# Patient Record
Sex: MALE | Race: BLACK OR AFRICAN AMERICAN | NOT HISPANIC OR LATINO | Employment: OTHER | URBAN - METROPOLITAN AREA
[De-identification: names, ages, dates, MRNs, and addresses within clinical notes are randomized per-mention and may not be internally consistent; named-entity substitution may affect disease eponyms.]

---

## 2017-01-11 ENCOUNTER — OFFICE VISIT (OUTPATIENT)
Dept: NEUROSURGERY | Facility: CLINIC | Age: 82
End: 2017-01-11
Payer: MEDICARE

## 2017-01-11 VITALS
HEART RATE: 69 BPM | TEMPERATURE: 98 F | SYSTOLIC BLOOD PRESSURE: 159 MMHG | WEIGHT: 152 LBS | DIASTOLIC BLOOD PRESSURE: 79 MMHG | HEIGHT: 62 IN | BODY MASS INDEX: 27.97 KG/M2

## 2017-01-11 DIAGNOSIS — G91.2 NPH (NORMAL PRESSURE HYDROCEPHALUS): Primary | ICD-10-CM

## 2017-01-11 PROCEDURE — 99213 OFFICE O/P EST LOW 20 MIN: CPT | Mod: S$GLB,,, | Performed by: NEUROLOGICAL SURGERY

## 2017-01-11 PROCEDURE — 1160F RVW MEDS BY RX/DR IN RCRD: CPT | Mod: S$GLB,,, | Performed by: NEUROLOGICAL SURGERY

## 2017-01-11 PROCEDURE — 1159F MED LIST DOCD IN RCRD: CPT | Mod: S$GLB,,, | Performed by: NEUROLOGICAL SURGERY

## 2017-01-11 PROCEDURE — 1126F AMNT PAIN NOTED NONE PRSNT: CPT | Mod: S$GLB,,, | Performed by: NEUROLOGICAL SURGERY

## 2017-01-11 PROCEDURE — 1157F ADVNC CARE PLAN IN RCRD: CPT | Mod: S$GLB,,, | Performed by: NEUROLOGICAL SURGERY

## 2017-01-11 PROCEDURE — 99999 PR PBB SHADOW E&M-EST. PATIENT-LVL III: CPT | Mod: PBBFAC,,, | Performed by: NEUROLOGICAL SURGERY

## 2017-01-11 NOTE — PROGRESS NOTES
"Subjective:   ISamantha, am scribing for, and in the presence of, Dr. Swapnil Sam.     Patient ID: Atif Neves is a 88 y.o. male.    Chief Complaint: Follow-up    HPI This is an 88-year-old man who presents today for follow up after NM Cisternogram. He reports continued difficulty with imbalance and memory loss since last office visit.     He presents today with a walker.     Review of Systems   Constitutional: Positive for activity change. Negative for fatigue and fever.   HENT: Negative for facial swelling.    Eyes: Negative.    Respiratory: Negative.    Cardiovascular: Negative.    Gastrointestinal: Negative for diarrhea, nausea and vomiting.   Genitourinary: Negative.    Musculoskeletal: Positive for back pain. Negative for joint swelling and myalgias.   Neurological: Negative for seizures, weakness, numbness and headaches.        Imbalance  Memory loss   Psychiatric/Behavioral: Negative.        Past Medical History   Diagnosis Date    JONATAN (acute kidney injury) 06/2016    Anxiety     Arthritis     Glaucoma     Hyperlipemia     Hypertension     Irregular heartbeat     Macular degeneration     Normal pressure hydrocephalus     Urinary retention 06/2016    UTI (urinary tract infection)      Objective:       Visit Vitals    BP (!) 159/79    Pulse 69    Temp 97.8 °F (36.6 °C)    Ht 5' 2" (1.575 m)    Wt 68.9 kg (152 lb)    BMI 27.8 kg/m2     Physical Exam   Constitutional: He is oriented to person, place, and time. He appears well-developed and well-nourished.   HENT:   Head: Normocephalic and atraumatic.   Neck: Neck supple.   Neurological: He is alert and oriented to person, place, and time. No cranial nerve deficit. He displays a negative Romberg sign. GCS eye subscore is 4. GCS verbal subscore is 5. GCS motor subscore is 6.       Imaging:  NM Cisternogram, date 12/15/2016, shows evidence of normal pressure hydrocephalus.    I have personally reviewed the images with the pt.      Dr RANDA. " Swapnil Sam, personally performed the services described in this documentation as scribed by Samantha Meyers in my presence, and it is both accurate and complete.  Assessment:       Normal pressure hydrocephalus.    Plan:   I have reviewed the NM Cisternogram with the patient, which shows evidence of normal pressure hydrocephalus. I have explained the indications of this diagnosis, including imbalance, urinary incontinence, and memory loss. I have recommended implantation of a ventriculoperitoneal shunt for treatment of the normal pressure hydrocephalus. I have explained that this is a relatively safe procedure for healthy individuals and would require an overnight stay in the hospital. The patient would like to further consider this surgery and discuss with his family. I will schedule him follow up in 2 weeks for further discussion.

## 2017-01-11 NOTE — PATIENT INSTRUCTIONS
I have reviewed the NM Cisternogram with the patient, which shows evidence of normal pressure hydrocephalus. I have explained the indications of this diagnosis, including imbalance, urinary incontinence, and memory loss. I have recommended implantation of a ventriculoperitoneal shunt for treatment of the normal pressure hydrocephalus. I have explained that this is a relatively safe procedure for healthy individuals and would require an overnight stay in the hospital. The patient would like to further consider this surgery and discuss with his family. I will schedule him follow up in 2 weeks for further discussion.

## 2017-01-11 NOTE — MR AVS SNAPSHOT
[facility] - Neurosurgery 7th Fl  [address]  [facility] [address]  Phone: [phone]                  Atif Neves   2017 2:45 PM   Office Visit    Description:  Male : 1928   Provider:  Swapnil Sam MD   Department:  [facility] - Neurosurgery 7th Fl           Reason for Visit     Follow-up                To Do List           Future Appointments        Provider Department Dept Phone    2017 10:20 AM Jose Lopez MD [facility] [phone]      Goals (5 Years of Data)     None      [facility]     [facility] -  Assistance  Registered nurses in the [facility] Center provide clinical advisement, health education, appointment booking, and other advisory services.  Call for this free service at [phone].             Medications           Message regarding Medications     Verify the changes and/or additions to your medication regime listed below are the same as discussed with your clinician today.  If any of these changes or additions are incorrect, please notify your healthcare provider.             Verify that the below list of medications is an accurate representation of the medications you are currently taking.  If none reported, the list may be blank. If incorrect, please contact your healthcare provider. Carry this list with you in case of emergency.           Current Medications     amlodipine (NORVASC) 5 MG tablet Take 1 tablet (5 mg total) by mouth once daily.    atorvastatin (LIPITOR) 20 MG tablet Take 20 mg by mouth once daily.    citalopram (CELEXA) 10 MG tablet Take 10 mg by mouth once daily.    dorzolamide-timolol 2-0.5% (COSOPT) 22.3-6.8 mg/mL ophthalmic solution Place 1 drop into the left eye once daily.    ergocalciferol (ERGOCALCIFEROL) 50,000 unit Cap TK ONE C     PO ONCE PER MONTH    polyethylene glycol (GLYCOLAX) 17 gram/dose powder Take 17 g by mouth 2 (two) times daily. Adjust frequency for one small bowel  "movement a day    tamsulosin (FLOMAX) 0.4 mg Cp24 TK ONE C     PO 30 MINUTES AFTER THE SAME MEAL QD           Clinical Reference Information           Vital Signs - Last Recorded  Most recent update: 1/11/2017  2:53 PM by Epifanio Henriquez MA    BP Pulse Temp Ht Wt BMI    (!) 159/79 69 97.8 °F (36.6 °C) 5' 2" (1.575 m) 68.9 kg (152 lb) 27.8 kg/m2      Blood Pressure          Most Recent Value    BP  (!)  159/79      Allergies as of 1/11/2017     Aspirin      Immunizations Administered on Date of Encounter - 1/11/2017     None      Instructions    I have reviewed the NM Cisternogram with the patient, which shows evidence of normal pressure hydrocephalus. I have explained the indications of this diagnosis, including imbalance, urinary incontinence, and memory loss. I have recommended implantation of a ventriculoperitoneal shunt for treatment of the normal pressure hydrocephalus. I have explained that this is a relatively safe procedure for healthy individuals and would require an overnight stay in the hospital. The patient would like to further consider this surgery and discuss with his family. I will schedule him follow up in 2 weeks for further discussion.        "

## 2017-01-11 NOTE — LETTER
January 18, 2017      Atif Christian MD  [address]  [place] [address]           [facility] - Neurosurgery 7th Fl  [address]  [place] [address]  Phone: [phone]          Patient: Atif Neves   MR Number: [id number]   YOB: 1928   Date of Visit: 1/11/2017       Dear Dr. Atif Christian:    Thank you for referring Atif Neves to me for evaluation. Attached you will find relevant portions of my assessment and plan of care.    If you have questions, please do not hesitate to call me. I look forward to following Atif Neves along with you.    Sincerely,    Swapnil Sam MD    Enclosure  CC:  No Recipients    If you would like to receive this communication electronically, please contact [email or web address] or [phone] to request more information on [employer] Link access.    For providers and/or their staff who would like to refer a patient to [facility], please contact us through our one-stop-shop provider referral line, [facility] , at [phone].    If you feel you have received this communication in error or would no longer like to receive these types of communications, please e-mail [email or web address]

## 2017-01-23 RX ORDER — CIPROFLOXACIN 250 MG/1
TABLET, FILM COATED ORAL
Qty: 20 TABLET | Refills: 0 | OUTPATIENT
Start: 2017-01-23

## 2017-01-25 ENCOUNTER — OFFICE VISIT (OUTPATIENT)
Dept: NEUROSURGERY | Facility: CLINIC | Age: 82
End: 2017-01-25
Payer: MEDICARE

## 2017-01-25 ENCOUNTER — OFFICE VISIT (OUTPATIENT)
Dept: UROLOGY | Facility: CLINIC | Age: 82
End: 2017-01-25
Attending: UROLOGY
Payer: MEDICARE

## 2017-01-25 ENCOUNTER — TELEPHONE (OUTPATIENT)
Dept: NEUROSURGERY | Facility: CLINIC | Age: 82
End: 2017-01-25

## 2017-01-25 VITALS
HEIGHT: 62 IN | SYSTOLIC BLOOD PRESSURE: 147 MMHG | BODY MASS INDEX: 27.97 KG/M2 | WEIGHT: 152 LBS | HEART RATE: 80 BPM | DIASTOLIC BLOOD PRESSURE: 74 MMHG

## 2017-01-25 VITALS
HEART RATE: 70 BPM | WEIGHT: 156.31 LBS | BODY MASS INDEX: 28.76 KG/M2 | HEIGHT: 62 IN | DIASTOLIC BLOOD PRESSURE: 75 MMHG | SYSTOLIC BLOOD PRESSURE: 152 MMHG

## 2017-01-25 DIAGNOSIS — G91.2 NPH (NORMAL PRESSURE HYDROCEPHALUS): Primary | ICD-10-CM

## 2017-01-25 DIAGNOSIS — N40.1 BENIGN PROSTATIC HYPERPLASIA WITH LOWER URINARY TRACT SYMPTOMS, UNSPECIFIED MORPHOLOGY: Primary | ICD-10-CM

## 2017-01-25 PROCEDURE — 1157F ADVNC CARE PLAN IN RCRD: CPT | Mod: S$GLB,,, | Performed by: NEUROLOGICAL SURGERY

## 2017-01-25 PROCEDURE — 1126F AMNT PAIN NOTED NONE PRSNT: CPT | Mod: S$GLB,,, | Performed by: NEUROLOGICAL SURGERY

## 2017-01-25 PROCEDURE — 1160F RVW MEDS BY RX/DR IN RCRD: CPT | Mod: S$GLB,,, | Performed by: NURSE PRACTITIONER

## 2017-01-25 PROCEDURE — 1157F ADVNC CARE PLAN IN RCRD: CPT | Mod: S$GLB,,, | Performed by: NURSE PRACTITIONER

## 2017-01-25 PROCEDURE — 99999 PR PBB SHADOW E&M-EST. PATIENT-LVL III: CPT | Mod: PBBFAC,,, | Performed by: UROLOGY

## 2017-01-25 PROCEDURE — 1159F MED LIST DOCD IN RCRD: CPT | Mod: S$GLB,,, | Performed by: NEUROLOGICAL SURGERY

## 2017-01-25 PROCEDURE — 1125F AMNT PAIN NOTED PAIN PRSNT: CPT | Mod: S$GLB,,, | Performed by: NURSE PRACTITIONER

## 2017-01-25 PROCEDURE — 99213 OFFICE O/P EST LOW 20 MIN: CPT | Mod: S$GLB,,, | Performed by: NEUROLOGICAL SURGERY

## 2017-01-25 PROCEDURE — 99213 OFFICE O/P EST LOW 20 MIN: CPT | Mod: S$GLB,,, | Performed by: NURSE PRACTITIONER

## 2017-01-25 PROCEDURE — 99999 PR PBB SHADOW E&M-EST. PATIENT-LVL III: CPT | Mod: PBBFAC,,, | Performed by: NEUROLOGICAL SURGERY

## 2017-01-25 PROCEDURE — 1160F RVW MEDS BY RX/DR IN RCRD: CPT | Mod: S$GLB,,, | Performed by: NEUROLOGICAL SURGERY

## 2017-01-25 PROCEDURE — 1159F MED LIST DOCD IN RCRD: CPT | Mod: S$GLB,,, | Performed by: NURSE PRACTITIONER

## 2017-01-25 NOTE — MR AVS SNAPSHOT
[facility] - Neurosurgery 7th Fl  [address]  [facility] [address]  Phone: [phone]                  Atif Neves   2017 1:00 PM   Office Visit    Description:  Male : 1928   Provider:  Swapnil Sam MD   Department:  [facility] - Neurosurgery [facility]                To Do List           Future Appointments        Provider Department Dept Phone    2017 3:45 PM [facility] MRI2 [facility] [phone]    2017 9:45 AM Jose Lopez MD [facility] [place] [phone]      Goals (5 Years of Data)     None      [facility]     [facility] -  Assistance  Registered nurses in the [facility] provide clinical advisement, health education, appointment booking, and other advisory services.  Call for this free service at [phone].             Medications           Message regarding Medications     Verify the changes and/or additions to your medication regime listed below are the same as discussed with your clinician today.  If any of these changes or additions are incorrect, please notify your healthcare provider.             Verify that the below list of medications is an accurate representation of the medications you are currently taking.  If none reported, the list may be blank. If incorrect, please contact your healthcare provider. Carry this list with you in case of emergency.           Current Medications     amlodipine (NORVASC) 5 MG tablet Take 1 tablet (5 mg total) by mouth once daily.    atorvastatin (LIPITOR) 20 MG tablet Take 20 mg by mouth once daily.    dorzolamide-timolol 2-0.5% (COSOPT) 22.3-6.8 mg/mL ophthalmic solution Place 1 drop into the left eye once daily.    ergocalciferol (ERGOCALCIFEROL) 50,000 unit Cap TK ONE C     PO ONCE PER MONTH    polyethylene glycol (GLYCOLAX) 17 gram/dose powder Take 17 g by mouth 2 (two) times daily. Adjust frequency for one small bowel movement a day    tamsulosin (FLOMAX)  "0.4 mg Cp24 TK ONE C     PO 30 MINUTES AFTER THE SAME MEAL QD    citalopram (CELEXA) 10 MG tablet Take 10 mg by mouth once daily.           Clinical Reference Information           Vital Signs - Last Recorded  Most recent update: 1/25/2017  1:06 PM by Anabel Edmonds RN    BP Pulse Ht Wt BMI    (!) 152/75 70 5' 2" (1.575 m) 70.9 kg (156 lb 4.8 oz) 28.59 kg/m2      Blood Pressure          Most Recent Value    BP  (!)  152/75      Allergies as of 1/25/2017     Aspirin      Immunizations Administered on Date of Encounter - 1/25/2017     None      Instructions    At last visit, I had recommended implantation of a ventriculoperitoneal shunt for treatment of the normal pressure hydrocephalus. At this point, the patient would like to proceed with the surgery. I have explained the timeline of the surgery and its mechanism. I also discussed the timeline of recovery and home health with Physical Therapy. I have discussed the risks/benefits, indications, and alternatives for the proposed procedure in detail.  I have answered all of their questions and the pt wishes to proceed with surgery. We will schedule the pt on February 6, 2017.       "

## 2017-01-25 NOTE — PATIENT INSTRUCTIONS
At last visit, I had recommended implantation of a ventriculoperitoneal shunt for treatment of the normal pressure hydrocephalus. At this point, the patient would like to proceed with the surgery. I have explained the timeline of the surgery and its mechanism. I also discussed the timeline of recovery and home health with Physical Therapy. I have discussed the risks/benefits, indications, and alternatives for the proposed procedure in detail.  I have answered all of their questions and the pt wishes to proceed with surgery. We will schedule the pt on February 6, 2017.

## 2017-01-25 NOTE — PROGRESS NOTES
"Subjective:      Atif Neves is a 88 y.o. male who returns today s/p TURP for urinary retention on 8/31/16. Today he reports that his stream is great, and he is emptying his bladder well. PVR today was 17 mL. He does complain of some stress incontinence and leaking; however he does not find this bothersome. He reports nocturia x2, but overall reports that he is doing well. He has discontinued flomax and finasteride.     The following portions of the patient's history were reviewed and updated as appropriate: allergies, current medications, past family history, past medical history, past social history, past surgical history and problem list.    Review of Systems  A comprehensive multipoint review of systems was negative except as otherwise stated in the HPI.     Objective:   Vitals:   Visit Vitals    BP (!) 147/74 (BP Location: Left arm, Patient Position: Sitting, BP Method: Automatic)    Pulse 80    Ht 5' 2" (1.575 m)    Wt 68.9 kg (152 lb)    BMI 27.8 kg/m2       Physical Exam   General: alert and oriented, no acute distress  Respiratory: Symmetric expansion, non-labored breathing  Cardiovascular: regular rate and rhythm, no peripheral edema  Abdomen: soft, non distended  Genitourinary: not done  not indicated  Rectal: not done  Skin: normal coloration and turgor, no rashes, no suspicious skin lesions noted  Neuro: no gross deficits  Psych: normal judgment and insight, normal mood/affect and non-anxious    Lab Review   Urinalysis demonstrates positive for leukocytes (Trace), protein (trace)  Lab Results   Component Value Date    WBC 9.59 08/23/2016    HGB 13.3 (L) 08/23/2016    HCT 40.8 08/23/2016    MCV 82 08/23/2016     08/23/2016     Lab Results   Component Value Date    CREATININE 0.9 08/08/2016    BUN 10 08/08/2016     No results found for: PSA, PSADIAG    Assessment and Plan:   Atif was seen today for follow-up.    Diagnoses and all orders for this visit:    Benign prostatic hyperplasia with " lower urinary tract symptoms, unspecified morphology      Plan: Patient is doing well postoperatively. Follow up in 6 months. If patient becomes bothered by the leakage/incontinence will consider adding myrbetriq.

## 2017-01-25 NOTE — PROGRESS NOTES
"Subjective:    I, Samantha Meyers, am scribing for, and in the presence of, Dr. Swapnil Sam.     Patient ID: Atif Neves is a 88 y.o. male.    Chief Complaint: No chief complaint on file.    HPI This is a 88-year-old man who presents today for follow up to further discuss surgery. At last office visit, I had recommended implantation of a ventriculoperitoneal shunt for treatment of the normal pressure hydrocephalus and he wanted to further consider this. The patient states that he is doing well today. He was seen by Dr. Lopez of Urology this morning.     Review of Systems   Constitutional: Positive for activity change. Negative for fatigue and fever.   HENT: Negative for facial swelling.    Eyes: Negative.    Respiratory: Negative.    Cardiovascular: Negative.    Gastrointestinal: Negative for diarrhea, nausea and vomiting.   Genitourinary: Negative.    Musculoskeletal: Positive for back pain. Negative for joint swelling and myalgias.   Neurological: Negative for seizures, weakness, numbness and headaches.        Imbalance   Memory loss    Psychiatric/Behavioral: Negative.        Past Medical History   Diagnosis Date    JONATAN (acute kidney injury) 06/2016    Anxiety     Arthritis     Glaucoma     Hyperlipemia     Hypertension     Irregular heartbeat     Macular degeneration     Normal pressure hydrocephalus     Urinary retention 06/2016    UTI (urinary tract infection)      Objective:       Visit Vitals    BP (!) 152/75    Pulse 70    Ht 5' 2" (1.575 m)    Wt 70.9 kg (156 lb 4.8 oz)    BMI 28.59 kg/m2     Physical Exam   Constitutional: He is oriented to person, place, and time. He appears well-developed and well-nourished.   HENT:   Head: Normocephalic and atraumatic.   Neck: Neck supple.   Neurological: He is alert and oriented to person, place, and time. No cranial nerve deficit. He displays a negative Romberg sign. GCS eye subscore is 4. GCS verbal subscore is 5. GCS motor subscore is 6.       I, " Dr. Swapnil Sam, personally performed the services described in this documentation as scribed by Samantha Meyers in my presence, and it is both accurate and complete.  Assessment:       Normal pressure hydrocephalus.    Plan:   At last visit, I had recommended implantation of a ventriculoperitoneal shunt for treatment of the normal pressure hydrocephalus. At this point, the patient would like to proceed with the surgery. I have explained the timeline of the surgery and its mechanism. I also discussed the timeline of recovery and home health with Physical Therapy. I have discussed the risks/benefits, indications, and alternatives for the proposed procedure in detail.  I have answered all of their questions and the pt wishes to proceed with surgery. We will schedule the pt on February 6, 2017.

## 2017-01-25 NOTE — LETTER
January 27, 2017      Atif Christian MD  [address]  [place] [address]           [facility] - Neurosurgery 7th Fl  [address]  [place] [address]  Phone: [phone]          Patient: Atif Neves   MR Number: [id number]   YOB: 1928   Date of Visit: 1/25/2017       Dear Dr. Atif Christian:    Thank you for referring Atif Neves to me for evaluation. Attached you will find relevant portions of my assessment and plan of care.    If you have questions, please do not hesitate to call me. I look forward to following Atif Neves along with you.    Sincerely,    Swapnil Sam MD    Enclosure  CC:  No Recipients    If you would like to receive this communication electronically, please contact [email or web address] or [phone] to request more information on [facility] Link access.    For providers and/or their staff who would like to refer a patient to [facility], please contact us through our one-stop-shop provider referral line, [facility] , at [phone].    If you feel you have received this communication in error or would no longer like to receive these types of communications, please e-mail [email or web address]

## 2017-01-26 ENCOUNTER — TELEPHONE (OUTPATIENT)
Dept: NEUROSURGERY | Facility: CLINIC | Age: 82
End: 2017-01-26

## 2017-01-26 DIAGNOSIS — G91.2 NPH (NORMAL PRESSURE HYDROCEPHALUS): Primary | ICD-10-CM

## 2017-02-02 ENCOUNTER — TELEPHONE (OUTPATIENT)
Dept: NEUROSURGERY | Facility: CLINIC | Age: 82
End: 2017-02-02

## 2017-02-02 ENCOUNTER — HOSPITAL ENCOUNTER (OUTPATIENT)
Dept: RADIOLOGY | Facility: HOSPITAL | Age: 82
Discharge: HOME | End: 2017-02-02
Attending: NEUROLOGICAL SURGERY
Payer: MEDICARE

## 2017-02-02 DIAGNOSIS — G91.2 NPH (NORMAL PRESSURE HYDROCEPHALUS): ICD-10-CM

## 2017-02-02 DIAGNOSIS — D49.6 BRAIN TUMOR: ICD-10-CM

## 2017-02-02 LAB
CREAT SERPL-MCNC: 0.8 MG/DL (ref 0.5–1.4)
SAMPLE: NORMAL

## 2017-02-02 PROCEDURE — 70552 MRI BRAIN STEM W/DYE: CPT | Mod: 26,,, | Performed by: RADIOLOGY

## 2017-02-02 PROCEDURE — 25500020 PHARM REV CODE 255: Performed by: NEUROLOGICAL SURGERY

## 2017-02-02 PROCEDURE — A9585 GADOBUTROL INJECTION: HCPCS | Performed by: NEUROLOGICAL SURGERY

## 2017-02-02 PROCEDURE — 70553 MRI BRAIN STEM W/O & W/DYE: CPT | Mod: 26,,, | Performed by: RADIOLOGY

## 2017-02-02 PROCEDURE — 70552 MRI BRAIN STEM W/DYE: CPT | Mod: TC

## 2017-02-02 PROCEDURE — 70553 MRI BRAIN STEM W/O & W/DYE: CPT | Mod: TC

## 2017-02-02 RX ORDER — GADOBUTROL 604.72 MG/ML
7 INJECTION INTRAVENOUS
Status: COMPLETED | OUTPATIENT
Start: 2017-02-02 | End: 2017-02-02

## 2017-02-02 RX ADMIN — GADOBUTROL 7 ML: 604.72 INJECTION INTRAVENOUS at 05:02

## 2017-02-02 NOTE — TELEPHONE ENCOUNTER
----- Message from Jacqueline Garcia sent at 2/2/2017 12:47 PM CST -----  Contact: colleen(daughter) [phone]  Colleen is calling to speak with nurse regarding pt mri appt.pls call

## 2017-02-03 ENCOUNTER — PATIENT MESSAGE (OUTPATIENT)
Dept: NEUROSURGERY | Facility: CLINIC | Age: 82
End: 2017-02-03

## 2017-02-03 ENCOUNTER — TELEPHONE (OUTPATIENT)
Dept: NEUROSURGERY | Facility: CLINIC | Age: 82
End: 2017-02-03

## 2017-02-03 NOTE — TELEPHONE ENCOUNTER
Spoke with patient's sister. Advised her that patient is to arrive for surgery time at 830, DOSC, NPO after MN on Sunday. Understanding verbalized by patient.

## 2017-02-05 NOTE — H&P
"HPI This is a 88-year-old man who presents today for follow up to further discuss surgery. At last office visit, I had recommended implantation of a ventriculoperitoneal shunt for treatment of the normal pressure hydrocephalus and he wanted to further consider this. The patient states that he is doing well today. He was seen by Dr. Lopez of Urology this morning.      Review of Systems   Constitutional: Positive for activity change. Negative for fatigue and fever.   HENT: Negative for facial swelling.   Eyes: Negative.   Respiratory: Negative.   Cardiovascular: Negative.   Gastrointestinal: Negative for diarrhea, nausea and vomiting.   Genitourinary: Negative.   Musculoskeletal: Positive for back pain. Negative for joint swelling and myalgias.   Neurological: Negative for seizures, weakness, numbness and headaches.   Imbalance   Memory loss    Psychiatric/Behavioral: Negative.            Past Medical History   Diagnosis Date    JONATAN (acute kidney injury) 06/2016    Anxiety      Arthritis      Glaucoma      Hyperlipemia      Hypertension      Irregular heartbeat      Macular degeneration      Normal pressure hydrocephalus      Urinary retention 06/2016    UTI (urinary tract infection)        Objective:            Visit Vitals    BP (!) 152/75    Pulse 70    Ht 5' 2" (1.575 m)    Wt 70.9 kg (156 lb 4.8 oz)    BMI 28.59 kg/m2      Physical Exam   Constitutional: He is oriented to person, place, and time. He appears well-developed and well-nourished.   HENT:   Head: Normocephalic and atraumatic.   Neck: Neck supple.   Neurological: He is alert and oriented to person, place, and time. No cranial nerve deficit. He displays a negative Romberg sign. GCS eye subscore is 4. GCS verbal subscore is 5. GCS motor subscore is 6.       I, Dr. Swapnil Sam, personally performed the services described in this documentation as scribed by Samantha Meyers in my presence, and it is both accurate and complete.  Assessment:     "   Normal pressure hydrocephalus.     Plan:   At last visit, I had recommended implantation of a ventriculoperitoneal shunt for treatment of the normal pressure hydrocephalus. At this point, the patient would like to proceed with the surgery. I have explained the timeline of the surgery and its mechanism. I also discussed the timeline of recovery and home health with Physical Therapy. I have discussed the risks/benefits, indications, and alternatives for the proposed procedure in detail. I have answered all of their questions and the pt wishes to proceed with surgery. We will schedule the pt on February 6, 2017.

## 2017-02-06 ENCOUNTER — ANESTHESIA (OUTPATIENT)
Dept: SURGERY | Facility: HOSPITAL | Age: 82
DRG: 033 | End: 2017-02-06
Payer: MEDICARE

## 2017-02-06 ENCOUNTER — ANESTHESIA EVENT (OUTPATIENT)
Dept: SURGERY | Facility: HOSPITAL | Age: 82
DRG: 033 | End: 2017-02-06
Payer: MEDICARE

## 2017-02-06 ENCOUNTER — SURGERY (OUTPATIENT)
Age: 82
End: 2017-02-06

## 2017-02-06 ENCOUNTER — HOSPITAL ENCOUNTER (INPATIENT)
Facility: HOSPITAL | Age: 82
LOS: 1 days | Discharge: HOME HEALTH | DRG: 033 | End: 2017-02-07
Attending: NEUROLOGICAL SURGERY | Admitting: NEUROLOGICAL SURGERY
Payer: MEDICARE

## 2017-02-06 DIAGNOSIS — G91.2 NPH (NORMAL PRESSURE HYDROCEPHALUS): ICD-10-CM

## 2017-02-06 LAB
ABO + RH BLD: NORMAL
ANION GAP SERPL CALC-SCNC: 7 MMOL/L
APTT BLDCRRT: 23.1 SEC
BASOPHILS # BLD AUTO: 0.03 K/UL
BASOPHILS NFR BLD: 0.4 %
BLD GP AB SCN CELLS X3 SERPL QL: NORMAL
BUN SERPL-MCNC: 12 MG/DL
CALCIUM SERPL-MCNC: 8.7 MG/DL
CHLORIDE SERPL-SCNC: 111 MMOL/L
CO2 SERPL-SCNC: 24 MMOL/L
CREAT SERPL-MCNC: 0.8 MG/DL
DIFFERENTIAL METHOD: ABNORMAL
EOSINOPHIL # BLD AUTO: 0.1 K/UL
EOSINOPHIL NFR BLD: 1.3 %
ERYTHROCYTE [DISTWIDTH] IN BLOOD BY AUTOMATED COUNT: 16.1 %
EST. GFR  (AFRICAN AMERICAN): >60 ML/MIN/1.73 M^2
EST. GFR  (NON AFRICAN AMERICAN): >60 ML/MIN/1.73 M^2
GLUCOSE SERPL-MCNC: 94 MG/DL
HCT VFR BLD AUTO: 36.5 %
HGB BLD-MCNC: 12 G/DL
INR PPP: 0.9
LYMPHOCYTES # BLD AUTO: 1.4 K/UL
LYMPHOCYTES NFR BLD: 19 %
MCH RBC QN AUTO: 25.7 PG
MCHC RBC AUTO-ENTMCNC: 32.9 %
MCV RBC AUTO: 78 FL
MONOCYTES # BLD AUTO: 0.6 K/UL
MONOCYTES NFR BLD: 8 %
NEUTROPHILS # BLD AUTO: 5 K/UL
NEUTROPHILS NFR BLD: 71.2 %
PLATELET # BLD AUTO: 217 K/UL
PMV BLD AUTO: 9.7 FL
POTASSIUM SERPL-SCNC: 3.9 MMOL/L
PROTHROMBIN TIME: 10.2 SEC
RBC # BLD AUTO: 4.67 M/UL
SODIUM SERPL-SCNC: 142 MMOL/L
WBC # BLD AUTO: 7.09 K/UL

## 2017-02-06 PROCEDURE — 63600175 PHARM REV CODE 636 W HCPCS: Performed by: STUDENT IN AN ORGANIZED HEALTH CARE EDUCATION/TRAINING PROGRAM

## 2017-02-06 PROCEDURE — 86900 BLOOD TYPING SEROLOGIC ABO: CPT

## 2017-02-06 PROCEDURE — 63600175 PHARM REV CODE 636 W HCPCS: Performed by: ANESTHESIOLOGY

## 2017-02-06 PROCEDURE — 63600175 PHARM REV CODE 636 W HCPCS: Performed by: NURSE ANESTHETIST, CERTIFIED REGISTERED

## 2017-02-06 PROCEDURE — 80048 BASIC METABOLIC PNL TOTAL CA: CPT

## 2017-02-06 PROCEDURE — 27201423 OPTIME MED/SURG SUP & DEVICES STERILE SUPPLY: Performed by: NEUROLOGICAL SURGERY

## 2017-02-06 PROCEDURE — 85730 THROMBOPLASTIN TIME PARTIAL: CPT

## 2017-02-06 PROCEDURE — 62223 ESTABLISH BRAIN CAVITY SHUNT: CPT | Mod: 62,,, | Performed by: SURGERY

## 2017-02-06 PROCEDURE — 71000039 HC RECOVERY, EACH ADD'L HOUR: Performed by: NEUROLOGICAL SURGERY

## 2017-02-06 PROCEDURE — 37000009 HC ANESTHESIA EA ADD 15 MINS: Performed by: NEUROLOGICAL SURGERY

## 2017-02-06 PROCEDURE — 36000711: Performed by: NEUROLOGICAL SURGERY

## 2017-02-06 PROCEDURE — 86850 RBC ANTIBODY SCREEN: CPT

## 2017-02-06 PROCEDURE — 37000008 HC ANESTHESIA 1ST 15 MINUTES: Performed by: NEUROLOGICAL SURGERY

## 2017-02-06 PROCEDURE — 94761 N-INVAS EAR/PLS OXIMETRY MLT: CPT

## 2017-02-06 PROCEDURE — 25000003 PHARM REV CODE 250: Performed by: NURSE ANESTHETIST, CERTIFIED REGISTERED

## 2017-02-06 PROCEDURE — 61781 SCAN PROC CRANIAL INTRA: CPT | Mod: ,,, | Performed by: NEUROLOGICAL SURGERY

## 2017-02-06 PROCEDURE — 25000003 PHARM REV CODE 250: Performed by: NEUROLOGICAL SURGERY

## 2017-02-06 PROCEDURE — C1729 CATH, DRAINAGE: HCPCS | Performed by: NEUROLOGICAL SURGERY

## 2017-02-06 PROCEDURE — 25000003 PHARM REV CODE 250: Performed by: STUDENT IN AN ORGANIZED HEALTH CARE EDUCATION/TRAINING PROGRAM

## 2017-02-06 PROCEDURE — 63600175 PHARM REV CODE 636 W HCPCS: Performed by: NEUROLOGICAL SURGERY

## 2017-02-06 PROCEDURE — 25000003 PHARM REV CODE 250: Performed by: SURGERY

## 2017-02-06 PROCEDURE — 85610 PROTHROMBIN TIME: CPT

## 2017-02-06 PROCEDURE — D9220A PRA ANESTHESIA: Mod: CRNA,,, | Performed by: NURSE ANESTHETIST, CERTIFIED REGISTERED

## 2017-02-06 PROCEDURE — 71000033 HC RECOVERY, INTIAL HOUR: Performed by: NEUROLOGICAL SURGERY

## 2017-02-06 PROCEDURE — 00160J6 BYPASS CEREBRAL VENTRICLE TO PERITONEAL CAVITY WITH SYNTHETIC SUBSTITUTE, OPEN APPROACH: ICD-10-PCS | Performed by: NEUROLOGICAL SURGERY

## 2017-02-06 PROCEDURE — 27000221 HC OXYGEN, UP TO 24 HOURS

## 2017-02-06 PROCEDURE — 27800903 OPTIME MED/SURG SUP & DEVICES OTHER IMPLANTS: Performed by: NEUROLOGICAL SURGERY

## 2017-02-06 PROCEDURE — D9220A PRA ANESTHESIA: Mod: ANES,,, | Performed by: ANESTHESIOLOGY

## 2017-02-06 PROCEDURE — 85025 COMPLETE CBC W/AUTO DIFF WBC: CPT

## 2017-02-06 PROCEDURE — 86920 COMPATIBILITY TEST SPIN: CPT

## 2017-02-06 PROCEDURE — 62223 ESTABLISH BRAIN CAVITY SHUNT: CPT | Mod: 62,,, | Performed by: NEUROLOGICAL SURGERY

## 2017-02-06 PROCEDURE — 20600001 HC STEP DOWN PRIVATE ROOM

## 2017-02-06 PROCEDURE — 36000710: Performed by: NEUROLOGICAL SURGERY

## 2017-02-06 DEVICE — KIT CATH WITH BACTISEAL: Type: IMPLANTABLE DEVICE | Site: ABDOMEN | Status: FUNCTIONAL

## 2017-02-06 DEVICE — CATH VENTRICULAR INNERVISION: Type: IMPLANTABLE DEVICE | Site: CRANIAL | Status: FUNCTIONAL

## 2017-02-06 DEVICE — VALVE CHPU PROGRAMMABLE: Type: IMPLANTABLE DEVICE | Site: CRANIAL | Status: FUNCTIONAL

## 2017-02-06 RX ORDER — SODIUM CHLORIDE 9 MG/ML
INJECTION, SOLUTION INTRAVENOUS CONTINUOUS
Status: DISCONTINUED | OUTPATIENT
Start: 2017-02-06 | End: 2017-02-07 | Stop reason: HOSPADM

## 2017-02-06 RX ORDER — TIMOLOL MALEATE 5 MG/ML
1 SOLUTION/ DROPS OPHTHALMIC DAILY
Status: DISCONTINUED | OUTPATIENT
Start: 2017-02-06 | End: 2017-02-07 | Stop reason: HOSPADM

## 2017-02-06 RX ORDER — ONDANSETRON 2 MG/ML
4 INJECTION INTRAMUSCULAR; INTRAVENOUS EVERY 12 HOURS PRN
Status: DISCONTINUED | OUTPATIENT
Start: 2017-02-06 | End: 2017-02-07 | Stop reason: HOSPADM

## 2017-02-06 RX ORDER — DORZOLAMIDE HYDROCHLORIDE AND TIMOLOL MALEATE 20; 5 MG/ML; MG/ML
1 SOLUTION/ DROPS OPHTHALMIC DAILY
Status: DISCONTINUED | OUTPATIENT
Start: 2017-02-06 | End: 2017-02-06 | Stop reason: CLARIF

## 2017-02-06 RX ORDER — ACETAMINOPHEN 325 MG/1
650 TABLET ORAL EVERY 6 HOURS PRN
Status: DISCONTINUED | OUTPATIENT
Start: 2017-02-06 | End: 2017-02-07 | Stop reason: HOSPADM

## 2017-02-06 RX ORDER — HYDROCODONE BITARTRATE AND ACETAMINOPHEN 5; 325 MG/1; MG/1
1 TABLET ORAL EVERY 4 HOURS PRN
Status: DISCONTINUED | OUTPATIENT
Start: 2017-02-06 | End: 2017-02-07 | Stop reason: HOSPADM

## 2017-02-06 RX ORDER — GLYCOPYRROLATE 0.2 MG/ML
INJECTION INTRAMUSCULAR; INTRAVENOUS
Status: DISCONTINUED | OUTPATIENT
Start: 2017-02-06 | End: 2017-02-06

## 2017-02-06 RX ORDER — FENTANYL CITRATE 50 UG/ML
INJECTION, SOLUTION INTRAMUSCULAR; INTRAVENOUS
Status: DISCONTINUED | OUTPATIENT
Start: 2017-02-06 | End: 2017-02-06 | Stop reason: HOSPADM

## 2017-02-06 RX ORDER — LIDOCAINE HYDROCHLORIDE AND EPINEPHRINE 10; 10 MG/ML; UG/ML
INJECTION, SOLUTION INFILTRATION; PERINEURAL
Status: DISCONTINUED | OUTPATIENT
Start: 2017-02-06 | End: 2017-02-06 | Stop reason: HOSPADM

## 2017-02-06 RX ORDER — ONDANSETRON 2 MG/ML
INJECTION INTRAMUSCULAR; INTRAVENOUS
Status: DISCONTINUED | OUTPATIENT
Start: 2017-02-06 | End: 2017-02-06

## 2017-02-06 RX ORDER — POLYETHYLENE GLYCOL 3350 17 G/17G
17 POWDER, FOR SOLUTION ORAL DAILY
Status: DISCONTINUED | OUTPATIENT
Start: 2017-02-06 | End: 2017-02-07 | Stop reason: HOSPADM

## 2017-02-06 RX ORDER — BACITRACIN ZINC 500 UNIT/G
OINTMENT (GRAM) TOPICAL
Status: DISCONTINUED | OUTPATIENT
Start: 2017-02-06 | End: 2017-02-06 | Stop reason: HOSPADM

## 2017-02-06 RX ORDER — ATORVASTATIN CALCIUM 20 MG/1
20 TABLET, FILM COATED ORAL DAILY
Status: DISCONTINUED | OUTPATIENT
Start: 2017-02-06 | End: 2017-02-07 | Stop reason: HOSPADM

## 2017-02-06 RX ORDER — NEOSTIGMINE METHYLSULFATE 1 MG/ML
INJECTION, SOLUTION INTRAVENOUS
Status: DISCONTINUED | OUTPATIENT
Start: 2017-02-06 | End: 2017-02-06

## 2017-02-06 RX ORDER — TAMSULOSIN HYDROCHLORIDE 0.4 MG/1
0.4 CAPSULE ORAL DAILY
Status: DISCONTINUED | OUTPATIENT
Start: 2017-02-06 | End: 2017-02-07 | Stop reason: HOSPADM

## 2017-02-06 RX ORDER — BUPIVACAINE HYDROCHLORIDE 2.5 MG/ML
INJECTION, SOLUTION EPIDURAL; INFILTRATION; INTRACAUDAL
Status: DISCONTINUED | OUTPATIENT
Start: 2017-02-06 | End: 2017-02-06 | Stop reason: HOSPADM

## 2017-02-06 RX ORDER — AMLODIPINE BESYLATE 5 MG/1
5 TABLET ORAL DAILY
Status: DISCONTINUED | OUTPATIENT
Start: 2017-02-06 | End: 2017-02-07 | Stop reason: HOSPADM

## 2017-02-06 RX ORDER — LIDOCAINE HCL/PF 100 MG/5ML
SYRINGE (ML) INTRAVENOUS
Status: DISCONTINUED | OUTPATIENT
Start: 2017-02-06 | End: 2017-02-06

## 2017-02-06 RX ORDER — ROCURONIUM BROMIDE 10 MG/ML
INJECTION, SOLUTION INTRAVENOUS
Status: DISCONTINUED | OUTPATIENT
Start: 2017-02-06 | End: 2017-02-06

## 2017-02-06 RX ORDER — FENTANYL CITRATE 50 UG/ML
25 INJECTION, SOLUTION INTRAMUSCULAR; INTRAVENOUS EVERY 5 MIN PRN
Status: DISCONTINUED | OUTPATIENT
Start: 2017-02-06 | End: 2017-02-06 | Stop reason: HOSPADM

## 2017-02-06 RX ORDER — ONDANSETRON 2 MG/ML
4 INJECTION INTRAMUSCULAR; INTRAVENOUS ONCE
Status: COMPLETED | OUTPATIENT
Start: 2017-02-06 | End: 2017-02-06

## 2017-02-06 RX ORDER — BACITRACIN 50000 [IU]/1
INJECTION, POWDER, FOR SOLUTION INTRAMUSCULAR
Status: DISCONTINUED | OUTPATIENT
Start: 2017-02-06 | End: 2017-02-06 | Stop reason: HOSPADM

## 2017-02-06 RX ORDER — CITALOPRAM 10 MG/1
10 TABLET ORAL DAILY
Status: DISCONTINUED | OUTPATIENT
Start: 2017-02-06 | End: 2017-02-07 | Stop reason: HOSPADM

## 2017-02-06 RX ORDER — DORZOLAMIDE HCL 20 MG/ML
1 SOLUTION/ DROPS OPHTHALMIC DAILY
Status: DISCONTINUED | OUTPATIENT
Start: 2017-02-06 | End: 2017-02-07 | Stop reason: HOSPADM

## 2017-02-06 RX ORDER — CEFAZOLIN SODIUM 2 G/50ML
2 SOLUTION INTRAVENOUS
Status: COMPLETED | OUTPATIENT
Start: 2017-02-06 | End: 2017-02-07

## 2017-02-06 RX ORDER — LIDOCAINE HYDROCHLORIDE 10 MG/ML
1 INJECTION, SOLUTION EPIDURAL; INFILTRATION; INTRACAUDAL; PERINEURAL ONCE
Status: COMPLETED | OUTPATIENT
Start: 2017-02-06 | End: 2017-02-06

## 2017-02-06 RX ORDER — MORPHINE SULFATE 2 MG/ML
2 INJECTION, SOLUTION INTRAMUSCULAR; INTRAVENOUS EVERY 4 HOURS PRN
Status: DISCONTINUED | OUTPATIENT
Start: 2017-02-06 | End: 2017-02-07 | Stop reason: HOSPADM

## 2017-02-06 RX ORDER — PANTOPRAZOLE SODIUM 40 MG/10ML
40 INJECTION, POWDER, LYOPHILIZED, FOR SOLUTION INTRAVENOUS
Status: DISCONTINUED | OUTPATIENT
Start: 2017-02-07 | End: 2017-02-07 | Stop reason: HOSPADM

## 2017-02-06 RX ORDER — BUPIVACAINE HYDROCHLORIDE AND EPINEPHRINE 5; 5 MG/ML; UG/ML
INJECTION, SOLUTION EPIDURAL; INTRACAUDAL; PERINEURAL
Status: DISCONTINUED | OUTPATIENT
Start: 2017-02-06 | End: 2017-02-06 | Stop reason: HOSPADM

## 2017-02-06 RX ORDER — PROPOFOL 10 MG/ML
VIAL (ML) INTRAVENOUS
Status: DISCONTINUED | OUTPATIENT
Start: 2017-02-06 | End: 2017-02-06

## 2017-02-06 RX ORDER — PHENYLEPHRINE HYDROCHLORIDE 10 MG/ML
INJECTION INTRAVENOUS
Status: DISCONTINUED | OUTPATIENT
Start: 2017-02-06 | End: 2017-02-06

## 2017-02-06 RX ADMIN — SODIUM CHLORIDE: 0.9 INJECTION, SOLUTION INTRAVENOUS at 10:02

## 2017-02-06 RX ADMIN — EPHEDRINE SULFATE 5 MG: 50 INJECTION, SOLUTION INTRAMUSCULAR; INTRAVENOUS; SUBCUTANEOUS at 12:02

## 2017-02-06 RX ADMIN — CEFTRIAXONE 2 G: 2 INJECTION, POWDER, FOR SOLUTION INTRAMUSCULAR; INTRAVENOUS at 12:02

## 2017-02-06 RX ADMIN — SODIUM CHLORIDE, SODIUM GLUCONATE, SODIUM ACETATE, POTASSIUM CHLORIDE, MAGNESIUM CHLORIDE, SODIUM PHOSPHATE, DIBASIC, AND POTASSIUM PHOSPHATE: .53; .5; .37; .037; .03; .012; .00082 INJECTION, SOLUTION INTRAVENOUS at 01:02

## 2017-02-06 RX ADMIN — TIMOLOL MALEATE 1 DROP: 5 SOLUTION OPHTHALMIC at 03:02

## 2017-02-06 RX ADMIN — LIDOCAINE HYDROCHLORIDE 0.2 MG: 10 INJECTION, SOLUTION EPIDURAL; INFILTRATION; INTRACAUDAL; PERINEURAL at 10:02

## 2017-02-06 RX ADMIN — AMLODIPINE BESYLATE 5 MG: 5 TABLET ORAL at 02:02

## 2017-02-06 RX ADMIN — BUPIVACAINE HYDROCHLORIDE AND EPINEPHRINE BITARTRATE 5 ML: 5; .0091 INJECTION, SOLUTION EPIDURAL; INTRACAUDAL; PERINEURAL at 12:02

## 2017-02-06 RX ADMIN — BACITRACIN ZINC 1 TUBE: 500 OINTMENT TOPICAL at 01:02

## 2017-02-06 RX ADMIN — PROPOFOL 60 MG: 10 INJECTION, EMULSION INTRAVENOUS at 11:02

## 2017-02-06 RX ADMIN — PHENYLEPHRINE HYDROCHLORIDE 200 MCG: 10 INJECTION INTRAVENOUS at 12:02

## 2017-02-06 RX ADMIN — DORZOLAMIDE HYDROCHLORIDE 1 DROP: 20 SOLUTION/ DROPS OPHTHALMIC at 03:02

## 2017-02-06 RX ADMIN — ONDANSETRON 4 MG: 2 INJECTION INTRAMUSCULAR; INTRAVENOUS at 02:02

## 2017-02-06 RX ADMIN — LIDOCAINE HYDROCHLORIDE AND EPINEPHRINE 5 ML: 10; 10 INJECTION, SOLUTION INFILTRATION; PERINEURAL at 12:02

## 2017-02-06 RX ADMIN — Medication 1 EACH: at 12:02

## 2017-02-06 RX ADMIN — LIDOCAINE HYDROCHLORIDE 80 MG: 20 INJECTION, SOLUTION INTRAVENOUS at 11:02

## 2017-02-06 RX ADMIN — PHENYLEPHRINE HYDROCHLORIDE 100 MCG: 10 INJECTION INTRAVENOUS at 12:02

## 2017-02-06 RX ADMIN — CEFAZOLIN SODIUM 2 G: 2 SOLUTION INTRAVENOUS at 10:02

## 2017-02-06 RX ADMIN — SODIUM CHLORIDE: 0.9 INJECTION, SOLUTION INTRAVENOUS at 02:02

## 2017-02-06 RX ADMIN — BUPIVACAINE HYDROCHLORIDE 2 ML: 2.5 INJECTION, SOLUTION EPIDURAL; INFILTRATION; INTRACAUDAL; PERINEURAL at 01:02

## 2017-02-06 RX ADMIN — NEOSTIGMINE METHYLSULFATE 4 MG: 1 INJECTION INTRAVENOUS at 01:02

## 2017-02-06 RX ADMIN — FENTANYL CITRATE 100 MCG: 50 INJECTION, SOLUTION INTRAMUSCULAR; INTRAVENOUS at 11:02

## 2017-02-06 RX ADMIN — ATORVASTATIN CALCIUM 20 MG: 20 TABLET, FILM COATED ORAL at 02:02

## 2017-02-06 RX ADMIN — GLYCOPYRROLATE 0.4 MG: 0.2 INJECTION, SOLUTION INTRAMUSCULAR; INTRAVENOUS at 01:02

## 2017-02-06 RX ADMIN — ONDANSETRON 4 MG: 2 INJECTION INTRAMUSCULAR; INTRAVENOUS at 01:02

## 2017-02-06 RX ADMIN — THROMBIN, TOPICAL (BOVINE) 5000 UNITS: KIT at 12:02

## 2017-02-06 RX ADMIN — FENTANYL CITRATE 50 MCG: 50 INJECTION, SOLUTION INTRAMUSCULAR; INTRAVENOUS at 12:02

## 2017-02-06 RX ADMIN — BACITRACIN 50000 UNITS: 50000 INJECTION, POWDER, LYOPHILIZED, FOR SOLUTION INTRAMUSCULAR at 12:02

## 2017-02-06 RX ADMIN — TAMSULOSIN HYDROCHLORIDE 0.4 MG: 0.4 CAPSULE ORAL at 02:02

## 2017-02-06 RX ADMIN — ROCURONIUM BROMIDE 10 MG: 10 INJECTION, SOLUTION INTRAVENOUS at 12:02

## 2017-02-06 RX ADMIN — CITALOPRAM HYDROBROMIDE 10 MG: 10 TABLET ORAL at 03:02

## 2017-02-06 RX ADMIN — ROCURONIUM BROMIDE 40 MG: 10 INJECTION, SOLUTION INTRAVENOUS at 11:02

## 2017-02-06 NOTE — BRIEF OP NOTE
Operative Note       Surgery Date: 2/6/2017     Surgeon(s) and Role:  Panel 1:     * Lizandro Churchill DO - Resident - Assisting     * Swapnil Sam MD - Primary     * Swapnil Sam MD - Primary     * Swapnil Sam MD - Primary    Panel 2:     * Viraj Mckay MD - Primary    Pre-op Diagnosis:  NPH (normal pressure hydrocephalus) [G91.2]    Post-op Diagnosis:  NPH (normal pressure hydrocephalus) [G91.2]    Procedure(s) (LRB):  ENDOSCOPIC  SHUNT (N/A)  INSERTION- SHUNT (N/A)    Anesthesia: General    Procedure in Detail/Findings:   to abdomen without complication    Estimated Blood Loss: Minimal           Specimens     None        Implants:   Implant Name Type Inv. Item Serial No.  Lot No. LRB No. Used   CATH VENTRICULAR INNERVISION - [id number]  CATH VENTRICULAR INNERVISION  [employer] [facility] [id number] N/A 1   VALVE CHPU PROGRAMMABLE - [id number]  VALVE CHPU PROGRAMMABLE  [employer] [id number] N/A 1   KIT CATHETER WITH BACTISEAL - [id number]   KIT CATHETER WITH BACTISEAL   [employer] [id number] N/A 1              Disposition: PACU - hemodynamically stable.           Condition: Good    Attestation:  I was present and scrubbed for the entire procedure.

## 2017-02-06 NOTE — PROGRESS NOTES
Notified PA with Dr. Sam that surgical consents are dated for 2016 and needs to be updated; PA states she will notify the resident. Verbalized understanding.

## 2017-02-06 NOTE — BRIEF OP NOTE
[facility]  Neurosurgery  Operative Note    SUMMARY      Date of Procedure: 2/6/2017     Procedure: Procedure(s) (LRB):  ENDOSCOPIC  SHUNT (N/A)  INSERTION- SHUNT (N/A)     Surgeon(s) and Role:  Panel 1:     * Lizandro Churchill DO - Resident - Assisting     * Swapnil Sam MD - Primary     * Swapnil Sam MD - Primary     * Swapnil Sam MD - Primary    Panel 2:     * Viraj Mckay MD - Primary        Pre-Operative Diagnosis: NPH (normal pressure hydrocephalus) [G91.2]    Post-Operative Diagnosis: Post-Op Diagnosis Codes:     * NPH (normal pressure hydrocephalus) [G91.2]    Anesthesia: General    Technical Procedures Used: na    Description of the Findings of the Procedure: Right parietoccipital  shunt    Significant Surgical Tasks Conducted by the Assistant(s), if Applicable: na    Complications: No    Estimated Blood Loss (EBL): * No values recorded between 2/6/2017 12:36 PM and 2/6/2017  1:41 PM *           Specimens:   Specimen     None           Implants:   Implant Name Type Inv. Item Serial No.  Lot No. LRB No. Used   CATH VENTRICULAR INNERVISION - [id number]  CATH VENTRICULAR INNERVISION  [employer] [facility] [id number] N/A 1   VALVE CHPU PROGRAMMABLE - [id number]  VALVE CHPU PROGRAMMABLE  [employer] [id number] N/A 1   KIT CATHETER WITH BACTISEAL - [id number]   KIT CATHETER WITH BACTISEAL   [employer] [id number] N/A 1              Condition: Stable    Disposition: PACU - hemodynamically stable.    Attestation: I was present and scrubbed for the entire procedure.

## 2017-02-06 NOTE — PROGRESS NOTES
Pt's daughter updated at bedside. She is going home.  Will update and call her when pt gets a room.  Daughter given telephone number to pacu and .

## 2017-02-06 NOTE — PROGRESS NOTES
Notified Dr. Sam that we were only able to collect a type and screen pre-op. MD states its ok to draw the other labs in the OR. Verbalized understanding. No new orders given. MD also updated patient's surgical consent.

## 2017-02-06 NOTE — PLAN OF CARE
Problem: Patient Care Overview  Goal: Plan of Care Review  Outcome: Ongoing (interventions implemented as appropriate)  Pt and daughter updated plan of care by david, rn both verbalize understanding. Vss. sats 100% on room air.  Pt tolerating clear liquids.  Pt oriented, but confused to time/day/year.  Reoriented by pacu rn.  mivf infusing per md order to piv. drsg to right head with old drainage noted, intact with telfa island drsg. 3 abd incisions with steri strips/bandaids noted. Cdi. Well approx. ble teds/scds. Xray series done in pacu.  Pt released from anesthesia, awaiting bed on floor.  Currently in ct, ct scan of head in progress per md order. After ct scan, pt to be transferred back to pacu #20. Vss. sats 100%.  Upon arrival, reconnected to  in pacu.

## 2017-02-06 NOTE — ANESTHESIA POSTPROCEDURE EVALUATION
"Anesthesia Post Evaluation    Patient: Atif Neves    Procedure(s) Performed: Procedure(s) (LRB):  ENDOSCOPIC  SHUNT (N/A)  INSERTION- SHUNT (N/A)    Final Anesthesia Type: general  Patient location during evaluation: PACU  Patient participation: Yes- Able to Participate  Level of consciousness: awake and alert  Post-procedure vital signs: reviewed and stable  Pain management: adequate  Airway patency: patent  PONV status at discharge: No PONV  Anesthetic complications: no      Cardiovascular status: blood pressure returned to baseline  Respiratory status: unassisted  Hydration status: euvolemic  Follow-up not needed.        Visit Vitals    BP (!) 142/63    Pulse 67    Temp 36.4 °C (97.5 °F) (Oral)    Resp 15    Ht 5' 6" (1.676 m)    Wt 70.3 kg (155 lb)    SpO2 100%    BMI 25.02 kg/m2       Pain/Reynaldo Score: Pain Assessment Performed: Yes (2/6/2017 10:08 AM)  Presence of Pain: denies (2/6/2017 10:08 AM)      "

## 2017-02-06 NOTE — PROGRESS NOTES
Pt transported via stretcher and portable cm from pacu #20 to ct scan 2nd floor. Ct of head done per md order. Vss.

## 2017-02-06 NOTE — ANESTHESIA PREPROCEDURE EVALUATION
Past Medical History   Diagnosis Date    JONATAN (acute kidney injury) 06/2016    Anxiety     Arthritis     Glaucoma     Hyperlipemia     Hypertension     Irregular heartbeat     Macular degeneration     Normal pressure hydrocephalus     Urinary retention 06/2016    UTI (urinary tract infection)      Past Surgical History   Procedure Laterality Date    Transurethral resection of prostate  08/31/2016     Patient Active Problem List   Diagnosis    Complicated UTI (urinary tract infection)    BPH (benign prostatic hypertrophy) with urinary obstruction    Hyperlipidemia    Generalized anxiety disorder    Glaucoma    Essential hypertension    Macular degeneration    Cystoid macular edema, left eye    Nonexudative age-related macular degeneration, bilateral, intermediate dry stage    Normal pressure hydrocephalus    BPH (benign prostatic hyperplasia)    NPH (normal pressure hydrocephalus)                                                                                                               02/06/2017  Atif Neves is a 88 y.o., male.    [facility] Anesthesia Evaluation    I have reviewed the Patient Summary Reports.    I have reviewed the Nursing Notes.   I have reviewed the Medications.     Review of Systems  Anesthesia Hx:  No problems with previous Anesthesia    Social:  Former smoker   Hematology/Oncology:  Hematology Normal   Oncology Normal     EENT/Dental:EENT/Dental Normal   Cardiovascular:   Hypertension, well controlled hyperlipidemia ECG has been reviewed.    Pulmonary:  Pulmonary Normal    Renal/:   Chronic Renal Disease BPH    Hepatic/GI:  Hepatic/GI Normal    Musculoskeletal:   Arthritis     Neurological:  Neurology Normal    Endocrine:  Endocrine Normal    Psych:   Psychiatric History          Physical Exam  General:  Well nourished      Dental:  Dental Findings: Upper Dentures, Lower Dentures   Chest/Lungs:  Chest/Lungs Findings: Clear to auscultation, Normal Respiratory Rate      Heart/Vascular:  Heart Findings: Rate: Normal  Rhythm: Regular Rhythm        Mental Status:  Mental Status Findings:  Cooperative, Alert and Oriented         Anesthesia Plan  Type of Anesthesia, risks & benefits discussed:  Anesthesia Type:  general  Patient's Preference: gen  Intra-op Monitoring Plan:   Intra-op Monitoring Plan Comments:   Post Op Pain Control Plan:   Post Op Pain Control Plan Comments: Iv>po  Induction:   IV  Beta Blocker:  Patient is not currently on a Beta-Blocker (No further documentation required).       Informed Consent: Patient understands risks and agrees with Anesthesia plan.  Questions answered. Anesthesia consent signed with patient.  ASA Score: 3     Day of Surgery Review of History & Physical:    H&P update referred to the surgeon.         Ready For Surgery From Anesthesia Perspective.     Present Prior to Hospital Arrival?: No; Placement Date: 08/31/16; Placement Time: 1240; Method of Intubation: Direct laryngoscopy; Inserted by: Anesthesia MD; Airway Device: Endotracheal Tube; Mask Ventilation: Easy - oral; Intubated: Postinduction; Blade: Acevedo #2; Airway Device Size: 7.5; Style: Cuffed; Cuff Inflation: Minimal occlusive pressure; Inflation Amount: 3; Placement Verified By: Auscultation, Capnometry; Grade: Grade I; Complicating Factors: None; Intubation Findings: Positive EtCO2, Bilateral breath sounds, Other (see comments) (edentulous);  Depth of Insertion: 22; Securment: Lips; Complications: None; Breath Sounds: Equal Bilateral; Insertion Attempts:     Vitals - 1 value per visit 7/14/2016 7/19/2016 7/25/2016 7/26/2016 8/4/2016   SYSTOLIC 144  110 132    DIASTOLIC 96  70 70    PULSE 93  94 88      Vitals - 1 value per visit 8/16/2016 8/23/2016 8/29/2016 8/31/2016 9/1/2016   SYSTOLIC 132 134   130   DIASTOLIC 78 70   60   PULSE 83 78   70     Vitals - 1 value per visit 9/7/2016 9/12/2016 9/15/2016 10/25/2016   SYSTOLIC 124 133 143 151   DIASTOLIC 74 73 80 75   PULSE 81 71 100 66      Vitals - 1 value per visit 1/11/2017 1/25/2017 1/25/2017 2/6/2017   SYSTOLIC 159 147 152 134   DIASTOLIC 79 74 75 69   PULSE 69 80 70 65

## 2017-02-06 NOTE — TRANSFER OF CARE
"Anesthesia Transfer of Care Note    Patient: Atif Neves    Procedure(s) Performed: Procedure(s) (LRB):  ENDOSCOPIC  SHUNT (N/A)  INSERTION- SHUNT (N/A)    Patient location: PACU    Anesthesia Type: general    Transport from OR: Transported from OR on 6-10 L/min O2 by face mask with adequate spontaneous ventilation    Post pain: adequate analgesia    Post assessment: no apparent anesthetic complications    Post vital signs: stable    Level of consciousness: sedated    Nausea/Vomiting: no nausea/vomiting    Complications: none          Last vitals:   Visit Vitals    /69 (BP Location: Left arm, Patient Position: Lying, BP Method: Automatic)    Pulse 65    Temp 36.4 °C (97.5 °F) (Axillary)    Resp 18    Ht 5' 6" (1.676 m)    Wt 70.3 kg (155 lb)    SpO2 100%    BMI 25.02 kg/m2     "

## 2017-02-06 NOTE — OP NOTE
Surgery Date: 2/6/2017     Surgeon(s) and Role:  Panel 1:     * Lizandro Churchill DO - Resident - Assisting     * Swapnil Sam MD - Primary     * Swapnil Sam MD - Primary     * Swapnil Sam MD - Primary    Panel 2:     * Viraj Mckay MD - Primary    Pre-op Diagnosis:  NPH (normal pressure hydrocephalus) [G91.2]    Post-op Diagnosis:  NPH (normal pressure hydrocephalus) [G91.2]    Procedure(s) (LRB):  ENDOSCOPIC  SHUNT (N/A)  INSERTION- SHUNT (N/A)    Anesthesia: General    PROCEDURE: The patient was placed under general anesthesia. The patient was   prepped and draped in the usual manner. The access to the peritoneum was gained  through luq abdomen using Optiview trocar under direct vision.   Pneumoperitoneum to 15 mmHg with CO2 gas was attained. The shunt was in the   right upper quadrant. It was brought into the abdominal cavity on a mosquito. The suture passer was placed through the left upper quadrant under direct vision and pulled the shunt into the abdominal cavity to its full extent. The abdomen was inspected for hemostasis. Pneumoperitoneum was released. The trocar was removed. The skin incisions were closed with 4-0 plain catgut, and reinforced   with Mastisol, Steri-Strips, and Band-Aids. The patient tolerated the procedure  well, was brought to Recovery Room in stable condition. Sponge and needle   counts were correct at the end of the case.    PATHOLOGY:  for my part of the procedure is none.  COMPLICATIONS: None.  BLOOD LOSS: Minimal.

## 2017-02-06 NOTE — PROGRESS NOTES
Pt returned to pacu from ct scan 2nd floor. Connected to pacu cm. Vss. sats 100% on room air. Tolerated ct scan of head well. No distress. Pt awaiting room on 7th floor. Remains in pacu. Food tray order.

## 2017-02-06 NOTE — IP AVS SNAPSHOT
[facility]  [address]  [facility] [address]  Phone: [phone]           Patient Discharge Instructions     Our goal is to set you up for success. This packet includes information on your condition, medications, and your home care. It will help you to care for yourself so you don't get sicker and need to go back to the hospital.     Please ask your nurse if you have any questions.        There are many details to remember when preparing to leave the hospital. Here is what you will need to do:    1. Take your medicine. If you are prescribed medications, review your Medication List in the following pages. You may have new medications to  at the pharmacy and others that you'll need to stop taking. Review the instructions for how and when to take your medications. Talk with your doctor or nurses if you are unsure of what to do.     2. Go to your follow-up appointments. Specific follow-up information is listed in the following pages. Your may be contacted by a transition nurse or clinical provider about future appointments. Be sure we have all of the phone numbers to reach you, if needed. Please contact your provider's office if you are unable to make an appointment.     3. Watch for warning signs. Your doctor or nurse will give you detailed warning signs to watch for and when to call for assistance. These instructions may also include educational information about your condition. If you experience any of warning signs to your health, call your doctor.               [facility]  Unless otherwise directed by your provider, please contact [facility], our nurse care line that is available for 24/7 assistance.     [phone] (toll-free)    Registered nurses in the [facility] provide clinical advisement, health education, appointment booking, and other advisory services.                    ** Verify the list of medication(s) below is accurate and up  to date. Carry this with you in case of emergency. If your medications have changed, please notify your healthcare provider.             Medication List      START taking these medications        Additional Info                      hydrocodone-acetaminophen 5-325mg 5-325 mg per tablet   Commonly known as:  NORCO   Quantity:  60 tablet   Refills:  0   Dose:  1 tablet    Instructions:  Take 1 tablet by mouth every 6 (six) hours as needed for Pain.     Begin Date    AM    Noon    PM    Bedtime         CONTINUE taking these medications        Additional Info                      amlodipine 5 MG tablet   Commonly known as:  NORVASC   Quantity:  30 tablet   Refills:  11   Dose:  5 mg    Last time this was given:  5 mg on 2/7/2017  8:55 AM   Instructions:  Take 1 tablet (5 mg total) by mouth once daily.     Begin Date    AM    Noon    PM    Bedtime       atorvastatin 20 MG tablet   Commonly known as:  LIPITOR   Refills:  0   Dose:  20 mg    Last time this was given:  20 mg on 2/7/2017  8:54 AM   Instructions:  Take 20 mg by mouth once daily.     Begin Date    AM    Noon    PM    Bedtime       citalopram 10 MG tablet   Commonly known as:  CELEXA   Refills:  0   Dose:  10 mg    Last time this was given:  10 mg on 2/7/2017  8:54 AM   Instructions:  Take 10 mg by mouth once daily.     Begin Date    AM    Noon    PM    Bedtime       dorzolamide-timolol 2-0.5% 22.3-6.8 mg/mL ophthalmic solution   Commonly known as:  COSOPT   Refills:  0   Dose:  1 drop    Instructions:  Place 1 drop into the left eye once daily.     Begin Date    AM    Noon    PM    Bedtime       ergocalciferol 50,000 unit Cap   Commonly known as:  ERGOCALCIFEROL   Refills:  1    Instructions:  TK ONE C     PO ONCE PER MONTH     Begin Date    AM    Noon    PM    Bedtime       polyethylene glycol 17 gram/dose powder   Commonly known as:  GLYCOLAX   Quantity:  1 Bottle   Refills:  3   Dose:  17 g    Instructions:  Take 17 g by mouth 2 (two) times daily. Adjust  frequency for one small bowel movement a day     Begin Date    AM    Noon    PM    Bedtime       tamsulosin 0.4 mg Cp24   Commonly known as:  FLOMAX   Refills:  1    Last time this was given:  0.4 mg on 2/7/2017  8:54 AM   Instructions:  TK ONE C     PO 30 MINUTES AFTER THE SAME MEAL QD     Begin Date    AM    Noon    PM    Bedtime            Where to Get Your Medications      You can get these medications from any pharmacy     Bring a paper prescription for each of these medications     hydrocodone-acetaminophen 5-325mg 5-325 mg per tablet                  Please bring to all follow up appointments:    1. A copy of your discharge instructions.  2. All medicines you are currently taking in their original bottles.  3. Identification and insurance card.    Please arrive 15 minutes ahead of scheduled appointment time.    Please call 24 hours in advance if you must reschedule your appointment and/or time.        Your Scheduled Appointments     Feb 17, 2017  9:00 AM CST   Post OP with RN, NEUROSURGERY   [facility] Neurosurgery [facility]    [address]  [place] [address]   [phone]            Mar 22, 2017  9:20 AM CDT   Ct Head Non Contrast with [facility] CT6 MOBILE LIMIT 450 LBS   [facility] ([facility] )    [address] [facility] [address]   [phone]            Mar 22, 2017 11:30 AM CDT   Post OP with Swapnil Sam MD   [facility] )    [address]  [place] [address]   [phone]            Jul 25, 2017  9:45 AM CDT   Established Patient Visit with Jose Lopez MD   [facility]    [address], [address]  [facility] [address] [phone]              Referrals     Future Orders    Ambulatory referral to Home Health         Discharge Instructions     Future Orders    Activity as tolerated     Call MD for:  difficulty breathing or increased cough     Call MD for:   increased confusion or weakness     Call MD for:  persistent dizziness, light-headedness, or visual disturbances     Call MD for:  persistent nausea and vomiting or diarrhea     Call MD for:  redness, tenderness, or signs of infection (pain, swelling, redness, odor or green/yellow discharge around incision site)     Call MD for:  severe persistent headache     Call MD for:  severe uncontrolled pain     Call MD for:  temperature >100.4     Call MD for:  worsening rash     Diet general     Questions:    Total calories:      Fat restriction, if any:      Protein restriction, if any:      Na restriction, if any:      Fluid restriction:      Additional restrictions:      No dressing needed         Discharge Instructions       Patient Information  -No driving   -Do not take any OTC products containing acetaminophen at the same time as you take your narcotic pain medication. Medications that may contain acetaminophen include but are not limited to: Excedrin and other headache medications, arthritis medications, cold and sinus medications, etc. Please review the list of active ingredients in any OTC medication prior to taking it.  -Do not take any Aspirin or Aspirin containing products for 2 weeks  -Do not take any Aleeve, Naprosyn, Naproxen, Ibuprofen, Advil or any other NSAID for 2 weeks.   -Do not consume any alcoholic beverages until released by your Neurosurgeon  -Do not perform any excessive bending over or leaning forward as this is a fall hazard.  -Do not perform any heavy lifting or lifting more than 10 lbs from the ground level as this is a fall hazard.    Contact the Neurosurgery Office immediately if:  -If you begin to notice any neurologic changes such as:           -Sudden onset of lethargy or sleepiness           -Sudden confusion, trouble speaking, or understanding            -Sudden trouble seeing in one or both eyes            -Sudden trouble walking, dizziness, loss of coordination            -Sudden  "severe headache with no known cause            -Sudden onset of numbness or weakness     Wound Care:  No bandage necessary. Keep your incision open to air. You may shower on Day 2. Have the stream of water hit you opposite from the incision. Do not scrub the incision. Pat the incision dry after your shower. You cannot take a bath/swim/submerge the incision until 8 weeks after surgery.    Apply Bacitracin ointment (over the counter) to incision twice daily.    Call your doctor or go to the Emergency Room for any signs of infection including: increased redness, drainage, pain or fever (temperature greater than or equal to 101.4).       Miscellaneous:  -Follow up with Neurosurgery RN in 2 weeks for wound check  -Follow up with Dr. Sam in 6 weeks.   -Appointments will be mailed to you      Neurosurgery Office: [phone]        Primary Diagnosis     Your primary diagnosis was:  Water On The Brain      Admission Information     Date & Time Provider Department CSN    2/6/2017  8:43 AM Swapnil Sam MD [facility] [id number]      Care Providers     Provider Role Specialty Primary office phone    Swapnil Sam MD Attending Provider Neurosurgery [phone]    Swapnil Sam MD Surgeon  Neurosurgery [phone]      Your Vitals Were     BP Pulse Temp Resp Height Weight    137/64 81 98.8 °F (37.1 °C) 16 5' 6" (1.676 m) 70.3 kg (155 lb)    SpO2 BMI             95% 25.02 kg/m2         Recent Lab Values     No lab values to display.      Pending Labs     Order Current Status    Prepare RBC 2 Units; perioperative Preliminary result      Allergies as of 2/7/2017        Reactions    Aspirin Nausea And Vomiting    States, "makes me sick." pt does not elaborate.       Advance Directives     An advance directive is a document which, in the event you are no longer able to make decisions for yourself, tells your healthcare team what kind of treatment you do or do not want to receive, or who you would like to " make those decisions for you.  If you do not currently have an advance directive, [facility] encourages you to create one.  For more information call:  [phone]), [phone] ([phone]),  or log on to [email or web address].        Smoking Cessation     If you would like to quit smoking:   You may be eligible for free services if you are a [place] resident and started smoking cigarettes before September 1, 1988.  Call the Smoking Cessation Trust (SCT) toll free at (409) 541-0498 or (755) 966-4402.   Call 5-792-QUIT-NOW if you do not meet the above criteria.            Language Assistance Services     ATTENTION: Language assistance services are available, free of charge. Please call [phone].      ATENCIÓN: Si habla espedgar, tiene a corral disposición servicios gratuitos de asistencia lingüística. Llame al [phone].     CHÚ Ý: N?u b?n nói Ti?ng Vi?t, có các d?ch v? h? tr? ngôn ng? mi?n phí dành cho b?n. G?i s? [phone].         [facility] complies with applicable Federal civil rights laws and does not discriminate on the basis of race, color, national origin, age, disability, or sex.

## 2017-02-06 NOTE — ANESTHESIA RELEASE NOTE
"Anesthesia Release from PACU Note    Patient: Atif Neves    Procedure(s) Performed: Procedure(s) (LRB):  ENDOSCOPIC  SHUNT (N/A)  INSERTION- SHUNT (N/A)    Final Anesthesia Type: general  Patient location during evaluation: PACU  Patient participation: Yes- Able to Participate  Level of consciousness: awake and alert  Post-procedure vital signs: reviewed and stable  Pain management: adequate  Airway patency: patent  PONV status at discharge: No PONV  Anesthetic complications: no      Cardiovascular status: blood pressure returned to baseline  Respiratory status: unassisted  Hydration status: euvolemic  Follow-up not needed.        Visit Vitals    BP (!) 142/63    Pulse 67    Temp 36.4 °C (97.5 °F) (Oral)    Resp 15    Ht 5' 6" (1.676 m)    Wt 70.3 kg (155 lb)    SpO2 100%    BMI 25.02 kg/m2       Pain/Reynaldo Score: Pain Assessment Performed: Yes (2/6/2017 10:08 AM)  Presence of Pain: denies (2/6/2017 10:08 AM)    "

## 2017-02-06 NOTE — PROGRESS NOTES
Pt's daughter updated by pacu rn. Verbalizes understanding. Notified next visiting hours at 4pm. Verbalizes ok.

## 2017-02-07 VITALS
HEIGHT: 66 IN | BODY MASS INDEX: 24.91 KG/M2 | OXYGEN SATURATION: 95 % | SYSTOLIC BLOOD PRESSURE: 137 MMHG | HEART RATE: 81 BPM | DIASTOLIC BLOOD PRESSURE: 64 MMHG | RESPIRATION RATE: 16 BRPM | TEMPERATURE: 99 F | WEIGHT: 155 LBS

## 2017-02-07 DIAGNOSIS — G91.2 NPH (NORMAL PRESSURE HYDROCEPHALUS): Primary | ICD-10-CM

## 2017-02-07 DIAGNOSIS — Z98.2 S/P VENTRICULOPERITONEAL SHUNT: ICD-10-CM

## 2017-02-07 LAB
ANION GAP SERPL CALC-SCNC: 6 MMOL/L
BASOPHILS # BLD AUTO: 0.02 K/UL
BASOPHILS NFR BLD: 0.2 %
BUN SERPL-MCNC: 8 MG/DL
CALCIUM SERPL-MCNC: 8.4 MG/DL
CHLORIDE SERPL-SCNC: 109 MMOL/L
CO2 SERPL-SCNC: 23 MMOL/L
CREAT SERPL-MCNC: 0.8 MG/DL
DIFFERENTIAL METHOD: ABNORMAL
EOSINOPHIL # BLD AUTO: 0.1 K/UL
EOSINOPHIL NFR BLD: 0.4 %
ERYTHROCYTE [DISTWIDTH] IN BLOOD BY AUTOMATED COUNT: 16.3 %
EST. GFR  (AFRICAN AMERICAN): >60 ML/MIN/1.73 M^2
EST. GFR  (NON AFRICAN AMERICAN): >60 ML/MIN/1.73 M^2
GLUCOSE SERPL-MCNC: 100 MG/DL
HCT VFR BLD AUTO: 33.7 %
HGB BLD-MCNC: 10.9 G/DL
LYMPHOCYTES # BLD AUTO: 1.6 K/UL
LYMPHOCYTES NFR BLD: 13.6 %
MAGNESIUM SERPL-MCNC: 1.4 MG/DL
MCH RBC QN AUTO: 25.5 PG
MCHC RBC AUTO-ENTMCNC: 32.3 %
MCV RBC AUTO: 79 FL
MONOCYTES # BLD AUTO: 0.8 K/UL
MONOCYTES NFR BLD: 6.6 %
NEUTROPHILS # BLD AUTO: 9.2 K/UL
NEUTROPHILS NFR BLD: 79 %
PHOSPHATE SERPL-MCNC: 2.1 MG/DL
PLATELET # BLD AUTO: 198 K/UL
PMV BLD AUTO: 9.7 FL
POTASSIUM SERPL-SCNC: 3.5 MMOL/L
RBC # BLD AUTO: 4.27 M/UL
SODIUM SERPL-SCNC: 138 MMOL/L
WBC # BLD AUTO: 11.57 K/UL

## 2017-02-07 PROCEDURE — 83735 ASSAY OF MAGNESIUM: CPT

## 2017-02-07 PROCEDURE — 84100 ASSAY OF PHOSPHORUS: CPT

## 2017-02-07 PROCEDURE — 63600175 PHARM REV CODE 636 W HCPCS: Performed by: STUDENT IN AN ORGANIZED HEALTH CARE EDUCATION/TRAINING PROGRAM

## 2017-02-07 PROCEDURE — 25000003 PHARM REV CODE 250: Performed by: STUDENT IN AN ORGANIZED HEALTH CARE EDUCATION/TRAINING PROGRAM

## 2017-02-07 PROCEDURE — 36415 COLL VENOUS BLD VENIPUNCTURE: CPT

## 2017-02-07 PROCEDURE — 85025 COMPLETE CBC W/AUTO DIFF WBC: CPT

## 2017-02-07 PROCEDURE — C9113 INJ PANTOPRAZOLE SODIUM, VIA: HCPCS | Performed by: STUDENT IN AN ORGANIZED HEALTH CARE EDUCATION/TRAINING PROGRAM

## 2017-02-07 PROCEDURE — 63600175 PHARM REV CODE 636 W HCPCS: Performed by: NEUROLOGICAL SURGERY

## 2017-02-07 PROCEDURE — 80048 BASIC METABOLIC PNL TOTAL CA: CPT

## 2017-02-07 PROCEDURE — 99024 POSTOP FOLLOW-UP VISIT: CPT | Mod: ,,, | Performed by: PHYSICIAN ASSISTANT

## 2017-02-07 RX ORDER — HYDROCODONE BITARTRATE AND ACETAMINOPHEN 5; 325 MG/1; MG/1
1 TABLET ORAL EVERY 6 HOURS PRN
Qty: 60 TABLET | Refills: 0 | Status: SHIPPED | OUTPATIENT
Start: 2017-02-07 | End: 2017-03-22

## 2017-02-07 RX ADMIN — ATORVASTATIN CALCIUM 20 MG: 20 TABLET, FILM COATED ORAL at 08:02

## 2017-02-07 RX ADMIN — CITALOPRAM HYDROBROMIDE 10 MG: 10 TABLET ORAL at 08:02

## 2017-02-07 RX ADMIN — CEFAZOLIN SODIUM 2 G: 2 SOLUTION INTRAVENOUS at 05:02

## 2017-02-07 RX ADMIN — PANTOPRAZOLE SODIUM 40 MG: 40 INJECTION, POWDER, FOR SOLUTION INTRAVENOUS at 06:02

## 2017-02-07 RX ADMIN — DORZOLAMIDE HYDROCHLORIDE 1 DROP: 20 SOLUTION/ DROPS OPHTHALMIC at 08:02

## 2017-02-07 RX ADMIN — AMLODIPINE BESYLATE 5 MG: 5 TABLET ORAL at 08:02

## 2017-02-07 RX ADMIN — POLYETHYLENE GLYCOL 3350 17 G: 17 POWDER, FOR SOLUTION ORAL at 08:02

## 2017-02-07 RX ADMIN — TAMSULOSIN HYDROCHLORIDE 0.4 MG: 0.4 CAPSULE ORAL at 08:02

## 2017-02-07 RX ADMIN — TIMOLOL MALEATE 1 DROP: 5 SOLUTION OPHTHALMIC at 08:02

## 2017-02-07 NOTE — NURSING TRANSFER
Nursing Transfer Note      2/6/2017     Transfer To: 725    Transfer via stretcher    Transported by PCT    Chart send with patient: Yes    Notified: daughter    Patient reassessed at: 02/06/2017 1289

## 2017-02-07 NOTE — PLAN OF CARE
Problem: Patient Care Overview  Goal: Individualization & Mutuality  Outcome: Ongoing (interventions implemented as appropriate)  POC reviewed with patient. Alert but disoriented to place. Pt knows that he is at the hospital, but cannot remember which one. VS remained stable throughout shift. Afebrile. Pt remained free of falls and injuries during the night. Safety precautions remained in place. No new neuro changes. No new skin impairments noted. No c/o pain or nausea/vomiting. Bed locked and low, side rails up x2, and call light in reach. Will continue to monitor.

## 2017-02-07 NOTE — PROGRESS NOTES
Patient seen and examined. S/p  shunt    NAEON    Feeling well, pain well controlled  Abd soft, approp TTP, dressings intact    Please call if any concerns or questions  Follow up per neuro    Linda Plasencia PGY3  [phone]

## 2017-02-07 NOTE — DISCHARGE INSTRUCTIONS
Patient Information  -No driving   -Do not take any OTC products containing acetaminophen at the same time as you take your narcotic pain medication. Medications that may contain acetaminophen include but are not limited to: Excedrin and other headache medications, arthritis medications, cold and sinus medications, etc. Please review the list of active ingredients in any OTC medication prior to taking it.  -Do not take any Aspirin or Aspirin containing products for 2 weeks  -Do not take any Aleeve, Naprosyn, Naproxen, Ibuprofen, Advil or any other NSAID for 2 weeks.   -Do not consume any alcoholic beverages until released by your Neurosurgeon  -Do not perform any excessive bending over or leaning forward as this is a fall hazard.  -Do not perform any heavy lifting or lifting more than 10 lbs from the ground level as this is a fall hazard.    Contact the Neurosurgery Office immediately if:  -If you begin to notice any neurologic changes such as:           -Sudden onset of lethargy or sleepiness           -Sudden confusion, trouble speaking, or understanding            -Sudden trouble seeing in one or both eyes            -Sudden trouble walking, dizziness, loss of coordination            -Sudden severe headache with no known cause            -Sudden onset of numbness or weakness     Wound Care:  No bandage necessary. Keep your incision open to air. You may shower on Day 2. Have the stream of water hit you opposite from the incision. Do not scrub the incision. Pat the incision dry after your shower. You cannot take a bath/swim/submerge the incision until 8 weeks after surgery.    Apply Bacitracin ointment (over the counter) to incision twice daily.    Call your doctor or go to the Emergency Room for any signs of infection including: increased redness, drainage, pain or fever (temperature greater than or equal to 101.4).       Miscellaneous:  -Follow up with Neurosurgery RN in 2 weeks for wound check  -Follow up with  Dr. Sam in 6 weeks.   -Appointments will be mailed to you      Neurosurgery Office: [phone]

## 2017-02-07 NOTE — PLAN OF CARE
Future Appointments  Date Time Provider Department Center   2/17/2017 9:00 AM RN, NEUROSURGERY [facility] NEUROS7 [facility]   3/22/2017 9:20 AM [facility] CT6 MOBILE LIMIT 450 LBS [facility] CT SCAN [facility]   3/22/2017 11:30 AM Swapnil Sam MD [facility] NEUROS7 [facility]   7/25/2017 9:45 AM Jose Lopez MD [facility] UROLOGY [facility]          02/07/17 1425   Final Note   Assessment Type Final Discharge Note   Discharge Disposition Home   Discharge planning education complete? Yes   Hospital Follow Up  Appt(s) scheduled? Yes   Discharge plans and expectations educations in teach back method with documentation complete? Yes   Offered [facility] -- Bedside Delivery? n/a   Discharge/Hospital Encounter Summary to ([facility]) PCP n/a   Referral to Outpatient Case Management complete? n/a   30 day supply of medicines given at discharge, if documented non-compliance / non-adherence? n/a   Any social issues identified prior to discharge? Yes

## 2017-02-07 NOTE — PLAN OF CARE
PCP: Atif Christian MD  ADDRESS: [address] / [facility] [address]    Payor: HU[employer] MANAGED MEDICARE / Plan: [employer] HE[employer] HMO SNP / Product Type: Medicare Advantage /     PHARMACY:    [facility] [address] [place], [place] - [address]  [address]  [facility] [address]  Phone: [phone] Fax: [phone]      Future Appointments  Date Time Provider Department Center   2/17/2017 9:00 AM RN, NEUROSURGERY [facility] NEUROS7 [facility]   3/22/2017 9:20 AM [facility] CT6 MOBILE LIMIT 450 LBS [facility] CT SCAN [facility]   3/22/2017 11:30 AM Swapnil Sam MD [facility] NEUROS7 [facility]   7/25/2017 9:45 AM Jose Lopez MD [facility] UROLOGY [facility]          02/07/17 1040   Discharge Assessment   Assessment Type Discharge Planning Assessment   Confirmed/corrected address and phone number on facesheet? Yes   Assessment information obtained from? Patient;Medical Record  (Pt unable to answer all questions)   Expected Length of Stay (days) 2   Communicated expected length of stay with patient/caregiver yes   Prior to hospitilization cognitive status: Unable to Assess   Prior to hospitalization functional status: Needs Assistance   Current cognitive status: Alert/Oriented  (Unable to provide some information, no family at bedside to confirm)   Current Functional Status: Needs Assistance   Arrived From home or self-care   Lives With child(candi), adult   Able to Return to Prior Arrangements yes   Is patient able to care for self after discharge? No   How many people do you have in your home that can help with your care after discharge? 1   Who are your caregiver(s) and their phone number(s)? Laila, Colleen, [phone]? from pt   Patient's perception of discharge disposition home or selfcare   Readmission Within The Last 30 Days no previous admission in last 30 days   Patient currently being followed by outpatient case management? Unable to determine  (comments)   Patient currently receives private duty nursing? N/A   Equipment Currently Used at Home rollator   Do you have any problems affording any of your prescribed medications? No   Is the patient taking medications as prescribed? yes   Do you have any financial concerns preventing you from receiving the healthcare you need? No   Does the patient have transportation to healthcare appointments? Yes  (Dtr to provide transportation home)   Transportation Available family or friend will provide   On Dialysis? No   Are there any open cases? No   Discharge Plan A Home with family   Discharge Plan B Home   Patient/Family In Agreement With Plan yes

## 2017-02-07 NOTE — PROGRESS NOTES
"Progress Note  Neurosurgery    Admit Date: 2/6/2017  Post-operative Day: 1 Day Post-Op  Hospital Day: 2    SUBJECTIVE:     Atif Neves is a 88 y.o. male with NPH s/p VPS, POD #1. NAEON. Patient resting comfortably in bed. No family at bedside. Denies any headaches, dizziness, N/V. States that he still doesn't feel that his "mind is right". Complains of mild neck soreness. Denies any new symptoms.     Scheduled Meds:   amlodipine  5 mg Oral Daily    atorvastatin  20 mg Oral Daily    citalopram  10 mg Oral Daily    dorzolamide  1 drop Left Eye Daily    And    timolol maleate 0.5%  1 drop Left Eye Daily    pantoprazole  40 mg Intravenous Before breakfast    polyethylene glycol  17 g Oral Daily    tamsulosin  0.4 mg Oral Daily     Continuous Infusions:   sodium chloride 0.9% Stopped (02/06/17 1306)    sodium chloride 0.9% 75 mL/hr at 02/06/17 1436     PRN Meds:acetaminophen, hydrocodone-acetaminophen 5-325mg, morphine, ondansetron    Review of patient's allergies indicates:   Allergen Reactions    Aspirin Nausea And Vomiting     States, "makes me sick." pt does not elaborate.        OBJECTIVE:     Vital Signs (Most Recent)  Temp: 98.8 °F (37.1 °C) (02/07/17 0800)  Pulse: 81 (02/07/17 0800)  Resp: 16 (02/07/17 0800)  BP: 137/64 (02/07/17 0800)  SpO2: 95 % (02/07/17 0800)    Vital Signs Range (Last 24H):  Temp:  [97.4 °F (36.3 °C)-98.9 °F (37.2 °C)]   Pulse:  [65-95]   Resp:  [11-22]   BP: (132-158)/(62-85)   SpO2:  [95 %-100 %]     I & O (Last 24H):  Intake/Output Summary (Last 24 hours) at 02/07/17 0950  Last data filed at 02/06/17 1900   Gross per 24 hour   Intake             2207 ml   Output              275 ml   Net             1932 ml     Physical Exam:  General: well developed, well nourished, no distress.   Head: normocephalic  Neurologic: Alert, mild confusion.   GCS: Motor: 6/Verbal: 4/Eyes: 4 GCS Total: 14  Mental Status: Awake, Alert, Oriented to person, place and situation. Not oriented to year. "   Language: No aphasia  Speech: No dysarthria  Cranial nerves: face symmetric, tongue midline, CN II-XII grossly intact.   Eyes: pupils equal, round, reactive to light with accomodation, EOMI.   Pulmonary: normal respirations, no signs of respiratory distress  Abdomen: soft, non-distended, not tender to palpation  Sensory: intact to light touch throughout  Motor Strength:Moves all extremities spontaneously with good tone.  Full strength upper and lower extremities. No abnormal movements seen.   Pronator Drift: no drift noted  Finger-to-nose: mild dysmetria bilaterally  De Souza: absent  Clonus: absent  No LE edema  Skin: Skin is warm, dry and intact.      Lines/Drains:       Peripheral IV - Single Lumen 08/31/16 1150 Right Hand (Active)   Number of days:159            Peripheral IV - Single Lumen 02/06/17 1020 Left Forearm (Active)   Site Assessment Clean;Dry;Intact 2/6/2017  8:00 PM   Line Status Infusing 2/6/2017  8:00 PM   Dressing Status Clean;Dry;Intact 2/6/2017  8:00 PM   Dressing Intervention New dressing 2/6/2017  7:00 PM   Dressing Change Due 02/10/17 2/6/2017  8:00 PM   Site Change Due 02/10/17 2/6/2017  8:00 PM   Reason Not Rotated Not due 2/6/2017  8:00 PM   Number of days:0            Urethral Catheter 06/16/16 2208 Straight-tip (Active)   Number of days:235            Urethral Catheter 08/31/16 1416 Triple-lumen;Non-latex;Straight-tip 20 Fr. (Active)   Number of days:159       Wound/Incision:  clean, dry, intact, no drainage    Laboratory:  CBC:   Recent Labs  Lab 02/07/17  0436   WBC 11.57   RBC 4.27*   HGB 10.9*   HCT 33.7*      MCV 79*   MCH 25.5*   MCHC 32.3     BMP:   Recent Labs  Lab 02/07/17  0436         K 3.5      CO2 23   BUN 8   CREATININE 0.8   CALCIUM 8.4*   MG 1.4*         Diagnostic Results:  Head CT:  I independently reviewed the imaging.     Interval placement of a ventricular shunt catheter without apparent complication.      Xray shunt series:  I  independently reviewed the imaging.     Views: There is a single intracranial shunt catheter attached to a programmable reservoir at the 7:00 position.  There is continuation of tubing down to the abdomen without complication.      ASSESSMENT/PLAN:     Assessment:   Atif Neves is a 88 y.o. male with NPH s/p VPS, POD #1.    Plan:  -Patient neurologically stable on exam  -Head CT and xray shows good positioning of hardware.   -DC home with home health  -Follow up with NSGY RN in 2 weeks for wound check  -Follow up with Dr. Sam in 6 weeks with head CT  -Discharge instructions given verbally to patient and over the phone to his daughter. All of their questions were answered. They were encouraged to call the clinic with any questions or concerns prior to follow up appt.     Discussed with Dr. Sam  Please call with any questions    Maame Mason PA-C   Neurosurgery   Pager: [phone]

## 2017-02-07 NOTE — DISCHARGE SUMMARY
[facility]  Discharge Summary      Admit Date: 2/6/2017    Discharge Date and Time:  02/07/2017     Attending Physician: Swapnil Sam MD     Reason for Admission:  Mr. Atif Neves is an 88 year old male that presented to Neurosurgery clinic with complaints of imbalance, urinary incontinence, and memory loss. The NM Cisternogram showed evidence of normal pressure hydrocephalus, so placement of a  shunt was recommended. Dr. Sam have explained the timeline of the surgery and its mechanism. He also discussed the timeline of recovery and home health with Physical Therapy. He discussed the risks/benefits, indications, and alternatives for the proposed procedure in detail and have answered all of their questions and the pt wishes to proceed with surgery.     Date of Procedure: 2/6/2017      Surgeon(s) and Role:   Swapnil Sam MD - Primary   Lizandro Churchill DO - Resident - Assisting   Viraj Mckay MD - Primary     Pre-Operative Diagnosis: NPH (normal pressure hydrocephalus) [G91.2]     Post-Operative Diagnosis: NPH (normal pressure hydrocephalus) [G91.2]     Procedure: Right parietoccipital  shunt       Hospital Course   Mr. Neves presented to [facility] on 2/6/17 for the above stated procedure. He tolerated the procedure well and there were no intra-operative complications. He recovered in PACU and was then transferred to the floor. Post-op X-rays and head CT showed good placement of the hardware. No detrimental findings. On POD #1, patient denied any complaints and was at his neurologic baseline. On 2/7/2017, he was discharged home with pain medication and follow up appointments. Regular diet. Activity as tolerated. At the time of discharge, vital signs were stable, patient was afebrile and neuro stable. Discharge instructions were given verbally/written to the patient and their family and all of their questions were answered. Patient and family voiced understanding. They were  encouraged to call the clinic with any questions they might have prior to the follow up appointments.       Consults: None    Significant Diagnostic Studies: Labs:   BMP:   Recent Labs  Lab 02/06/17  0946 02/07/17  0436   GLU 94 100    138   K 3.9 3.5   * 109   CO2 24 23   BUN 12 8   CREATININE 0.8 0.8   CALCIUM 8.7 8.4*   MG  --  1.4*    and CBC   Recent Labs  Lab 02/06/17  0946 02/07/17  0436   WBC 7.09 11.57   HGB 12.0* 10.9*   HCT 36.5* 33.7*    198     Radiology: X-Ray: shunt series  CT scan: head    Final Diagnoses:    Principal Problem: NPH (normal pressure hydrocephalus)   Secondary Diagnoses:   Active Hospital Problems    Diagnosis  POA    *NPH (normal pressure hydrocephalus) [G91.2]  Yes      Resolved Hospital Problems    Diagnosis Date Resolved POA   No resolved problems to display.       Discharged Condition: good    Disposition: Home-Health Care [facility]    Follow Up/Patient Instructions:   See the patient instruction tab for detailed discharge instructions and follow up information.     Medications:  Reconciled Home Medications:   Current Discharge Medication List      START taking these medications    Details   hydrocodone-acetaminophen 5-325mg (NORCO) 5-325 mg per tablet Take 1 tablet by mouth every 6 (six) hours as needed for Pain.  Qty: 60 tablet, Refills: 0         CONTINUE these medications which have NOT CHANGED    Details   amlodipine (NORVASC) 5 MG tablet Take 1 tablet (5 mg total) by mouth once daily.  Qty: 30 tablet, Refills: 11      dorzolamide-timolol 2-0.5% (COSOPT) 22.3-6.8 mg/mL ophthalmic solution Place 1 drop into the left eye once daily.      polyethylene glycol (GLYCOLAX) 17 gram/dose powder Take 17 g by mouth 2 (two) times daily. Adjust frequency for one small bowel movement a day  Qty: 1 Bottle, Refills: 3      tamsulosin (FLOMAX) 0.4 mg Cp24 TK ONE C     PO 30 MINUTES AFTER THE SAME MEAL QD  Refills: 1      atorvastatin (LIPITOR) 20 MG tablet Take 20 mg by mouth  once daily.      citalopram (CELEXA) 10 MG tablet Take 10 mg by mouth once daily.      ergocalciferol (ERGOCALCIFEROL) 50,000 unit Cap TK ONE C     PO ONCE PER MONTH  Refills: 1             Discharge Procedure Orders  Ambulatory referral to Home Health   Referral Priority: Routine Referral Type: Home Health   Referral Reason: Specialty Services Required    Requested Specialty: Home Health Services    Number of Visits Requested: 1      Diet general     Activity as tolerated     Call MD for:  temperature >100.4     Call MD for:  persistent nausea and vomiting or diarrhea     Call MD for:  severe uncontrolled pain     Call MD for:  redness, tenderness, or signs of infection (pain, swelling, redness, odor or green/yellow discharge around incision site)     Call MD for:  difficulty breathing or increased cough     Call MD for:  severe persistent headache     Call MD for:  worsening rash     Call MD for:  persistent dizziness, light-headedness, or visual disturbances     Call MD for:  increased confusion or weakness     No dressing needed

## 2017-02-10 LAB
BLD PROD TYP BPU: NORMAL
BLD PROD TYP BPU: NORMAL
BLOOD UNIT EXPIRATION DATE: NORMAL
BLOOD UNIT EXPIRATION DATE: NORMAL
BLOOD UNIT TYPE CODE: 5100
BLOOD UNIT TYPE CODE: 5100
BLOOD UNIT TYPE: NORMAL
BLOOD UNIT TYPE: NORMAL
CODING SYSTEM: NORMAL
CODING SYSTEM: NORMAL
DISPENSE STATUS: NORMAL
DISPENSE STATUS: NORMAL
TRANS ERYTHROCYTES VOL PATIENT: NORMAL ML
TRANS ERYTHROCYTES VOL PATIENT: NORMAL ML

## 2017-02-17 ENCOUNTER — CLINICAL SUPPORT (OUTPATIENT)
Dept: NEUROSURGERY | Facility: CLINIC | Age: 82
End: 2017-02-17
Payer: MEDICARE

## 2017-02-17 VITALS
HEIGHT: 66 IN | SYSTOLIC BLOOD PRESSURE: 146 MMHG | TEMPERATURE: 98 F | DIASTOLIC BLOOD PRESSURE: 76 MMHG | WEIGHT: 155 LBS | BODY MASS INDEX: 24.91 KG/M2 | HEART RATE: 66 BPM

## 2017-02-17 PROCEDURE — 99999 PR PBB SHADOW E&M-EST. PATIENT-LVL III: CPT | Mod: PBBFAC,,,

## 2017-02-17 NOTE — PROGRESS NOTES
Patient seen 2 weeks post op VPS placement on 2/6/17 with Dr. Sam. Accompanied by daughter. Patient ambulating with rollator. Incision to right head assessed. Dissolvable sutures intact. Edges well approximated. No erythema, swelling, or drainage noted. Puncture site to abdomen assessed. Steri strips intact. Removed without complication. Edges well approximated. No erythema, swelling, or drainage noted. Instructed patient to keep incisions open to air, no scrubbing incisions, no submerging incisions in tub bath/pool for 6 more weeks, and pat to dry. Patient and daughter verbalized understanding of all instructions given. Patient denies any HA's, n/v, or changes in vision. Daughter states she sees some improvement in patient's memory. 6 week post op appointment with CT head reviewed and provided in print. Encouraged to call clinic with any questions or concerns.

## 2017-02-23 ENCOUNTER — OFFICE VISIT (OUTPATIENT)
Dept: INTERNAL MEDICINE | Facility: CLINIC | Age: 82
End: 2017-02-23
Payer: MEDICARE

## 2017-02-23 VITALS
HEIGHT: 67 IN | DIASTOLIC BLOOD PRESSURE: 80 MMHG | BODY MASS INDEX: 23.7 KG/M2 | WEIGHT: 151 LBS | HEART RATE: 66 BPM | SYSTOLIC BLOOD PRESSURE: 132 MMHG

## 2017-02-23 DIAGNOSIS — H90.0 CONDUCTIVE HEARING LOSS, BILATERAL: ICD-10-CM

## 2017-02-23 DIAGNOSIS — G91.2 NPH (NORMAL PRESSURE HYDROCEPHALUS): Primary | ICD-10-CM

## 2017-02-23 DIAGNOSIS — E78.5 HYPERLIPIDEMIA, UNSPECIFIED HYPERLIPIDEMIA TYPE: ICD-10-CM

## 2017-02-23 DIAGNOSIS — N13.8 BPH (BENIGN PROSTATIC HYPERTROPHY) WITH URINARY OBSTRUCTION: ICD-10-CM

## 2017-02-23 DIAGNOSIS — D50.9 IRON DEFICIENCY ANEMIA, UNSPECIFIED IRON DEFICIENCY ANEMIA TYPE: ICD-10-CM

## 2017-02-23 DIAGNOSIS — I10 ESSENTIAL HYPERTENSION: ICD-10-CM

## 2017-02-23 DIAGNOSIS — N40.1 BPH (BENIGN PROSTATIC HYPERTROPHY) WITH URINARY OBSTRUCTION: ICD-10-CM

## 2017-02-23 PROCEDURE — 1159F MED LIST DOCD IN RCRD: CPT | Mod: S$GLB,,, | Performed by: INTERNAL MEDICINE

## 2017-02-23 PROCEDURE — 99499 UNLISTED E&M SERVICE: CPT | Mod: S$GLB,,, | Performed by: INTERNAL MEDICINE

## 2017-02-23 PROCEDURE — 99999 PR PBB SHADOW E&M-EST. PATIENT-LVL III: CPT | Mod: PBBFAC,,, | Performed by: INTERNAL MEDICINE

## 2017-02-23 PROCEDURE — 99215 OFFICE O/P EST HI 40 MIN: CPT | Mod: S$GLB,,, | Performed by: INTERNAL MEDICINE

## 2017-02-23 PROCEDURE — 1126F AMNT PAIN NOTED NONE PRSNT: CPT | Mod: S$GLB,,, | Performed by: INTERNAL MEDICINE

## 2017-02-23 PROCEDURE — 1157F ADVNC CARE PLAN IN RCRD: CPT | Mod: S$GLB,,, | Performed by: INTERNAL MEDICINE

## 2017-02-23 PROCEDURE — 1160F RVW MEDS BY RX/DR IN RCRD: CPT | Mod: S$GLB,,, | Performed by: INTERNAL MEDICINE

## 2017-02-23 NOTE — MR AVS SNAPSHOT
[facility]  [address]  [facility] [address]  Phone: [phone]  Fax: [phone]                  Atif Neves   2017 12:30 PM   Office Visit    Description:  Male : 1928   Provider:  Atif Christian MD   Department:  [facility]           Reason for Visit     Follow-up           Diagnoses this Visit        Comments    NPH (normal pressure hydrocephalus)    -  Primary     Essential hypertension         BPH (benign prostatic hypertrophy) with urinary obstruction         Hyperlipidemia, unspecified hyperlipidemia type         Conductive hearing loss, bilateral         Iron deficiency anemia, unspecified iron deficiency anemia type                To Do List           Future Appointments        Provider Department Dept Phone    3/22/2017 9:20 AM [facility] CT6 MOBILE LIMIT 450 LBS [facility] [phone]    3/22/2017 11:30 AM Swapnil Sam MD [facility] - Neurosurgery 7th Fl [phone]    2017 9:45 AM Jose Lopez MD [facility] Urology [phone]      Goals (5 Years of Data)     None      [facility] On Call     [facility] Care Line -  Assistance  Registered nurses in the [facility] provide clinical advisement, health education, appointment booking, and other advisory services.  Call for this free service at [phone].             Medications           Message regarding Medications     Verify the changes and/or additions to your medication regime listed below are the same as discussed with your clinician today.  If any of these changes or additions are incorrect, please notify your healthcare provider.             Verify that the below list of medications is an accurate representation of the medications you are currently taking.  If none reported, the list may be blank. If incorrect, please contact your healthcare provider. Carry this list with you in case of emergency.           Current  "Medications     amlodipine (NORVASC) 5 MG tablet Take 1 tablet (5 mg total) by mouth once daily.    atorvastatin (LIPITOR) 20 MG tablet Take 20 mg by mouth once daily.    citalopram (CELEXA) 10 MG tablet Take 10 mg by mouth once daily.    dorzolamide-timolol 2-0.5% (COSOPT) 22.3-6.8 mg/mL ophthalmic solution Place 1 drop into the left eye once daily.    ergocalciferol (ERGOCALCIFEROL) 50,000 unit Cap TK ONE C     PO ONCE PER MONTH    hydrocodone-acetaminophen 5-325mg (NORCO) 5-325 mg per tablet Take 1 tablet by mouth every 6 (six) hours as needed for Pain.    polyethylene glycol (GLYCOLAX) 17 gram/dose powder Take 17 g by mouth 2 (two) times daily. Adjust frequency for one small bowel movement a day    tamsulosin (FLOMAX) 0.4 mg Cp24 TK ONE C     PO 30 MINUTES AFTER THE SAME MEAL QD           Clinical Reference Information           Your Vitals Were     BP Pulse Height Weight BMI    132/80 (BP Location: Right arm, Patient Position: Sitting, BP Method: Manual) 66 5' 7" (1.702 m) 68.5 kg (151 lb 0.2 oz) 23.65 kg/m2      Blood Pressure          Most Recent Value    BP  132/80      Allergies as of 2/23/2017     Aspirin      Immunizations Administered on Date of Encounter - 2/23/2017     None      Orders Placed During Today's Visit      Normal Orders This Visit    Ambulatory Referral to ENT     Future Labs/Procedures Expected by Expires    CBC auto differential  2/23/2017 2/23/2018    Iron  2/23/2017 2/23/2018    Lipid panel  2/23/2017 2/23/2018    Comprehensive metabolic panel  8/26/2017 (Approximate) 2/23/2018      Language Assistance Services     ATTENTION: Language assistance services are available, free of charge. Please call [phone].      ATENCIÓN: Si habla bassem, tiene a corral disposición servicios gratuitos de asistencia lingüística. Llame al [phone].     CHÚ Ý: N?u b?n nói Ti?ng Vi?t, có các d?ch v? h? tr? ngôn ng? mi?n phí dành cho b?n. G?i s? [phone].         [facility] " complies with applicable Federal civil rights laws and does not discriminate on the basis of race, color, national origin, age, disability, or sex.

## 2017-02-23 NOTE — PROGRESS NOTES
CHIEF COMPLAINT:  Hospital followup.    HISTORY OF PRESENT ILLNESS:  The patient is an 88-year-old gentleman with   hypertension, macular degeneration, normal pressure hydrocephalus who is status   post  shunt, who comes in today for followup.  He is here today with his   daughter who reports no new complaints.  The patient was having trouble with his   memory.  He would ask the same questions twice.  He was having some other   problems as well such as constipation and urgency, none of these problems   improved after his shunt.    REVIEW OF SYSTEMS:  The patient reports a good appetite.  He has decreased   hearing.  He does have problems with his vision secondary to macular   degeneration.  No trouble swallowing.  No chest pain, no shortness of breath.    No nausea or vomiting.  No abdominal pain, no bowel changes.  He pretty much has   a bowel movement every other day.  He does have to use MiraLax, which helps.    He reports no trouble starting or stopping, but does have to wear Depends at   night because sometimes at night when he wakes up, he will have urinated.  His   memory is still the same.  His gait is about the same.    PHYSICAL EXAMINATION:  GENERAL APPEARANCE:  No acute distress.  HEENT:  Conjunctivae are clear.  Pupils are equal.  TMs are clear.  Nasal septum   is midline without discharge.  Oropharynx is without erythema.  He has upper   and lower dentures in place.  PULMONARY:  Good inspiratory, expiratory breath sounds are heard.  Lungs are   clear to auscultation.  CARDIOVASCULAR:  S1, S2.  EXTREMITIES:  Without edema.  ABDOMEN:  Nontender, nondistended without hepatosplenomegaly.    ASSESSMENT:  1.  Normal pressure hydrocephalus status post  shunt.  2.  Hypertension.  3.  BPH.  4.  Hyperlipidemia.  5.  Hearing loss.  6.  Anemia on review of labs.    PLAN:  We will refer the patient to ENT for hearing evaluation.  We will check a   CBC, CMP, serum iron and lipid panel.  He is to follow up with us  pending   results.      EDITH/ANABEL  dd: 02/23/2017 16:33:48 (CST)  td: 02/24/2017 11:06:24 (CST)  Doc ID   #[id number]  Job ID #[id number]    CC:

## 2017-03-02 ENCOUNTER — LAB VISIT (OUTPATIENT)
Dept: LAB | Facility: HOSPITAL | Age: 82
End: 2017-03-02
Attending: INTERNAL MEDICINE
Payer: MEDICARE

## 2017-03-02 DIAGNOSIS — N13.8 BPH (BENIGN PROSTATIC HYPERTROPHY) WITH URINARY OBSTRUCTION: ICD-10-CM

## 2017-03-02 DIAGNOSIS — N40.1 BPH (BENIGN PROSTATIC HYPERTROPHY) WITH URINARY OBSTRUCTION: ICD-10-CM

## 2017-03-02 DIAGNOSIS — G91.2 NPH (NORMAL PRESSURE HYDROCEPHALUS): ICD-10-CM

## 2017-03-02 DIAGNOSIS — I10 ESSENTIAL HYPERTENSION: ICD-10-CM

## 2017-03-02 DIAGNOSIS — D50.9 IRON DEFICIENCY ANEMIA, UNSPECIFIED IRON DEFICIENCY ANEMIA TYPE: ICD-10-CM

## 2017-03-02 DIAGNOSIS — E78.5 HYPERLIPIDEMIA, UNSPECIFIED HYPERLIPIDEMIA TYPE: ICD-10-CM

## 2017-03-02 LAB
ALBUMIN SERPL BCP-MCNC: 3.6 G/DL
ALP SERPL-CCNC: 144 U/L
ALT SERPL W/O P-5'-P-CCNC: 13 U/L
ANION GAP SERPL CALC-SCNC: 7 MMOL/L
AST SERPL-CCNC: 19 U/L
BASOPHILS # BLD AUTO: 0.02 K/UL
BASOPHILS NFR BLD: 0.3 %
BILIRUB SERPL-MCNC: 0.9 MG/DL
BUN SERPL-MCNC: 13 MG/DL
CALCIUM SERPL-MCNC: 9.6 MG/DL
CHLORIDE SERPL-SCNC: 109 MMOL/L
CHOLEST/HDLC SERPL: 3.8 {RATIO}
CO2 SERPL-SCNC: 25 MMOL/L
CREAT SERPL-MCNC: 0.9 MG/DL
DIFFERENTIAL METHOD: ABNORMAL
EOSINOPHIL # BLD AUTO: 0.2 K/UL
EOSINOPHIL NFR BLD: 3.2 %
ERYTHROCYTE [DISTWIDTH] IN BLOOD BY AUTOMATED COUNT: 16.3 %
EST. GFR  (AFRICAN AMERICAN): >60 ML/MIN/1.73 M^2
EST. GFR  (NON AFRICAN AMERICAN): >60 ML/MIN/1.73 M^2
GLUCOSE SERPL-MCNC: 94 MG/DL
HCT VFR BLD AUTO: 40.8 %
HDL/CHOLESTEROL RATIO: 26.2 %
HDLC SERPL-MCNC: 221 MG/DL
HDLC SERPL-MCNC: 58 MG/DL
HGB BLD-MCNC: 13.3 G/DL
IRON SERPL-MCNC: 47 UG/DL
LDLC SERPL CALC-MCNC: 148.6 MG/DL
LYMPHOCYTES # BLD AUTO: 2.1 K/UL
LYMPHOCYTES NFR BLD: 27.2 %
MCH RBC QN AUTO: 25.8 PG
MCHC RBC AUTO-ENTMCNC: 32.6 %
MCV RBC AUTO: 79 FL
MONOCYTES # BLD AUTO: 0.5 K/UL
MONOCYTES NFR BLD: 6.6 %
NEUTROPHILS # BLD AUTO: 4.7 K/UL
NEUTROPHILS NFR BLD: 62.6 %
NONHDLC SERPL-MCNC: 163 MG/DL
PLATELET # BLD AUTO: 223 K/UL
PMV BLD AUTO: 10.4 FL
POTASSIUM SERPL-SCNC: 4.1 MMOL/L
PROT SERPL-MCNC: 7.6 G/DL
RBC # BLD AUTO: 5.16 M/UL
SODIUM SERPL-SCNC: 141 MMOL/L
TRIGL SERPL-MCNC: 72 MG/DL
WBC # BLD AUTO: 7.53 K/UL

## 2017-03-02 PROCEDURE — 80061 LIPID PANEL: CPT

## 2017-03-02 PROCEDURE — 83540 ASSAY OF IRON: CPT

## 2017-03-02 PROCEDURE — 36415 COLL VENOUS BLD VENIPUNCTURE: CPT

## 2017-03-02 PROCEDURE — 85025 COMPLETE CBC W/AUTO DIFF WBC: CPT

## 2017-03-02 PROCEDURE — 80053 COMPREHEN METABOLIC PANEL: CPT

## 2017-03-03 ENCOUNTER — TELEPHONE (OUTPATIENT)
Dept: INTERNAL MEDICINE | Facility: CLINIC | Age: 82
End: 2017-03-03

## 2017-03-03 RX ORDER — ATORVASTATIN CALCIUM 20 MG/1
20 TABLET, FILM COATED ORAL DAILY
Qty: 30 TABLET | Refills: 6 | Status: SHIPPED | OUTPATIENT
Start: 2017-03-03 | End: 2017-09-28 | Stop reason: SDUPTHER

## 2017-03-03 NOTE — TELEPHONE ENCOUNTER
----- Message from Atif Christian MD sent at 3/2/2017  3:44 PM CST -----  Please contact patient's daughter. His cholesterol was a little elevated. Has he been taking his atorvastatin?

## 2017-03-03 NOTE — TELEPHONE ENCOUNTER
I spoke with patient daughter and pt daughter states she do not have a rx for lipitor/ atorvastatin.

## 2017-03-22 ENCOUNTER — OFFICE VISIT (OUTPATIENT)
Dept: NEUROSURGERY | Facility: CLINIC | Age: 82
DRG: 071 | End: 2017-03-22
Payer: MEDICARE

## 2017-03-22 ENCOUNTER — HOSPITAL ENCOUNTER (OUTPATIENT)
Dept: RADIOLOGY | Facility: HOSPITAL | Age: 82
Discharge: HOME | DRG: 071 | End: 2017-03-22
Attending: NEUROLOGICAL SURGERY
Payer: MEDICARE

## 2017-03-22 VITALS
TEMPERATURE: 97 F | HEIGHT: 67 IN | DIASTOLIC BLOOD PRESSURE: 71 MMHG | HEART RATE: 66 BPM | BODY MASS INDEX: 24.4 KG/M2 | WEIGHT: 155.5 LBS | SYSTOLIC BLOOD PRESSURE: 141 MMHG

## 2017-03-22 DIAGNOSIS — Z98.2 S/P VENTRICULOPERITONEAL SHUNT: ICD-10-CM

## 2017-03-22 DIAGNOSIS — G91.2 NPH (NORMAL PRESSURE HYDROCEPHALUS): Primary | ICD-10-CM

## 2017-03-22 DIAGNOSIS — G91.2 NPH (NORMAL PRESSURE HYDROCEPHALUS): ICD-10-CM

## 2017-03-22 PROCEDURE — 99024 POSTOP FOLLOW-UP VISIT: CPT | Mod: S$GLB,,, | Performed by: NEUROLOGICAL SURGERY

## 2017-03-22 PROCEDURE — 70450 CT HEAD/BRAIN W/O DYE: CPT | Mod: 26,,, | Performed by: RADIOLOGY

## 2017-03-22 PROCEDURE — 99499 UNLISTED E&M SERVICE: CPT | Mod: S$GLB,,, | Performed by: NEUROLOGICAL SURGERY

## 2017-03-22 PROCEDURE — 99999 PR PBB SHADOW E&M-EST. PATIENT-LVL III: CPT | Mod: PBBFAC,,, | Performed by: NEUROLOGICAL SURGERY

## 2017-03-22 NOTE — LETTER
March 22, 2017      Atif Christian MD  [address]  [place] [address]           [facility] - Neurosurgery 7th Fl  [address]  [place] [address]  Phone: [phone]          Patient: Atif Neves   MR Number: [id number]   YOB: 1928   Date of Visit: 3/22/2017       Dear Dr. Atif Christian:    Thank you for referring Atif Neves to me for evaluation. Attached you will find relevant portions of my assessment and plan of care.    If you have questions, please do not hesitate to call me. I look forward to following Atif Neves along with you.    Sincerely,    Swapnil Sam MD    Enclosure  CC:  No Recipients    If you would like to receive this communication electronically, please contact [email or web address] or [phone] to request more information on [facility] access.    For providers and/or their staff who would like to refer a patient to [facility], please contact us through our one-stop-shop provider referral line, [facility] , at [phone].    If you feel you have received this communication in error or would no longer like to receive these types of communications, please e-mail [email or web address]

## 2017-03-22 NOTE — PATIENT INSTRUCTIONS
The patient is doing well 6-weeks post-op endoscopic placement of a ventriculoperitoneal shunt on 2/6/2017. I have reviewed the CT head with the patient, which shows operative change from right parietal ventriculostomy catheter placement. I will refer the patient to Physical Therapy for gait training and strengthening. I will schedule him follow up in 1 year.

## 2017-03-22 NOTE — MR AVS SNAPSHOT
[facility] - Neurosurgery 7th Fl  [address]  [facility] [address]  Phone: [phone]                  Atif Neves   3/22/2017 11:30 AM   Office Visit    Description:  Male : 1928   Provider:  Swapnil Sam MD   Department[facility]  [facility] - Neurosurgery 7th Fl           Reason for Visit     NPH           Diagnoses this Visit        Comments    NPH (normal pressure hydrocephalus)    -  Primary     S/P ventriculoperitoneal shunt                To Do List           Future Appointments        Provider Department Dept Phone    2017 1:00 PM AUDIOGRAM, AUDIO [facility] Audiology [phone]    2017 2:15 PM Elton Butts III, MD [facility] Otorhinolaryngology [phone]    2017 9:45 AM Jose Lopez MD [facility] Urology [phone]      Goals (5 Years of Data)     None      [facility]     [facility] On Call Nurse [facility] -  Assistance  Registered nurses in the [facility] On Call Center provide clinical advisement, health education, appointment booking, and other advisory services.  Call for this free service at [phone].             Medications           Message regarding Medications     Verify the changes and/or additions to your medication regime listed below are the same as discussed with your clinician today.  If any of these changes or additions are incorrect, please notify your healthcare provider.        STOP taking these medications     hydrocodone-acetaminophen 5-325mg (NORCO) 5-325 mg per tablet Take 1 tablet by mouth every 6 (six) hours as needed for Pain.           Verify that the below list of medications is an accurate representation of the medications you are currently taking.  If none reported, the list may be blank. If incorrect, please contact your healthcare provider. Carry this list with you in case of emergency.           Current Medications     amlodipine (NORVASC) 5 MG tablet Take 1 tablet (5 mg total) by mouth once daily.    citalopram  "(CELEXA) 10 MG tablet Take 10 mg by mouth once daily.    dorzolamide-timolol 2-0.5% (COSOPT) 22.3-6.8 mg/mL ophthalmic solution Place 1 drop into the left eye once daily.    ergocalciferol (ERGOCALCIFEROL) 50,000 unit Cap TK ONE C     PO ONCE PER MONTH    polyethylene glycol (GLYCOLAX) 17 gram/dose powder Take 17 g by mouth 2 (two) times daily. Adjust frequency for one small bowel movement a day    tamsulosin (FLOMAX) 0.4 mg Cp24 TK ONE C     PO 30 MINUTES AFTER THE SAME MEAL QD    atorvastatin (LIPITOR) 20 MG tablet Take 1 tablet (20 mg total) by mouth once daily.           Clinical Reference Information           Your Vitals Were     BP Pulse Temp Height Weight BMI    141/71 66 97.4 °F (36.3 °C) (Oral) 5' 7" (1.702 m) 70.5 kg (155 lb 8 oz) 24.35 kg/m2      Blood Pressure          Most Recent Value    BP  (!)  141/71      Allergies as of 3/22/2017     Aspirin      Immunizations Administered on Date of Encounter - 3/22/2017     None      Orders Placed During Today's Visit      Normal Orders This Visit    Ambulatory Referral to Physical/Occupational Therapy       Instructions    The patient is doing well 6-weeks post-op endoscopic placement of a ventriculoperitoneal shunt on 2/6/2017. I have reviewed the CT head with the patient, which shows operative change from right parietal ventriculostomy catheter placement. I will refer the patient to Physical Therapy for gait training and strengthening. I will schedule him follow up in 1 year.       Language Assistance Services     ATTENTION: Language assistance services are available, free of charge. Please call [phone].      ATENCIÓN: Si habla español, tiene a corral disposición servicios gratuitos de asistencia lingüística. Llame al [phone].     JOSÉ MIGUEL Ý: N?u b?n nói Ti?ng Vi?t, có các d?ch v? h? tr? ngôn ng? mi?n phí dành cho b?n. G?i s? [phone].         [facility] - Neurosurgery [facility] complies with applicable Federal civil rights laws and does not " discriminate on the basis of race, color, national origin, age, disability, or sex.

## 2017-03-22 NOTE — PROGRESS NOTES
"Subjective:    I, Samantha Meyers, am scribing for, and in the presence of, Dr. Swapnil Sam.     Patient ID: Atif Neves is a 88 y.o. male.    Chief Complaint: NPH    HPI This is a 88-year-old male who presents 6-weeks PO endoscopic placement of a ventriculoperitoneal shunt on 2/6/2017. The pt states that he is doing well without any complaints. No headaches. He states that his walking has improved.    He presents today with a walker.  Review of Systems   Constitutional: Negative for activity change, fatigue and fever.   HENT: Negative for facial swelling.    Eyes: Negative.    Respiratory: Negative.    Cardiovascular: Negative.    Gastrointestinal: Negative for diarrhea, nausea and vomiting.   Genitourinary: Negative.    Musculoskeletal: Negative for back pain, joint swelling and myalgias.   Neurological: Negative for seizures, weakness, numbness and headaches.   Psychiatric/Behavioral: Negative.        Past Medical History:   Diagnosis Date    JONATAN (acute kidney injury) 06/2016    Anxiety     Arthritis     Glaucoma     Hyperlipemia     Hypertension     Irregular heartbeat     Macular degeneration     Normal pressure hydrocephalus     Urinary retention 06/2016    UTI (urinary tract infection)        Objective:     BP (!) 141/71  Pulse 66  Temp 97.4 °F (36.3 °C) (Oral)   Ht 5' 7" (1.702 m)  Wt 70.5 kg (155 lb 8 oz)  BMI 24.35 kg/m2    Physical Exam   Constitutional: He is oriented to person, place, and time. He appears well-developed and well-nourished.   HENT:   Head: Normocephalic and atraumatic.   Neck: Neck supple.   Neurological: He is alert and oriented to person, place, and time. No cranial nerve deficit. He displays a negative Romberg sign. GCS eye subscore is 4. GCS verbal subscore is 5. GCS motor subscore is 6.       Imaging:  CT head, dated 3/22/2017, shows operative change from right parietal ventriculostomy catheter placement.    I have personally reviewed the images with the pt.    I, " Dr. Swapnil Sam, personally performed the services described in this documentation as scribed by Samantha Meyers in my presence, and it is both accurate and complete.  Assessment:       1. NPH (normal pressure hydrocephalus)    2. S/P ventriculoperitoneal shunt        Plan:   The patient is doing well 6-weeks post-op endoscopic placement of a ventriculoperitoneal shunt on 2/6/2017. I have reviewed the CT head with the patient, which shows operative change from right parietal ventriculostomy catheter placement. I will refer the patient to Physical Therapy for gait training and strengthening. I will schedule him follow up in 1 year.

## 2017-03-23 ENCOUNTER — HOSPITAL ENCOUNTER (INPATIENT)
Facility: HOSPITAL | Age: 82
LOS: 11 days | Discharge: HOME HEALTH | DRG: 071 | End: 2017-04-03
Attending: EMERGENCY MEDICINE | Admitting: HOSPITALIST
Payer: MEDICARE

## 2017-03-23 DIAGNOSIS — Z98.2 VENTRICULO-PERITONEAL SHUNT STATUS: ICD-10-CM

## 2017-03-23 DIAGNOSIS — R41.82 ALTERED MENTAL STATUS, UNSPECIFIED ALTERED MENTAL STATUS TYPE: ICD-10-CM

## 2017-03-23 DIAGNOSIS — N40.1 BPH (BENIGN PROSTATIC HYPERTROPHY) WITH URINARY OBSTRUCTION: ICD-10-CM

## 2017-03-23 DIAGNOSIS — N13.8 BPH (BENIGN PROSTATIC HYPERTROPHY) WITH URINARY OBSTRUCTION: ICD-10-CM

## 2017-03-23 DIAGNOSIS — G91.2 NPH (NORMAL PRESSURE HYDROCEPHALUS): ICD-10-CM

## 2017-03-23 DIAGNOSIS — R41.0 DELIRIUM, ACUTE: ICD-10-CM

## 2017-03-23 DIAGNOSIS — R78.81 BACTEREMIA DUE TO GRAM-POSITIVE BACTERIA: Primary | ICD-10-CM

## 2017-03-23 DIAGNOSIS — R41.82 ALTERED MENTAL STATE: ICD-10-CM

## 2017-03-23 LAB
ALBUMIN SERPL BCP-MCNC: 3.6 G/DL
ALP SERPL-CCNC: 142 U/L
ALT SERPL W/O P-5'-P-CCNC: 11 U/L
AMMONIA PLAS-SCNC: 23 UMOL/L
ANION GAP SERPL CALC-SCNC: 12 MMOL/L
APAP SERPL-MCNC: <3 UG/ML
AST SERPL-CCNC: 44 U/L
BACTERIA #/AREA URNS AUTO: ABNORMAL /HPF
BASOPHILS # BLD AUTO: 0.02 K/UL
BASOPHILS NFR BLD: 0.1 %
BILIRUB SERPL-MCNC: 0.9 MG/DL
BILIRUB UR QL STRIP: NEGATIVE
BILIRUB UR QL STRIP: NEGATIVE
BUN SERPL-MCNC: 12 MG/DL
CALCIUM SERPL-MCNC: 9.2 MG/DL
CHLORIDE SERPL-SCNC: 111 MMOL/L
CLARITY UR REFRACT.AUTO: ABNORMAL
CLARITY UR REFRACT.AUTO: ABNORMAL
CO2 SERPL-SCNC: 22 MMOL/L
COLOR UR AUTO: YELLOW
COLOR UR AUTO: YELLOW
CREAT SERPL-MCNC: 0.9 MG/DL
DIFFERENTIAL METHOD: ABNORMAL
EOSINOPHIL # BLD AUTO: 0 K/UL
EOSINOPHIL NFR BLD: 0.1 %
ERYTHROCYTE [DISTWIDTH] IN BLOOD BY AUTOMATED COUNT: 15.5 %
EST. GFR  (AFRICAN AMERICAN): >60 ML/MIN/1.73 M^2
EST. GFR  (NON AFRICAN AMERICAN): >60 ML/MIN/1.73 M^2
ETHANOL SERPL-MCNC: <10 MG/DL
GLUCOSE SERPL-MCNC: 86 MG/DL
GLUCOSE UR QL STRIP: NEGATIVE
GLUCOSE UR QL STRIP: NEGATIVE
HCT VFR BLD AUTO: 35.4 %
HGB BLD-MCNC: 11.7 G/DL
HGB UR QL STRIP: ABNORMAL
HGB UR QL STRIP: ABNORMAL
KETONES UR QL STRIP: NEGATIVE
KETONES UR QL STRIP: NEGATIVE
LACTATE SERPL-SCNC: 1.9 MMOL/L
LEUKOCYTE ESTERASE UR QL STRIP: NEGATIVE
LEUKOCYTE ESTERASE UR QL STRIP: NEGATIVE
LIPASE SERPL-CCNC: 23 U/L
LYMPHOCYTES # BLD AUTO: 2 K/UL
LYMPHOCYTES NFR BLD: 12.2 %
MCH RBC QN AUTO: 25.7 PG
MCHC RBC AUTO-ENTMCNC: 33.1 %
MCV RBC AUTO: 78 FL
MICROSCOPIC COMMENT: ABNORMAL
MONOCYTES # BLD AUTO: 0.9 K/UL
MONOCYTES NFR BLD: 5.6 %
NEUTROPHILS # BLD AUTO: 13.2 K/UL
NEUTROPHILS NFR BLD: 81.8 %
NITRITE UR QL STRIP: NEGATIVE
NITRITE UR QL STRIP: NEGATIVE
PH UR STRIP: 5 [PH] (ref 5–8)
PH UR STRIP: 5 [PH] (ref 5–8)
PLATELET # BLD AUTO: 236 K/UL
PMV BLD AUTO: 9.9 FL
POCT GLUCOSE: 97 MG/DL (ref 70–110)
POTASSIUM SERPL-SCNC: 3.4 MMOL/L
PROT SERPL-MCNC: 7.2 G/DL
PROT UR QL STRIP: NEGATIVE
PROT UR QL STRIP: NEGATIVE
RBC # BLD AUTO: 4.56 M/UL
RBC #/AREA URNS AUTO: 6 /HPF (ref 0–4)
SALICYLATES SERPL-MCNC: <5 MG/DL
SODIUM SERPL-SCNC: 145 MMOL/L
SP GR UR STRIP: 1.01 (ref 1–1.03)
SP GR UR STRIP: 1.01 (ref 1–1.03)
URATE CRY UR QL COMP ASSIST: ABNORMAL
URN SPEC COLLECT METH UR: ABNORMAL
URN SPEC COLLECT METH UR: ABNORMAL
UROBILINOGEN UR STRIP-ACNC: NEGATIVE EU/DL
UROBILINOGEN UR STRIP-ACNC: NEGATIVE EU/DL
WBC # BLD AUTO: 16.11 K/UL
WBC #/AREA URNS AUTO: 3 /HPF (ref 0–5)

## 2017-03-23 PROCEDURE — 80053 COMPREHEN METABOLIC PANEL: CPT

## 2017-03-23 PROCEDURE — 99285 EMERGENCY DEPT VISIT HI MDM: CPT | Mod: ,,, | Performed by: EMERGENCY MEDICINE

## 2017-03-23 PROCEDURE — 96367 TX/PROPH/DG ADDL SEQ IV INF: CPT

## 2017-03-23 PROCEDURE — 80320 DRUG SCREEN QUANTALCOHOLS: CPT

## 2017-03-23 PROCEDURE — 82140 ASSAY OF AMMONIA: CPT

## 2017-03-23 PROCEDURE — 87077 CULTURE AEROBIC IDENTIFY: CPT

## 2017-03-23 PROCEDURE — 93005 ELECTROCARDIOGRAM TRACING: CPT

## 2017-03-23 PROCEDURE — 63600175 PHARM REV CODE 636 W HCPCS: Performed by: EMERGENCY MEDICINE

## 2017-03-23 PROCEDURE — 99285 EMERGENCY DEPT VISIT HI MDM: CPT

## 2017-03-23 PROCEDURE — 96366 THER/PROPH/DIAG IV INF ADDON: CPT

## 2017-03-23 PROCEDURE — 99024 POSTOP FOLLOW-UP VISIT: CPT | Mod: ,,, | Performed by: PHYSICIAN ASSISTANT

## 2017-03-23 PROCEDURE — 87186 SC STD MICRODIL/AGAR DIL: CPT | Mod: 59

## 2017-03-23 PROCEDURE — 96365 THER/PROPH/DIAG IV INF INIT: CPT

## 2017-03-23 PROCEDURE — 87040 BLOOD CULTURE FOR BACTERIA: CPT | Mod: 59

## 2017-03-23 PROCEDURE — 85025 COMPLETE CBC W/AUTO DIFF WBC: CPT

## 2017-03-23 PROCEDURE — 81001 URINALYSIS AUTO W/SCOPE: CPT

## 2017-03-23 PROCEDURE — 80307 DRUG TEST PRSMV CHEM ANLYZR: CPT

## 2017-03-23 PROCEDURE — 83690 ASSAY OF LIPASE: CPT

## 2017-03-23 PROCEDURE — 83605 ASSAY OF LACTIC ACID: CPT

## 2017-03-23 PROCEDURE — 96375 TX/PRO/DX INJ NEW DRUG ADDON: CPT

## 2017-03-23 PROCEDURE — 80329 ANALGESICS NON-OPIOID 1 OR 2: CPT

## 2017-03-23 PROCEDURE — 82962 GLUCOSE BLOOD TEST: CPT

## 2017-03-23 PROCEDURE — 11000001 HC ACUTE MED/SURG PRIVATE ROOM

## 2017-03-23 RX ORDER — LORAZEPAM 2 MG/ML
1 INJECTION INTRAMUSCULAR
Status: COMPLETED | OUTPATIENT
Start: 2017-03-23 | End: 2017-03-23

## 2017-03-23 RX ORDER — CEFEPIME HYDROCHLORIDE 2 G/50ML
2 INJECTION, SOLUTION INTRAVENOUS
Status: COMPLETED | OUTPATIENT
Start: 2017-03-23 | End: 2017-03-23

## 2017-03-23 RX ADMIN — VANCOMYCIN HYDROCHLORIDE 1000 MG: 1 INJECTION, POWDER, LYOPHILIZED, FOR SOLUTION INTRAVENOUS at 04:03

## 2017-03-23 RX ADMIN — LORAZEPAM 1 MG: 2 INJECTION, SOLUTION INTRAMUSCULAR; INTRAVENOUS at 01:03

## 2017-03-23 RX ADMIN — CEFEPIME HYDROCHLORIDE 2 G: 2 INJECTION, SOLUTION INTRAVENOUS at 02:03

## 2017-03-23 NOTE — IP AVS SNAPSHOT
[facility]  [address]  [facility] [address]  Phone: [phone]           Patient Discharge Instructions   Our goal is to set you up for success. This packet includes information on your condition, medications, and your home care.  It will help you care for yourself to prevent having to return to the hospital.     Please ask your nurse if you have any questions.      There are many details to remember when preparing to leave the hospital. Here is what you will need to do:    1. Take your medicine. If you are prescribed medications, review your Medication List on the following pages. You may have new medications to  at the pharmacy and others that you'll need to stop taking. Review the instructions for how and when to take your medications. Talk with your doctor or nurses if you are unsure of what to do.     2. Go to your follow-up appointments. Specific follow-up information is listed in the following pages. Your may be contacted by a nurse or clinical provider about future appointments. Be sure we have all of the phone numbers to reach you. Please contact your provider's office if you are unable to make an appointment.     3. Watch for warning signs. Your doctor or nurse will give you detailed warning signs to watch for and when to call for assistance. These instructions may also include educational information about your condition. If you experience any of warning signs to your health, call your doctor.           [facility]  Unless otherwise directed by your provider, please   contact [facility], our nurse care line   that is available for 24/7 assistance.     [phone] (toll-free)     Registered nurses in the [facility] Center   provide: appointment scheduling, clinical advisement, health education, and other advisory services.                  ** Verify the list of medication(s) below is accurate and up to date. Carry this with you in case of  emergency. If your medications have changed, please notify your healthcare provider.             Medication List      START taking these medications        Additional Info                      levetiracetam 1000 MG tablet   Commonly known as:  KEPPRA   Quantity:  60 tablet   Refills:  11   Dose:  1000 mg    Instructions:  Take 1 tablet (1,000 mg total) by mouth 2 (two) times daily.     Begin Date    AM    Noon    PM    Bedtime       VANCOMYCIN 1 G/250 ML D5W (READY TO MIX SYSTEM)   Refills:  0   Dose:  1000 mg   Indications:  Bacteremia    Last time this was given:  1,000 mg on 4/3/2017  4:52 PM   Instructions:  Inject 250 mLs (1,000 mg total) into the vein every 12 (twelve) hours.     Begin Date    AM    Noon    PM    Bedtime         CHANGE how you take these medications        Additional Info                      polyethylene glycol 17 gram/dose powder   Commonly known as:  GLYCOLAX   Quantity:  1 Bottle   Refills:  3   Dose:  17 g   What changed:    - when to take this  - reasons to take this    Instructions:  Take 17 g by mouth 2 (two) times daily as needed (constipation). Adjust frequency for one small bowel movement a day     Begin Date    AM    Noon    PM    Bedtime         CONTINUE taking these medications        Additional Info                      amlodipine 5 MG tablet   Commonly known as:  NORVASC   Quantity:  30 tablet   Refills:  11   Dose:  5 mg    Last time this was given:  10 mg on 4/3/2017  9:28 AM   Instructions:  Take 1 tablet (5 mg total) by mouth once daily.     Begin Date    AM    Noon    PM    Bedtime       atorvastatin 20 MG tablet   Commonly known as:  LIPITOR   Quantity:  30 tablet   Refills:  6   Dose:  20 mg    Last time this was given:  20 mg on 4/3/2017  9:28 AM   Instructions:  Take 1 tablet (20 mg total) by mouth once daily.     Begin Date    AM    Noon    PM    Bedtime       citalopram 10 MG tablet   Commonly known as:  CELEXA   Refills:  0   Dose:  10 mg    Last time this was  given:  10 mg on 4/3/2017  9:28 AM   Instructions:  Take 10 mg by mouth once daily.     Begin Date    AM    Noon    PM    Bedtime       dorzolamide-timolol 2-0.5% 22.3-6.8 mg/mL ophthalmic solution   Commonly known as:  COSOPT   Refills:  0   Dose:  1 drop    Instructions:  Place 1 drop into the left eye once daily.     Begin Date    AM    Noon    PM    Bedtime       ergocalciferol 50,000 unit Cap   Commonly known as:  ERGOCALCIFEROL   Refills:  1    Instructions:  TK ONE C     PO ONCE PER MONTH     Begin Date    AM    Noon    PM    Bedtime       tamsulosin 0.4 mg Cp24   Commonly known as:  FLOMAX   Refills:  1    Last time this was given:  0.4 mg on 4/3/2017  9:28 AM   Instructions:  TK ONE C     PO 30 MINUTES AFTER THE SAME MEAL QD     Begin Date    AM    Noon    PM    Bedtime            Where to Get Your Medications      These medications were sent to [facility] [place] [address] [place] [address] [address]  [address] [facility] [address]     Phone:  [phone]     polyethylene glycol 17 gram/dose powder         Information about where to get these medications is not yet available     ! Ask your nurse or doctor about these medications     levetiracetam 1000 MG tablet    VANCOMYCIN 1 G/250 ML D5W (READY TO MIX SYSTEM)                  Please bring to all follow up appointments:    1. A copy of your discharge instructions.  2. All medicines you are currently taking in their original bottles.  3. Identification and insurance card.    Please arrive 15 minutes ahead of scheduled appointment time.    Please call 24 hours in advance if you must reschedule your appointment and/or time.        Your Scheduled Appointments     Apr 07, 2017  8:15 AM CDT   New Physical Therapy Patient with Zoe Kee, PT   [facility] ([facility])    [address]  [place] [address] [phone]            Apr 19, 2017  1:00 PM  CDT   Audiogram with AUDIOGRAM, AUDIO   [facility] ([facility] )    [address]  [facility] [address] [phone]            Apr 19, 2017  2:15 PM CDT   Consult with MD Marlon Merrill III - Otorhinolaryngology ([facility] )    [address]  [facility] [address] [phone]            Jul 25, 2017  9:45 AM CDT   Established Patient Visit with Jose Lopez MD   [facility] - Urology ([facility])    [address]  [facility] [address] [phone]              Follow-up Information     Follow up with [facility].    Specialties:  Home Health Services, Physical Therapy, Occupational Therapy    Contact information:    [address]  [address]  [facility] [id number] [phone]          Follow up with [facility].    Specialties:  Pharmacist, DME Provider, IV Infusion    Contact information:    [address]  [facility] [address]  [phone]        Referrals     Future Orders    Ambulatory consult to Neurology Epilepsy     Ambulatory referral to Outpatient Case Management     Questions:    Does the patient have a chronic or uncontrolled disease process?:      Does the patient have a new diagnosis of a catastrophic or life altering illness/treatment?:      Does the patient have any psycho-social issues that may affect their ability to adhere to treatment plan?:      Does patient have any behaviors or circumstances that may impede ability to adhere to treatment plan?:      Is patient at risk for admission/readmission?:          Discharge Instructions     Future Orders    Diet general     Questions:    Total calories:      Fat restriction, if any:      Protein restriction, if any:      Na restriction, if any:      Fluid restriction:      Additional restrictions:      Diet general     Questions:    Total calories:      Fat restriction, if any:      Protein restriction,  "if any:      Na restriction, if any:      Fluid restriction:      Additional restrictions:          Primary Diagnosis     Your primary diagnosis was:  Mental Health Problem      Admission Information     Date & Time Provider Department CSN    3/23/2017 10:02 AM Claudio Phipps MD [facility] [id number]      Care Providers     Provider Role Specialty Primary office phone    Claudio Phipps MD Attending Provider Hospitalist [phone]    Claudio Phipps MD Team Attending  Hospitalist [phone]      Your Vitals Were     BP Pulse Temp Resp Height Weight    131/77 72 99.2 °F (37.3 °C) (Oral) 18 5' 6.9" (1.699 m) 68.9 kg (151 lb 14.4 oz)    SpO2 BMI             93% 23.86 kg/m2         Recent Lab Values     No lab values to display.      Pending Labs     Order Current Status    Drug screen panel, emergency In process      Allergies as of 4/3/2017        Reactions    Aspirin Swelling    States, "makes me sick." pt does not elaborate.       Advance Directives     An advance directive is a document which, in the event you are no longer able to make decisions for yourself, tells your healthcare team what kind of treatment you do or do not want to receive, or who you would like to make those decisions for you.  If you do not currently have an advance directive, [facility] encourages you to create one.  For more information call:  [phone]), [phone] ([phone]),  or log on to [email or web address].        Language Assistance Services     ATTENTION: Language assistance services are available, free of charge. Please call [phone].      ATENCIÓN: Si habla español, tiene a corral disposición servicios gratuitos de asistencia lingüística. Llame al [phone].     CHÚ Ý: N?u b?n nói Ti?ng Vi?t, có các d?ch v? h? tr? ngôn ng? mi?n phí dành cho b?n. G?i s? [phone].         [facility] complies with applicable Federal civil rights laws and does not " discriminate on the basis of race, color, national origin, age, disability, or sex.

## 2017-03-23 NOTE — ED TRIAGE NOTES
Per pt's daughter found pt in his room incoherent, couldn't state his name, or where he was. Last normal 9pm yesterday. Pt is normally AAOx4, per daughter. Pt's daughter denies pt had fever, n/v/d. Hx of  shunt placed last month. Followed up yesterday for a CT of shunt. Per pt's daughter, the physician stated that the CT looked fine.

## 2017-03-23 NOTE — CONSULTS
"[facility]  Consult Note  Neurosurgery    Consult Requested By: ED  Reason for Consult: AMS, recent shunt placement 2/6/17    SUBJECTIVE:     History of Present Illness:  Patient is a 88 y.o. male with PMHx HTN, HLD, BPH, macular degeneration, & NPH s/p VPS placement on 2/6/17 by Dr. Sam who presents to the ED for acute altered mental status.  Patient has no family at bedside & information is obtained via EMR.  He was actually seen in clinic yesterday by Dr. Sam with stable CTH.  His gait had improved & he was given an Rx for physical therapy & follow up made for 1 year.  The patient's daughter reports acute MS change at 7:30am upon waking.  He was not following commands, & had tangential, disoriented speech.  She denies any fevers, seizure, N/V, or weakness.      Scheduled Meds:   ceFEPime (MAXIPIME) IVPB  2 g Intravenous ED 1 Time    vancomycin (VANCOCIN) IVPB  15 mg/kg Intravenous ED 1 Time     Continuous Infusions:   PRN Meds:    Review of patient's allergies indicates:   Allergen Reactions    Aspirin Swelling     States, "makes me sick." pt does not elaborate.        Past Medical History:   Diagnosis Date    JONATAN (acute kidney injury) 06/2016    Anxiety     Arthritis     Glaucoma     Hyperlipemia     Hypertension     Irregular heartbeat     Macular degeneration     Normal pressure hydrocephalus     Urinary retention 06/2016    UTI (urinary tract infection)      Past Surgical History:   Procedure Laterality Date    TRANSURETHRAL RESECTION OF PROSTATE  08/31/2016     Family History   Problem Relation Age of Onset    Heart disease Brother     Cancer Daughter 55     Breast    Cancer Daughter 54     Breast     Social History   Substance Use Topics    Smoking status: Former Smoker     Packs/day: 1.00     Years: 18.00     Quit date: 1950    Smokeless tobacco: Never Used    Alcohol use No        Review of Systems:  Unable to obtain secondary to mental status    OBJECTIVE:     Vital " Signs (Most Recent)  Temp: 99.5 °F (37.5 °C) (03/23/17 1255)  Pulse: 77 (03/23/17 0858)  Resp: 16 (03/23/17 0858)  BP: (!) 158/81 (03/23/17 0858)  SpO2: 99 % (03/23/17 0858)    Vital Signs Range (Last 24H):  Temp:  [98.3 °F (36.8 °C)-99.5 °F (37.5 °C)]   Pulse:  [77]   Resp:  [16]   BP: (158)/(81)   SpO2:  [99 %]     Physical Exam:  General: well developed, well nourished, no distress  Head: normocephalic, atraumatic  Shunt incision healing well, shunt reservoir pumps & refills appropriately  Neurologic: Alert, keeps repeating phrases  GCS: Motor: 5/Verbal: 3/Eyes: 4 GCS Total: 12  Mental Status: Awake, Alert, disoriented  Language: No aphasia  Speech: No dysarthria  Cranial nerves: face symmetric, tongue midline, CN II-XII grossly intact.   Eyes: pupils equal, round, reactive to light with accommodation, EOMI. Unable to appreciate optic disc. Red reflex intact bilaterally.   Pulmonary: normal respirations, not labored, no accessory muscles used  Abdomen: soft, non-distended, not tender to palpation  Sensory: intact to light touch throughout  Motor Strength: Moves all extremities spontaneously with good tone. He is not following commands.  No focal motor deficits appreciated.  No abnormal movements seen.   DTR's - 2 + and symmetric triceps, biceps, brachioradialis, patellar, & achilles  Pronator Drift: Unable to assess  Finger-to-nose: Unable to assess  De Souza: absent  Clonus: absent  Babinski: absent  Pulses: 2+ and symmetric radial and dorsalis pedis  Skin: warm, dry and intact, no rashes  Negative for Nuchal rigidity, Kernig's sign, Brudzinski's signs    Laboratory:  CBC:   Recent Labs  Lab 03/23/17  1247   WBC 16.11*   RBC 4.56*   HGB 11.7*   HCT 35.4*      MCV 78*   MCH 25.7*   MCHC 33.1     CMP:   Recent Labs  Lab 03/23/17  1247   GLU 86   CALCIUM 9.2   ALBUMIN 3.6   PROT 7.2      K 3.4*   CO2 22*   *   BUN 12   CREATININE 0.9   ALKPHOS 142*   ALT 11   AST 44*   BILITOT 0.9     Coagulation:  No results for input(s): INR, APTT in the last 168 hours.    Invalid input(s): PT    Diagnostic Results:  I personally reviewed CT Head & agree with findings: Severely limited exam due to motion artifact. Given the limitation, no evidence of acute intracranial pathology.  Stable ventricular dilation with ventriculoperitoneal shunt catheter in stable and appropriate position.    I personally reviewed Shunt series xrays & agree with the findings: Findings: There is a single intracranial shunt catheter attached to a reservoir with continuous shunt tubing in the abdomen.    ASSESSMENT/PLAN:     Patient is a 88 y.o. male with PMHx HTN, HLD, BPH, macular degeneration, & NPH s/p VPS placement on 2/6/17 by Dr. Sam who presents to the ED for acute altered mental status & leukocytosis  -Recommend admit to medicine for work up of altered mental status  -CTH stable from yesterday, Xrays without evidence of disruption of catheter  -UA has not resulted, blood cultures recently drawn - the risks of shunt tap at this early stage of  workup far outweighs the benefits given risk for introducing infection to hardware  -Low suspicion for meningitis - no fevers & no meningeal signs  -If workup remains negative, can consider LP for CSF analysis   -Will sign off at this time  -Please reconsult if neurosurgery can be of any further assistance  -Discussed with Dr. Sam    Please feel free to call with any further questions    Awilda Jaffe PA-C  Neurosurgery  [phone]

## 2017-03-23 NOTE — ED NOTES
Multiple attempt from 2 different nurses to start IV.  Pt is unable to follow commands.  He is unable to remain still for IV placement.  He is pulling off EKG leads and his diaper and his SPO2.  Order for soft restraints. Even with soft restraints he is pulling at things and grabbing at nurse.  He is alert but cannot answer questions appropriately or follow verbal commands

## 2017-03-23 NOTE — ED PROVIDER NOTES
"Encounter Date: 3/23/2017    SCRIBE #1 NOTE: I, Zoe Leonard, am scribing for, and in the presence of,  Dr. Shah. I have scribed the following portions of the note - the Resident attestation.   SCRIBE #2 NOTE: I, Yanni Sandoval, am scribing for, and in the presence of,  Dr. Jules. I have scribed the following portions of the note - Other sections scribed: Attending Notes .     History     Chief Complaint   Patient presents with    Altered Mental Status     Altered mental status per family that was noticed at 0730 when patient woke up. Patient had  shunt placed 1 month ago.     Review of patient's allergies indicates:   Allergen Reactions    Aspirin Nausea And Vomiting     States, "makes me sick." pt does not elaborate.      HPI Comments: 87 y/o AA male with a hx of NPH with recent  shunt placed on 2/6/2017, HTN, UTI, and HLD who presents to the ED with family with reports of acute AMS since about 7:30 am upon waking up this morning. At baseline, dtr says patient is normal (A&O x 3). Dtr says patient was well and at baseline without f/c, n/v/d or any other complaints.     The history is provided by the patient.     Past Medical History:   Diagnosis Date    JONATAN (acute kidney injury) 06/2016    Anxiety     Arthritis     Glaucoma     Hyperlipemia     Hypertension     Irregular heartbeat     Macular degeneration     Normal pressure hydrocephalus     Urinary retention 06/2016    UTI (urinary tract infection)      Past Surgical History:   Procedure Laterality Date    TRANSURETHRAL RESECTION OF PROSTATE  08/31/2016     Family History   Problem Relation Age of Onset    Heart disease Brother     Cancer Daughter 55     Breast    Cancer Daughter 54     Breast     Social History   Substance Use Topics    Smoking status: Former Smoker     Packs/day: 1.00     Years: 18.00     Quit date: 1950    Smokeless tobacco: Never Used    Alcohol use No     Review of Systems   Unable to perform ROS: " Mental status change       Physical Exam   Initial Vitals   BP Pulse Resp Temp SpO2   03/23/17 0858 03/23/17 0858 03/23/17 0858 03/23/17 0858 03/23/17 0858   158/81 77 16 98.8 °F (37.1 °C) 99 %     Physical Exam    Nursing note and vitals reviewed.  Constitutional: He appears well-developed and well-nourished. No distress.   + urine stained clothes   HENT:   Head: Normocephalic and atraumatic.   Unable to palpation shunt for assessment   Eyes: EOM are normal. Pupils are equal, round, and reactive to light.   Pupils pin point B   Neck: Normal range of motion. Neck supple.   Cardiovascular: Normal rate, regular rhythm, normal heart sounds and intact distal pulses. Exam reveals no gallop and no friction rub.    No murmur heard.  Pulmonary/Chest: Breath sounds normal. No stridor. No respiratory distress. He has no wheezes. He has no rhonchi. He has no rales. He exhibits no tenderness.   Abdominal: Soft. Bowel sounds are normal. He exhibits no distension and no mass. There is no tenderness. There is no rebound and no guarding.   Musculoskeletal: Normal range of motion.   Neurological: He is alert.   GCS 12 (E4, V3, M5).  Moving all extremities   Skin: Skin is warm and dry.   Psychiatric: He has a normal mood and affect. His behavior is normal. Judgment and thought content normal.         ED Course   Procedures  Labs Reviewed   CBC W/ AUTO DIFFERENTIAL - Abnormal; Notable for the following:        Result Value    WBC 16.11 (*)     RBC 4.56 (*)     Hemoglobin 11.7 (*)     Hematocrit 35.4 (*)     MCV 78 (*)     MCH 25.7 (*)     RDW 15.5 (*)     Gran # 13.2 (*)     Gran% 81.8 (*)     Lymph% 12.2 (*)     All other components within normal limits   COMPREHENSIVE METABOLIC PANEL - Abnormal; Notable for the following:     Potassium 3.4 (*)     Chloride 111 (*)     CO2 22 (*)     Alkaline Phosphatase 142 (*)     AST 44 (*)     All other components within normal limits   URINALYSIS - Abnormal; Notable for the following:      Appearance, UA Hazy (*)     Occult Blood UA 2+ (*)     All other components within normal limits   ACETAMINOPHEN LEVEL - Abnormal; Notable for the following:     Acetaminophen (Tylenol), Serum <3.0 (*)     All other components within normal limits   SALICYLATE LEVEL - Abnormal; Notable for the following:     Salicylate Lvl <5.0 (*)     All other components within normal limits   URINALYSIS - Abnormal; Notable for the following:     Appearance, UA Hazy (*)     Occult Blood UA 2+ (*)     All other components within normal limits   URINALYSIS MICROSCOPIC - Abnormal; Notable for the following:     RBC, UA 6 (*)     All other components within normal limits   CULTURE, BLOOD   CULTURE, BLOOD   LIPASE   LACTIC ACID, PLASMA   ALCOHOL,MEDICAL (ETHANOL)   AMMONIA   DRUG SCREEN PANEL, URINE EMERGENCY   POCT GLUCOSE   POCT GLUCOSE MONITORING CONTINUOUS             Medical Decision Making:   History:   Old Medical Records: I decided to obtain old medical records.  Clinical Tests:   Lab Tests: Reviewed and Ordered  Radiological Study: Reviewed and Ordered  Medical Tests: Reviewed and Ordered  Other:   I have discussed this case with another health care provider.       APC / Resident Notes:    89 y/o AA male with a hx of NPH with recent  shunt placed on 2/6/2017, HTN, UTI, and HLD who presents to the ED with family with reports of acute AMS since about 7:30 am upon waking up this morning. Diff includes shunt malfunction vs infectious process vs metabolic derangement vs ACS vs UTI. Exam concerning for shunt malfunction. Will get AMS w/u including xray shunt series with CT head. Will cover with vanc and cefepime for indwelling device infection. BG 90s. Dispo admission with NSGY consult.      10:37 AM 3/23/2017  Tao FREIRE       HOIV update note: Difficulty getting line. Fem stick for blood. CT head completed. Will need to establics line access for IV abx.    12:39 PM 3/23/2017  Tao FREIRE    HOIV update  note: Spoke with NSGY regarding patient and acute AMS. Nsgy will come and evaluate patient. Suspected need for tap of shunt.     12:49 PM 3/23/2017  Tao PHAMIV      HOIV update note: Deep brachial IV access obtained US guided. Able to flush but unable to draw back. Patient tolerated well. UA pending. NSGY does not want to tap the  shunt despite ED concern for possible infection/malfuction. NSGY recs include admit to medicine. Will admit to medicine.    2:28 PM 3/23/2017  Tao FREIRE    HOIV update note: Spoke with medicine (Dr. Garay) who feel patient should be admitted to NSGY if UA is negative. UA negative. Spoke with NSGY who will contact medicine to come up with a plan for further management of the patient. Awaiting final dispo.     3:58 PM 3/23/2017  Tao FREIRE    HOIV update note: Final disposition pending. Staff to staff discussing occurring with Dr. Garay and Dr. Sam. Family updated. Patient sign out to Dr. Edouard.    4:58 PM 3/23/2017  Tao PHAMIV            Scribe Attestation:   Scribe #1: I performed the above scribed service and the documentation accurately describes the services I performed. I attest to the accuracy of the note.  Scribe #2: I performed the above scribed service and the documentation accurately describes the services I performed. I attest to the accuracy of the note.    Attending Attestation:   Physician Attestation Statement for Resident:  As the supervising MD   Physician Attestation Statement: I have personally seen and examined this patient.   I agree with the above history. -: This is an emergent evaluation of a 88 y.o. Male w/ recent shunt placement for NPH presenting from nursing facility w/ AMS. Initial concerns for shunt malfunction, infectious etiology, electrolyte abnormality. Will get labs and imaging to evaluate for shunt malfunction or other abnormalities.   As the supervising MD I agree with the above PE.    As the  supervising MD I agree with the above treatment, course, plan, and disposition.          Physician Attestation for Scribe:  Physician Attestation Statement for Scribe #1: I, Dr. Shah, reviewed documentation, as scribed by Zoe Leonard in my presence, and it is both accurate and complete.   Physician Attestation Statement for Scribe #2: I, Dr. Jules, reviewed documentation, as scribed by Yanni Sandoval in my presence, and it is both accurate and complete. I also acknowledge and confirm the content of the note done by Karieibbrionna #1.      Attending ED Notes:   Received this patient in sign out from Dr. Shah. I have spoken to both Dr. Sam and Dr. Varela. Dr. Sam states that there is no reason to tap the shunt because this was done 6 weeks ago, and patient was seen yesterday. He doubts that this was a sudden evolution of a CNS problem. The patient will be admitted to internal medicine.           ED Course     Clinical Impression:   Diagnoses of Altered mental state and Altered mental status, unspecified altered mental status type were pertinent to this visit.    Disposition:   Disposition: Admitted       Napoleon Shah MD  03/23/17 2028

## 2017-03-24 PROBLEM — R78.81 BACTEREMIA DUE TO GRAM-POSITIVE BACTERIA: Status: ACTIVE | Noted: 2017-03-24

## 2017-03-24 PROBLEM — D72.829 LEUKOCYTOSIS: Status: ACTIVE | Noted: 2017-03-24

## 2017-03-24 LAB
ALBUMIN SERPL BCP-MCNC: 3.4 G/DL
ALBUMIN SERPL BCP-MCNC: 3.6 G/DL
ALP SERPL-CCNC: 138 U/L
ALP SERPL-CCNC: 148 U/L
ALT SERPL W/O P-5'-P-CCNC: 15 U/L
ALT SERPL W/O P-5'-P-CCNC: 21 U/L
ANION GAP SERPL CALC-SCNC: 16 MMOL/L
AST SERPL-CCNC: 140 U/L
AST SERPL-CCNC: 92 U/L
BASOPHILS # BLD AUTO: 0.01 K/UL
BASOPHILS NFR BLD: 0.1 %
BILIRUB DIRECT SERPL-MCNC: 0.4 MG/DL
BILIRUB SERPL-MCNC: 1.1 MG/DL
BILIRUB SERPL-MCNC: 1.5 MG/DL
BUN SERPL-MCNC: 9 MG/DL
CALCIUM SERPL-MCNC: 9.4 MG/DL
CHLORIDE SERPL-SCNC: 109 MMOL/L
CO2 SERPL-SCNC: 16 MMOL/L
CREAT SERPL-MCNC: 0.8 MG/DL
DIFFERENTIAL METHOD: ABNORMAL
EOSINOPHIL # BLD AUTO: 0.1 K/UL
EOSINOPHIL NFR BLD: 0.3 %
ERYTHROCYTE [DISTWIDTH] IN BLOOD BY AUTOMATED COUNT: 15.6 %
EST. GFR  (AFRICAN AMERICAN): >60 ML/MIN/1.73 M^2
EST. GFR  (NON AFRICAN AMERICAN): >60 ML/MIN/1.73 M^2
GLUCOSE SERPL-MCNC: 82 MG/DL
HCT VFR BLD AUTO: 38.2 %
HGB BLD-MCNC: 12.9 G/DL
LYMPHOCYTES # BLD AUTO: 1.4 K/UL
LYMPHOCYTES NFR BLD: 9.7 %
MAGNESIUM SERPL-MCNC: 2.1 MG/DL
MCH RBC QN AUTO: 26.7 PG
MCHC RBC AUTO-ENTMCNC: 33.8 %
MCV RBC AUTO: 79 FL
MONOCYTES # BLD AUTO: 0.7 K/UL
MONOCYTES NFR BLD: 4.9 %
NEUTROPHILS # BLD AUTO: 12.4 K/UL
NEUTROPHILS NFR BLD: 84.7 %
PLATELET # BLD AUTO: 185 K/UL
PMV BLD AUTO: 11 FL
POTASSIUM SERPL-SCNC: 3.8 MMOL/L
PROCALCITONIN SERPL IA-MCNC: 0.24 NG/ML
PROT SERPL-MCNC: 7 G/DL
PROT SERPL-MCNC: 7.6 G/DL
RBC # BLD AUTO: 4.84 M/UL
SODIUM SERPL-SCNC: 141 MMOL/L
T4 FREE SERPL-MCNC: 0.96 NG/DL
TSH SERPL DL<=0.005 MIU/L-ACNC: 0.23 UIU/ML
WBC # BLD AUTO: 14.7 K/UL

## 2017-03-24 PROCEDURE — 99233 SBSQ HOSP IP/OBS HIGH 50: CPT | Mod: ,,, | Performed by: INTERNAL MEDICINE

## 2017-03-24 PROCEDURE — 87040 BLOOD CULTURE FOR BACTERIA: CPT

## 2017-03-24 PROCEDURE — 80053 COMPREHEN METABOLIC PANEL: CPT

## 2017-03-24 PROCEDURE — 84145 PROCALCITONIN (PCT): CPT

## 2017-03-24 PROCEDURE — 25000003 PHARM REV CODE 250: Performed by: NURSE PRACTITIONER

## 2017-03-24 PROCEDURE — 83735 ASSAY OF MAGNESIUM: CPT

## 2017-03-24 PROCEDURE — 84439 ASSAY OF FREE THYROXINE: CPT

## 2017-03-24 PROCEDURE — 99223 1ST HOSP IP/OBS HIGH 75: CPT | Mod: ,,, | Performed by: NURSE PRACTITIONER

## 2017-03-24 PROCEDURE — 85025 COMPLETE CBC W/AUTO DIFF WBC: CPT

## 2017-03-24 PROCEDURE — 11000001 HC ACUTE MED/SURG PRIVATE ROOM

## 2017-03-24 PROCEDURE — 99223 1ST HOSP IP/OBS HIGH 75: CPT | Mod: 25,GC,, | Performed by: PSYCHIATRY & NEUROLOGY

## 2017-03-24 PROCEDURE — 63600175 PHARM REV CODE 636 W HCPCS: Performed by: NURSE PRACTITIONER

## 2017-03-24 PROCEDURE — 80076 HEPATIC FUNCTION PANEL: CPT

## 2017-03-24 PROCEDURE — 84443 ASSAY THYROID STIM HORMONE: CPT

## 2017-03-24 PROCEDURE — 36415 COLL VENOUS BLD VENIPUNCTURE: CPT

## 2017-03-24 PROCEDURE — 63600175 PHARM REV CODE 636 W HCPCS: Performed by: PSYCHIATRY & NEUROLOGY

## 2017-03-24 PROCEDURE — 62270 DX LMBR SPI PNXR: CPT | Mod: ,,, | Performed by: PSYCHIATRY & NEUROLOGY

## 2017-03-24 RX ORDER — HEPARIN SODIUM 5000 [USP'U]/ML
5000 INJECTION, SOLUTION INTRAVENOUS; SUBCUTANEOUS EVERY 12 HOURS
Status: DISCONTINUED | OUTPATIENT
Start: 2017-03-24 | End: 2017-04-03 | Stop reason: HOSPADM

## 2017-03-24 RX ORDER — DEXTROSE MONOHYDRATE, SODIUM CHLORIDE, AND POTASSIUM CHLORIDE 50; 1.49; 4.5 G/1000ML; G/1000ML; G/1000ML
INJECTION, SOLUTION INTRAVENOUS CONTINUOUS
Status: DISCONTINUED | OUTPATIENT
Start: 2017-03-24 | End: 2017-03-29

## 2017-03-24 RX ORDER — RAMELTEON 8 MG/1
8 TABLET ORAL NIGHTLY PRN
Status: DISCONTINUED | OUTPATIENT
Start: 2017-03-24 | End: 2017-04-03 | Stop reason: HOSPADM

## 2017-03-24 RX ORDER — AMLODIPINE BESYLATE 5 MG/1
5 TABLET ORAL DAILY
Status: DISCONTINUED | OUTPATIENT
Start: 2017-03-24 | End: 2017-03-28

## 2017-03-24 RX ORDER — ATORVASTATIN CALCIUM 20 MG/1
20 TABLET, FILM COATED ORAL DAILY
Status: DISCONTINUED | OUTPATIENT
Start: 2017-03-24 | End: 2017-04-03 | Stop reason: HOSPADM

## 2017-03-24 RX ORDER — CITALOPRAM 10 MG/1
10 TABLET ORAL DAILY
Status: DISCONTINUED | OUTPATIENT
Start: 2017-03-24 | End: 2017-04-03 | Stop reason: HOSPADM

## 2017-03-24 RX ORDER — POLYETHYLENE GLYCOL 3350 17 G/17G
17 POWDER, FOR SOLUTION ORAL 2 TIMES DAILY PRN
Status: DISCONTINUED | OUTPATIENT
Start: 2017-03-24 | End: 2017-04-03 | Stop reason: HOSPADM

## 2017-03-24 RX ORDER — ONDANSETRON 2 MG/ML
4 INJECTION INTRAMUSCULAR; INTRAVENOUS EVERY 12 HOURS PRN
Status: DISCONTINUED | OUTPATIENT
Start: 2017-03-24 | End: 2017-04-03 | Stop reason: HOSPADM

## 2017-03-24 RX ORDER — GLUCAGON 1 MG
1 KIT INJECTION
Status: DISCONTINUED | OUTPATIENT
Start: 2017-03-24 | End: 2017-04-03 | Stop reason: HOSPADM

## 2017-03-24 RX ORDER — IBUPROFEN 200 MG
16 TABLET ORAL
Status: DISCONTINUED | OUTPATIENT
Start: 2017-03-24 | End: 2017-04-03 | Stop reason: HOSPADM

## 2017-03-24 RX ORDER — TAMSULOSIN HYDROCHLORIDE 0.4 MG/1
0.4 CAPSULE ORAL DAILY
Status: DISCONTINUED | OUTPATIENT
Start: 2017-03-24 | End: 2017-04-03 | Stop reason: HOSPADM

## 2017-03-24 RX ORDER — DIAZEPAM 10 MG/2ML
2 INJECTION INTRAMUSCULAR ONCE
Status: COMPLETED | OUTPATIENT
Start: 2017-03-24 | End: 2017-03-24

## 2017-03-24 RX ORDER — BISACODYL 10 MG
10 SUPPOSITORY, RECTAL RECTAL DAILY PRN
Status: DISCONTINUED | OUTPATIENT
Start: 2017-03-24 | End: 2017-04-03 | Stop reason: HOSPADM

## 2017-03-24 RX ORDER — ONDANSETRON 4 MG/1
8 TABLET, ORALLY DISINTEGRATING ORAL EVERY 8 HOURS PRN
Status: DISCONTINUED | OUTPATIENT
Start: 2017-03-24 | End: 2017-04-03 | Stop reason: HOSPADM

## 2017-03-24 RX ORDER — OLANZAPINE 2.5 MG/1
2.5 TABLET ORAL 3 TIMES DAILY PRN
Status: DISCONTINUED | OUTPATIENT
Start: 2017-03-24 | End: 2017-04-03 | Stop reason: HOSPADM

## 2017-03-24 RX ORDER — DORZOLAMIDE HCL 20 MG/ML
1 SOLUTION/ DROPS OPHTHALMIC DAILY
Status: DISCONTINUED | OUTPATIENT
Start: 2017-03-24 | End: 2017-04-03 | Stop reason: HOSPADM

## 2017-03-24 RX ORDER — TIMOLOL MALEATE 5 MG/ML
1 SOLUTION/ DROPS OPHTHALMIC DAILY
Status: DISCONTINUED | OUTPATIENT
Start: 2017-03-24 | End: 2017-04-03 | Stop reason: HOSPADM

## 2017-03-24 RX ORDER — IBUPROFEN 200 MG
24 TABLET ORAL
Status: DISCONTINUED | OUTPATIENT
Start: 2017-03-24 | End: 2017-04-03 | Stop reason: HOSPADM

## 2017-03-24 RX ORDER — ACETAMINOPHEN 325 MG/1
650 TABLET ORAL EVERY 4 HOURS PRN
Status: DISCONTINUED | OUTPATIENT
Start: 2017-03-24 | End: 2017-04-03 | Stop reason: HOSPADM

## 2017-03-24 RX ADMIN — DIAZEPAM 2 MG: 5 INJECTION, SOLUTION INTRAMUSCULAR; INTRAVENOUS at 02:03

## 2017-03-24 RX ADMIN — HEPARIN SODIUM 5000 UNITS: 5000 INJECTION, SOLUTION INTRAVENOUS; SUBCUTANEOUS at 08:03

## 2017-03-24 RX ADMIN — CEFTRIAXONE 2 G: 2 INJECTION, SOLUTION INTRAVENOUS at 11:03

## 2017-03-24 RX ADMIN — RAMELTEON 8 MG: 8 TABLET, FILM COATED ORAL at 08:03

## 2017-03-24 RX ADMIN — VANCOMYCIN HYDROCHLORIDE 1000 MG: 1 INJECTION, POWDER, LYOPHILIZED, FOR SOLUTION INTRAVENOUS at 06:03

## 2017-03-24 RX ADMIN — HEPARIN SODIUM 5000 UNITS: 5000 INJECTION, SOLUTION INTRAVENOUS; SUBCUTANEOUS at 12:03

## 2017-03-24 NOTE — ASSESSMENT & PLAN NOTE
- BP fairly well controlled  - continue home antihypertensive regimen   - consider adding PRN agent if SBP climbs greater than 180

## 2017-03-24 NOTE — PROGRESS NOTES
Pt transported to 958A per RN from Ed. Unable to assess LOC. Pt is confused. Restraints noted; bilateral wrist. Will continue to monitor.

## 2017-03-24 NOTE — ASSESSMENT & PLAN NOTE
- patient of Dr. Sam - who recently underwent  shunt placement 2/6/2017  - evaluated by NSX while in the ED  - neurology consulted

## 2017-03-24 NOTE — ASSESSMENT & PLAN NOTE
Mr. 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 At baseline he is alert and oriented **. Labs show a leukocytosis and a temperature of 100.1 today, suggesting an infectious process with a metabolic encephalopathy. Infectious workup is still pending, and he has been started on broad spectrum antibiotics.     Recommendations:   -- Continue infectious workup per primary team - including urine culture, chest xray, f/u blood cultures, shunt tap by Neurosurgery  -- Basic dementia labs: Vitamin B12, B1  -- Routine EEG to rule out seizures

## 2017-03-24 NOTE — PLAN OF CARE
Problem: Restraint, Nonbehavioral (nonviolent)  Goal: Clinical Justification  Outcome: Ongoing (interventions implemented as appropriate)  Pt continues to attempt climbing out of bed and pulling at medical devices such as koenig and PIV. Attempts made to reorient pt.     Problem: Patient Care Overview  Goal: Plan of Care Review  Outcome: Ongoing (interventions implemented as appropriate)  Pt is disoriented X 4. Pt is attempting to communicate but is not appropriate to situation. Bilateral wrist restraints continued. Koenig intact; delmy urine noted. No falls or injuries. Bed alarm noted. Regular rounding done on this pt.

## 2017-03-24 NOTE — H&P
[facility] Hospital Medicine  History & Physical    Patient Name: Atif Neves  MRN: [id number]  Admission Date: 3/23/2017  Attending Physician: Fer Jerez MD   Primary Care Provider: Atif Christian MD    [facility] Medicine Team: [facility] MED B Go Torres NP     Patient information was obtained from ER records and limited patient input.     Subjective:     Principal Problem:Altered mental state    Chief Complaint:   Chief Complaint   Patient presents with    Altered Mental Status     Altered mental status per family that was noticed at 0730 when patient woke up. Patient had  shunt placed 1 month ago.        HPI: 89 y/o gentleman who is new to me presents to the ED today with acute onset of AMS.  At this time, he is confused, alone, and unable to provide any information related to events leading up to ED visit.  According to the records it appears that he has a hx of normal pressure hydrocephalus and recently underwent  shunt placement in February of this year.  He appears to have went to his 6 week post-op follow up with Dr. Sam on 3/22/17 and was at baseline and A/Ox3.   He reportedly was at baseline yesterday, but was found to be acutely alerted upon awaking this AM around 7:30. At this time he is screaming, calling for people that are not present in the room and conversing with the television. He is witnessed moving all 4 extremities.  He does follow simple commands but not consistently and provides yes / no responses to some questioning.  Additional PMH consists of HTN, BPH, macular degeneration, and HLD.  CT of the head was performed while in the ED and there appears to be no acute abnormality, but image was less than optimal related to motion artifact. He was found to have a WBC count of 16.1 and was evaluated by NSX and considered low probability for meningitis. U/A was not suggestive of UTI. Blood cultures were obtained and he was started empirically on vancomycin and  "cefepime.       Past Medical History:   Diagnosis Date    JONATAN (acute kidney injury) 06/2016    Anxiety     Arthritis     Glaucoma     Hyperlipemia     Hypertension     Irregular heartbeat     Macular degeneration     Normal pressure hydrocephalus     Urinary retention 06/2016    UTI (urinary tract infection)        Past Surgical History:   Procedure Laterality Date    TRANSURETHRAL RESECTION OF PROSTATE  08/31/2016       Review of patient's allergies indicates:   Allergen Reactions    Aspirin Swelling     States, "makes me sick." pt does not elaborate.        No current facility-administered medications on file prior to encounter.      Current Outpatient Prescriptions on File Prior to Encounter   Medication Sig    amlodipine (NORVASC) 5 MG tablet Take 1 tablet (5 mg total) by mouth once daily.    atorvastatin (LIPITOR) 20 MG tablet Take 1 tablet (20 mg total) by mouth once daily.    citalopram (CELEXA) 10 MG tablet Take 10 mg by mouth once daily.    dorzolamide-timolol 2-0.5% (COSOPT) 22.3-6.8 mg/mL ophthalmic solution Place 1 drop into the left eye once daily.    ergocalciferol (ERGOCALCIFEROL) 50,000 unit Cap TK ONE C     PO ONCE PER MONTH    polyethylene glycol (GLYCOLAX) 17 gram/dose powder Take 17 g by mouth 2 (two) times daily. Adjust frequency for one small bowel movement a day    tamsulosin (FLOMAX) 0.4 mg Cp24 TK ONE C     PO 30 MINUTES AFTER THE SAME MEAL QD     Family History     Problem Relation (Age of Onset)    Cancer Daughter (55), Daughter (54)    Heart disease Brother        Social History Main Topics    Smoking status: Former Smoker     Packs/day: 1.00     Years: 18.00     Quit date: 1950    Smokeless tobacco: Never Used    Alcohol use No    Drug use: No    Sexual activity: Not on file     Review of Systems   Constitutional: Negative for chills and fever.   HENT: Negative for rhinorrhea.    Eyes: Negative for visual disturbance.   Respiratory: Negative for cough and " shortness of breath.    Cardiovascular: Negative for chest pain and leg swelling.   Gastrointestinal: Negative for abdominal distention and abdominal pain.   Endocrine: Negative for cold intolerance and heat intolerance.   Genitourinary: Negative for dysuria.   Musculoskeletal: Negative for back pain.   Skin: Negative for rash.   Neurological: Negative for seizures and syncope.   Hematological: Does not bruise/bleed easily.     Objective:     Vital Signs (Most Recent):  Temp: 98.5 °F (36.9 °C) (03/23/17 2200)  Pulse: 75 (03/23/17 2200)  Resp: 18 (03/23/17 2200)  BP: (!) 143/67 (03/23/17 2200)  SpO2: 100 % (03/23/17 2200) Vital Signs (24h Range):  Temp:  [98.3 °F (36.8 °C)-99.5 °F (37.5 °C)] 98.5 °F (36.9 °C)  Pulse:  [72-78] 75  Resp:  [16-18] 18  SpO2:  [96 %-100 %] 100 %  BP: (132-158)/(67-81) 143/67     Weight: 70.5 kg (155 lb 6.8 oz)  Body mass index is 24.42 kg/(m^2).    Physical Exam   Constitutional: He appears well-developed and well-nourished. No distress.   HENT:   Head: Normocephalic and atraumatic.   Hx of right sided  shunt   Eyes: EOM are normal. Pupils are equal, round, and reactive to light.   Neck: Normal range of motion. Neck supple.   Cardiovascular: Normal rate, regular rhythm, normal heart sounds and intact distal pulses.    No murmur heard.  Pulmonary/Chest: Effort normal and breath sounds normal. No respiratory distress.   Abdominal: Soft. Bowel sounds are normal. He exhibits no distension. There is no tenderness.   Musculoskeletal: Normal range of motion. He exhibits no edema.   Neurological: He is alert. He has normal strength. He displays a negative Romberg sign. GCS eye subscore is 4. GCS verbal subscore is 4. GCS motor subscore is 6.   Skin: Skin is warm and dry. He is not diaphoretic.   Psychiatric: He is actively hallucinating.   Nursing note and vitals reviewed.       Significant Labs:   Blood Culture:   Recent Labs  Lab 03/23/17  1247 03/23/17  1249   LABSELINA No Growth to date No  Growth to date     BMP:   Recent Labs  Lab 03/23/17  1247 03/24/17  0109   GLU 86  --      --    K 3.4*  --    *  --    CO2 22*  --    BUN 12  --    CREATININE 0.9  --    CALCIUM 9.2  --    MG  --  2.1     CBC:   Recent Labs  Lab 03/23/17  1247   WBC 16.11*   HGB 11.7*   HCT 35.4*        Urine Studies:   Recent Labs  Lab 03/23/17  1350   COLORU Yellow  Yellow   APPEARANCEUA Hazy*  Hazy*   PHUR 5.0  5.0   SPECGRAV 1.015  1.015   PROTEINUA Negative  Negative   GLUCUA Negative  Negative   KETONESU Negative  Negative   BILIRUBINUA Negative  Negative   OCCULTUA 2+*  2+*   NITRITE Negative  Negative   UROBILINOGEN Negative  Negative   LEUKOCYTESUR Negative  Negative   RBCUA 6*   WBCUA 3   BACTERIA Occasional       Significant Imaging: I have reviewed and interpreted all pertinent imaging results/findings within the past 24 hours.    Assessment/Plan:     * Altered mental state  - remains altered  - continue all safety measures - high risk of injury / falls  - ammonia 23  - CT head unremarkable but less than optimal imaging related to motion artifact  - may want to consider sedation and repeat imaging  - neurology consulted      Leukocytosis  - WBC's 16.1 on arrival   - Repeat CBC in AM  - Procalcitonin pending    - U/A negative for nitrites or leuks   - Blood cultures pending  - Vanco and cefepime given empirically  in the ED  - continue Vanco and transition to ceftriaxone and deescalate as indicated by culture results  - seen by NSX while in ED - thought to be low probability for meningitis - if workup is negative consider reconsulting NSX and possible LP      Essential hypertension  - BP fairly well controlled  - continue home antihypertensive regimen   - consider adding PRN agent if SBP climbs greater than 180      NPH (normal pressure hydrocephalus)  - patient of Dr. Sam - who recently underwent  shunt placement 2/6/2017  - evaluated by NSX while in the ED  - neurology  consulted        Glaucoma  Hx of glaucoma  - continue home dorzolamide / timolol as directed         BPH (benign prostatic hypertrophy) with urinary obstruction  - continue home tamsulosin       VTE Risk Mitigation         Ordered     heparin (porcine) injection 5,000 Units  Every 12 hours     Route:  Subcutaneous        03/24/17 0018     Medium Risk of VTE  Once      03/24/17 0018     Place sequential compression device  Until discontinued      03/24/17 0018     Place LIAM hose  Until discontinued      03/24/17 0018        Go Torres NP  Department of Hospital Medicine   [facility]

## 2017-03-24 NOTE — PLAN OF CARE
03/24/17 1325   Discharge Assessment   Assessment Type Discharge Planning Assessment   Confirmed/corrected address and phone number on facesheet? Yes   Assessment information obtained from? Caregiver;Medical Record  (patient continues with AMS)   Prior to hospitilization cognitive status: Alert/Oriented   Prior to hospitalization functional status: Assistive Equipment   Current cognitive status: Alert/Oriented   Current Functional Status: Assistive Equipment   Arrived From home or self-care   Lives With child(candi), adult  (daughter, Colleen)   Able to Return to Prior Arrangements yes   Is patient able to care for self after discharge? Unable to determine at this time (comments)   How many people do you have in your home that can help with your care after discharge? 1   Patient's perception of discharge disposition home or selfcare   Readmission Within The Last 30 Days current reason for admission unrelated to previous admission   Patient currently being followed by outpatient case management? No   Patient currently receives home health services? No  (has previously had [facility] and would like them again if home health needed)   Does the patient currently use HME? Yes   Patient currently receives private duty nursing? No   Patient currently receives any other outside agency services? No   Equipment Currently Used at Home rollator   Do you have any problems affording any of your prescribed medications? No   Is the patient taking medications as prescribed? yes   Do you have any financial concerns preventing you from receiving the healthcare you need? No   Does the patient have transportation to healthcare appointments? Yes   Transportation Available family or friend will provide   On Dialysis? No   Does the patient receive services at the Coumadin Clinic? No   Are there any open cases? No   Discharge Plan A Home with family   Discharge Plan B Home Health   Patient/Family In Agreement With Plan yes   Patient has 5 sons and  2 daughters, no POA.

## 2017-03-24 NOTE — SUBJECTIVE & OBJECTIVE
"Past Medical History:   Diagnosis Date    JONATAN (acute kidney injury) 06/2016    Anxiety     Arthritis     Glaucoma     Hyperlipemia     Hypertension     Irregular heartbeat     Macular degeneration     Normal pressure hydrocephalus     Urinary retention 06/2016    UTI (urinary tract infection)        Past Surgical History:   Procedure Laterality Date    TRANSURETHRAL RESECTION OF PROSTATE  08/31/2016       Review of patient's allergies indicates:   Allergen Reactions    Aspirin Swelling     States, "makes me sick." pt does not elaborate.          No current facility-administered medications on file prior to encounter.      Current Outpatient Prescriptions on File Prior to Encounter   Medication Sig    amlodipine (NORVASC) 5 MG tablet Take 1 tablet (5 mg total) by mouth once daily.    atorvastatin (LIPITOR) 20 MG tablet Take 1 tablet (20 mg total) by mouth once daily.    citalopram (CELEXA) 10 MG tablet Take 10 mg by mouth once daily.    dorzolamide-timolol 2-0.5% (COSOPT) 22.3-6.8 mg/mL ophthalmic solution Place 1 drop into the left eye once daily.    ergocalciferol (ERGOCALCIFEROL) 50,000 unit Cap TK ONE C     PO ONCE PER MONTH    polyethylene glycol (GLYCOLAX) 17 gram/dose powder Take 17 g by mouth 2 (two) times daily. Adjust frequency for one small bowel movement a day    tamsulosin (FLOMAX) 0.4 mg Cp24 TK ONE C     PO 30 MINUTES AFTER THE SAME MEAL QD     Family History     Problem Relation (Age of Onset)    Cancer Daughter (55), Daughter (54)    Heart disease Brother        Social History Main Topics    Smoking status: Former Smoker     Packs/day: 1.00     Years: 18.00     Quit date: 1950    Smokeless tobacco: Never Used    Alcohol use No    Drug use: No    Sexual activity: Not on file     Review of Systems   Unable to perform ROS: Mental status change     Objective:     Vital Signs (Most Recent):  Temp: 100.1 °F (37.8 °C) (03/24/17 0758)  Pulse: 92 (03/24/17 0800)  Resp: 18 (03/24/17 " 0758)  BP: (!) 173/76 (03/24/17 0758)  SpO2: 100 % (03/23/17 2200) Vital Signs (24h Range):  Temp:  [97.4 °F (36.3 °C)-100.1 °F (37.8 °C)] 100.1 °F (37.8 °C)  Pulse:  [68-92] 92  Resp:  [18] 18  SpO2:  [96 %-100 %] 100 %  BP: (132-173)/(56-76) 173/76     Weight: 70.5 kg (155 lb 6.8 oz)  Body mass index is 24.42 kg/(m^2).    Physical Exam   Constitutional: He appears well-developed and well-nourished.   Eyes: EOM are normal.   Neck: Normal range of motion. Neck supple.   Pulmonary/Chest: Effort normal.   Abdominal: Soft.   Neurological:   Reflex Scores:       Bicep reflexes are 2+ on the right side and 2+ on the left side.       Brachioradialis reflexes are 2+ on the right side and 2+ on the left side.       Patellar reflexes are 2+ on the right side and 2+ on the left side.       Achilles reflexes are 2+ on the right side and 2+ on the left side.  Skin: Skin is warm and dry.   Nursing note and vitals reviewed.      NEUROLOGICAL EXAMINATION:     MENTAL STATUS        Disoriented, speaking nonsensically. Does not follow commands or answer questions appropriately.      CRANIAL NERVES     CN III, IV, VI   Extraocular motions are normal.   Pupils: pinpoint       Unable to fully assess cranial nerves due to mental status. No obvious facial asymmetry.      MOTOR EXAM        In two point restraints. Moving all 4 extremities spontaneously. Unable to fully assess strength.     REFLEXES     Reflexes   Right brachioradialis: 2+  Left brachioradialis: 2+  Right biceps: 2+  Left biceps: 2+  Right patellar: 2+  Left patellar: 2+  Right achilles: 2+  Left achilles: 2+  Right plantar: equivocal  Left plantar: equivocal    SENSORY EXAM        Unable to assess due to mental status.       Significant Labs:   Blood Culture:   Recent Labs  Lab 03/23/17  1247 03/23/17  1249   LABBLOO No Growth to date No Growth to date     CBC:   Recent Labs  Lab 03/23/17  1247   WBC 16.11*   HGB 11.7*   HCT 35.4*        CMP:   Recent Labs  Lab  03/23/17  1247 03/24/17  0109   GLU 86  --      --    K 3.4*  --    *  --    CO2 22*  --    BUN 12  --    CREATININE 0.9  --    CALCIUM 9.2  --    MG  --  2.1   PROT 7.2 7.0   ALBUMIN 3.6 3.4*   BILITOT 0.9 1.1*   ALKPHOS 142* 138*   AST 44* 92*   ALT 11 15   ANIONGAP 12  --    EGFRNONAA >60.0  --      Urine Culture: No results for input(s): LABURIN in the last 48 hours.  Urine Studies:   Recent Labs  Lab 03/23/17  1350   COLORU Yellow  Yellow   APPEARANCEUA Hazy*  Hazy*   PHUR 5.0  5.0   SPECGRAV 1.015  1.015   PROTEINUA Negative  Negative   GLUCUA Negative  Negative   KETONESU Negative  Negative   BILIRUBINUA Negative  Negative   OCCULTUA 2+*  2+*   NITRITE Negative  Negative   UROBILINOGEN Negative  Negative   LEUKOCYTESUR Negative  Negative   RBCUA 6*   WBCUA 3   BACTERIA Occasional       Significant Imaging: CT: I have reviewed all pertinent results/findings within the past 24 hours and my personal findings are:  Limited due to motion artifact. No large mass or hemorrhage.

## 2017-03-24 NOTE — ED NOTES
Pt restless.  Fusing with me.  Remains in restraints to protect himself and protect his IV and such

## 2017-03-24 NOTE — ASSESSMENT & PLAN NOTE
- CT head limited by motion, but appears stable  - No evidence of shunt malfunction per NSY  - Management per NSY

## 2017-03-24 NOTE — ASSESSMENT & PLAN NOTE
- WBC's 16.1 on arrival   - Repeat CBC in AM  - Procalcitonin pending    - U/A negative for nitrites or leuks   - Blood cultures pending  - Vanco and cefepime given empirically  in the ED  - continue Vanco and transition to ceftriaxone and deescalate as indicated by culture results  - seen by NSX while in ED - thought to be low probability for meningitis - if workup is negative consider reconsulting NSX and possible LP

## 2017-03-24 NOTE — CONSULTS
"[facility]  Neurology  Consult Note    Patient Name: Atif Neves  MRN: [id number]  Admission Date: 3/23/2017  Hospital Length of Stay: 1 days  Code Status: Full Code   Attending Provider: Leyla Bettencourt MD   Consulting Provider: Diann Zayas MD  Primary Care Physician: Atif Christian MD  Principal Problem:Delirium, acute    Inpatient consult to Neurology  Consult performed by: DIANN ZAYAS  Consult ordered by: ROBERTO URBANO         Subjective:     Chief Complaint:  Altered mental status     HPI:   Mr. Neves is an 88 year old man with a history of NPH s/p  shunt placement 2/6/17, HTN, HLD who presents with altered mental status. No family is at bedside so history is obtained from chart review. He saw Dr. Sam in clinic on 3/22 for post-op follow up, and was noted to be at baseline mental status (alert and oriented). His daughter reported that he was normal when he went to sleep on 3/22, but upon waking at 7:30am on 3/23 he was confused - unable to follow commands, speaking inappropriately. His daughter denied any recent nausea, vomiting, diarrhea, fevers or chills. There is no known trauma or seizure activity.    Neurosurgery was consulted in the ED, and imaging confirmed that his shunt was functioning appropriately. They deferred tapping his shunt, as infectious workup was still pending.       Past Medical History:   Diagnosis Date    JONATAN (acute kidney injury) 06/2016    Anxiety     Arthritis     Glaucoma     Hyperlipemia     Hypertension     Irregular heartbeat     Macular degeneration     Normal pressure hydrocephalus     Urinary retention 06/2016    UTI (urinary tract infection)        Past Surgical History:   Procedure Laterality Date    TRANSURETHRAL RESECTION OF PROSTATE  08/31/2016       Review of patient's allergies indicates:   Allergen Reactions    Aspirin Swelling     States, "makes me sick." pt does not elaborate.          No current facility-administered " medications on file prior to encounter.      Current Outpatient Prescriptions on File Prior to Encounter   Medication Sig    amlodipine (NORVASC) 5 MG tablet Take 1 tablet (5 mg total) by mouth once daily.    atorvastatin (LIPITOR) 20 MG tablet Take 1 tablet (20 mg total) by mouth once daily.    citalopram (CELEXA) 10 MG tablet Take 10 mg by mouth once daily.    dorzolamide-timolol 2-0.5% (COSOPT) 22.3-6.8 mg/mL ophthalmic solution Place 1 drop into the left eye once daily.    ergocalciferol (ERGOCALCIFEROL) 50,000 unit Cap TK ONE C     PO ONCE PER MONTH    polyethylene glycol (GLYCOLAX) 17 gram/dose powder Take 17 g by mouth 2 (two) times daily. Adjust frequency for one small bowel movement a day    tamsulosin (FLOMAX) 0.4 mg Cp24 TK ONE C     PO 30 MINUTES AFTER THE SAME MEAL QD     Family History     Problem Relation (Age of Onset)    Cancer Daughter (55), Daughter (54)    Heart disease Brother        Social History Main Topics    Smoking status: Former Smoker     Packs/day: 1.00     Years: 18.00     Quit date: 1950    Smokeless tobacco: Never Used    Alcohol use No    Drug use: No    Sexual activity: Not on file     Review of Systems   Unable to perform ROS: Mental status change     Objective:     Vital Signs (Most Recent):  Temp: 100.1 °F (37.8 °C) (03/24/17 0758)  Pulse: 92 (03/24/17 0800)  Resp: 18 (03/24/17 0758)  BP: (!) 173/76 (03/24/17 0758)  SpO2: 100 % (03/23/17 2200) Vital Signs (24h Range):  Temp:  [97.4 °F (36.3 °C)-100.1 °F (37.8 °C)] 100.1 °F (37.8 °C)  Pulse:  [68-92] 92  Resp:  [18] 18  SpO2:  [96 %-100 %] 100 %  BP: (132-173)/(56-76) 173/76     Weight: 70.5 kg (155 lb 6.8 oz)  Body mass index is 24.42 kg/(m^2).    Physical Exam   Constitutional: He appears well-developed and well-nourished.   Eyes: EOM are normal.   Neck: Normal range of motion. Neck supple.   Pulmonary/Chest: Effort normal.   Abdominal: Soft.   Neurological:   Reflex Scores:       Bicep reflexes are 2+ on the right  side and 2+ on the left side.       Brachioradialis reflexes are 2+ on the right side and 2+ on the left side.       Patellar reflexes are 2+ on the right side and 2+ on the left side.       Achilles reflexes are 2+ on the right side and 2+ on the left side.  Skin: Skin is warm and dry.   Nursing note and vitals reviewed.      NEUROLOGICAL EXAMINATION:     MENTAL STATUS        Disoriented, speaking nonsensically. Does not follow commands or answer questions appropriately.      CRANIAL NERVES     CN III, IV, VI   Extraocular motions are normal.   Pupils: pinpoint       Unable to fully assess cranial nerves due to mental status. No obvious facial asymmetry.      MOTOR EXAM        In two point restraints. Moving all 4 extremities spontaneously. Unable to fully assess strength.     REFLEXES     Reflexes   Right brachioradialis: 2+  Left brachioradialis: 2+  Right biceps: 2+  Left biceps: 2+  Right patellar: 2+  Left patellar: 2+  Right achilles: 2+  Left achilles: 2+  Right plantar: equivocal  Left plantar: equivocal    SENSORY EXAM        Unable to assess due to mental status.       Significant Labs:   Blood Culture:   Recent Labs  Lab 03/23/17  1247 03/23/17  1249   LABBLOO No Growth to date No Growth to date     CBC:   Recent Labs  Lab 03/23/17  1247   WBC 16.11*   HGB 11.7*   HCT 35.4*        CMP:   Recent Labs  Lab 03/23/17  1247 03/24/17  0109   GLU 86  --      --    K 3.4*  --    *  --    CO2 22*  --    BUN 12  --    CREATININE 0.9  --    CALCIUM 9.2  --    MG  --  2.1   PROT 7.2 7.0   ALBUMIN 3.6 3.4*   BILITOT 0.9 1.1*   ALKPHOS 142* 138*   AST 44* 92*   ALT 11 15   ANIONGAP 12  --    EGFRNONAA >60.0  --      Urine Culture: No results for input(s): LABURIN in the last 48 hours.  Urine Studies:   Recent Labs  Lab 03/23/17  1350   COLORU Yellow  Yellow   APPEARANCEUA Hazy*  Hazy*   PHUR 5.0  5.0   SPECGRAV 1.015  1.015   PROTEINUA Negative  Negative   GLUCUA Negative  Negative   KETONESU  Negative  Negative   BILIRUBINUA Negative  Negative   OCCULTUA 2+*  2+*   NITRITE Negative  Negative   UROBILINOGEN Negative  Negative   LEUKOCYTESUR Negative  Negative   RBCUA 6*   WBCUA 3   BACTERIA Occasional       Significant Imaging: CT: I have reviewed all pertinent results/findings within the past 24 hours and my personal findings are:  Limited due to motion artifact. No large mass or hemorrhage.    Assessment and Plan:     * Delirium, acute  Mr. 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 At baseline he is alert and oriented **. Labs show a leukocytosis and a temperature of 100.1 today, suggesting an infectious process with a metabolic encephalopathy. Infectious workup is still pending, and he has been started on broad spectrum antibiotics.     Recommendations:   -- Continue infectious workup per primary team - including urine culture, chest xray, f/u blood cultures, shunt tap by Neurosurgery  -- Basic dementia labs: Vitamin B12, B1  -- Routine EEG to rule out seizures      NPH (normal pressure hydrocephalus)  - CT head limited by motion, but appears stable  - No evidence of shunt malfunction per NSY  - Management per NSY      VTE Risk Mitigation         Ordered     heparin (porcine) injection 5,000 Units  Every 12 hours     Route:  Subcutaneous        03/24/17 0018     Medium Risk of VTE  Once      03/24/17 0018     Place sequential compression device  Until discontinued      03/24/17 0018     Place LIAM hose  Until discontinued      03/24/17 0018          Thank you for your consult. I will follow-up with patient. Please contact us if you have any additional questions.       Rosi Zayas MD  Neurology  [facility]

## 2017-03-24 NOTE — SUBJECTIVE & OBJECTIVE
"Past Medical History:   Diagnosis Date    JONATAN (acute kidney injury) 06/2016    Anxiety     Arthritis     Glaucoma     Hyperlipemia     Hypertension     Irregular heartbeat     Macular degeneration     Normal pressure hydrocephalus     Urinary retention 06/2016    UTI (urinary tract infection)        Past Surgical History:   Procedure Laterality Date    TRANSURETHRAL RESECTION OF PROSTATE  08/31/2016       Review of patient's allergies indicates:   Allergen Reactions    Aspirin Swelling     States, "makes me sick." pt does not elaborate.        No current facility-administered medications on file prior to encounter.      Current Outpatient Prescriptions on File Prior to Encounter   Medication Sig    amlodipine (NORVASC) 5 MG tablet Take 1 tablet (5 mg total) by mouth once daily.    atorvastatin (LIPITOR) 20 MG tablet Take 1 tablet (20 mg total) by mouth once daily.    citalopram (CELEXA) 10 MG tablet Take 10 mg by mouth once daily.    dorzolamide-timolol 2-0.5% (COSOPT) 22.3-6.8 mg/mL ophthalmic solution Place 1 drop into the left eye once daily.    ergocalciferol (ERGOCALCIFEROL) 50,000 unit Cap TK ONE C     PO ONCE PER MONTH    polyethylene glycol (GLYCOLAX) 17 gram/dose powder Take 17 g by mouth 2 (two) times daily. Adjust frequency for one small bowel movement a day    tamsulosin (FLOMAX) 0.4 mg Cp24 TK ONE C     PO 30 MINUTES AFTER THE SAME MEAL QD     Family History     Problem Relation (Age of Onset)    Cancer Daughter (55), Daughter (54)    Heart disease Brother        Social History Main Topics    Smoking status: Former Smoker     Packs/day: 1.00     Years: 18.00     Quit date: 1950    Smokeless tobacco: Never Used    Alcohol use No    Drug use: No    Sexual activity: Not on file     Review of Systems   Constitutional: Negative for chills and fever.   HENT: Negative for rhinorrhea.    Eyes: Negative for visual disturbance.   Respiratory: Negative for cough and shortness of breath.  "   Cardiovascular: Negative for chest pain and leg swelling.   Gastrointestinal: Negative for abdominal distention and abdominal pain.   Endocrine: Negative for cold intolerance and heat intolerance.   Genitourinary: Negative for dysuria.   Musculoskeletal: Negative for back pain.   Skin: Negative for rash.   Neurological: Negative for seizures and syncope.   Hematological: Does not bruise/bleed easily.     Objective:     Vital Signs (Most Recent):  Temp: 98.5 °F (36.9 °C) (03/23/17 2200)  Pulse: 75 (03/23/17 2200)  Resp: 18 (03/23/17 2200)  BP: (!) 143/67 (03/23/17 2200)  SpO2: 100 % (03/23/17 2200) Vital Signs (24h Range):  Temp:  [98.3 °F (36.8 °C)-99.5 °F (37.5 °C)] 98.5 °F (36.9 °C)  Pulse:  [72-78] 75  Resp:  [16-18] 18  SpO2:  [96 %-100 %] 100 %  BP: (132-158)/(67-81) 143/67     Weight: 70.5 kg (155 lb 6.8 oz)  Body mass index is 24.42 kg/(m^2).    Physical Exam   Constitutional: He appears well-developed and well-nourished. No distress.   HENT:   Head: Normocephalic and atraumatic.   Hx of right sided  shunt   Eyes: EOM are normal. Pupils are equal, round, and reactive to light.   Neck: Normal range of motion. Neck supple.   Cardiovascular: Normal rate, regular rhythm, normal heart sounds and intact distal pulses.    No murmur heard.  Pulmonary/Chest: Effort normal and breath sounds normal. No respiratory distress.   Abdominal: Soft. Bowel sounds are normal. He exhibits no distension. There is no tenderness.   Musculoskeletal: Normal range of motion. He exhibits no edema.   Neurological: He is alert. He has normal strength. He displays a negative Romberg sign. GCS eye subscore is 4. GCS verbal subscore is 4. GCS motor subscore is 6.   Skin: Skin is warm and dry. He is not diaphoretic.   Psychiatric: He is actively hallucinating.   Nursing note and vitals reviewed.       Significant Labs:   Blood Culture:   Recent Labs  Lab 03/23/17  1247 03/23/17  1249   LABBLOO No Growth to date No Growth to date     BMP:    Recent Labs  Lab 03/23/17  1247 03/24/17  0109   GLU 86  --      --    K 3.4*  --    *  --    CO2 22*  --    BUN 12  --    CREATININE 0.9  --    CALCIUM 9.2  --    MG  --  2.1     CBC:   Recent Labs  Lab 03/23/17  1247   WBC 16.11*   HGB 11.7*   HCT 35.4*        Urine Studies:   Recent Labs  Lab 03/23/17  1350   COLORU Yellow  Yellow   APPEARANCEUA Hazy*  Hazy*   PHUR 5.0  5.0   SPECGRAV 1.015  1.015   PROTEINUA Negative  Negative   GLUCUA Negative  Negative   KETONESU Negative  Negative   BILIRUBINUA Negative  Negative   OCCULTUA 2+*  2+*   NITRITE Negative  Negative   UROBILINOGEN Negative  Negative   LEUKOCYTESUR Negative  Negative   RBCUA 6*   WBCUA 3   BACTERIA Occasional       Significant Imaging: I have reviewed and interpreted all pertinent imaging results/findings within the past 24 hours.

## 2017-03-24 NOTE — PROCEDURES
"Atif Neves is a 88 y.o. male patient.    Temp: 99.6 °F (37.6 °C) (03/24/17 1208)  Pulse: 84 (03/24/17 1500)  Resp: 18 (03/24/17 1208)  BP: (!) 173/76 (03/24/17 0758)  SpO2: 96 % (03/24/17 1208)  Weight: 70.5 kg (155 lb 6.8 oz) (03/23/17 1037)  Height: 5' 6.9" (169.9 cm) (03/23/17 1038)       Lumbar Puncture  Date/Time: 3/24/2017 3:55 PM  Performed by: EVELYN ANDREWS  Authorized by: EVELYN ANDREWS   Consent Done: Yes  Indications: evaluation for infection  Anesthesia: local infiltration    Anesthesia:  Anesthesia: local infiltration  Local Anesthetic: lidocaine 2% without epinephrine   Patient sedated: no  Preparation: Patient was prepped and draped in the usual sterile fashion.  Lumbar space: L4-L5 interspace  Patient's position: right lateral decubitus  Needle gauge: 22  Needle type: moris point  Needle length: 2.5 in  Number of attempts: 5 or more  Total volume: 0 ml  Post-procedure: site cleaned  Complications: No  Specimens: No  Patient tolerance: Patient tolerated the procedure well with no immediate complications  Comments: We first attempted to position patient without sedation and he became combative.  Given 2 mg of valium and returned 20 minutes later.  Patient more calm, but positioning was still difficult (back would not flex fully, he was in crease of bed and slanted back.  Three people (2 students and an intern) holding him.  Multiple attempts made, but unable to get to dural sac.  Procedure aborted.          Evelyn Andrews  3/24/2017  "

## 2017-03-24 NOTE — ASSESSMENT & PLAN NOTE
- remains altered  - continue all safety measures - high risk of injury / falls  - ammonia 23  - CT head unremarkable but less than optimal imaging related to motion artifact  - may want to consider sedation and repeat imaging  - neurology consulted

## 2017-03-25 LAB
ALBUMIN SERPL BCP-MCNC: 3.4 G/DL
ALP SERPL-CCNC: 139 U/L
ALT SERPL W/O P-5'-P-CCNC: 25 U/L
ANION GAP SERPL CALC-SCNC: 15 MMOL/L
AST SERPL-CCNC: 184 U/L
BASOPHILS # BLD AUTO: 0.03 K/UL
BASOPHILS NFR BLD: 0.2 %
BILIRUB SERPL-MCNC: 1.4 MG/DL
BUN SERPL-MCNC: 11 MG/DL
CALCIUM SERPL-MCNC: 9.3 MG/DL
CHLORIDE SERPL-SCNC: 107 MMOL/L
CO2 SERPL-SCNC: 20 MMOL/L
CREAT SERPL-MCNC: 0.8 MG/DL
DIFFERENTIAL METHOD: ABNORMAL
EOSINOPHIL # BLD AUTO: 0.1 K/UL
EOSINOPHIL NFR BLD: 0.8 %
ERYTHROCYTE [DISTWIDTH] IN BLOOD BY AUTOMATED COUNT: 15.2 %
EST. GFR  (AFRICAN AMERICAN): >60 ML/MIN/1.73 M^2
EST. GFR  (NON AFRICAN AMERICAN): >60 ML/MIN/1.73 M^2
GLUCOSE SERPL-MCNC: 106 MG/DL
HCT VFR BLD AUTO: 38.6 %
HGB BLD-MCNC: 13.1 G/DL
LYMPHOCYTES # BLD AUTO: 1.3 K/UL
LYMPHOCYTES NFR BLD: 9.1 %
MAGNESIUM SERPL-MCNC: 1.8 MG/DL
MCH RBC QN AUTO: 25.8 PG
MCHC RBC AUTO-ENTMCNC: 33.9 %
MCV RBC AUTO: 76 FL
MONOCYTES # BLD AUTO: 0.9 K/UL
MONOCYTES NFR BLD: 6.8 %
NEUTROPHILS # BLD AUTO: 11.4 K/UL
NEUTROPHILS NFR BLD: 82.9 %
PLATELET # BLD AUTO: 244 K/UL
PMV BLD AUTO: 10.2 FL
POTASSIUM SERPL-SCNC: 3.2 MMOL/L
PROT SERPL-MCNC: 7.3 G/DL
RBC # BLD AUTO: 5.08 M/UL
SODIUM SERPL-SCNC: 142 MMOL/L
WBC # BLD AUTO: 13.76 K/UL

## 2017-03-25 PROCEDURE — 80053 COMPREHEN METABOLIC PANEL: CPT

## 2017-03-25 PROCEDURE — 99222 1ST HOSP IP/OBS MODERATE 55: CPT | Mod: GC,,, | Performed by: INTERNAL MEDICINE

## 2017-03-25 PROCEDURE — 99232 SBSQ HOSP IP/OBS MODERATE 35: CPT | Mod: ,,, | Performed by: INTERNAL MEDICINE

## 2017-03-25 PROCEDURE — 25000003 PHARM REV CODE 250: Performed by: PSYCHIATRY & NEUROLOGY

## 2017-03-25 PROCEDURE — 85025 COMPLETE CBC W/AUTO DIFF WBC: CPT

## 2017-03-25 PROCEDURE — 36415 COLL VENOUS BLD VENIPUNCTURE: CPT

## 2017-03-25 PROCEDURE — 25000003 PHARM REV CODE 250: Performed by: NURSE PRACTITIONER

## 2017-03-25 PROCEDURE — 95816 EEG AWAKE AND DROWSY: CPT | Mod: 26,,, | Performed by: PSYCHIATRY & NEUROLOGY

## 2017-03-25 PROCEDURE — 99233 SBSQ HOSP IP/OBS HIGH 50: CPT | Mod: ,,, | Performed by: PSYCHIATRY & NEUROLOGY

## 2017-03-25 PROCEDURE — 63600175 PHARM REV CODE 636 W HCPCS: Performed by: NURSE PRACTITIONER

## 2017-03-25 PROCEDURE — 11000001 HC ACUTE MED/SURG PRIVATE ROOM

## 2017-03-25 PROCEDURE — 83735 ASSAY OF MAGNESIUM: CPT

## 2017-03-25 PROCEDURE — 63600175 PHARM REV CODE 636 W HCPCS: Performed by: INTERNAL MEDICINE

## 2017-03-25 PROCEDURE — 63600175 PHARM REV CODE 636 W HCPCS: Performed by: PSYCHIATRY & NEUROLOGY

## 2017-03-25 PROCEDURE — 25000003 PHARM REV CODE 250: Performed by: INTERNAL MEDICINE

## 2017-03-25 RX ORDER — LEVETIRACETAM 10 MG/ML
1000 INJECTION INTRAVASCULAR EVERY 12 HOURS
Status: DISCONTINUED | OUTPATIENT
Start: 2017-03-25 | End: 2017-04-03 | Stop reason: HOSPADM

## 2017-03-25 RX ADMIN — TAMSULOSIN HYDROCHLORIDE 0.4 MG: 0.4 CAPSULE ORAL at 09:03

## 2017-03-25 RX ADMIN — ATORVASTATIN CALCIUM 20 MG: 20 TABLET, FILM COATED ORAL at 09:03

## 2017-03-25 RX ADMIN — CITALOPRAM HYDROBROMIDE 10 MG: 10 TABLET ORAL at 09:03

## 2017-03-25 RX ADMIN — HEPARIN SODIUM 5000 UNITS: 5000 INJECTION, SOLUTION INTRAVENOUS; SUBCUTANEOUS at 09:03

## 2017-03-25 RX ADMIN — DEXTROSE MONOHYDRATE, SODIUM CHLORIDE, AND POTASSIUM CHLORIDE: 50; 4.5; 1.49 INJECTION, SOLUTION INTRAVENOUS at 07:03

## 2017-03-25 RX ADMIN — DORZOLAMIDE HYDROCHLORIDE 1 DROP: 20 SOLUTION/ DROPS OPHTHALMIC at 09:03

## 2017-03-25 RX ADMIN — TIMOLOL MALEATE 1 DROP: 5 SOLUTION OPHTHALMIC at 09:03

## 2017-03-25 RX ADMIN — THIAMINE HYDROCHLORIDE 200 MG: 100 INJECTION, SOLUTION INTRAMUSCULAR; INTRAVENOUS at 01:03

## 2017-03-25 RX ADMIN — VANCOMYCIN HYDROCHLORIDE 1000 MG: 1 INJECTION, POWDER, LYOPHILIZED, FOR SOLUTION INTRAVENOUS at 04:03

## 2017-03-25 RX ADMIN — LEVETIRACETAM 1000 MG: 10 INJECTION INTRAVENOUS at 05:03

## 2017-03-25 RX ADMIN — DEXTROSE MONOHYDRATE, SODIUM CHLORIDE, AND POTASSIUM CHLORIDE: 50; 4.5; 1.49 INJECTION, SOLUTION INTRAVENOUS at 01:03

## 2017-03-25 RX ADMIN — DEXTROSE 2000 MG: 5 SOLUTION INTRAVENOUS at 12:03

## 2017-03-25 RX ADMIN — AMLODIPINE BESYLATE 5 MG: 5 TABLET ORAL at 09:03

## 2017-03-25 RX ADMIN — CEFTRIAXONE 2 G: 2 INJECTION, SOLUTION INTRAVENOUS at 09:03

## 2017-03-25 NOTE — ASSESSMENT & PLAN NOTE
Mr. 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 At baseline he is alert and oriented. Blood cultures have grown GPC and GNR (staph and enterococcus), with speciation pending - source remains unclear. LP was attempted at bedside yesterday, however patient was combative and non-compliant despite valium so the procedure was aborted. On rounds today there was concern that the patient may be actively seizing, so keppra 2g IV was ordered STAT with maintenance of 1g BID. EEG was ordered yesterday but hasn't occurred, so we will discuss with techs regarding placement ASAP.    Recommendations:   -- Continue infectious workup per primary team - positive blood cultures with unclear source, LP unable to be performed at bedside though CSF is likely source at this time  -- Keppra 2g IV once ordered  -- Start keppra 1g BID  -- EEG pending    Plan discussed with Dr. Bettencourt.

## 2017-03-25 NOTE — ASSESSMENT & PLAN NOTE
- remains altered  - continue all safety measures - high risk of injury / falls  - ammonia 23 on admission  - CT head unremarkable but less than optimal imaging related to motion artifact  - Neurology consulted; LP attempted. Appears that infectious cause is likely given positive blood cultures.

## 2017-03-25 NOTE — PROGRESS NOTES
[facility]  Neurology  Progress Note    Patient Name: Atif Neves  MRN: [id number]  Admission Date: 3/23/2017  Hospital Length of Stay: 2 days  Code Status: Full Code   Attending Provider: Leyla Bettencourt MD  Primary Care Physician: Atif Christian MD   Principal Problem:Delirium, acute      Subjective:     Interval History: On rounds, patient found in bed alert but confused and mute. He was not able to respond to questions or follow commands, and appeared to have a left sided facial droop (eye and mouth). He was unable to fully open his left eye and kept blinking it. He also had increased tone throughout, but was moving extremities. We were concerned that he could be either seizing or post-ictal, so a keppra load was ordered (2g) with maintenance (1g BID). An EEG will be placed ASAP.     Current Neurological Medications: Keppra    Current Facility-Administered Medications   Medication Dose Route Frequency Provider Last Rate Last Dose    acetaminophen tablet 650 mg  650 mg Oral Q4H PRN Go Torres NP        amlodipine tablet 5 mg  5 mg Oral Daily Go Torres NP   5 mg at 03/25/17 0957    atorvastatin tablet 20 mg  20 mg Oral Daily Go Torres NP   20 mg at 03/25/17 0957    bisacodyl suppository 10 mg  10 mg Rectal Daily PRN Go Torres NP        cefTRIAXone (ROCEPHIN) 2 g in dextrose 5 % 50 mL IVPB  2 g Intravenous Q24H Go Torres NP   2 g at 03/25/17 0956    citalopram tablet 10 mg  10 mg Oral Daily Go Torres NP   10 mg at 03/25/17 0957    dextrose 5 % and 0.45 % NaCl with KCl 20 mEq infusion   Intravenous Continuous Leyla Bettencourt MD 75 mL/hr at 03/25/17 0101      dextrose 50% injection 12.5 g  12.5 g Intravenous PRN Go Torres NP        dextrose 50% injection 25 g  25 g Intravenous PRN Go Torres NP        dorzolamide 2 % ophthalmic solution 1 drop  1 drop Left Eye Daily Go Torres NP   1 drop at 03/25/17 0959    glucagon (human recombinant) injection 1 mg  1 mg  Intramuscular PRN Go Torres NP        glucose chewable tablet 16 g  16 g Oral PRN Go Torres NP        glucose chewable tablet 24 g  24 g Oral PRN Go Torres NP        heparin (porcine) injection 5,000 Units  5,000 Units Subcutaneous Q12H Go Torres NP   5,000 Units at 03/25/17 0956    levetiracetam (KEPPRA) 2,000 mg in dextrose 5 % 250 mL IVPB  2,000 mg Intravenous Once Evelyn Jorge MD        levetiracetam in NaCl (iso-os) IVPB 1,000 mg  1,000 mg Intravenous Q12H Evelyn Jorge MD        olanzapine tablet 2.5 mg  2.5 mg Oral TID PRN Leyla Bettencourt MD        ondansetron disintegrating tablet 8 mg  8 mg Oral Q8H PRN Go Torres NP        ondansetron injection 4 mg  4 mg Intravenous Q12H PRN Go Torres NP        polyethylene glycol packet 17 g  17 g Oral BID PRN Go Torres NP        ramelteon tablet 8 mg  8 mg Oral Nightly PRN Go Torres NP   8 mg at 03/24/17 2046    tamsulosin 24 hr capsule 0.4 mg  0.4 mg Oral Daily Go Torres NP   0.4 mg at 03/25/17 0957    thiamine (B-1) 200 mg in dextrose 5 % 50 mL IVPB  200 mg Intravenous Q24H Leyla Bettencourt MD 25 mL/hr at 03/25/17 0113 200 mg at 03/25/17 0113    timolol maleate 0.5% ophthalmic solution 1 drop  1 drop Left Eye Daily Go Torres NP   1 drop at 03/25/17 0957    vancomycin 1 g in dextrose 5 % 250 mL IVPB (ready to mix system)  15 mg/kg Intravenous Q24H Go Torres .7 mL/hr at 03/24/17 1843 1,000 mg at 03/24/17 1843       Review of Systems   Unable to perform ROS: Mental status change     Objective:     Vital Signs (Most Recent):  Temp: 97.4 °F (36.3 °C) (03/25/17 0857)  Pulse: 66 (03/25/17 0857)  Resp: 16 (03/25/17 0857)  BP: (!) 143/68 (03/25/17 0857)  SpO2: 96 % (03/25/17 0857) Vital Signs (24h Range):  Temp:  [97.4 °F (36.3 °C)-99.6 °F (37.6 °C)] 97.4 °F (36.3 °C)  Pulse:  [60-93] 66  Resp:  [15-18] 16  SpO2:  [96 %-99 %] 96 %  BP: (140-168)/(67-82) 143/68     Weight: 65.7 kg (144 lb 13.5 oz)  Body mass index is  "22.75 kg/(m^2).    Physical Exam   Constitutional: He appears well-developed and well-nourished.   HENT:   Head: Normocephalic and atraumatic.   Pulmonary/Chest: Effort normal.   Skin: Skin is warm and dry.   Nursing note and vitals reviewed.      NEUROLOGICAL EXAMINATION:     MENTAL STATUS   Level of consciousness: Awake, alert       Makes eye contact, but unable to answer questions or follow commands. Says "mmm-hmm" to all questions.     CRANIAL NERVES        Left ptosis/swelling. Constant non-rhythmic blinking of left eye. ?left nasolabial fold flattening. Unable to fully assess cranial nerves due to mental status     MOTOR EXAM   Muscle bulk: normal  Overall muscle tone: increased       Moves all extremities spontaneously, but decreased from yesterday.     REFLEXES  Right plantar: equivocal  Left plantar: equivocal      Significant Labs:   Blood Culture:   Recent Labs  Lab 03/23/17  1247 03/23/17  1249 03/24/17  1133   LABBLOO Gram stain laura bottle: Gram positive cocci  Results called to and read back by:Lavonne Garcia RN 03/24/2017  11:24  Gram stain aer bottle: Gram positive rods   Results called to and read back by:Kristin Zimmerman RN 03/25/2017  06:21  Gram stain laura bottle: Gram positive cocci in chains resembling Strep  Results called to and read back by:Anitha Teague RN 03/25/2017  07:48  ENTEROCOCCUS SPECIESIdentification and susceptibility pending Gram stain aer bottle: Gram positive cocci in clusters resembling Staph  Gram stain laura bottle: Gram positive cocci in clusters resembling Staph  Results called to and read back by:Lavonne Garcia RN 03/24/2017  09:40  STAPHYLOCOCCUS SPECIESIdentification and susceptibility pending No Growth to date     CBC:   Recent Labs  Lab 03/23/17  1247 03/24/17  1133 03/25/17  0457   WBC 16.11* 14.70* 13.76*   HGB 11.7* 12.9* 13.1*   HCT 35.4* 38.2* 38.6*    185 244     CMP:   Recent Labs  Lab 03/23/17  1247 03/24/17  0109 03/24/17  1133 03/25/17  0457   GLU " 86  --  82 106     --  141 142   K 3.4*  --  3.8 3.2*   *  --  109 107   CO2 22*  --  16* 20*   BUN 12  --  9 11   CREATININE 0.9  --  0.8 0.8   CALCIUM 9.2  --  9.4 9.3   MG  --  2.1  --  1.8   PROT 7.2 7.0 7.6 7.3   ALBUMIN 3.6 3.4* 3.6 3.4*   BILITOT 0.9 1.1* 1.5* 1.4*   ALKPHOS 142* 138* 148* 139*   AST 44* 92* 140* 184*   ALT 11 15 21 25   ANIONGAP 12  --  16 15   EGFRNONAA >60.0  --  >60.0 >60.0     Urine Culture: No results for input(s): LABURIN in the last 48 hours.  Urine Studies:   Recent Labs  Lab 03/23/17  1350   COLORU Yellow  Yellow   APPEARANCEUA Hazy*  Hazy*   PHUR 5.0  5.0   SPECGRAV 1.015  1.015   PROTEINUA Negative  Negative   GLUCUA Negative  Negative   KETONESU Negative  Negative   BILIRUBINUA Negative  Negative   OCCULTUA 2+*  2+*   NITRITE Negative  Negative   UROBILINOGEN Negative  Negative   LEUKOCYTESUR Negative  Negative   RBCUA 6*   WBCUA 3   BACTERIA Occasional       Significant Imaging: I have reviewed all pertinent imaging results/findings within the past 24 hours.    Assessment and Plan:     * Delirium, acute  Mr. 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 At baseline he is alert and oriented. Blood cultures have grown GPC and GNR (staph and enterococcus), with speciation pending - source remains unclear. LP was attempted at bedside yesterday, however patient was combative and non-compliant despite valium so the procedure was aborted. On rounds today there was concern that the patient may be actively seizing, so keppra 2g IV was ordered STAT with maintenance of 1g BID. EEG was ordered yesterday but hasn't occurred, so we will discuss with techs regarding placement ASAP.    Recommendations:   -- Continue infectious workup per primary team - positive blood cultures with unclear source, LP unable to be performed at bedside though CSF is likely source at this time  -- Keppra 2g IV once ordered  -- Start  keppra 1g BID  -- EEG pending    Plan discussed with Dr. Btetencourt.       NPH (normal pressure hydrocephalus)  - CT head limited by motion, but appears stable  - No evidence of shunt malfunction per NSY  - Management per NSY      VTE Risk Mitigation         Ordered     heparin (porcine) injection 5,000 Units  Every 12 hours     Route:  Subcutaneous        03/24/17 0018     Medium Risk of VTE  Once      03/24/17 0018     Place sequential compression device  Until discontinued      03/24/17 0018     Place LIAM hose  Until discontinued      03/24/17 0018          Rosi Zayas MD  Neurology  [facility]

## 2017-03-25 NOTE — NURSING
Notified MD Arnold of  Blood cultures gram + cocci & gram + rods. MD stated pt covered with vanc and rocephin.

## 2017-03-25 NOTE — PROGRESS NOTES
[facility] Hospital Medicine  Progress Note    Patient Name: Atif Neves  MRN: [id number]  Patient Class: IP- Inpatient   Admission Date: 3/23/2017  Length of Stay: 1 days  Attending Physician: Leyla Bettencourt MD  Primary Care Provider: Atif Christian MD    [facility] MED  Leyla Bettencourt MD    Subjective:     Principal Problem:Delirium, acute    HPI:  87 y/o gentleman who is new to me presents to the ED today with acute onset of AMS.  At this time, he is confused, alone, and unable to provide any information related to events leading up to ED visit.  According to the records it appears that he has a hx of normal pressure hydrocephalus and recently underwent  shunt placement in February of this year.  He appears to have went to his 6 week post-op follow up with Dr. Sam on 3/22/17 and was at baseline and A/Ox3.   He reportedly was at baseline yesterday, but was found to be acutely alerted upon awaking this AM around 7:30. At this time he is screaming, calling for people that are not present in the room and conversing with the television. He is witnessed moving all 4 extremities.  He does follow simple commands but not consistently and provides yes / no responses to some questioning.  Additional PMH consists of HTN, BPH, macular degeneration, and HLD.  CT of the head was performed while in the ED and there appears to be no acute abnormality, but image was less than optimal related to motion artifact. He was found to have a WBC count of 16.1 and was evaluated by NSX and considered low probability for meningitis. U/A was not suggestive of UTI. Blood cultures were obtained and he was started empirically on vancomycin and cefepime.       Hospital Course:       Interval History: Patient with no events overnight, no new complaints. Remains confused; requires restraints. Blood cultures positive for GPC.  Data Review: Results recorded below were reviewed 3/24/2017.    Review  of Systems   Unable to perform ROS: Mental status change   Constitutional: Positive for fever.   Psychiatric/Behavioral: Positive for confusion, hallucinations and sleep disturbance.     Objective:     Vital Signs (Most Recent):  Temp: 98.2 °F (36.8 °C) (03/24/17 1951)  Pulse: 64 (03/24/17 2300)  Resp: 18 (03/24/17 1951)  BP: (!) 168/78 (03/24/17 1951)  SpO2: 99 % (03/24/17 1951) Vital Signs (24h Range):  Temp:  [97.4 °F (36.3 °C)-100.1 °F (37.8 °C)] 98.2 °F (36.8 °C)  Pulse:  [64-92] 64  Resp:  [18] 18  SpO2:  [96 %-99 %] 99 %  BP: (158-173)/(56-78) 168/78     Weight: 70.5 kg (155 lb 6.8 oz)  Body mass index is 24.42 kg/(m^2).    Intake/Output Summary (Last 24 hours) at 03/24/17 2338  Last data filed at 03/24/17 0500   Gross per 24 hour   Intake                0 ml   Output              250 ml   Net             -250 ml      Physical Exam   Constitutional: He appears well-developed.   HENT:   Nose: Nose normal.   Mouth/Throat: Mucous membranes are not pale and not cyanotic.   Eyes: Conjunctivae and lids are normal. Pupils are equal.   Neck: Neck supple.   Cardiovascular: Normal rate, regular rhythm, S1 normal and S2 normal.    Pulmonary/Chest: Effort normal and breath sounds normal.   Abdominal: Soft. Bowel sounds are normal. There is no tenderness.   Musculoskeletal: He exhibits no edema.   Neurological: He is alert. He is disoriented.   Skin: Skin is warm and dry. He is not diaphoretic. No cyanosis. Nails show no clubbing.   Psychiatric: His affect is inappropriate. He is agitated.       Significant Labs:     Blood Culture:   Recent Labs  Lab 03/23/17  1247 03/23/17  1249 03/24/17  1133   LABBLOO Gram stain laura bottle: Gram positive cocci  Results called to and read back by:Lavonne Garcia RN 03/24/2017  11:24 Gram stain aer bottle: Gram positive cocci in clusters resembling Staph  Gram stain laura bottle: Gram positive cocci in clusters resembling Staph  03/24/2017  09:40 No Growth to date     CBC:   Recent  Labs  Lab 03/23/17  1247 03/24/17  1133   WBC 16.11* 14.70*   HGB 11.7* 12.9*   HCT 35.4* 38.2*    185     CMP:   Recent Labs  Lab 03/23/17  1247 03/24/17  0109 03/24/17  1133     --  141   K 3.4*  --  3.8   *  --  109   CO2 22*  --  16*   GLU 86  --  82   BUN 12  --  9   CREATININE 0.9  --  0.8   CALCIUM 9.2  --  9.4   PROT 7.2 7.0 7.6   ALBUMIN 3.6 3.4* 3.6   BILITOT 0.9 1.1* 1.5*   ALKPHOS 142* 138* 148*   AST 44* 92* 140*   ALT 11 15 21   ANIONGAP 12  --  16   EGFRNONAA >60.0  --  >60.0     Lactic Acid:   Recent Labs  Lab 03/23/17  1248   LACTATE 1.9     Lipase:   Recent Labs  Lab 03/23/17  1247   LIPASE 23     POCT Glucose:   Recent Labs  Lab 03/23/17  1032   POCTGLUCOSE 97     TSH:   Recent Labs  Lab 03/24/17  0109   TSH 0.228*     Urine Studies:   Recent Labs  Lab 03/23/17  1350   COLORU Yellow  Yellow   APPEARANCEUA Hazy*  Hazy*   PHUR 5.0  5.0   SPECGRAV 1.015  1.015   PROTEINUA Negative  Negative   GLUCUA Negative  Negative   KETONESU Negative  Negative   BILIRUBINUA Negative  Negative   OCCULTUA 2+*  2+*   NITRITE Negative  Negative   UROBILINOGEN Negative  Negative   LEUKOCYTESUR Negative  Negative   RBCUA 6*   WBCUA 3   BACTERIA Occasional     Assessment/Plan:      Current Hospital Problem List:    Active Hospital Problems    Diagnosis  POA    *Delirium, acute [R41.0]  Yes     Priority: 1 - High     Last known normal 9pm 3/22. Found at 7:30am on 3/23 confused. No known trauma or seizure activity.      Bacteremia due to Gram-positive bacteria [A49.9]  Yes     Priority: 2     NPH (normal pressure hydrocephalus) [G91.2]  Yes     Priority: 3      Shunt placed 2/6/17      Essential hypertension [I10]  Yes     Priority: 4     BPH (benign prostatic hypertrophy) with urinary obstruction [N40.1, N13.8]  Yes     Priority: 5     Glaucoma [H40.9]  Yes      Resolved Hospital Problems    Diagnosis Date Resolved POA   No resolved problems to display.       Ordered Medications for  management of current problems:    amlodipine  5 mg Oral Daily    atorvastatin  20 mg Oral Daily    cefTRIAXone (ROCEPHIN) IVPB  2 g Intravenous Q24H    citalopram  10 mg Oral Daily    dorzolamide  1 drop Left Eye Daily    heparin (porcine)  5,000 Units Subcutaneous Q12H    tamsulosin  0.4 mg Oral Daily    [START ON 3/25/2017] thiamine (VITAMIN B1) IVPB  200 mg Intravenous Q24H    timolol maleate 0.5%  1 drop Left Eye Daily    vancomycin (VANCOCIN) IVPB  15 mg/kg Intravenous Q24H       IV Fluids: D5-0.45NS or with 20 meq potassium/liter    Risk  Patient has an abrupt change in neurologic status: Delirium  Patient is currently on drug therapy requiring intensive monitoring for toxicity: Vancomycin    Anticipated Disposition: Home-Health Care [facility]    Assessment and Plan by Problem:    * Delirium, acute  - remains altered  - continue all safety measures - high risk of injury / falls  - ammonia 23 on admission  - CT head unremarkable but less than optimal imaging related to motion artifact  - Neurology consulted; LP attempted. Appears that infectious cause is likely given positive blood cultures.    Bacteremia due to Gram-positive bacteria  - WBC's 16.1 on arrival   - Procalcitonin 0.24  - U/A negative for nitrites or leuks   - Blood cultures positive for GPC in 2 bottles  - Vanco and cefepime started empirically in the ED  - continue Vanco and transition to ceftriaxone and deescalate as indicated by culture results  - seen by NSX while in ED - thought to be low probability for meningitis - if workup is negative consider reconsulting NSX and possible LP  - Consulting ID      NPH (normal pressure hydrocephalus)  - patient of Dr. Sam - who recently underwent  shunt placement 2/6/2017  - evaluated by NSX while in the ED  - neurology consulted        Essential hypertension  - BP fairly well controlled  - continue home antihypertensive regimen   - consider adding PRN agent if SBP climbs greater than 180      BPH  (benign prostatic hypertrophy) with urinary obstruction  - continue home tamsulosin   - bladder scan / straight cath PRN      VTE Risk Mitigation         Ordered     heparin (porcine) injection 5,000 Units  Every 12 hours     Route:  Subcutaneous        03/24/17 0018     Medium Risk of VTE  Once      03/24/17 0018     Place sequential compression device  Until discontinued      03/24/17 0018     Place LIAM hose  Until discontinued      03/24/17 0018          Leyla Bettencourt MD  Department of Hospital Medicine   [facility]

## 2017-03-25 NOTE — ASSESSMENT & PLAN NOTE
- WBC's 16.1 on arrival   - Procalcitonin 0.24  - U/A negative for nitrites or leuks   - Blood cultures positive for GPC in 2 bottles  - Vanco and cefepime started empirically in the ED  - continue Vanco and transition to ceftriaxone and deescalate as indicated by culture results  - seen by NSX while in ED - thought to be low probability for meningitis - if workup is negative consider reconsulting NSX and possible LP  - Consulting ID

## 2017-03-25 NOTE — SUBJECTIVE & OBJECTIVE
Subjective:     Interval History: On rounds, patient found in bed alert but confused and mute. He was not able to respond to questions or follow commands, and appeared to have a left sided facial droop (eye and mouth). He was unable to fully open his left eye and kept blinking it. He also had increased tone throughout, but was moving extremities. We were concerned that he could be either seizing or post-ictal, so a keppra load was ordered (2g) with maintenance (1g BID). An EEG will be placed ASAP.     Current Neurological Medications: Keppra    Current Facility-Administered Medications   Medication Dose Route Frequency Provider Last Rate Last Dose    acetaminophen tablet 650 mg  650 mg Oral Q4H PRN oG Torres NP        amlodipine tablet 5 mg  5 mg Oral Daily Go Torres NP   5 mg at 03/25/17 0957    atorvastatin tablet 20 mg  20 mg Oral Daily Go Torres NP   20 mg at 03/25/17 0957    bisacodyl suppository 10 mg  10 mg Rectal Daily PRN Go Torres NP        cefTRIAXone (ROCEPHIN) 2 g in dextrose 5 % 50 mL IVPB  2 g Intravenous Q24H Go Torres NP   2 g at 03/25/17 0956    citalopram tablet 10 mg  10 mg Oral Daily Go Torres NP   10 mg at 03/25/17 0957    dextrose 5 % and 0.45 % NaCl with KCl 20 mEq infusion   Intravenous Continuous Leyla Bettencourt MD 75 mL/hr at 03/25/17 0101      dextrose 50% injection 12.5 g  12.5 g Intravenous PRN Go Torres NP        dextrose 50% injection 25 g  25 g Intravenous PRN Go Torres NP        dorzolamide 2 % ophthalmic solution 1 drop  1 drop Left Eye Daily Go Torres NP   1 drop at 03/25/17 0959    glucagon (human recombinant) injection 1 mg  1 mg Intramuscular PRN Go Torres NP        glucose chewable tablet 16 g  16 g Oral PRN Go Torres NP        glucose chewable tablet 24 g  24 g Oral PRN Go Torres NP        heparin (porcine) injection 5,000 Units  5,000 Units Subcutaneous Q12H Go Torres NP   5,000 Units at 03/25/17 0956    levetiracetam  (KEPPRA) 2,000 mg in dextrose 5 % 250 mL IVPB  2,000 mg Intravenous Once Evelyn Jorge MD        levetiracetam in NaCl (iso-os) IVPB 1,000 mg  1,000 mg Intravenous Q12H Evelyn Jorge MD        olanzapine tablet 2.5 mg  2.5 mg Oral TID PRN Leyla Bettencourt MD        ondansetron disintegrating tablet 8 mg  8 mg Oral Q8H PRN Go Torres NP        ondansetron injection 4 mg  4 mg Intravenous Q12H PRN Go Torres NP        polyethylene glycol packet 17 g  17 g Oral BID PRN Go Torres NP        ramelteon tablet 8 mg  8 mg Oral Nightly PRN Go Torres NP   8 mg at 03/24/17 2046    tamsulosin 24 hr capsule 0.4 mg  0.4 mg Oral Daily Go Torres NP   0.4 mg at 03/25/17 0957    thiamine (B-1) 200 mg in dextrose 5 % 50 mL IVPB  200 mg Intravenous Q24H Leyla Bettencourt MD 25 mL/hr at 03/25/17 0113 200 mg at 03/25/17 0113    timolol maleate 0.5% ophthalmic solution 1 drop  1 drop Left Eye Daily Go Torres NP   1 drop at 03/25/17 0957    vancomycin 1 g in dextrose 5 % 250 mL IVPB (ready to mix system)  15 mg/kg Intravenous Q24H Go Torres .7 mL/hr at 03/24/17 1843 1,000 mg at 03/24/17 1843       Review of Systems   Unable to perform ROS: Mental status change     Objective:     Vital Signs (Most Recent):  Temp: 97.4 °F (36.3 °C) (03/25/17 0857)  Pulse: 66 (03/25/17 0857)  Resp: 16 (03/25/17 0857)  BP: (!) 143/68 (03/25/17 0857)  SpO2: 96 % (03/25/17 0857) Vital Signs (24h Range):  Temp:  [97.4 °F (36.3 °C)-99.6 °F (37.6 °C)] 97.4 °F (36.3 °C)  Pulse:  [60-93] 66  Resp:  [15-18] 16  SpO2:  [96 %-99 %] 96 %  BP: (140-168)/(67-82) 143/68     Weight: 65.7 kg (144 lb 13.5 oz)  Body mass index is 22.75 kg/(m^2).    Physical Exam   Constitutional: He appears well-developed and well-nourished.   HENT:   Head: Normocephalic and atraumatic.   Pulmonary/Chest: Effort normal.   Skin: Skin is warm and dry.   Nursing note and vitals reviewed.      NEUROLOGICAL EXAMINATION:     MENTAL STATUS   Level of  consciousness: alert       Makes eye contact, but unable to answer questions or follow commands.      CRANIAL NERVES        Left ptosis/swelling. Constant non-rhythmic blinking of left eye. ?left nasolabial fold flattening. Unable to fully assess cranial nerves due to mental status     MOTOR EXAM   Muscle bulk: normal  Overall muscle tone: increased       Moves all extremities spontaneously, but decreased from yesterday.       Significant Labs:   Blood Culture:   Recent Labs  Lab 03/23/17  1247 03/23/17  1249 03/24/17  1133   LABBLOO Gram stain laura bottle: Gram positive cocci  Results called to and read back by:Lavonne Garcia RN 03/24/2017  11:24  Gram stain aer bottle: Gram positive rods   Results called to and read back by:Kristin Zimmerman RN 03/25/2017  06:21  Gram stain laura bottle: Gram positive cocci in chains resembling Strep  Results called to and read back by:Anitha Teague RN 03/25/2017  07:48  ENTEROCOCCUS SPECIESIdentification and susceptibility pending Gram stain aer bottle: Gram positive cocci in clusters resembling Staph  Gram stain laura bottle: Gram positive cocci in clusters resembling Staph  Results called to and read back by:Lavonne Garcia RN 03/24/2017  09:40  STAPHYLOCOCCUS SPECIESIdentification and susceptibility pending No Growth to date     CBC:   Recent Labs  Lab 03/23/17  1247 03/24/17  1133 03/25/17  0457   WBC 16.11* 14.70* 13.76*   HGB 11.7* 12.9* 13.1*   HCT 35.4* 38.2* 38.6*    185 244     CMP:   Recent Labs  Lab 03/23/17  1247 03/24/17  0109 03/24/17  1133 03/25/17  0457   GLU 86  --  82 106     --  141 142   K 3.4*  --  3.8 3.2*   *  --  109 107   CO2 22*  --  16* 20*   BUN 12  --  9 11   CREATININE 0.9  --  0.8 0.8   CALCIUM 9.2  --  9.4 9.3   MG  --  2.1  --  1.8   PROT 7.2 7.0 7.6 7.3   ALBUMIN 3.6 3.4* 3.6 3.4*   BILITOT 0.9 1.1* 1.5* 1.4*   ALKPHOS 142* 138* 148* 139*   AST 44* 92* 140* 184*   ALT 11 15 21 25   ANIONGAP 12  --  16 15   EGFRNONAA >60.0  --   >60.0 >60.0     Urine Culture: No results for input(s): LABURIN in the last 48 hours.  Urine Studies:   Recent Labs  Lab 03/23/17  1350   COLORU Yellow  Yellow   APPEARANCEUA Hazy*  Hazy*   PHUR 5.0  5.0   SPECGRAV 1.015  1.015   PROTEINUA Negative  Negative   GLUCUA Negative  Negative   KETONESU Negative  Negative   BILIRUBINUA Negative  Negative   OCCULTUA 2+*  2+*   NITRITE Negative  Negative   UROBILINOGEN Negative  Negative   LEUKOCYTESUR Negative  Negative   RBCUA 6*   WBCUA 3   BACTERIA Occasional       Significant Imaging: I have reviewed all pertinent imaging results/findings within the past 24 hours.

## 2017-03-25 NOTE — CONSULTS
[facility]  Infectious Disease  Consult Note    Patient Name: Atif Neves  MRN: [id number]  Admission Date: 3/23/2017  Hospital Length of Stay: 2 days  Attending Physician: Raysa Bettencourt MD  Primary Care Provider: Atif Christian MD     Isolation Status: No active isolations       Inpatient consult to Infectious Diseases  Consult performed by: SHIRLEY GARCIA  Consult ordered by: RAYSA BETTENCOURT        Assessment/Plan:     Active Diagnoses:    Diagnosis Date Noted POA    PRINCIPAL PROBLEM:  Delirium, acute [R41.0]  Yes    Bacteremia due to Gram-positive bacteria [A49.9] 03/24/2017 Yes    NPH (normal pressure hydrocephalus) [G91.2] 02/06/2017 Yes    BPH (benign prostatic hypertrophy) with urinary obstruction [N40.1, N13.8] 06/16/2016 Yes    Essential hypertension [I10] 06/16/2016 Yes    Glaucoma [H40.9] 06/16/2016 Yes      Problems Resolved During this Admission:    Diagnosis Date Noted Date Resolved POA     Assessment: Pt is an 88 year old male with recent 2/6/17 placement of  shunt for NPH who presents with altered mental status, and has since grown enterococcus (I & S pending) and staph (I & S pending) in 3/23 blood cultures. UA and CXR unremarkable. Repeat blood cultures NGTD. LP was attempted yesterday but aborted secondary to combative behavior and inability to get patient in proper position.     Plan:   - Recommend LP given bacteremic patient with recent shunt placement and AMS, may need to be done by anesthesia or by IR   - Recommend continuing vancomycin and ceftriaxone for now, though will change antibiotics based on upon further culture results    Thank you for your consult. Patient examined and discussed with staff, Dr. Sargent. ID will continue to follow patient.     JORDYN Epstein  Infectious Disease  [facility]    Subjective:     Principal Problem: Delirium, acute    From HPI: 89 y/o gentleman who presented to the ED with acute onset of AMS. At  "this time, he is confused, alone, and unable to provide any information related to events leading up to ED visit. According to the records it appears that he has a hx of normal pressure hydrocephalus and recently underwent  shunt placement in February of this year. He appears to have went to his 6 week post-op follow up with Dr. Sam on 3/22/17 and was at baseline and A/Ox3. He reportedly was at baseline yesterday, but was found to be acutely alerted upon awaking this AM around 7:30. At this time he is screaming, calling for people that are not present in the room and conversing with the television. He is witnessed moving all 4 extremities. He does follow simple commands but not consistently and provides yes / no responses to some questioning. Additional PMH consists of HTN, BPH, macular degeneration, and HLD. CT of the head was performed while in the ED and there appears to be no acute abnormality, but image was less than optimal related to motion artifact. He was found to have a WBC count of 16.1 and was evaluated by NSX and considered low probability for meningitis. U/A was not suggestive of UTI. Blood cultures were obtained and he was started empirically on vancomycin and cefepime.     Past Medical History:   Diagnosis Date    JONATAN (acute kidney injury) 06/2016    Anxiety     Arthritis     Glaucoma     Hyperlipemia     Hypertension     Irregular heartbeat     Macular degeneration     Normal pressure hydrocephalus     Urinary retention 06/2016    UTI (urinary tract infection)        Past Surgical History:   Procedure Laterality Date    TRANSURETHRAL RESECTION OF PROSTATE  08/31/2016       Review of patient's allergies indicates:   Allergen Reactions    Aspirin Swelling     States, "makes me sick." pt does not elaborate.        Medications:  Prescriptions Prior to Admission   Medication Sig    amlodipine (NORVASC) 5 MG tablet Take 1 tablet (5 mg total) by mouth once daily.    atorvastatin (LIPITOR) " 20 MG tablet Take 1 tablet (20 mg total) by mouth once daily.    citalopram (CELEXA) 10 MG tablet Take 10 mg by mouth once daily.    dorzolamide-timolol 2-0.5% (COSOPT) 22.3-6.8 mg/mL ophthalmic solution Place 1 drop into the left eye once daily.    ergocalciferol (ERGOCALCIFEROL) 50,000 unit Cap TK ONE C     PO ONCE PER MONTH    polyethylene glycol (GLYCOLAX) 17 gram/dose powder Take 17 g by mouth 2 (two) times daily. Adjust frequency for one small bowel movement a day    tamsulosin (FLOMAX) 0.4 mg Cp24 TK ONE C     PO 30 MINUTES AFTER THE SAME MEAL QD     Antibiotics     Start     Stop Route Frequency Ordered    03/24/17 0900  cefTRIAXone (ROCEPHIN) 2 g in dextrose 5 % 50 mL IVPB      03/31 0859 IV Every 24 hours (non-standard times) 03/24/17 0026    03/24/17 1600  vancomycin 1 g in dextrose 5 % 250 mL IVPB (ready to mix system)      -- IV Every 24 hours (non-standard times) 03/24/17 0026        Antifungals     None        Antivirals     None             There is no immunization history on file for this patient.    Family History     Problem Relation (Age of Onset)    Cancer Daughter (55), Daughter (54)    Heart disease Brother        Social History     Social History    Marital status:      Spouse name: N/A    Number of children: N/A    Years of education: N/A     Occupational History          Social History Main Topics    Smoking status: Former Smoker     Packs/day: 1.00     Years: 18.00     Quit date: 1950    Smokeless tobacco: Never Used    Alcohol use No    Drug use: No    Sexual activity: Not Asked     Other Topics Concern    None     Social History Narrative       Review of Systems   Unable to perform ROS: Mental status change     Objective:     Vital Signs (Most Recent):  Temp: 99 °F (37.2 °C) (03/25/17 0420)  Pulse: 71 (03/25/17 0700)  Resp: 15 (03/25/17 0420)  BP: (!) 140/67 (03/25/17 0420)  SpO2: 97 % (03/25/17 0420) Vital Signs (24h Range):  Temp:  [98.2 °F (36.8 °C)-99.6  "°F (37.6 °C)] 99 °F (37.2 °C)  Pulse:  [60-93] 71  Resp:  [15-18] 15  SpO2:  [96 %-99 %] 97 %  BP: (140-168)/(67-82) 140/67     Weight: 65.7 kg (144 lb 13.5 oz)  Body mass index is 22.75 kg/(m^2).    Estimated Creatinine Clearance: 59.3 mL/min (based on Cr of 0.8).    Physical Exam   Constitutional: No distress.   HENT:   Head: Normocephalic and atraumatic.   Eyes: EOM are normal. Pupils are equal, round, and reactive to light.   Neck: Normal range of motion.   Moves his neck with assistance, does not appear to be in pain    Cardiovascular: Normal rate, regular rhythm, normal heart sounds and intact distal pulses.    Pulmonary/Chest: Effort normal and breath sounds normal.   Abdominal: Soft.   Musculoskeletal: Normal range of motion.   Neurological: He is alert.   Was only stating "yeah" to all questions asked to him regardless of whether it was a yes or no question. Moving all extremities, intermittently following commands.     Skin: Skin is warm and dry. No rash noted. He is not diaphoretic. No erythema. No pallor.       Significant Labs:   Blood Culture:   Recent Labs  Lab 03/23/17  1247 03/23/17  1249 03/24/17  1133   LABBLOO Gram stain laura bottle: Gram positive cocci  Results called to and read back by:Lavonne Garcia RN 03/24/2017  11:24  Gram stain aer bottle: Gram positive rods   Results called to and read back by:Kristin Zimmerman RN 03/25/2017  06:21  Gram stain laura bottle: Gram positive cocci in chains resembling Strep  Results called to and read back by:Anitha Teague RN 03/25/2017  07:48  ENTEROCOCCUS SPECIESIdentification and susceptibility pending Gram stain aer bottle: Gram positive cocci in clusters resembling Staph  Gram stain laura bottle: Gram positive cocci in clusters resembling Staph  Results called to and read back by:Lavonne Garcia RN 03/24/2017  09:40  STAPHYLOCOCCUS SPECIESIdentification and susceptibility pending No Growth to date     CBC:   Recent Labs  Lab 03/24/17  1133 03/25/17  0457 "   WBC 14.70* 13.76*   HGB 12.9* 13.1*   HCT 38.2* 38.6*    244     CMP:   Recent Labs  Lab 03/24/17  0109 03/24/17  1133 03/25/17  0457   NA  --  141 142   K  --  3.8 3.2*   CL  --  109 107   CO2  --  16* 20*   GLU  --  82 106   BUN  --  9 11   CREATININE  --  0.8 0.8   CALCIUM  --  9.4 9.3   PROT 7.0 7.6 7.3   ALBUMIN 3.4* 3.6 3.4*   BILITOT 1.1* 1.5* 1.4*   ALKPHOS 138* 148* 139*   AST 92* 140* 184*   ALT 15 21 25   ANIONGAP  --  16 15   EGFRNONAA  --  >60.0 >60.0     Lactic Acid: No results for input(s): LACTATE in the last 48 hours.  Urine Culture: No results for input(s): LABURIN in the last 4320 hours.  Urine Studies:   Recent Labs  Lab 03/23/17  1350   COLORU Yellow  Yellow   APPEARANCEUA Hazy*  Hazy*   PHUR 5.0  5.0   SPECGRAV 1.015  1.015   PROTEINUA Negative  Negative   GLUCUA Negative  Negative   KETONESU Negative  Negative   BILIRUBINUA Negative  Negative   OCCULTUA 2+*  2+*   NITRITE Negative  Negative   UROBILINOGEN Negative  Negative   LEUKOCYTESUR Negative  Negative   RBCUA 6*   WBCUA 3   BACTERIA Occasional       Significant Imaging: I have reviewed all pertinent imaging results/findings within the past 24 hours.

## 2017-03-25 NOTE — SUBJECTIVE & OBJECTIVE
Interval History: Patient with no events overnight, no new complaints. Remains confused; requires restraints. Blood cultures positive for GPC.  Data Review: Results recorded below were reviewed 3/24/2017.    Review of Systems   Unable to perform ROS: Mental status change   Constitutional: Positive for fever.   Psychiatric/Behavioral: Positive for confusion, hallucinations and sleep disturbance.     Objective:     Vital Signs (Most Recent):  Temp: 98.2 °F (36.8 °C) (03/24/17 1951)  Pulse: 64 (03/24/17 2300)  Resp: 18 (03/24/17 1951)  BP: (!) 168/78 (03/24/17 1951)  SpO2: 99 % (03/24/17 1951) Vital Signs (24h Range):  Temp:  [97.4 °F (36.3 °C)-100.1 °F (37.8 °C)] 98.2 °F (36.8 °C)  Pulse:  [64-92] 64  Resp:  [18] 18  SpO2:  [96 %-99 %] 99 %  BP: (158-173)/(56-78) 168/78     Weight: 70.5 kg (155 lb 6.8 oz)  Body mass index is 24.42 kg/(m^2).    Intake/Output Summary (Last 24 hours) at 03/24/17 2338  Last data filed at 03/24/17 0500   Gross per 24 hour   Intake                0 ml   Output              250 ml   Net             -250 ml      Physical Exam   Constitutional: He appears well-developed.   HENT:   Nose: Nose normal.   Mouth/Throat: Mucous membranes are not pale and not cyanotic.   Eyes: Conjunctivae and lids are normal. Pupils are equal.   Neck: Neck supple.   Cardiovascular: Normal rate, regular rhythm, S1 normal and S2 normal.    Pulmonary/Chest: Effort normal and breath sounds normal.   Abdominal: Soft. Bowel sounds are normal. There is no tenderness.   Musculoskeletal: He exhibits no edema.   Neurological: He is alert. He is disoriented.   Skin: Skin is warm and dry. He is not diaphoretic. No cyanosis. Nails show no clubbing.   Psychiatric: His affect is inappropriate. He is agitated.       Significant Labs:     Blood Culture:   Recent Labs  Lab 03/23/17  1247 03/23/17  1249 03/24/17  1133   LABBLOO Gram stain laura bottle: Gram positive cocci  Results called to and read back by:Lavonne Garcia RN 03/24/2017   11:24 Gram stain aer bottle: Gram positive cocci in clusters resembling Staph  Gram stain laura bottle: Gram positive cocci in clusters resembling Staph  Results called to and read back by:Lavonne Garcia RN 03/24/2017  09:40 No Growth to date     CBC:   Recent Labs  Lab 03/23/17  1247 03/24/17  1133   WBC 16.11* 14.70*   HGB 11.7* 12.9*   HCT 35.4* 38.2*    185     CMP:   Recent Labs  Lab 03/23/17  1247 03/24/17  0109 03/24/17  1133     --  141   K 3.4*  --  3.8   *  --  109   CO2 22*  --  16*   GLU 86  --  82   BUN 12  --  9   CREATININE 0.9  --  0.8   CALCIUM 9.2  --  9.4   PROT 7.2 7.0 7.6   ALBUMIN 3.6 3.4* 3.6   BILITOT 0.9 1.1* 1.5*   ALKPHOS 142* 138* 148*   AST 44* 92* 140*   ALT 11 15 21   ANIONGAP 12  --  16   EGFRNONAA >60.0  --  >60.0     Lactic Acid:   Recent Labs  Lab 03/23/17  1248   LACTATE 1.9     Lipase:   Recent Labs  Lab 03/23/17  1247   LIPASE 23     POCT Glucose:   Recent Labs  Lab 03/23/17  1032   POCTGLUCOSE 97     TSH:   Recent Labs  Lab 03/24/17  0109   TSH 0.228*     Urine Studies:   Recent Labs  Lab 03/23/17  1350   COLORU Yellow  Yellow   APPEARANCEUA Hazy*  Hazy*   PHUR 5.0  5.0   SPECGRAV 1.015  1.015   PROTEINUA Negative  Negative   GLUCUA Negative  Negative   KETONESU Negative  Negative   BILIRUBINUA Negative  Negative   OCCULTUA 2+*  2+*   NITRITE Negative  Negative   UROBILINOGEN Negative  Negative   LEUKOCYTESUR Negative  Negative   RBCUA 6*   WBCUA 3   BACTERIA Occasional

## 2017-03-26 LAB
ALBUMIN SERPL BCP-MCNC: 3.3 G/DL
ALP SERPL-CCNC: 134 U/L
ALT SERPL W/O P-5'-P-CCNC: 27 U/L
ANION GAP SERPL CALC-SCNC: 12 MMOL/L
AST SERPL-CCNC: 168 U/L
BACTERIA BLD CULT: NORMAL
BASOPHILS # BLD AUTO: 0.02 K/UL
BASOPHILS NFR BLD: 0.2 %
BILIRUB SERPL-MCNC: 1.2 MG/DL
BUN SERPL-MCNC: 13 MG/DL
CALCIUM SERPL-MCNC: 9.3 MG/DL
CHLORIDE SERPL-SCNC: 108 MMOL/L
CO2 SERPL-SCNC: 20 MMOL/L
CREAT SERPL-MCNC: 0.8 MG/DL
DIFFERENTIAL METHOD: ABNORMAL
EOSINOPHIL # BLD AUTO: 0.2 K/UL
EOSINOPHIL NFR BLD: 1.9 %
ERYTHROCYTE [DISTWIDTH] IN BLOOD BY AUTOMATED COUNT: 15.2 %
EST. GFR  (AFRICAN AMERICAN): >60 ML/MIN/1.73 M^2
EST. GFR  (NON AFRICAN AMERICAN): >60 ML/MIN/1.73 M^2
GLUCOSE SERPL-MCNC: 110 MG/DL
HCT VFR BLD AUTO: 40 %
HGB BLD-MCNC: 13.6 G/DL
LYMPHOCYTES # BLD AUTO: 1.4 K/UL
LYMPHOCYTES NFR BLD: 12 %
MAGNESIUM SERPL-MCNC: 2 MG/DL
MCH RBC QN AUTO: 26.3 PG
MCHC RBC AUTO-ENTMCNC: 34 %
MCV RBC AUTO: 77 FL
MONOCYTES # BLD AUTO: 0.9 K/UL
MONOCYTES NFR BLD: 7.6 %
NEUTROPHILS # BLD AUTO: 9.1 K/UL
NEUTROPHILS NFR BLD: 78 %
PLATELET # BLD AUTO: 244 K/UL
PMV BLD AUTO: 10.1 FL
POTASSIUM SERPL-SCNC: 3.4 MMOL/L
PROT SERPL-MCNC: 7.2 G/DL
RBC # BLD AUTO: 5.18 M/UL
SODIUM SERPL-SCNC: 140 MMOL/L
WBC # BLD AUTO: 11.63 K/UL

## 2017-03-26 PROCEDURE — 99233 SBSQ HOSP IP/OBS HIGH 50: CPT | Mod: ,,, | Performed by: PSYCHIATRY & NEUROLOGY

## 2017-03-26 PROCEDURE — 80053 COMPREHEN METABOLIC PANEL: CPT

## 2017-03-26 PROCEDURE — 25000003 PHARM REV CODE 250: Performed by: NURSE PRACTITIONER

## 2017-03-26 PROCEDURE — 63600175 PHARM REV CODE 636 W HCPCS: Performed by: NURSE PRACTITIONER

## 2017-03-26 PROCEDURE — 36415 COLL VENOUS BLD VENIPUNCTURE: CPT

## 2017-03-26 PROCEDURE — 11000001 HC ACUTE MED/SURG PRIVATE ROOM

## 2017-03-26 PROCEDURE — 87040 BLOOD CULTURE FOR BACTERIA: CPT

## 2017-03-26 PROCEDURE — 83735 ASSAY OF MAGNESIUM: CPT

## 2017-03-26 PROCEDURE — 85025 COMPLETE CBC W/AUTO DIFF WBC: CPT

## 2017-03-26 PROCEDURE — 25000003 PHARM REV CODE 250: Performed by: INTERNAL MEDICINE

## 2017-03-26 PROCEDURE — 63600175 PHARM REV CODE 636 W HCPCS: Performed by: PSYCHIATRY & NEUROLOGY

## 2017-03-26 PROCEDURE — 63600175 PHARM REV CODE 636 W HCPCS: Performed by: INTERNAL MEDICINE

## 2017-03-26 PROCEDURE — 99232 SBSQ HOSP IP/OBS MODERATE 35: CPT | Mod: ,,, | Performed by: INTERNAL MEDICINE

## 2017-03-26 PROCEDURE — 99232 SBSQ HOSP IP/OBS MODERATE 35: CPT | Mod: GC,,, | Performed by: INTERNAL MEDICINE

## 2017-03-26 RX ORDER — POTASSIUM CHLORIDE 750 MG/1
50 CAPSULE, EXTENDED RELEASE ORAL ONCE
Status: COMPLETED | OUTPATIENT
Start: 2017-03-26 | End: 2017-03-26

## 2017-03-26 RX ADMIN — TIMOLOL MALEATE 1 DROP: 5 SOLUTION OPHTHALMIC at 09:03

## 2017-03-26 RX ADMIN — LEVETIRACETAM 1000 MG: 10 INJECTION INTRAVENOUS at 08:03

## 2017-03-26 RX ADMIN — DORZOLAMIDE HYDROCHLORIDE 1 DROP: 20 SOLUTION/ DROPS OPHTHALMIC at 09:03

## 2017-03-26 RX ADMIN — HEPARIN SODIUM 5000 UNITS: 5000 INJECTION, SOLUTION INTRAVENOUS; SUBCUTANEOUS at 08:03

## 2017-03-26 RX ADMIN — AMLODIPINE BESYLATE 5 MG: 5 TABLET ORAL at 09:03

## 2017-03-26 RX ADMIN — CEFTRIAXONE 2 G: 2 INJECTION, SOLUTION INTRAVENOUS at 10:03

## 2017-03-26 RX ADMIN — POTASSIUM CHLORIDE 50 MEQ: 750 CAPSULE, EXTENDED RELEASE ORAL at 05:03

## 2017-03-26 RX ADMIN — CITALOPRAM HYDROBROMIDE 10 MG: 10 TABLET ORAL at 09:03

## 2017-03-26 RX ADMIN — TAMSULOSIN HYDROCHLORIDE 0.4 MG: 0.4 CAPSULE ORAL at 09:03

## 2017-03-26 RX ADMIN — THIAMINE HYDROCHLORIDE 200 MG: 100 INJECTION, SOLUTION INTRAMUSCULAR; INTRAVENOUS at 12:03

## 2017-03-26 RX ADMIN — VANCOMYCIN HYDROCHLORIDE 1000 MG: 1 INJECTION, POWDER, LYOPHILIZED, FOR SOLUTION INTRAVENOUS at 05:03

## 2017-03-26 RX ADMIN — LEVETIRACETAM 1000 MG: 10 INJECTION INTRAVENOUS at 09:03

## 2017-03-26 RX ADMIN — HEPARIN SODIUM 5000 UNITS: 5000 INJECTION, SOLUTION INTRAVENOUS; SUBCUTANEOUS at 09:03

## 2017-03-26 RX ADMIN — ATORVASTATIN CALCIUM 20 MG: 20 TABLET, FILM COATED ORAL at 09:03

## 2017-03-26 NOTE — ASSESSMENT & PLAN NOTE
- remains altered  - continue all safety measures - high risk of injury / falls  - ammonia 23 on admission  - CT head unremarkable but less than optimal imaging related to motion artifact  - Neurology consulted; LP attempted. Appears that infectious cause is possible given positive blood cultures but cultures are polymicrobial.

## 2017-03-26 NOTE — SUBJECTIVE & OBJECTIVE
Interval History: Patient with no events overnight, no new complaints. Patient found to be excessively confused and lethargic this AM; appearing post-ictal. Later, talking about going to work and attempting to get out of bed.  Data Review: Results recorded below were reviewed 3/25/2017.    Review of Systems   Unable to perform ROS: Mental status change   Constitutional: Positive for fever.   Psychiatric/Behavioral: Positive for confusion, hallucinations and sleep disturbance.     Objective:     Vital Signs (Most Recent):  Temp: 97.5 °F (36.4 °C) (03/25/17 1923)  Pulse: 85 (03/25/17 1923)  Resp: 18 (03/25/17 1923)  BP: (!) 162/81 (03/25/17 1923)  SpO2: 98 % (03/25/17 1923) Vital Signs (24h Range):  Temp:  [97.4 °F (36.3 °C)-99 °F (37.2 °C)] 97.5 °F (36.4 °C)  Pulse:  [9-93] 85  Resp:  [15-18] 18  SpO2:  [96 %-99 %] 98 %  BP: (125-163)/(67-84) 162/81     Weight: 65.7 kg (144 lb 13.5 oz)  Body mass index is 22.75 kg/(m^2).    Intake/Output Summary (Last 24 hours) at 03/25/17 2108  Last data filed at 03/25/17 0545   Gross per 24 hour   Intake              465 ml   Output                0 ml   Net              465 ml      Physical Exam   Constitutional: He appears well-developed.   HENT:   Nose: Nose normal.   Mouth/Throat: Mucous membranes are not pale and not cyanotic.   Eyes: Conjunctivae and lids are normal. Pupils are equal.   Neck: Neck supple.   Cardiovascular: Normal rate, regular rhythm, S1 normal and S2 normal.    Pulmonary/Chest: Effort normal and breath sounds normal.   Abdominal: Soft. Bowel sounds are normal. There is no tenderness.   Musculoskeletal: He exhibits no edema.   Neurological: He is alert. He is disoriented.   Skin: Skin is warm and dry. He is not diaphoretic. No cyanosis. Nails show no clubbing.   Psychiatric: His affect is inappropriate. He is agitated.       Significant Labs:     CBC:     Recent Labs  Lab 03/24/17  1133 03/25/17  0457   WBC 14.70* 13.76*   HGB 12.9* 13.1*   HCT 38.2* 38.6*     244     CMP:     Recent Labs  Lab 03/24/17  0109 03/24/17  1133 03/25/17  0457   NA  --  141 142   K  --  3.8 3.2*   CL  --  109 107   CO2  --  16* 20*   GLU  --  82 106   BUN  --  9 11   CREATININE  --  0.8 0.8   CALCIUM  --  9.4 9.3   PROT 7.0 7.6 7.3   ALBUMIN 3.4* 3.6 3.4*   BILITOT 1.1* 1.5* 1.4*   ALKPHOS 138* 148* 139*   AST 92* 140* 184*   ALT 15 21 25   ANIONGAP  --  16 15   EGFRNONAA  --  >60.0 >60.0     TSH:     Recent Labs  Lab 03/24/17 0109   TSH 0.228*

## 2017-03-26 NOTE — SUBJECTIVE & OBJECTIVE
Interval History: Patient with no events overnight, no new complaints. Patient intermittently at baseline today per his wife.  Data Review: Results recorded below were reviewed 3/26/2017.    Review of Systems   Unable to perform ROS: Mental status change   Constitutional: Negative for fever.   Psychiatric/Behavioral: Positive for confusion.     Objective:     Vital Signs (Most Recent):  Temp: 98.5 °F (36.9 °C) (03/26/17 1235)  Pulse: 86 (03/26/17 1500)  Resp: 18 (03/26/17 1235)  BP: 120/62 (03/26/17 1235)  SpO2: 96 % (03/26/17 1235) Vital Signs (24h Range):  Temp:  [97.5 °F (36.4 °C)-98.5 °F (36.9 °C)] 98.5 °F (36.9 °C)  Pulse:  [56-86] 86  Resp:  [16-18] 18  SpO2:  [96 %-98 %] 96 %  BP: (120-162)/(62-82) 120/62     Weight: 66.5 kg (146 lb 9.7 oz)  Body mass index is 23.03 kg/(m^2).    Intake/Output Summary (Last 24 hours) at 03/26/17 1722  Last data filed at 03/26/17 0013   Gross per 24 hour   Intake               50 ml   Output                0 ml   Net               50 ml      Physical Exam   Constitutional: He appears well-developed.   HENT:   Nose: Nose normal.   Mouth/Throat: Mucous membranes are not pale and not cyanotic.   Eyes: Conjunctivae and lids are normal. Pupils are equal.   Neck: Neck supple.   Cardiovascular: Normal rate, regular rhythm, S1 normal and S2 normal.    Pulmonary/Chest: Effort normal and breath sounds normal.   Abdominal: Soft. Bowel sounds are normal. There is no tenderness.   Musculoskeletal: He exhibits no edema.   Neurological: He is alert. He is disoriented.   Skin: Skin is warm and dry. He is not diaphoretic. No cyanosis. Nails show no clubbing.   Psychiatric: His affect is inappropriate. Cognition and memory are impaired.       Significant Labs:     CBC:     Recent Labs  Lab 03/25/17  0457 03/26/17  0350   WBC 13.76* 11.63   HGB 13.1* 13.6*   HCT 38.6* 40.0    244     CMP:     Recent Labs  Lab 03/25/17  0457 03/26/17  0350    140   K 3.2* 3.4*    108   CO2 20*  20*    110   BUN 11 13   CREATININE 0.8 0.8   CALCIUM 9.3 9.3   PROT 7.3 7.2   ALBUMIN 3.4* 3.3*   BILITOT 1.4* 1.2*   ALKPHOS 139* 134   * 168*   ALT 25 27   ANIONGAP 15 12   EGFRNONAA >60.0 >60.0     TSH:     Recent Labs  Lab 03/24/17  0109   TSH 0.228*

## 2017-03-26 NOTE — ASSESSMENT & PLAN NOTE
Mr. 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 At baseline he is alert and oriented. Blood cultures have grown GPC and GNR (staph and enterococcus), with speciation pending - source remains unclear. LP was attempted at bedside yesterday, however patient was combative and non-compliant despite valium so the procedure was aborted. On rounds 3/25 there was concern that the patient may be actively seizing, so keppra 2g IV was ordered STAT with maintenance of 1g BID.     3/26, doing much better (no focal neuro deficits), but still delirious and not to baseline.  I suspect CNS infection as well as his bacteremia with possibly secondary seizures.  EEG was slow, but without seizure by yesterday afternoon (load of keppra).  Unable to get LP at bedside safely, so discussed empirically treating for encephalitis with ID team.  ID would prefer to get LP (will have to be under general anesthesia) to determine length of treatment.    Recommendations:    -> LP under general anesthesia   -> continue keppra 1g BID indefinitely

## 2017-03-26 NOTE — SUBJECTIVE & OBJECTIVE
Subjective:     Interval history 3/26:  Daughter at bedside.  Patient much more alert, making eye contact.  Still confused, but not aphasic.      Interval History 3/25: On rounds, patient found in bed alert but confused and mute. He was not able to respond to questions or follow commands, and appeared to have a left sided facial droop (eye and mouth). He was unable to fully open his left eye and kept blinking it. He also had increased tone throughout, but was moving extremities. We were concerned that he could be either seizing or post-ictal, so a keppra load was ordered (2g) with maintenance (1g BID). An EEG will be placed ASAP.     Current Neurological Medications: Keppra    Current Facility-Administered Medications   Medication Dose Route Frequency Provider Last Rate Last Dose    acetaminophen tablet 650 mg  650 mg Oral Q4H PRN Go Torres NP        amlodipine tablet 5 mg  5 mg Oral Daily Go Torres NP   5 mg at 03/26/17 0929    atorvastatin tablet 20 mg  20 mg Oral Daily Go Torres NP   20 mg at 03/26/17 0929    bisacodyl suppository 10 mg  10 mg Rectal Daily PRN Go Torres NP        cefTRIAXone (ROCEPHIN) 2 g in dextrose 5 % 50 mL IVPB  2 g Intravenous Q24H Go Torres NP   2 g at 03/26/17 1046    citalopram tablet 10 mg  10 mg Oral Daily Go Torres NP   10 mg at 03/26/17 0929    dextrose 5 % and 0.45 % NaCl with KCl 20 mEq infusion   Intravenous Continuous Leyla Bettencourt MD 75 mL/hr at 03/25/17 1950      dextrose 50% injection 12.5 g  12.5 g Intravenous PRN Go Torres NP        dextrose 50% injection 25 g  25 g Intravenous PRN Go Torres NP        dorzolamide 2 % ophthalmic solution 1 drop  1 drop Left Eye Daily Go Torres NP   1 drop at 03/26/17 0929    glucagon (human recombinant) injection 1 mg  1 mg Intramuscular PRN Go Torres NP        glucose chewable tablet 16 g  16 g Oral PRN Go Torres NP        glucose chewable tablet 24 g  24 g Oral PRN Go Torres NP         heparin (porcine) injection 5,000 Units  5,000 Units Subcutaneous Q12H Go Torres NP   5,000 Units at 03/26/17 0929    levetiracetam in NaCl (iso-os) IVPB 1,000 mg  1,000 mg Intravenous Q12H Evelyn Jorge  mL/hr at 03/26/17 0929 1,000 mg at 03/26/17 0929    olanzapine tablet 2.5 mg  2.5 mg Oral TID PRN Leyla Bettencourt MD        ondansetron disintegrating tablet 8 mg  8 mg Oral Q8H PRN Go Torres NP        ondansetron injection 4 mg  4 mg Intravenous Q12H PRN Go Torres NP        polyethylene glycol packet 17 g  17 g Oral BID PRN Go Torres NP        ramelteon tablet 8 mg  8 mg Oral Nightly PRN Go Torres NP   8 mg at 03/24/17 2046    tamsulosin 24 hr capsule 0.4 mg  0.4 mg Oral Daily Go Torres NP   0.4 mg at 03/26/17 0929    thiamine (B-1) 200 mg in dextrose 5 % 50 mL IVPB  200 mg Intravenous Q24H Leyla Bettencourt MD 25 mL/hr at 03/26/17 0001 200 mg at 03/26/17 0001    timolol maleate 0.5% ophthalmic solution 1 drop  1 drop Left Eye Daily Go Torres NP   1 drop at 03/26/17 0929    vancomycin 1 g in dextrose 5 % 250 mL IVPB (ready to mix system)  15 mg/kg Intravenous Q24H Go Torres .7 mL/hr at 03/25/17 1623 1,000 mg at 03/25/17 1623       Review of Systems   Unable to perform ROS: Mental status change     Objective:     Vital Signs (Most Recent):  Temp: 98.5 °F (36.9 °C) (03/26/17 1235)  Pulse: 86 (03/26/17 1235)  Resp: 18 (03/26/17 1235)  BP: 120/62 (03/26/17 1235)  SpO2: 96 % (03/26/17 1235) Vital Signs (24h Range):  Temp:  [97.5 °F (36.4 °C)-98.5 °F (36.9 °C)] 98.5 °F (36.9 °C)  Pulse:  [9-90] 86  Resp:  [16-18] 18  SpO2:  [96 %-98 %] 96 %  BP: (120-162)/(62-82) 120/62     Weight: 66.5 kg (146 lb 9.7 oz)  Body mass index is 23.03 kg/(m^2).    Physical Exam   Constitutional: He appears well-developed and well-nourished.   HENT:   Head: Normocephalic and atraumatic.   Pulmonary/Chest: Effort normal.   Neurological: He has normal strength.   Skin: Skin is warm and dry.    Nursing note and vitals reviewed.      NEUROLOGICAL EXAMINATION:     MENTAL STATUS   Level of consciousness: alert       Makes eye contact, says hello, but starts telling us about his cat.    Oriented to self only.  Speech is understandable.     CRANIAL NERVES        Left ptosis/swelling is resolved from yesterday     MOTOR EXAM   Muscle bulk: normal  Overall muscle tone: increased    Strength   Strength 5/5 throughout.       Significant Labs:   Blood Culture:     Recent Labs  Lab 03/26/17  0349   LABBLOO No Growth to date     CBC:     Recent Labs  Lab 03/25/17 0457 03/26/17  0350   WBC 13.76* 11.63   HGB 13.1* 13.6*   HCT 38.6* 40.0    244     CMP:     Recent Labs  Lab 03/25/17 0457 03/26/17  0350    110    140   K 3.2* 3.4*    108   CO2 20* 20*   BUN 11 13   CREATININE 0.8 0.8   CALCIUM 9.3 9.3   MG 1.8 2.0   PROT 7.3 7.2   ALBUMIN 3.4* 3.3*   BILITOT 1.4* 1.2*   ALKPHOS 139* 134   * 168*   ALT 25 27   ANIONGAP 15 12   EGFRNONAA >60.0 >60.0     Urine Culture: No results for input(s): LABURIN in the last 48 hours.  Urine Studies: No results for input(s): COLORU, APPEARANCEUA, PHUR, SPECGRAV, PROTEINUA, GLUCUA, KETONESU, BILIRUBINUA, OCCULTUA, NITRITE, UROBILINOGEN, LEUKOCYTESUR, RBCUA, WBCUA, BACTERIA, SQUAMEPITHEL, HYALINECASTS in the last 48 hours.    Invalid input(s): GALNE    Significant Imaging: I have reviewed all pertinent imaging results/findings within the past 24 hours.

## 2017-03-26 NOTE — ASSESSMENT & PLAN NOTE
- patient of Dr. Sam - who recently underwent  shunt placement 2/6/2017  - evaluated by Neurosurgery while in the ED  - Neurology consulted

## 2017-03-26 NOTE — ASSESSMENT & PLAN NOTE
- WBC's 16.1 on arrival   - Procalcitonin 0.24  - U/A negative for nitrites or leuks   - Blood cultures positive for GPC in 2 bottles  - Vanco and cefepime started empirically in the ED  - continue Vanco and transition to ceftriaxone and deescalate as indicated by culture results  - seen by Neurosurgery while in ED - thought to be low probability for meningitis - if workup is negative consider reconsulting and possible LP. LP attempted by Neurology but will need imaging due to DJD if reattempted.  - Consulted ID. BCs are polymicrobial.

## 2017-03-26 NOTE — PLAN OF CARE
Problem: Patient Care Overview  Goal: Plan of Care Review  Outcome: Ongoing (interventions implemented as appropriate)  D/Ox4. VSS. Pt able to verbalize more comprehensible speech tonight compared to last night. Remained free from falls,trauma,injuries throughout shift. Will continue to monitor and reassess.

## 2017-03-26 NOTE — PROGRESS NOTES
[facility] Hospital Medicine  Progress Note    Patient Name: Atif Neves  MRN: [id number]  Patient Class: IP- Inpatient   Admission Date: 3/23/2017  Length of Stay: 2 days  Attending Physician: Leyla Bettencourt MD  Primary Care Provider: Atif Christian MD    [facility] Medicine Team: [facility]  Leyla Bettencourt MD    Subjective:     Principal Problem:Delirium, acute    HPI:  87 y/o gentleman who is new to me presents to the ED today with acute onset of AMS.  At this time, he is confused, alone, and unable to provide any information related to events leading up to ED visit.  According to the records it appears that he has a hx of normal pressure hydrocephalus and recently underwent  shunt placement in February of this year.  He appears to have went to his 6 week post-op follow up with Dr. Sam on 3/22/17 and was at baseline and A/Ox3.   He reportedly was at baseline yesterday, but was found to be acutely alerted upon awaking this AM around 7:30. At this time he is screaming, calling for people that are not present in the room and conversing with the television. He is witnessed moving all 4 extremities.  He does follow simple commands but not consistently and provides yes / no responses to some questioning.  Additional PMH consists of HTN, BPH, macular degeneration, and HLD.  CT of the head was performed while in the ED and there appears to be no acute abnormality, but image was less than optimal related to motion artifact. He was found to have a WBC count of 16.1 and was evaluated by NSX and considered low probability for meningitis. U/A was not suggestive of UTI. Blood cultures were obtained and he was started empirically on vancomycin and cefepime.       Hospital Course:       Interval History: Patient with no events overnight, no new complaints. Patient found to be excessively confused and lethargic this AM; appearing post-ictal. Later, talking about going to work and attempting to get  out of bed.  Data Review: Results recorded below were reviewed 3/25/2017.    Review of Systems   Unable to perform ROS: Mental status change   Constitutional: Positive for fever.   Psychiatric/Behavioral: Positive for confusion, hallucinations and sleep disturbance.     Objective:     Vital Signs (Most Recent):  Temp: 97.5 °F (36.4 °C) (03/25/17 1923)  Pulse: 85 (03/25/17 1923)  Resp: 18 (03/25/17 1923)  BP: (!) 162/81 (03/25/17 1923)  SpO2: 98 % (03/25/17 1923) Vital Signs (24h Range):  Temp:  [97.4 °F (36.3 °C)-99 °F (37.2 °C)] 97.5 °F (36.4 °C)  Pulse:  [9-93] 85  Resp:  [15-18] 18  SpO2:  [96 %-99 %] 98 %  BP: (125-163)/(67-84) 162/81     Weight: 65.7 kg (144 lb 13.5 oz)  Body mass index is 22.75 kg/(m^2).    Intake/Output Summary (Last 24 hours) at 03/25/17 2108  Last data filed at 03/25/17 0545   Gross per 24 hour   Intake              465 ml   Output                0 ml   Net              465 ml      Physical Exam   Constitutional: He appears well-developed.   HENT:   Nose: Nose normal.   Mouth/Throat: Mucous membranes are not pale and not cyanotic.   Eyes: Conjunctivae and lids are normal. Pupils are equal.   Neck: Neck supple.   Cardiovascular: Normal rate, regular rhythm, S1 normal and S2 normal.    Pulmonary/Chest: Effort normal and breath sounds normal.   Abdominal: Soft. Bowel sounds are normal. There is no tenderness.   Musculoskeletal: He exhibits no edema.   Neurological: He is alert. He is disoriented.   Skin: Skin is warm and dry. He is not diaphoretic. No cyanosis. Nails show no clubbing.   Psychiatric: His affect is inappropriate. He is agitated.       Significant Labs:     CBC:     Recent Labs  Lab 03/24/17  1133 03/25/17  0457   WBC 14.70* 13.76*   HGB 12.9* 13.1*   HCT 38.2* 38.6*    244     CMP:     Recent Labs  Lab 03/24/17  0109 03/24/17  1133 03/25/17  0457   NA  --  141 142   K  --  3.8 3.2*   CL  --  109 107   CO2  --  16* 20*   GLU  --  82 106   BUN  --  9 11   CREATININE  --   0.8 0.8   CALCIUM  --  9.4 9.3   PROT 7.0 7.6 7.3   ALBUMIN 3.4* 3.6 3.4*   BILITOT 1.1* 1.5* 1.4*   ALKPHOS 138* 148* 139*   AST 92* 140* 184*   ALT 15 21 25   ANIONGAP  --  16 15   EGFRNONAA  --  >60.0 >60.0     TSH:     Recent Labs  Lab 03/24/17  0109   TSH 0.228*     Assessment/Plan:      Current Hospital Problem List:    Active Hospital Problems    Diagnosis  POA    *Delirium, acute [R41.0]  Yes     Priority: 1 - High     Last known normal 9pm 3/22. Found at 7:30am on 3/23 confused. No known trauma or seizure activity.      Bacteremia due to Gram-positive bacteria [A49.9]  Yes     Priority: 2     NPH (normal pressure hydrocephalus) [G91.2]  Yes     Priority: 3      Shunt placed 2/6/17      Essential hypertension [I10]  Yes     Priority: 4     BPH (benign prostatic hypertrophy) with urinary obstruction [N40.1, N13.8]  Yes     Priority: 5     Glaucoma [H40.9]  Yes      Resolved Hospital Problems    Diagnosis Date Resolved POA   No resolved problems to display.       Ordered Medications for management of current problems:    amlodipine  5 mg Oral Daily    atorvastatin  20 mg Oral Daily    cefTRIAXone (ROCEPHIN) IVPB  2 g Intravenous Q24H    citalopram  10 mg Oral Daily    dorzolamide  1 drop Left Eye Daily    heparin (porcine)  5,000 Units Subcutaneous Q12H    levetiracetam IVPB  1,000 mg Intravenous Q12H    tamsulosin  0.4 mg Oral Daily    thiamine (VITAMIN B1) IVPB  200 mg Intravenous Q24H    timolol maleate 0.5%  1 drop Left Eye Daily    vancomycin (VANCOCIN) IVPB  15 mg/kg Intravenous Q24H       IV Fluids: D5-0.45 NS or with 20 meq potassium/liter    Risk  Patient has an abrupt change in neurologic status: Possible Seizure and Delirium    Anticipated Disposition: [place]Health Care [facility] [place]    Assessment and Plan by Problem:    * Delirium, acute  - remains altered  - continue all safety measures - high risk of injury / falls  - ammonia 23 on admission  - CT head unremarkable but less than optimal  imaging related to motion artifact  - Neurology consulted; LP attempted. Appears that infectious cause is possible given positive blood cultures but cultures are polymicrobial.    Bacteremia due to Gram-positive bacteria  - WBC's 16.1 on arrival   - Procalcitonin 0.24  - U/A negative for nitrites or leuks   - Blood cultures positive for GPC in 2 bottles  - Vanco and cefepime started empirically in the ED  - continue Vanco and transition to ceftriaxone and deescalate as indicated by culture results  - seen by Neurosurgery while in ED - thought to be low probability for meningitis - if workup is negative consider reconsulting and possible LP. LP attempted by Neurology but will need imaging due to DJD if reattempted.  - Consulted ID. BCs are polymicrobial.    NPH (normal pressure hydrocephalus)  - patient of Dr. Sam - who recently underwent  shunt placement 2/6/2017  - evaluated by Neurosurgery while in the ED  - Neurology consulted    Essential hypertension  - BP fairly well controlled  - continue home antihypertensive regimen     BPH (benign prostatic hypertrophy) with urinary obstruction  - continue home tamsulosin   - bladder scan / straight cath PRN    Glaucoma  Hx of glaucoma  - continue home dorzolamide / timolol as directed     VTE Risk Mitigation         Ordered     heparin (porcine) injection 5,000 Units  Every 12 hours     Route:  Subcutaneous        03/24/17 0018     Medium Risk of VTE  Once      03/24/17 0018     Place sequential compression device  Until discontinued      03/24/17 0018     Place LIAM hose  Until discontinued      03/24/17 0018          Leyla Bettencourt MD  Department of Hospital Medicine   [facility]

## 2017-03-26 NOTE — PROGRESS NOTES
[facility]  Infectious Disease  Progress Note    Patient Name: Atif Neves  MRN: [id number]  Admission Date: 3/23/2017  Length of Stay: 3 days  Attending Physician: Leyla Bettencourt MD  Primary Care Provider: Atif Christian MD    Isolation Status: No active isolations  Assessment/Plan:      Active Diagnoses:    Diagnosis Date Noted POA    PRINCIPAL PROBLEM:  Delirium, acute [R41.0]  Yes    Bacteremia due to Gram-positive bacteria [A49.9] 03/24/2017 Yes    NPH (normal pressure hydrocephalus) [G91.2] 02/06/2017 Yes    BPH (benign prostatic hypertrophy) with urinary obstruction [N40.1, N13.8] 06/16/2016 Yes    Essential hypertension [I10] 06/16/2016 Yes    Glaucoma [H40.9] 06/16/2016 Yes      Problems Resolved During this Admission:    Diagnosis Date Noted Date Resolved POA     Assessment: Pt is an 88 year old male with recent 2/6/17 placement of  shunt for NPH who presents with altered mental status, and has since grown enterococcus faecalis (sensitive to ampicillin, vancomycin)  and staph epidermidis (sensitive to clinda, tetra, bactrim, and vanc) in 3/23 blood cultures. Less likely staph epidermidis is contaminant as it grew in more than one bottle. UA and CXR unremarkable. Repeat blood cultures 3/24 NGTD. LP was attempted previously but aborted secondary to combative behavior and inability to get patient in proper position.      Plan:   - Recommend LP given bacteremic patient with recent shunt placement and AMS, may need to be done by anesthesia or by IR   - Recommend continuing vancomycin and ceftriaxone for now, though likely will stop ceftriaxone soon given blood culture results  - Duration of abx dependent on LP results, though may need to treat empirically for CNS infection if not able to be done  - Will continue to follow cultures     Patient examined and discussed with staff, Dr. Sargent. ID will continue to follow patient.      JORDYN Epstein  Infectious Disease  [facility]    Subjective:     Principal Problem:Delirium, acute    Interval History: NAEON, afebrile. Waxing and waning mental status at times per pt's family.      From HPI: 87 y/o gentleman who presented to the ED with acute onset of AMS. At this time, he is confused, alone, and unable to provide any information related to events leading up to ED visit. According to the records it appears that he has a hx of normal pressure hydrocephalus and recently underwent  shunt placement in February of this year. He appears to have went to his 6 week post-op follow up with Dr. Sam on 3/22/17 and was at baseline and A/Ox3. He reportedly was at baseline yesterday, but was found to be acutely alerted upon awaking this AM around 7:30. At this time he is screaming, calling for people that are not present in the room and conversing with the television. He is witnessed moving all 4 extremities. He does follow simple commands but not consistently and provides yes / no responses to some questioning. Additional PMH consists of HTN, BPH, macular degeneration, and HLD. CT of the head was performed while in the ED and there appears to be no acute abnormality, but image was less than optimal related to motion artifact. He was found to have a WBC count of 16.1 and was evaluated by NSX and considered low probability for meningitis. U/A was not suggestive of UTI. Blood cultures were obtained and he was started empirically on vancomycin and cefepime.      Review of Systems   Unable to perform ROS: Mental status change     Objective:     Vital Signs (Most Recent):  Temp: 97.6 °F (36.4 °C) (03/26/17 0342)  Pulse: 61 (03/26/17 0700)  Resp: 16 (03/26/17 0342)  BP: (!) 144/82 (03/26/17 0342)  SpO2: 97 % (03/26/17 0342) Vital Signs (24h Range):  Temp:  [97.4 °F (36.3 °C)-98 °F (36.7 °C)] 97.6 °F (36.4 °C)  Pulse:  [9-90] 61  Resp:  [16-18] 16  SpO2:  [96 %-98 %] 97 %  BP: (125-163)/(62-84) 144/82     Weight: 66.5 kg (146 lb 9.7  oz)  Body mass index is 23.03 kg/(m^2).    Estimated Creatinine Clearance: 59.5 mL/min (based on Cr of 0.8).    Physical Exam   Constitutional: No distress.   HENT:   Head: Normocephalic and atraumatic.   Eyes: EOM are normal. Pupils are equal, round, and reactive to light.   Neck: Normal range of motion.   Pulmonary/Chest: Effort normal and breath sounds normal.   Abdominal: Soft. Bowel sounds are normal.   Musculoskeletal: Normal range of motion.   Neurological: He is alert.   Not oriented to time, person, or place but seems more appropriate than yesterday in terms of lying in bed comfortably, trying to converse (though nonsensical at times, difficult to understand)    Skin: Skin is warm and dry. He is not diaphoretic.       Significant Labs:   Blood Culture:   Recent Labs  Lab 03/23/17  1247 03/23/17  1249 03/24/17  1133 03/26/17  0349   LABBLOO Gram stain laura bottle: Gram positive cocci  Results called to and read back by:Lavonne Garcia RN 03/24/2017  11:24  Gram stain aer bottle: Gram positive rods   Results called to and read back by:Kristin Zimmerman RN 03/25/2017  06:21  Gram stain laura bottle: Gram positive cocci in chains resembling Strep  Results called to and read back by:Antiha Teague RN 03/25/2017  07:48  ENTEROCOCCUS FAECALIS  STAPHYLOCOCCUS EPIDERMIDISSusceptibility pending  CORYNEBACTERIUM JEIKEIUM (JK) Gram stain aer bottle: Gram positive cocci in clusters resembling Staph  Gram stain laura bottle: Gram positive cocci in clusters resembling Staph  Results called to and read back by:Lavonne Garcia RN 03/24/2017  09:40  STAPHYLOCOCCUS EPIDERMIDIS No Growth to date  No Growth to date No Growth to date     CBC:   Recent Labs  Lab 03/25/17  0457 03/26/17  0350   WBC 13.76* 11.63   HGB 13.1* 13.6*   HCT 38.6* 40.0    244     CMP:   Recent Labs  Lab 03/25/17  0457 03/26/17  0350    140   K 3.2* 3.4*    108   CO2 20* 20*    110   BUN 11 13   CREATININE 0.8 0.8   CALCIUM 9.3  9.3   PROT 7.3 7.2   ALBUMIN 3.4* 3.3*   BILITOT 1.4* 1.2*   ALKPHOS 139* 134   * 168*   ALT 25 27   ANIONGAP 15 12   EGFRNONAA >60.0 >60.0       Significant Imaging: I have reviewed all pertinent imaging results/findings within the past 24 hours.

## 2017-03-26 NOTE — PROGRESS NOTES
[facility]  Neurology  Progress Note    Patient Name: Atif Neves  MRN: [id number]  Admission Date: 3/23/2017  Hospital Length of Stay: 3 days  Code Status: Full Code   Attending Provider: Leyla Bettencourt MD  Primary Care Physician: Atif Christian MD   Principal Problem:Delirium, acute      Subjective:     Interval history 3/26:  Daughter at bedside.  Patient much more alert, making eye contact.  Still confused, but not aphasic.      Interval History 3/25: On rounds, patient found in bed alert but confused and mute. He was not able to respond to questions or follow commands, and appeared to have a left sided facial droop (eye and mouth). He was unable to fully open his left eye and kept blinking it. He also had increased tone throughout, but was moving extremities. We were concerned that he could be either seizing or post-ictal, so a keppra load was ordered (2g) with maintenance (1g BID). An EEG will be placed ASAP.     Current Neurological Medications: Keppra    Current Facility-Administered Medications   Medication Dose Route Frequency Provider Last Rate Last Dose    acetaminophen tablet 650 mg  650 mg Oral Q4H PRN Go Torres NP        amlodipine tablet 5 mg  5 mg Oral Daily Go Torres NP   5 mg at 03/26/17 0929    atorvastatin tablet 20 mg  20 mg Oral Daily Go Torres NP   20 mg at 03/26/17 0929    bisacodyl suppository 10 mg  10 mg Rectal Daily PRN Go Torres NP        cefTRIAXone (ROCEPHIN) 2 g in dextrose 5 % 50 mL IVPB  2 g Intravenous Q24H Go Torres NP   2 g at 03/26/17 1046    citalopram tablet 10 mg  10 mg Oral Daily Go Torres NP   10 mg at 03/26/17 0929    dextrose 5 % and 0.45 % NaCl with KCl 20 mEq infusion   Intravenous Continuous Leyla Bettencourt MD 75 mL/hr at 03/25/17 1950      dextrose 50% injection 12.5 g  12.5 g Intravenous PRN Go Torres NP        dextrose 50% injection 25 g  25 g Intravenous PRN Go Torres NP        dorzolamide 2 % ophthalmic  solution 1 drop  1 drop Left Eye Daily Go Torres NP   1 drop at 03/26/17 0929    glucagon (human recombinant) injection 1 mg  1 mg Intramuscular PRN Go Torres NP        glucose chewable tablet 16 g  16 g Oral PRN Go Torres NP        glucose chewable tablet 24 g  24 g Oral PRN Go Torres NP        heparin (porcine) injection 5,000 Units  5,000 Units Subcutaneous Q12H Go Torres NP   5,000 Units at 03/26/17 0929    levetiracetam in NaCl (iso-os) IVPB 1,000 mg  1,000 mg Intravenous Q12H Evelyn Jorge  mL/hr at 03/26/17 0929 1,000 mg at 03/26/17 0929    olanzapine tablet 2.5 mg  2.5 mg Oral TID PRN Leyla Bettencourt MD        ondansetron disintegrating tablet 8 mg  8 mg Oral Q8H PRN Go Torres NP        ondansetron injection 4 mg  4 mg Intravenous Q12H PRN Go Torres NP        polyethylene glycol packet 17 g  17 g Oral BID PRN Go Torres NP        ramelteon tablet 8 mg  8 mg Oral Nightly PRN Go Torres NP   8 mg at 03/24/17 2046    tamsulosin 24 hr capsule 0.4 mg  0.4 mg Oral Daily Go Torres NP   0.4 mg at 03/26/17 0929    thiamine (B-1) 200 mg in dextrose 5 % 50 mL IVPB  200 mg Intravenous Q24H Leyla Bettencourt MD 25 mL/hr at 03/26/17 0001 200 mg at 03/26/17 0001    timolol maleate 0.5% ophthalmic solution 1 drop  1 drop Left Eye Daily Go Torres NP   1 drop at 03/26/17 0929    vancomycin 1 g in dextrose 5 % 250 mL IVPB (ready to mix system)  15 mg/kg Intravenous Q24H Go Torres .7 mL/hr at 03/25/17 1623 1,000 mg at 03/25/17 1623       Review of Systems   Unable to perform ROS: Mental status change     Objective:     Vital Signs (Most Recent):  Temp: 98.5 °F (36.9 °C) (03/26/17 1235)  Pulse: 86 (03/26/17 1235)  Resp: 18 (03/26/17 1235)  BP: 120/62 (03/26/17 1235)  SpO2: 96 % (03/26/17 1235) Vital Signs (24h Range):  Temp:  [97.5 °F (36.4 °C)-98.5 °F (36.9 °C)] 98.5 °F (36.9 °C)  Pulse:  [9-90] 86  Resp:  [16-18] 18  SpO2:  [96 %-98 %] 96 %  BP: (120-162)/(62-82)  120/62     Weight: 66.5 kg (146 lb 9.7 oz)  Body mass index is 23.03 kg/(m^2).    Physical Exam   Constitutional: He appears well-developed and well-nourished.   HENT:   Head: Normocephalic and atraumatic.   Pulmonary/Chest: Effort normal.   Neurological: He has normal strength.   Skin: Skin is warm and dry.   Nursing note and vitals reviewed.      NEUROLOGICAL EXAMINATION:     MENTAL STATUS   Level of consciousness: alert       Makes eye contact, says hello, but starts telling us about his cat.    Oriented to self only.  Speech is understandable.     CRANIAL NERVES        Left ptosis/swelling is resolved from yesterday     MOTOR EXAM   Muscle bulk: normal  Overall muscle tone: increased    Strength   Strength 5/5 throughout.       Significant Labs:   Blood Culture:     Recent Labs  Lab 03/26/17  0349   LABBLOO No Growth to date     CBC:     Recent Labs  Lab 03/25/17 0457 03/26/17  0350   WBC 13.76* 11.63   HGB 13.1* 13.6*   HCT 38.6* 40.0    244     CMP:     Recent Labs  Lab 03/25/17 0457 03/26/17  0350    110    140   K 3.2* 3.4*    108   CO2 20* 20*   BUN 11 13   CREATININE 0.8 0.8   CALCIUM 9.3 9.3   MG 1.8 2.0   PROT 7.3 7.2   ALBUMIN 3.4* 3.3*   BILITOT 1.4* 1.2*   ALKPHOS 139* 134   * 168*   ALT 25 27   ANIONGAP 15 12   EGFRNONAA >60.0 >60.0     Urine Culture: No results for input(s): LABURIN in the last 48 hours.  Urine Studies: No results for input(s): COLORU, APPEARANCEUA, PHUR, SPECGRAV, PROTEINUA, GLUCUA, KETONESU, BILIRUBINUA, OCCULTUA, NITRITE, UROBILINOGEN, LEUKOCYTESUR, RBCUA, WBCUA, BACTERIA, SQUAMEPITHEL, HYALINECASTS in the last 48 hours.    Invalid input(s): WRIGHTSUR    Significant Imaging: I have reviewed all pertinent imaging results/findings within the past 24 hours.    Assessment and Plan:     * Delirium, acute  Mr. 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 At baseline he is alert and oriented.  Blood cultures have grown GPC and GNR (staph and enterococcus), with speciation pending - source remains unclear. LP was attempted at bedside yesterday, however patient was combative and non-compliant despite valium so the procedure was aborted. On rounds 3/25 there was concern that the patient may be actively seizing, so keppra 2g IV was ordered STAT with maintenance of 1g BID.     3/26, doing much better (no focal neuro deficits), but still delirious and not to baseline.  I suspect CNS infection as well as his bacteremia with possibly secondary seizures.  EEG was slow, but without seizure by yesterday afternoon (load of keppra).  Unable to get LP at bedside safely, so discussed empirically treating for encephalitis with ID team.  ID would prefer to get LP (will have to be under general anesthesia) to determine length of treatment.    Recommendations:    -> LP under general anesthesia   -> continue keppra 1g BID indefinitely        VTE Risk Mitigation         Ordered     heparin (porcine) injection 5,000 Units  Every 12 hours     Route:  Subcutaneous        03/24/17 0018     Medium Risk of VTE  Once      03/24/17 0018     Place sequential compression device  Until discontinued      03/24/17 0018     Place LIAM hose  Until discontinued      03/24/17 0018          Evelyn Jorge MD  Neurology  [facility]

## 2017-03-27 ENCOUNTER — OUTPATIENT CASE MANAGEMENT (OUTPATIENT)
Dept: ADMINISTRATIVE | Facility: OTHER | Age: 82
End: 2017-03-27

## 2017-03-27 PROBLEM — Z98.2 VENTRICULO-PERITONEAL SHUNT STATUS: Status: ACTIVE | Noted: 2017-02-06

## 2017-03-27 LAB
ALBUMIN SERPL BCP-MCNC: 2.8 G/DL
ALP SERPL-CCNC: 111 U/L
ALT SERPL W/O P-5'-P-CCNC: 21 U/L
ANION GAP SERPL CALC-SCNC: 9 MMOL/L
AST SERPL-CCNC: 79 U/L
BACTERIA BLD CULT: NORMAL
BASOPHILS # BLD AUTO: 0.01 K/UL
BASOPHILS NFR BLD: 0.1 %
BILIRUB SERPL-MCNC: 0.7 MG/DL
BUN SERPL-MCNC: 16 MG/DL
CALCIUM SERPL-MCNC: 9 MG/DL
CHLORIDE SERPL-SCNC: 107 MMOL/L
CO2 SERPL-SCNC: 23 MMOL/L
CREAT SERPL-MCNC: 0.9 MG/DL
DIFFERENTIAL METHOD: ABNORMAL
EOSINOPHIL # BLD AUTO: 0.1 K/UL
EOSINOPHIL NFR BLD: 1.2 %
ERYTHROCYTE [DISTWIDTH] IN BLOOD BY AUTOMATED COUNT: 15.3 %
EST. GFR  (AFRICAN AMERICAN): >60 ML/MIN/1.73 M^2
EST. GFR  (NON AFRICAN AMERICAN): >60 ML/MIN/1.73 M^2
GLUCOSE SERPL-MCNC: 98 MG/DL
HCT VFR BLD AUTO: 40.2 %
HGB BLD-MCNC: 13.1 G/DL
LYMPHOCYTES # BLD AUTO: 1.3 K/UL
LYMPHOCYTES NFR BLD: 10.6 %
MAGNESIUM SERPL-MCNC: 1.7 MG/DL
MCH RBC QN AUTO: 25.7 PG
MCHC RBC AUTO-ENTMCNC: 32.6 %
MCV RBC AUTO: 79 FL
MONOCYTES # BLD AUTO: 1.1 K/UL
MONOCYTES NFR BLD: 9.5 %
NEUTROPHILS # BLD AUTO: 9.2 K/UL
NEUTROPHILS NFR BLD: 78.2 %
PLATELET # BLD AUTO: 216 K/UL
PMV BLD AUTO: 9.9 FL
POTASSIUM SERPL-SCNC: 4 MMOL/L
PROT SERPL-MCNC: 6.8 G/DL
RBC # BLD AUTO: 5.1 M/UL
SODIUM SERPL-SCNC: 139 MMOL/L
VANCOMYCIN TROUGH SERPL-MCNC: 8.6 UG/ML
WBC # BLD AUTO: 11.75 K/UL

## 2017-03-27 PROCEDURE — 97535 SELF CARE MNGMENT TRAINING: CPT

## 2017-03-27 PROCEDURE — 25000003 PHARM REV CODE 250: Performed by: INTERNAL MEDICINE

## 2017-03-27 PROCEDURE — 99232 SBSQ HOSP IP/OBS MODERATE 35: CPT | Mod: ,,, | Performed by: INTERNAL MEDICINE

## 2017-03-27 PROCEDURE — 83735 ASSAY OF MAGNESIUM: CPT

## 2017-03-27 PROCEDURE — 36415 COLL VENOUS BLD VENIPUNCTURE: CPT

## 2017-03-27 PROCEDURE — 97116 GAIT TRAINING THERAPY: CPT

## 2017-03-27 PROCEDURE — 99233 SBSQ HOSP IP/OBS HIGH 50: CPT | Mod: GC,,, | Performed by: PSYCHIATRY & NEUROLOGY

## 2017-03-27 PROCEDURE — 85025 COMPLETE CBC W/AUTO DIFF WBC: CPT

## 2017-03-27 PROCEDURE — 80202 ASSAY OF VANCOMYCIN: CPT

## 2017-03-27 PROCEDURE — 63600175 PHARM REV CODE 636 W HCPCS: Performed by: INTERNAL MEDICINE

## 2017-03-27 PROCEDURE — 25000003 PHARM REV CODE 250: Performed by: NURSE PRACTITIONER

## 2017-03-27 PROCEDURE — 99232 SBSQ HOSP IP/OBS MODERATE 35: CPT | Mod: GC,,, | Performed by: INTERNAL MEDICINE

## 2017-03-27 PROCEDURE — 97166 OT EVAL MOD COMPLEX 45 MIN: CPT

## 2017-03-27 PROCEDURE — 63600175 PHARM REV CODE 636 W HCPCS: Performed by: NURSE PRACTITIONER

## 2017-03-27 PROCEDURE — 11000001 HC ACUTE MED/SURG PRIVATE ROOM

## 2017-03-27 PROCEDURE — 97530 THERAPEUTIC ACTIVITIES: CPT

## 2017-03-27 PROCEDURE — 80053 COMPREHEN METABOLIC PANEL: CPT

## 2017-03-27 PROCEDURE — 97162 PT EVAL MOD COMPLEX 30 MIN: CPT

## 2017-03-27 PROCEDURE — 63600175 PHARM REV CODE 636 W HCPCS: Performed by: PSYCHIATRY & NEUROLOGY

## 2017-03-27 RX ADMIN — AMLODIPINE BESYLATE 5 MG: 5 TABLET ORAL at 09:03

## 2017-03-27 RX ADMIN — LEVETIRACETAM 1000 MG: 10 INJECTION INTRAVENOUS at 12:03

## 2017-03-27 RX ADMIN — HEPARIN SODIUM 5000 UNITS: 5000 INJECTION, SOLUTION INTRAVENOUS; SUBCUTANEOUS at 08:03

## 2017-03-27 RX ADMIN — DORZOLAMIDE HYDROCHLORIDE 1 DROP: 20 SOLUTION/ DROPS OPHTHALMIC at 09:03

## 2017-03-27 RX ADMIN — VANCOMYCIN HYDROCHLORIDE 1000 MG: 1 INJECTION, POWDER, LYOPHILIZED, FOR SOLUTION INTRAVENOUS at 04:03

## 2017-03-27 RX ADMIN — TAMSULOSIN HYDROCHLORIDE 0.4 MG: 0.4 CAPSULE ORAL at 09:03

## 2017-03-27 RX ADMIN — ATORVASTATIN CALCIUM 20 MG: 20 TABLET, FILM COATED ORAL at 09:03

## 2017-03-27 RX ADMIN — HEPARIN SODIUM 5000 UNITS: 5000 INJECTION, SOLUTION INTRAVENOUS; SUBCUTANEOUS at 09:03

## 2017-03-27 RX ADMIN — THIAMINE HYDROCHLORIDE 200 MG: 100 INJECTION, SOLUTION INTRAMUSCULAR; INTRAVENOUS at 01:03

## 2017-03-27 RX ADMIN — DEXTROSE MONOHYDRATE, SODIUM CHLORIDE, AND POTASSIUM CHLORIDE: 50; 4.5; 1.49 INJECTION, SOLUTION INTRAVENOUS at 06:03

## 2017-03-27 RX ADMIN — CITALOPRAM HYDROBROMIDE 10 MG: 10 TABLET ORAL at 09:03

## 2017-03-27 RX ADMIN — TIMOLOL MALEATE 1 DROP: 5 SOLUTION OPHTHALMIC at 09:03

## 2017-03-27 RX ADMIN — CEFTRIAXONE 2 G: 2 INJECTION, SOLUTION INTRAVENOUS at 12:03

## 2017-03-27 RX ADMIN — LEVETIRACETAM 1000 MG: 10 INJECTION INTRAVENOUS at 08:03

## 2017-03-27 NOTE — PLAN OF CARE
Problem: Patient Care Overview  Goal: Plan of Care Review  Outcome: Ongoing (interventions implemented as appropriate)  VSS. DOx4. Removed restraints, pt has been calm, cooperative and remaining in bed. No acute events throughout shift. Will continue to monitor and reassess.

## 2017-03-27 NOTE — PROGRESS NOTES
[facility]  Infectious Disease  Progress Note    Patient Name: Atif Neves  MRN: [id number]  Admission Date: 3/23/2017  Length of Stay: 4 days  Attending Physician: Leyla Bettencourt MD  Primary Care Provider: Atif Christian MD    Isolation Status: No active isolations  Assessment/Plan:      Active Diagnoses:    Diagnosis Date Noted POA    PRINCIPAL PROBLEM:  Delirium, acute [R41.0]  Yes    Bacteremia due to Gram-positive bacteria [A49.9] 03/24/2017 Yes    NPH (normal pressure hydrocephalus) [G91.2] 02/06/2017 Yes    BPH (benign prostatic hypertrophy) with urinary obstruction [N40.1, N13.8] 06/16/2016 Yes    Essential hypertension [I10] 06/16/2016 Yes    Glaucoma [H40.9] 06/16/2016 Yes      Problems Resolved During this Admission:    Diagnosis Date Noted Date Resolved POA     Assessment: Pt is an 88 year old male with recent 2/6/17 placement of  shunt for NPH who presents with altered mental status, and has since grown enterococcus faecalis (sensitive to ampicillin, vancomycin)  and staph epidermidis (sensitive to clinda, tetra, bactrim, and vanc), and CORYNEBACTERIUM JEIKEIUM in 3/23 blood cultures. Less likely staph epidermidis is contaminant as it grew in more than one bottle. UA and CXR unremarkable. Repeat blood cultures 3/24 NGTD. LP was attempted previously but aborted secondary to combative behavior and inability to get patient in proper position.      Plan:   - Can stop ceftriaxone  - Recommend continuing vancomycin x 14 days following date of negative cultures, as this would cover for both uncomplicated bacteremia and possible meningitis (LP not able to be done)   - End date April 7th if 3/24 blood cultures NGTD once finalized result     Patient examined and discussed with staff, Dr. Sargent. ID will sign off at this time. Please call if questions.      Janeth Shultz MD  IM PGY-2  [facility]    Subjective:     Principal Problem:Delirium, acute    Interval  History: NAEON, afebrile.      From HPI: 89 y/o gentleman who presented to the ED with acute onset of AMS. At this time, he is confused, alone, and unable to provide any information related to events leading up to ED visit. According to the records it appears that he has a hx of normal pressure hydrocephalus and recently underwent  shunt placement in February of this year. He appears to have went to his 6 week post-op follow up with Dr. Sam on 3/22/17 and was at baseline and A/Ox3. He reportedly was at baseline yesterday, but was found to be acutely alerted upon awaking this AM around 7:30. At this time he is screaming, calling for people that are not present in the room and conversing with the television. He is witnessed moving all 4 extremities. He does follow simple commands but not consistently and provides yes / no responses to some questioning. Additional PMH consists of HTN, BPH, macular degeneration, and HLD. CT of the head was performed while in the ED and there appears to be no acute abnormality, but image was less than optimal related to motion artifact. He was found to have a WBC count of 16.1 and was evaluated by NSX and considered low probability for meningitis. U/A was not suggestive of UTI. Blood cultures were obtained and he was started empirically on vancomycin and cefepime.      Review of Systems   Unable to perform ROS: Mental status change     Objective:     Vital Signs (Most Recent):  Temp: 98.1 °F (36.7 °C) (03/27/17 1155)  Pulse: 78 (03/27/17 1155)  Resp: 18 (03/27/17 1155)  BP: (!) 158/77 (03/27/17 1155)  SpO2: (!) 93 % (03/27/17 1155) Vital Signs (24h Range):  Temp:  [97.7 °F (36.5 °C)-99 °F (37.2 °C)] 98.1 °F (36.7 °C)  Pulse:  [69-94] 78  Resp:  [16-18] 18  SpO2:  [93 %-97 %] 93 %  BP: (141-158)/(73-77) 158/77     Weight: 66.4 kg (146 lb 6.2 oz)  Body mass index is 23 kg/(m^2).    Estimated Creatinine Clearance: 52.9 mL/min (based on Cr of 0.9).    Physical Exam   Constitutional: No  distress.   HENT:   Head: Normocephalic and atraumatic.   Eyes: EOM are normal. Pupils are equal, round, and reactive to light.   Neck: Normal range of motion.   Pulmonary/Chest: Effort normal and breath sounds normal.   Abdominal: Soft. Bowel sounds are normal.   Musculoskeletal: Normal range of motion.   Neurological: He is alert.   Not oriented to time, person, or place but seems more appropriate than yesterday in terms of lying in bed comfortably, trying to converse (though nonsensical at times, difficult to understand)    Skin: Skin is warm and dry. He is not diaphoretic.       Significant Labs:   Blood Culture:     Recent Labs  Lab 03/23/17  1247 03/23/17  1249 03/24/17  1133 03/26/17  0349   LABBLOO Gram stain laura bottle: Gram positive cocci  Results called to and read back by:Lavonne Garcia RN 03/24/2017  11:24  Gram stain aer bottle: Gram positive rods   Results called to and read back by:Kristin Zimmerman RN 03/25/2017  06:21  Gram stain laura bottle: Gram positive cocci in chains resembling Strep  Results called to and read back by:Anitha Teague RN 03/25/2017  07:48  ENTEROCOCCUS FAECALIS  STAPHYLOCOCCUS EPIDERMIDIS  CORYNEBACTERIUM CHRISIKEIUM (JK) Gram stain aer bottle: Gram positive cocci in clusters resembling Staph  Gram stain laura bottle: Gram positive cocci in clusters resembling Staph  Results called to and read back by:Lavonne Garcia RN 03/24/2017  09:40  STAPHYLOCOCCUS EPIDERMIDIS No Growth to date  No Growth to date  No Growth to date  No Growth to date No Growth to date  No Growth to date     CBC:     Recent Labs  Lab 03/26/17  0350 03/27/17  0548   WBC 11.63 11.75   HGB 13.6* 13.1*   HCT 40.0 40.2    216     CMP:     Recent Labs  Lab 03/26/17  0350 03/27/17  0548    139   K 3.4* 4.0    107   CO2 20* 23    98   BUN 13 16   CREATININE 0.8 0.9   CALCIUM 9.3 9.0   PROT 7.2 6.8   ALBUMIN 3.3* 2.8*   BILITOT 1.2* 0.7   ALKPHOS 134 111   * 79*   ALT 27 21    ANIONGAP 12 9   EGFRNONAA >60.0 >60.0       Significant Imaging: I have reviewed all pertinent imaging results/findings within the past 24 hours.

## 2017-03-27 NOTE — PROGRESS NOTES
[facility] Hospital Medicine  Progress Note    Patient Name: Atif Neves  MRN: [id number]  Patient Class: IP- Inpatient   Admission Date: 3/23/2017  Length of Stay: 3 days  Attending Physician: Leyla Bettencourt MD  Primary Care Provider: Atif Christian MD    [facility] Medicine Team: [facility] HOSP MED  Leyla Bettencourt MD    Subjective:     Principal Problem:Delirium, acute    HPI:  89 y/o gentleman who is new to me presents to the ED today with acute onset of AMS.  At this time, he is confused, alone, and unable to provide any information related to events leading up to ED visit.  According to the records it appears that he has a hx of normal pressure hydrocephalus and recently underwent  shunt placement in February of this year.  He appears to have went to his 6 week post-op follow up with Dr. Sam on 3/22/17 and was at baseline and A/Ox3.   He reportedly was at baseline yesterday, but was found to be acutely alerted upon awaking this AM around 7:30. At this time he is screaming, calling for people that are not present in the room and conversing with the television. He is witnessed moving all 4 extremities.  He does follow simple commands but not consistently and provides yes / no responses to some questioning.  Additional PMH consists of HTN, BPH, macular degeneration, and HLD.  CT of the head was performed while in the ED and there appears to be no acute abnormality, but image was less than optimal related to motion artifact. He was found to have a WBC count of 16.1 and was evaluated by NSX and considered low probability for meningitis. U/A was not suggestive of UTI. Blood cultures were obtained and he was started empirically on vancomycin and cefepime.       Hospital Course:       Interval History: Patient with no events overnight, no new complaints. Patient intermittently at baseline today per his wife.  Data Review: Results recorded below were reviewed 3/26/2017.    Review of Systems    Unable to perform ROS: Mental status change   Constitutional: Negative for fever.   Psychiatric/Behavioral: Positive for confusion.     Objective:     Vital Signs (Most Recent):  Temp: 98.5 °F (36.9 °C) (03/26/17 1235)  Pulse: 86 (03/26/17 1500)  Resp: 18 (03/26/17 1235)  BP: 120/62 (03/26/17 1235)  SpO2: 96 % (03/26/17 1235) Vital Signs (24h Range):  Temp:  [97.5 °F (36.4 °C)-98.5 °F (36.9 °C)] 98.5 °F (36.9 °C)  Pulse:  [56-86] 86  Resp:  [16-18] 18  SpO2:  [96 %-98 %] 96 %  BP: (120-162)/(62-82) 120/62     Weight: 66.5 kg (146 lb 9.7 oz)  Body mass index is 23.03 kg/(m^2).    Intake/Output Summary (Last 24 hours) at 03/26/17 1722  Last data filed at 03/26/17 0013   Gross per 24 hour   Intake               50 ml   Output                0 ml   Net               50 ml      Physical Exam   Constitutional: He appears well-developed.   HENT:   Nose: Nose normal.   Mouth/Throat: Mucous membranes are not pale and not cyanotic.   Eyes: Conjunctivae and lids are normal. Pupils are equal.   Neck: Neck supple.   Cardiovascular: Normal rate, regular rhythm, S1 normal and S2 normal.    Pulmonary/Chest: Effort normal and breath sounds normal.   Abdominal: Soft. Bowel sounds are normal. There is no tenderness.   Musculoskeletal: He exhibits no edema.   Neurological: He is alert. He is disoriented.   Skin: Skin is warm and dry. He is not diaphoretic. No cyanosis. Nails show no clubbing.   Psychiatric: His affect is inappropriate. Cognition and memory are impaired.       Significant Labs:     CBC:     Recent Labs  Lab 03/25/17  0457 03/26/17  0350   WBC 13.76* 11.63   HGB 13.1* 13.6*   HCT 38.6* 40.0    244     CMP:     Recent Labs  Lab 03/25/17  0457 03/26/17  0350    140   K 3.2* 3.4*    108   CO2 20* 20*    110   BUN 11 13   CREATININE 0.8 0.8   CALCIUM 9.3 9.3   PROT 7.3 7.2   ALBUMIN 3.4* 3.3*   BILITOT 1.4* 1.2*   ALKPHOS 139* 134   * 168*   ALT 25 27   ANIONGAP 15 12   EGFRNONAA >60.0  >60.0     TSH:     Recent Labs  Lab 03/24/17  0109   TSH 0.228*     Assessment/Plan:      Current Hospital Problem List:    Active Hospital Problems    Diagnosis  POA    *Delirium, acute [R41.0]  Yes     Priority: 1 - High     Last known normal 9pm 3/22. Found at 7:30am on 3/23 confused. No known trauma or seizure activity.    Acutely worsened 3/25 with aphasia, left ptosis (concern for ictal event).  Neg eeg and CT, but loaded and maintained on keppra.      Bacteremia due to Gram-positive bacteria [A49.9]  Yes     Priority: 2     NPH (normal pressure hydrocephalus) [G91.2]  Yes     Priority: 3      Shunt placed 2/6/17      Essential hypertension [I10]  Yes     Priority: 4     BPH (benign prostatic hypertrophy) with urinary obstruction [N40.1, N13.8]  Yes     Priority: 5     Glaucoma [H40.9]  Yes      Resolved Hospital Problems    Diagnosis Date Resolved POA   No resolved problems to display.       Ordered Medications for management of current problems:    amlodipine  5 mg Oral Daily    atorvastatin  20 mg Oral Daily    cefTRIAXone (ROCEPHIN) IVPB  2 g Intravenous Q24H    citalopram  10 mg Oral Daily    dorzolamide  1 drop Left Eye Daily    heparin (porcine)  5,000 Units Subcutaneous Q12H    levetiracetam IVPB  1,000 mg Intravenous Q12H    tamsulosin  0.4 mg Oral Daily    thiamine (VITAMIN B1) IVPB  200 mg Intravenous Q24H    timolol maleate 0.5%  1 drop Left Eye Daily    vancomycin (VANCOCIN) IVPB  15 mg/kg Intravenous Q24H       IV Fluids: D5-0.45 NS with 20 meq potassium/liter    Risk  Patient has an abrupt change in neurologic status: Possible Seizure and Delirium  Patient is currently on drug therapy requiring intensive monitoring for toxicity: Vancomycin    Anticipated Disposition: [facility]    Assessment and Plan by Problem:    * Delirium, acute  - remains altered  - continue all safety measures - high risk of injury / falls  - ammonia 23 on admission  - CT head unremarkable but  less than optimal imaging related to motion artifact  - Neurology consulted; LP attempted. Appears that infectious cause is possible given positive blood cultures but cultures are polymicrobial. ID consulted; repeat BCs are negative so far.  - Slightly improved.    Bacteremia due to Gram-positive bacteria  - WBC's 16.1 on arrival   - Procalcitonin 0.24  - U/A negative for nitrites or leuks   - Blood cultures positive for GPC in 2 bottles  - Vanco and cefepime started empirically in the ED  - continue Vanco and transition to ceftriaxone and deescalate as indicated by culture results  - seen by Neurosurgery while in ED - thought to be low probability for meningitis - if workup is negative consider reconsulting and possible LP. LP attempted by Neurology but will need imaging due to DJD if reattempted.  - Consulted ID. BCs are polymicrobial.    NPH (normal pressure hydrocephalus)  - patient of Dr. Sam - who recently underwent  shunt placement 2/6/2017  - evaluated by Neurosurgery while in the ED  - Neurology consulted    Essential hypertension  - BP fairly well controlled  - continue home antihypertensive regimen     BPH (benign prostatic hypertrophy) with urinary obstruction  - continue home tamsulosin   - bladder scan / straight cath PRN    Glaucoma  Hx of glaucoma  - continue home dorzolamide / timolol as directed     VTE Risk Mitigation         Ordered     heparin (porcine) injection 5,000 Units  Every 12 hours     Route:  Subcutaneous        03/24/17 0018     Medium Risk of VTE  Once      03/24/17 0018     Place sequential compression device  Until discontinued      03/24/17 0018     Place LIAM hose  Until discontinued      03/24/17 0018          Leyla Bettencourt MD  Department of Hospital Medicine   [facility]

## 2017-03-27 NOTE — PT/OT/SLP EVAL
Physical Therapy  Evaluation    Atif Neves   MRN: [id number]   Admitting Diagnosis: Delirium, acute    PT Received On: 03/27/17  PT Start Time: 1037     PT Stop Time: 1107    PT Total Time (min): 30 min       Billable Minutes:  Evaluation 10, Gait [id number] and Therapeutic Activity 10    Diagnosis: Delirium, acute      Past Medical History:   Diagnosis Date    JONATAN (acute kidney injury) 06/2016    Anxiety     Arthritis     Glaucoma     Hyperlipemia     Hypertension     Irregular heartbeat     Macular degeneration     Normal pressure hydrocephalus     Urinary retention 06/2016    UTI (urinary tract infection)       Past Surgical History:   Procedure Laterality Date    TRANSURETHRAL RESECTION OF PROSTATE  08/31/2016       Referring physician: Rut  Date referred to PT: 3/27/2017      General Precautions: Standard, fall  Orthopedic Precautions: N/A   Braces: N/A       Do you have any cultural, spiritual, Bahai conflicts, given your current situation?: none stated    Patient History:  Lives With: child(candi), adult  Living Arrangements: house  Home Accessibility: stairs to enter home  Home Layout: Able to live on 1st floor  Number of Stairs to Enter Home: 1  Stair Railings at Home: none  Transportation Available: family or friend will provide  Living Environment Comment: Pt info provided by dtr 2/2 AMS of pt. Dtr states pt lives with her and was mostly (I) using a rollator for ambulation. Pt has a walk-in shower with a shower chair.   Equipment Currently Used at Home: rollator  DME owned (not currently used):     Previous Level of Function:       Subjective:  Communicated with nsg prior to session.  -Pt agreeable to PT session     Chief Complaint: impaired functional mobility and AMS  Patient goals: return to PLOF    Pain Rating: other (see comments) (no indications of pain)               Pain Rating Post-Intervention: other (see comments)    Objective:         Cognitive Exam:  Oriented to: Person  only; pt unable to determine location, situation, and time.    Follows Commands/attention: Follows multistep  commands  Communication: clear/fluent  Safety awareness/insight to disability: impaired    Physical Exam:  Postural examination/scapula alignment: Head forward    Skin integrity: Visible skin intact  Edema: None noted     Sensation:   Intact  light/touch (B) LE    Lower Extremity Range of Motion:  Right Lower Extremity: WNL  Left Lower Extremity: WNL    Lower Extremity Strength:  Right Lower Extremity: Deficits: 4/5 grossly  Left Lower Extremity: Deficits: 4/5 grossly    Functional Mobility:  Bed Mobility:  Scooting/Bridging: Contact Guard Assistance  Supine to Sit: Minimum Assistance  Sit to Supine: Contact Guard Assistance    Transfers:  Sit <> Stand Assistance: Contact Guard Assistance x2 trials from EOB  Sit <> Stand Assistive Device: Rolling Walker    -Pt given cueing for hand placement and sequencing 2/2 difficulty following commands.    Gait:   Gait Distance: 50' x1 trial  Assistance 1: Minimum assistance  Gait Assistive Device: Rolling walker  Gait Pattern: 3-point gait  Gait Deviation(s): decreased alisia, increased time in double stance, decreased velocity of limb motion, decreased step length, decreased stride length, decreased toe-to-floor clearance, decreased weight-shifting ability, foot flat     -Pt given frequent cueing to stay within RW during ambulation. Pt also given cueing for direction changes during ambulation as well as turning.     Balance:   Static Sit: FAIR+: Able to take MINIMAL challenges from all directions  Dynamic Sit: FAIR+: Maintains balance through MINIMAL excursions of active trunk motion  Static Stand: FAIR+: Takes MINIMAL challenges from all directions  Dynamic stand: FAIR: Needs CONTACT GUARD during gait    Therapeutic Activities and Exercises:  -Pt sat EOB for ~15 minutes today while OT changed linens in bed and performed manju-care/brief change after pt had soiled  original brief. Pt performed standing balance for ~3 minutes while OT/PT assisted pt with donning new brief and performing manju-care.    -Pt/family educated on:  A. PT POC and role of PT  B. Importance of OOB activity to improve functional outcomes  C. DME mgmt  D. Gait sequencing    AM-PAC 6 CLICK MOBILITY  How much help from another person does this patient currently need?   1 = Unable, Total/Dependent Assistance  2 = A lot, Maximum/Moderate Assistance  3 = A little, Minimum/Contact Guard/Supervision  4 = None, Modified [place]/Independent    Turning over in bed (including adjusting bedclothes, sheets and blankets)?: 3  Sitting down on and standing up from a chair with arms (e.g., wheelchair, bedside commode, etc.): 3  Moving from lying on back to sitting on the side of the bed?: 3  Moving to and from a bed to a chair (including a wheelchair)?: 3  Need to walk in hospital room?: 3  Climbing 3-5 steps with a railing?: 2  Total Score: 17     AM-PAC Raw Score CMS G-Code Modifier Level of Impairment Assistance   6 % Total / Unable   7 - 9 CM 80 - 100% Maximal Assist   10 - 14 CL 60 - 80% Moderate Assist   15 - 19 CK 40 - 60% Moderate Assist   20 - 22 CJ 20 - 40% Minimal Assist   23 CI 1-20% SBA / CGA   24 CH 0% Independent/ Mod I     Patient left supine with all lines intact, call button in reach and nsg notified.    Assessment:   Atif Neves is a 88 y.o. male with a medical diagnosis of Delirium, acute and presents with impaired functional mobility. Pt tolerated session well today despite being pleasantly confused. Pt tolerated t/f's and ambulation well with no LOB using RW. Pt required frequent cueing for attention to task and re-direction when ambulating with RW. Pt will cont to benefit from skilled therapy services and is appropriate for SNF placement upon d/c. Pt may be HHPT appropriate pending further progress/safety with mobility.    Rehab identified problem list/impairments: Rehab identified  problem list/impairments: weakness, impaired endurance, impaired self care skills, impaired functional mobilty, gait instability, impaired balance, impaired cognition, decreased safety awareness, decreased lower extremity function    Rehab potential is good.    Activity tolerance: Good    Discharge recommendations: Discharge Facility/Level Of Care Needs: nursing facility, skilled (possible HHPT pending resolution of AMS)     Barriers to discharge: Barriers to Discharge: Decreased caregiver support    Equipment recommendations: Equipment Needed After Discharge: none     GOALS:   Physical Therapy Goals        Problem: Physical Therapy Goal    Goal Priority Disciplines Outcome Goal Variances Interventions   Physical Therapy Goal     PT/OT, PT Ongoing (interventions implemented as appropriate)     Description:  Goals to be met by: 4/3/17     Patient will increase functional independence with mobility by performin. Supine to sit with SBA  2. Sit to supine with SBA  3. Sit to stand transfer with SBA  4. Bed to chair transfer with Stand-by Assistance using Rolling Walker  5. Gait  x 150 feet with Contact Guard Assistance using Rolling Walker.   6. Ascend/Descend 7 inch curb step with Minimal Assistance using Rolling Walker.  7. Lower extremity exercise program x20-30 reps per handout, with independence                PLAN:    Patient to be seen 4 x/week to address the above listed problems via gait training, therapeutic activities, therapeutic exercises, neuromuscular re-education  Plan of Care expires:  17  Plan of Care reviewed with: patient          Dejon Márquez, PT  2017

## 2017-03-27 NOTE — PROCEDURES
DATE OF SERVICE:  03/25/2017    EEG #:  [id number]    REQUESTING PHYSICIAN:  Dr. Bettencourt.    LOCATION OF SERVICE:  [id number]    ELECTROENCEPHALOGRAM REPORT    METHODOLOGY:  Electroencephalographic (EEG) recording is recorded with   electrodes placed according to the International 10-20 placement system.  Thirty   two (32) channels of digital signal (sampling rate of 512/sec), including T1   and T2, were simultaneously recorded from the scalp and may include EKG, EMG,   and/or eye monitors.  Recording band pass was 0.1 to 512 Hz.  Digital video   recording of the patient is simultaneously recorded with the EEG.  The patient   is instructed to report clinical symptoms which may occur during the recording   session.  EEG and video recording are stored and archived in digital format.    Activation procedures, which include photic stimulation, hyperventilation and   instructing patients to perform simple tasks, are done in selected patients.    The EEG is displayed on a monitor screen and can be reviewed using different   montages.  Computer assisted-analysis is employed to detect spike and   electrographic seizure activity.  The entire record is submitted for computer   analysis.  The entire recording is visually reviewed, and the times identified   by computer analysis as being spikes or seizures are reviewed again.    Compressed spectral analysis (CSA) is also performed on the activity recorded   from each individual channel.  This is displayed as a power display of   frequencies from 0 to 30 Hz over time.  The CSA is reviewed looking for   asymmetries in power between homologous areas of the scalp, then compared with   the original EEG recording.    [employer]rightleaf software was also utilized in the review of this study.  This software   suite analyzes the EEG recording in multiple domains.  Coherence and rhythmicity   are computed to identify EEG sections which may contain organized seizures.    Each channel undergoes analysis to  detect the presence of spike and sharp waves   which have special and morphological characteristics of epileptic activity.  The   routine EEG recording is converted from special into frequency domain.  This is   then displayed comparing homologous areas to identify areas of significant   asymmetry.  Algorithm to identify non-cortically generated artifact is used to   separate artifact from the EEG.    EEG FINDINGS:  Recording was obtained in the patient's hospital room.  The   patient was resting quietly, and both waking and sleeping states were recorded.    Waking background was somewhat irregular 7 to 8 Hz posterior dominant rhythm   with some beta activity present diffusely.  There was intermixed midrange theta noted at   times.  Sleep was characterized by a mixture of delta, theta and some beta   frequencies, which was seen diffusely.  Only light sleep was recorded.  Waking   and sleeping background was symmetrical without lateralized or focal changes,   and no spike or sharp wave activity was seen.  Activation procedures were not   conducted.    IMPRESSION:  Mildly abnormal EEG with background disorganized in slow and waking   state, indicating the presence of a mild and relatively static encephalopathy   without evidence of focal changes nor an epileptic process.    CLINICAL CORRELATION:  The patient is an 88-year-old gentleman with a history of   delirium, normal pressure hydrocephalus, acute kidney injury, and a urinary   tract infection.  EEG was requested to rule out seizures.  This tracing shows no   evidence of an epileptic process or focal changes, but does show mild, probably   relatively static encephalopathy.          /amado [id number] blank(s)       RR/HN  dd: 03/26/2017 12:33:34 (CDT)  td: 03/26/2017 13:31:05 (CDT)  Doc ID   #[id number]  Job ID #[id number]    CC:

## 2017-03-27 NOTE — PLAN OF CARE
Problem: Physical Therapy Goal  Goal: Physical Therapy Goal  Goals to be met by: 4/3/17     Patient will increase functional independence with mobility by performin. Supine to sit with SBA  2. Sit to supine with SBA  3. Sit to stand transfer with SBA  4. Bed to chair transfer with Stand-by Assistance using Rolling Walker  5. Gait x 150 feet with Contact Guard Assistance using Rolling Walker.   6. Ascend/Descend 7 inch curb step with Minimal Assistance using Rolling Walker.  7. Lower extremity exercise program x20-30 reps per handout, with independence  Outcome: Ongoing (interventions implemented as appropriate)  Pt tolerated session well today despite being pleasantly confused. Pt tolerated t/f's and ambulation well with no LOB using RW. Pt required frequent cueing for attention to task and re-direction when ambulating with RW. Pt will cont to benefit from skilled therapy services and is appropriate for SNF placement upon d/c. Pt may be HHPT appropriate pending further progress/safety with mobility.

## 2017-03-27 NOTE — ASSESSMENT & PLAN NOTE
- remains altered  - continue all safety measures - high risk of injury / falls  - ammonia 23 on admission  - CT head unremarkable but less than optimal imaging related to motion artifact  - Neurology consulted; LP attempted. Appears that infectious cause is possible given positive blood cultures but cultures are polymicrobial. ID consulted; repeat BCs are negative so far.  - Slightly improved.

## 2017-03-27 NOTE — ASSESSMENT & PLAN NOTE
- WBC's 16.1 on arrival   - Procalcitonin 0.24  - U/A negative for nitrites or leukocytes   - Blood cultures positive for GPC in 2 bottles  - Vanco and cefepime started empirically in the ED  - continued Vanco and transitioned to ceftriaxone; plan to deescalate as indicated by culture results  - seen by Neurosurgery while in ED - thought to be low probability for meningitis - if workup is negative consider reconsulting and possible LP. 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  - Consulted ID. BCs are polymicrobial:  ENTEROCOCCUS FAECALIS   STAPHYLOCOCCUS EPIDERMIDIS     CORYNEBACTERIUM JEIKEIUM (JK)    Antibiotics per ID recommendations; vancomycin x 14 days. LP if possible.

## 2017-03-27 NOTE — PLAN OF CARE
Problem: Occupational Therapy Goal  Goal: Occupational Therapy Goal  Goals to be met by: 04/10     Patient will increase functional independence with ADLs by performing:    UE Dressing with Stand-by Assistance.  LE Dressing with Moderate Assistance.  Grooming while seated with Supervision.  Toileting from bedside commode with Moderate Assistance for hygiene and clothing management.   Stand pivot transfers with Stand-by Assistance.  Toilet transfer to bedside commode with Stand-by Assistance.     BEN Vasquez  3/27/2017

## 2017-03-27 NOTE — ASSESSMENT & PLAN NOTE
"Mr. 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 At baseline he is alert and oriented. Blood cultures have grown GPC and GNR (staph and enterococcus), with speciation pending - source remains unclear. LP was attempted at bedside 3/24, however patient was combative and non-compliant despite valium so the procedure was aborted. On rounds 3/25 there was concern that the patient may be actively seizing, so keppra 2g IV was ordered STAT with maintenance of 1g BID.     3/26, doing much better (no focal neuro deficits), but still delirious and not to baseline.  I suspect CNS infection as well as his bacteremia with possibly secondary seizures.  EEG was slow, but without seizure by yesterday afternoon (load of keppra).  Unable to get LP at bedside safely, so discussed empirically treating for encephalitis with ID team.  ID would prefer to get LP (will have to be under general anesthesia) to determine length of treatment.    3/27 Patient calm, still delirious. Only says variations of "I'm doing ok" in response to all questions. I'm wondering if this is an aphasia, and no MRI has been done to rule out a focal lesion to account for this. It would also be worth doing baseline dementia labs. Blood cultures are polymicrobial, still with no clear source (?CSF, ?endocarditis)    Recommendations:   -- MRI brain with and without contrast - will require sedation for adequate exam  -- Dementia labs: Vitamin B12, B1, folate  -- Will ideally have an LP to determine source and length of treatment - will likely require general anesthesia  -- Consider cardiac imaging (TTE vs JANAY) to evaluate for any vegetations as a source of infection    "

## 2017-03-27 NOTE — SUBJECTIVE & OBJECTIVE
Subjective:     Interval history: No acute events overnight. No family is at bedside, but the patient is resting comfortably. He is out of restraints and alert but unable to follow commands or answer questions. He perseverates when answering questions, which he does so inappropriately.     Current Neurological Medications: Keppra    Current Facility-Administered Medications   Medication Dose Route Frequency Provider Last Rate Last Dose    acetaminophen tablet 650 mg  650 mg Oral Q4H PRN Go Torres NP        amlodipine tablet 5 mg  5 mg Oral Daily Go Torres NP   5 mg at 03/27/17 0909    atorvastatin tablet 20 mg  20 mg Oral Daily Go Torres NP   20 mg at 03/27/17 0908    bisacodyl suppository 10 mg  10 mg Rectal Daily PRN Go Torres NP        cefTRIAXone (ROCEPHIN) 2 g in dextrose 5 % 50 mL IVPB  2 g Intravenous Q24H Go Torres NP   2 g at 03/26/17 1046    citalopram tablet 10 mg  10 mg Oral Daily Go Torres NP   10 mg at 03/27/17 0908    dextrose 5 % and 0.45 % NaCl with KCl 20 mEq infusion   Intravenous Continuous Leyla Bettencourt MD 75 mL/hr at 03/27/17 0619      dextrose 50% injection 12.5 g  12.5 g Intravenous PRN Go Torres NP        dextrose 50% injection 25 g  25 g Intravenous PRN Go Torres NP        dorzolamide 2 % ophthalmic solution 1 drop  1 drop Left Eye Daily Go Torres NP   1 drop at 03/27/17 0909    glucagon (human recombinant) injection 1 mg  1 mg Intramuscular PRN Go Torres NP        glucose chewable tablet 16 g  16 g Oral PRN Go Torres NP        glucose chewable tablet 24 g  24 g Oral PRN Go Torres NP        heparin (porcine) injection 5,000 Units  5,000 Units Subcutaneous Q12H Go Torres NP   5,000 Units at 03/27/17 0909    levetiracetam in NaCl (iso-os) IVPB 1,000 mg  1,000 mg Intravenous Q12H Evelyn Jorge  mL/hr at 03/26/17 2050 1,000 mg at 03/26/17 2050    olanzapine tablet 2.5 mg  2.5 mg Oral TID PRN Leyla Bettencourt MD         "ondansetron disintegrating tablet 8 mg  8 mg Oral Q8H PRN Go Torres NP        ondansetron injection 4 mg  4 mg Intravenous Q12H PRN Go Torres NP        polyethylene glycol packet 17 g  17 g Oral BID PRN Go Torres NP        ramelteon tablet 8 mg  8 mg Oral Nightly PRN Go Torres NP   8 mg at 03/24/17 2046    tamsulosin 24 hr capsule 0.4 mg  0.4 mg Oral Daily Go Torres NP   0.4 mg at 03/27/17 0909    thiamine (B-1) 200 mg in dextrose 5 % 50 mL IVPB  200 mg Intravenous Q24H Leyla Bettencourt MD 25 mL/hr at 03/27/17 0115 200 mg at 03/27/17 0115    timolol maleate 0.5% ophthalmic solution 1 drop  1 drop Left Eye Daily Go Torres NP   1 drop at 03/27/17 0909    vancomycin 1 g in dextrose 5 % 250 mL IVPB (ready to mix system)  15 mg/kg Intravenous Q24H Go Torres .7 mL/hr at 03/26/17 1700 1,000 mg at 03/26/17 1700       Review of Systems   Unable to perform ROS: Mental status change     Objective:     Vital Signs (Most Recent):  Temp: 97.7 °F (36.5 °C) (03/27/17 0807)  Pulse: 78 (03/27/17 0807)  Resp: 16 (03/27/17 0807)  BP: (!) 155/77 (03/27/17 0807)  SpO2: (!) 94 % (03/27/17 0807) Vital Signs (24h Range):  Temp:  [97.7 °F (36.5 °C)-99 °F (37.2 °C)] 97.7 °F (36.5 °C)  Pulse:  [69-94] 78  Resp:  [16-18] 16  SpO2:  [94 %-97 %] 94 %  BP: (120-155)/(62-77) 155/77     Weight: 66.4 kg (146 lb 6.2 oz)  Body mass index is 23 kg/(m^2).    Physical Exam   Constitutional: He appears well-developed and well-nourished.   HENT:   Head: Normocephalic and atraumatic.   Pulmonary/Chest: Effort normal.   Neurological: He has normal strength.   Skin: Skin is warm and dry.   Nursing note and vitals reviewed.      NEUROLOGICAL EXAMINATION:     MENTAL STATUS   Level of consciousness: alert       Awake, alert. Makes eye contact. Only says variations of "I'm doing well" or "I have bad hearing".      CRANIAL NERVES        Left ptosis/swelling is resolved.     MOTOR EXAM   Muscle bulk: normal  Overall muscle tone: " normal    Strength   Strength 5/5 throughout.       Significant Labs:   Blood Culture:     Recent Labs  Lab 03/26/17  0349   LABBLOO No Growth to date  No Growth to date     CBC:     Recent Labs  Lab 03/26/17  0350 03/27/17  0548   WBC 11.63 11.75   HGB 13.6* 13.1*   HCT 40.0 40.2    216     CMP:     Recent Labs  Lab 03/26/17  0350 03/27/17  0548    98    139   K 3.4* 4.0    107   CO2 20* 23   BUN 13 16   CREATININE 0.8 0.9   CALCIUM 9.3 9.0   MG 2.0 1.7   PROT 7.2 6.8   ALBUMIN 3.3* 2.8*   BILITOT 1.2* 0.7   ALKPHOS 134 111   * 79*   ALT 27 21   ANIONGAP 12 9   EGFRNONAA >60.0 >60.0     Urine Culture: No results for input(s): LABURIN in the last 48 hours.  Urine Studies: No results for input(s): COLORU, APPEARANCEUA, PHUR, SPECGRAV, PROTEINUA, GLUCUA, KETONESU, BILIRUBINUA, OCCULTUA, NITRITE, UROBILINOGEN, LEUKOCYTESUR, RBCUA, WBCUA, BACTERIA, SQUAMEPITHEL, HYALINECASTS in the last 48 hours.    Invalid input(s): GALEN    Significant Imaging: I have reviewed all pertinent imaging results/findings within the past 24 hours.

## 2017-03-27 NOTE — PROGRESS NOTES
[facility]  Neurology  Progress Note    Patient Name: Atif Neves  MRN: [id number]  Admission Date: 3/23/2017  Hospital Length of Stay: 4 days  Code Status: Full Code   Attending Provider: Leyla Bettencourt MD  Primary Care Physician: Atif Christian MD   Principal Problem:Delirium, acute      Subjective:     Interval history: No acute events overnight. No family is at bedside, but the patient is resting comfortably. He is out of restraints and alert but unable to follow commands or answer questions. He perseverates when answering questions, which he does so inappropriately.     Current Neurological Medications: Keppra    Current Facility-Administered Medications   Medication Dose Route Frequency Provider Last Rate Last Dose    acetaminophen tablet 650 mg  650 mg Oral Q4H PRN Go Torres NP        amlodipine tablet 5 mg  5 mg Oral Daily Go Torres NP   5 mg at 03/27/17 0909    atorvastatin tablet 20 mg  20 mg Oral Daily Go Torres NP   20 mg at 03/27/17 0908    bisacodyl suppository 10 mg  10 mg Rectal Daily PRN Go Torres NP        cefTRIAXone (ROCEPHIN) 2 g in dextrose 5 % 50 mL IVPB  2 g Intravenous Q24H Go Torres NP   2 g at 03/26/17 1046    citalopram tablet 10 mg  10 mg Oral Daily Go Torres NP   10 mg at 03/27/17 0908    dextrose 5 % and 0.45 % NaCl with KCl 20 mEq infusion   Intravenous Continuous Leyla Bettencourt MD 75 mL/hr at 03/27/17 0619      dextrose 50% injection 12.5 g  12.5 g Intravenous PRN Go Torres NP        dextrose 50% injection 25 g  25 g Intravenous PRN Go Torres NP        dorzolamide 2 % ophthalmic solution 1 drop  1 drop Left Eye Daily Go Torres NP   1 drop at 03/27/17 0909    glucagon (human recombinant) injection 1 mg  1 mg Intramuscular PRN Go Torres NP        glucose chewable tablet 16 g  16 g Oral PRN Go Torres NP        glucose chewable tablet 24 g  24 g Oral PRN Go Torres NP        heparin (porcine) injection 5,000 Units   5,000 Units Subcutaneous Q12H Go Torres NP   5,000 Units at 03/27/17 0909    levetiracetam in NaCl (iso-os) IVPB 1,000 mg  1,000 mg Intravenous Q12H Evelyn Jorge  mL/hr at 03/26/17 2050 1,000 mg at 03/26/17 2050    olanzapine tablet 2.5 mg  2.5 mg Oral TID PRN Leyla Bettencourt MD        ondansetron disintegrating tablet 8 mg  8 mg Oral Q8H PRN Go Torres NP        ondansetron injection 4 mg  4 mg Intravenous Q12H PRN Go Torres NP        polyethylene glycol packet 17 g  17 g Oral BID PRN Go Torres NP        ramelteon tablet 8 mg  8 mg Oral Nightly PRN Go Torres NP   8 mg at 03/24/17 2046    tamsulosin 24 hr capsule 0.4 mg  0.4 mg Oral Daily Go Torres NP   0.4 mg at 03/27/17 0909    thiamine (B-1) 200 mg in dextrose 5 % 50 mL IVPB  200 mg Intravenous Q24H Leyla Bettencourt MD 25 mL/hr at 03/27/17 0115 200 mg at 03/27/17 0115    timolol maleate 0.5% ophthalmic solution 1 drop  1 drop Left Eye Daily Go Torres NP   1 drop at 03/27/17 0909    vancomycin 1 g in dextrose 5 % 250 mL IVPB (ready to mix system)  15 mg/kg Intravenous Q24H Go Torres .7 mL/hr at 03/26/17 1700 1,000 mg at 03/26/17 1700       Review of Systems   Unable to perform ROS: Mental status change     Objective:     Vital Signs (Most Recent):  Temp: 97.7 °F (36.5 °C) (03/27/17 0807)  Pulse: 78 (03/27/17 0807)  Resp: 16 (03/27/17 0807)  BP: (!) 155/77 (03/27/17 0807)  SpO2: (!) 94 % (03/27/17 0807) Vital Signs (24h Range):  Temp:  [97.7 °F (36.5 °C)-99 °F (37.2 °C)] 97.7 °F (36.5 °C)  Pulse:  [69-94] 78  Resp:  [16-18] 16  SpO2:  [94 %-97 %] 94 %  BP: (120-155)/(62-77) 155/77     Weight: 66.4 kg (146 lb 6.2 oz)  Body mass index is 23 kg/(m^2).    Physical Exam   Constitutional: He appears well-developed and well-nourished.   HENT:   Head: Normocephalic and atraumatic.   Pulmonary/Chest: Effort normal.   Neurological: He has normal strength.   Skin: Skin is warm and dry.   Nursing note and vitals  "reviewed.      NEUROLOGICAL EXAMINATION:     MENTAL STATUS   Level of consciousness: alert       Awake, alert. Makes eye contact. Only says variations of "I'm doing well" or "I have bad hearing".      CRANIAL NERVES        Left ptosis/swelling is resolved.     MOTOR EXAM   Muscle bulk: normal  Overall muscle tone: normal    Strength   Strength 5/5 throughout.       Significant Labs:   Blood Culture:     Recent Labs  Lab 03/26/17  0349   LABBLOO No Growth to date  No Growth to date     CBC:     Recent Labs  Lab 03/26/17  0350 03/27/17  0548   WBC 11.63 11.75   HGB 13.6* 13.1*   HCT 40.0 40.2    216     CMP:     Recent Labs  Lab 03/26/17  0350 03/27/17  0548    98    139   K 3.4* 4.0    107   CO2 20* 23   BUN 13 16   CREATININE 0.8 0.9   CALCIUM 9.3 9.0   MG 2.0 1.7   PROT 7.2 6.8   ALBUMIN 3.3* 2.8*   BILITOT 1.2* 0.7   ALKPHOS 134 111   * 79*   ALT 27 21   ANIONGAP 12 9   EGFRNONAA >60.0 >60.0     Urine Culture: No results for input(s): LABURIN in the last 48 hours.  Urine Studies: No results for input(s): COLORU, APPEARANCEUA, PHUR, SPECGRAV, PROTEINUA, GLUCUA, KETONESU, BILIRUBINUA, OCCULTUA, NITRITE, UROBILINOGEN, LEUKOCYTESUR, RBCUA, WBCUA, BACTERIA, SQUAMEPITHEL, HYALINECASTS in the last 48 hours.    Invalid input(s): WRIGHTSUR    Significant Imaging: I have reviewed all pertinent imaging results/findings within the past 24 hours.        Assessment and Plan:     * Delirium, acute  Mr. 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 At baseline he is alert and oriented. Blood cultures have grown GPC and GNR (staph and enterococcus), with speciation pending - source remains unclear. LP was attempted at bedside 3/24, however patient was combative and non-compliant despite valium so the procedure was aborted. On rounds 3/25 there was concern that the patient may be actively seizing, so keppra 2g IV was ordered STAT with " "maintenance of 1g BID.     3/26, doing much better (no focal neuro deficits), but still delirious and not to baseline.  I suspect CNS infection as well as his bacteremia with possibly secondary seizures.  EEG was slow, but without seizure by yesterday afternoon (load of keppra).  Unable to get LP at bedside safely, so discussed empirically treating for encephalitis with ID team.  ID would prefer to get LP (will have to be under general anesthesia) to determine length of treatment.    3/27 Patient calm, still delirious. Only says variations of "I'm doing ok" in response to all questions. I'm wondering if this is an aphasia, and no MRI has been done to rule out a focal lesion to account for this. It would also be worth doing baseline dementia labs. Blood cultures are polymicrobial, still with no clear source (?CSF, ?endocarditis)    Recommendations:   -- MRI brain with and without contrast - will require sedation for adequate exam  -- Dementia labs: Vitamin B12, B1, folate  -- Will ideally have an LP to determine source and length of treatment - will likely require general anesthesia  -- Consider cardiac imaging (TTE vs JANAY) to evaluate for any vegetations as a source of infection        VTE Risk Mitigation         Ordered     heparin (porcine) injection 5,000 Units  Every 12 hours     Route:  Subcutaneous        03/24/17 0018     Medium Risk of VTE  Once      03/24/17 0018     Place sequential compression device  Until discontinued      03/24/17 0018     Place LIAM hose  Until discontinued      03/24/17 0018          Rosi Zayas MD  Neurology  [facility]  "

## 2017-03-27 NOTE — PROGRESS NOTES
Please note the following patient has been assigned to Neli Frazier RN with Outpatient Complex Care Mgmt for screening.    Please contact [facility] at ext [phone] with questions.    Thank you    Chasidy Mitchell, SSC

## 2017-03-27 NOTE — PROGRESS NOTES
3/27/2017  1410  Received a referral for OPCM, review of EMR.  Pt. Is currently admitted to [facility], dx Delirium.  Call to [facility], Sally WILKERSON following, x [phone].  Per Sally current d/c plan is home with daughter Colleen, with HH, when medically stable for discharge.  Requested [facility] RN to notify patient/caregiver that I will be contacting them once discharged to complete OP assessment.  Partially completed initial assessment for OPCM via EMR review.  Will follow up with patient/caregiver once discharged from the hospital.      Follow - up Plan:  Collaborate with [facility] regarding discharge plan  Complete assessment with patient/care giver    CARLYLE Guillen

## 2017-03-27 NOTE — PT/OT/SLP EVAL
Occupational Therapy  Evaluation    Atif Neves   MRN: [id number]   Admitting Diagnosis: Delirium, acute    OT Date of Treatment: 17   OT Start Time: 1035  OT Stop Time: 1107  OT Total Time (min): 32 min    Billable Minutes:  Evaluation 9  Self Care/Home Management 23    Diagnosis: Delirium, acute     Past Medical History:   Diagnosis Date    JONATAN (acute kidney injury) 2016    Anxiety     Arthritis     Glaucoma     Hyperlipemia     Hypertension     Irregular heartbeat     Macular degeneration     Normal pressure hydrocephalus     Urinary retention 2016    UTI (urinary tract infection)       Past Surgical History:   Procedure Laterality Date    TRANSURETHRAL RESECTION OF PROSTATE  2016       General Precautions: Standard, fall    Patient History:  Living Environment  Lives With: child(candi), adult  Living Arrangements: house  Home Accessibility: stairs to enter home  Home Layout: Able to live on 1st floor  Number of Stairs to Enter Home: 1  Stair Railings at Home: none  Transportation Available: family or friend will provide  Living Environment Comment: Pt history provided by daughter 2* AMS of pt. Pt lives was Mod(I) with ADLs using rollator and has 24/7 Spv/(A) upon discharge  Equipment Currently Used at Home: rollator, shower chair    Prior level of function:   Bed Mobility/Transfers: needs device  Grooming: independent  Bathing: needs device  Upper Body Dressing: independent  Lower Body Dressing: independent  Toileting: independent     Subjective:  Communicated with RN prior to session.    Pt not oriented, able to follow 1 step commands 50% of the time, and agreeable to OOB activity    Pain Ratin/10    Objective:  Patient found with: telemetry    Upper Extremity Range of Motion:  Right Upper Extremity: WFL  Left Upper Extremity: WFL    Upper Extremity Strength:  Right Upper Extremity: grossly 4/5  Left Upper Extremity: grossly 4/5    Functional Mobility:  Bed  "Mobility:  Scooting/Bridging: Minimum Assistance  Supine to Sit: Minimum Assistance  Sit to Supine: Minimum Assistance    Transfers:  Sit <> Stand Assistance: Contact Guard Assistance  Sit <> Stand Assistive Device: Rolling Walker    Functional Ambulation: Theo with RW 50 feet    Activities of Daily Living:  UE Dressing Level of Assistance: Maximum assistance    LE Dressing Level of Assistance: Total assistance    Grooming Position: Seated, EOB  Grooming Level of Assistance: Moderate assistance     Toileting Where Assessed: Bed level  Toileting Level of Assistance: Total assistance    Balance:   Static Sit: GOOD: Takes MODERATE challenges from all directions  Dynamic Sit: GOOD: Maintains balance through MODERATE excursions of active trunk movement  Static Stand: FAIR: Maintains without assist but unable to take challenges  Dynamic stand: FAIR: Needs CONTACT GUARD during gait    AM-PAC 6 CLICK ADL  How much help from another person does this patient currently need?  1 = Unable, Total/Dependent Assistance  2 = A lot, Maximum/Moderate Assistance  3 = A little, Minimum/Contact Guard/Supervision  4 = None, Modified Stanley/Independent    Putting on and taking off regular lower body clothing? : 2  Bathing (including washing, rinsing, drying)?: 2  Toileting, which includes using toilet, bedpan, or urinal? : 1  Putting on and taking off regular upper body clothing?: 2  Taking care of personal grooming such as brushing teeth?: 2  Eating meals?: 3  Total Score: 12    AM-PAC Raw Score CMS "G-Code Modifier Level of Impairment Assistance   6 % Total / Unable   7 - 9 CM 80 - 100% Maximal Assist   10 - 14 CL 60 - 80% Moderate Assist   15 - 19 CK 40 - 60% Moderate Assist   20 - 22 CJ 20 - 40% Minimal Assist   23 CI 1-20% SBA / CGA   24 CH 0% Independent/ Mod I       Patient left supine with all lines intact, call button in reach, bed alarm on and RN notified    Assessment:  Atif Neves is a 88 y.o. male with a medical " diagnosis of Delirium, acute and presents with overall strength deficits and decreased cognition impeding his ability to safely perform ADLs and functional mobility and would benefit from OT services to maximize functional (I) and safety.    Rehab identified problem list/impairments: Rehab identified problem list/impairments: weakness, impaired endurance, impaired self care skills, impaired functional mobilty, gait instability, impaired balance, decreased safety awareness, impaired cognition    Rehab potential is good.    Activity tolerance: Good    Discharge recommendations: Discharge Facility/Level Of Care Needs: nursing facility, skilled     Barriers to discharge: Barriers to Discharge: Decreased caregiver support    Equipment recommendations: walker, rolling, bedside commode     GOALS:   Occupational Therapy Goals        Problem: Occupational Therapy Goal    Goal Priority Disciplines Outcome Interventions   Occupational Therapy Goal     OT, PT/OT     Description:  Goals to be met by: 04/10     Patient will increase functional independence with ADLs by performing:    UE Dressing with Stand-by Assistance.  LE Dressing with Moderate Assistance.  Grooming while seated with Supervision.  Toileting from bedside commode with Moderate Assistance for hygiene and clothing management.   Stand pivot transfers with Stand-by Assistance.  Toilet transfer to bedside commode with Stand-by Assistance.                PLAN:  Patient to be seen 4 x/week to address the above listed problems via self-care/home management, therapeutic activities, therapeutic exercises  Plan of Care reviewed with: patient, daughter      Lorenza M BEN Montgomery  03/27/2017

## 2017-03-28 LAB
ALBUMIN SERPL BCP-MCNC: 2.6 G/DL
ALP SERPL-CCNC: 95 U/L
ALT SERPL W/O P-5'-P-CCNC: 17 U/L
ANION GAP SERPL CALC-SCNC: 9 MMOL/L
AST SERPL-CCNC: 56 U/L
BASOPHILS # BLD AUTO: 0.03 K/UL
BASOPHILS NFR BLD: 0.4 %
BILIRUB SERPL-MCNC: 0.7 MG/DL
BUN SERPL-MCNC: 10 MG/DL
CALCIUM SERPL-MCNC: 8.5 MG/DL
CHLORIDE SERPL-SCNC: 105 MMOL/L
CO2 SERPL-SCNC: 22 MMOL/L
CREAT SERPL-MCNC: 0.8 MG/DL
DIFFERENTIAL METHOD: ABNORMAL
EOSINOPHIL # BLD AUTO: 0.1 K/UL
EOSINOPHIL NFR BLD: 1.3 %
ERYTHROCYTE [DISTWIDTH] IN BLOOD BY AUTOMATED COUNT: 15.2 %
EST. GFR  (AFRICAN AMERICAN): >60 ML/MIN/1.73 M^2
EST. GFR  (NON AFRICAN AMERICAN): >60 ML/MIN/1.73 M^2
GLUCOSE SERPL-MCNC: 97 MG/DL
HCT VFR BLD AUTO: 33.9 %
HGB BLD-MCNC: 11.3 G/DL
LYMPHOCYTES # BLD AUTO: 0.6 K/UL
LYMPHOCYTES NFR BLD: 7.8 %
MAGNESIUM SERPL-MCNC: 1.6 MG/DL
MCH RBC QN AUTO: 25.7 PG
MCHC RBC AUTO-ENTMCNC: 33.3 %
MCV RBC AUTO: 77 FL
MONOCYTES # BLD AUTO: 1.1 K/UL
MONOCYTES NFR BLD: 14.7 %
NEUTROPHILS # BLD AUTO: 5.7 K/UL
NEUTROPHILS NFR BLD: 75.7 %
PLATELET # BLD AUTO: 224 K/UL
PMV BLD AUTO: 10.3 FL
POTASSIUM SERPL-SCNC: 4 MMOL/L
PROT SERPL-MCNC: 6.2 G/DL
RBC # BLD AUTO: 4.4 M/UL
SODIUM SERPL-SCNC: 136 MMOL/L
WBC # BLD AUTO: 7.48 K/UL

## 2017-03-28 PROCEDURE — 99232 SBSQ HOSP IP/OBS MODERATE 35: CPT | Mod: ,,, | Performed by: INTERNAL MEDICINE

## 2017-03-28 PROCEDURE — 36415 COLL VENOUS BLD VENIPUNCTURE: CPT

## 2017-03-28 PROCEDURE — 63600175 PHARM REV CODE 636 W HCPCS: Performed by: PSYCHIATRY & NEUROLOGY

## 2017-03-28 PROCEDURE — 85025 COMPLETE CBC W/AUTO DIFF WBC: CPT

## 2017-03-28 PROCEDURE — 25000003 PHARM REV CODE 250: Performed by: INTERNAL MEDICINE

## 2017-03-28 PROCEDURE — 11000001 HC ACUTE MED/SURG PRIVATE ROOM

## 2017-03-28 PROCEDURE — 63600175 PHARM REV CODE 636 W HCPCS: Performed by: NURSE PRACTITIONER

## 2017-03-28 PROCEDURE — 83735 ASSAY OF MAGNESIUM: CPT

## 2017-03-28 PROCEDURE — 25000003 PHARM REV CODE 250: Performed by: NURSE PRACTITIONER

## 2017-03-28 PROCEDURE — 80053 COMPREHEN METABOLIC PANEL: CPT

## 2017-03-28 PROCEDURE — 63600175 PHARM REV CODE 636 W HCPCS: Performed by: INTERNAL MEDICINE

## 2017-03-28 RX ORDER — AMLODIPINE BESYLATE 10 MG/1
10 TABLET ORAL DAILY
Status: DISCONTINUED | OUTPATIENT
Start: 2017-03-29 | End: 2017-04-03 | Stop reason: HOSPADM

## 2017-03-28 RX ADMIN — ATORVASTATIN CALCIUM 20 MG: 20 TABLET, FILM COATED ORAL at 08:03

## 2017-03-28 RX ADMIN — VANCOMYCIN HYDROCHLORIDE 1000 MG: 1 INJECTION, POWDER, LYOPHILIZED, FOR SOLUTION INTRAVENOUS at 04:03

## 2017-03-28 RX ADMIN — DORZOLAMIDE HYDROCHLORIDE 1 DROP: 20 SOLUTION/ DROPS OPHTHALMIC at 08:03

## 2017-03-28 RX ADMIN — THIAMINE HYDROCHLORIDE 200 MG: 100 INJECTION, SOLUTION INTRAMUSCULAR; INTRAVENOUS at 11:03

## 2017-03-28 RX ADMIN — DEXTROSE MONOHYDRATE, SODIUM CHLORIDE, AND POTASSIUM CHLORIDE: 50; 4.5; 1.49 INJECTION, SOLUTION INTRAVENOUS at 11:03

## 2017-03-28 RX ADMIN — THIAMINE HYDROCHLORIDE 200 MG: 100 INJECTION, SOLUTION INTRAMUSCULAR; INTRAVENOUS at 01:03

## 2017-03-28 RX ADMIN — LEVETIRACETAM 1000 MG: 10 INJECTION INTRAVENOUS at 08:03

## 2017-03-28 RX ADMIN — TAMSULOSIN HYDROCHLORIDE 0.4 MG: 0.4 CAPSULE ORAL at 08:03

## 2017-03-28 RX ADMIN — DEXTROSE MONOHYDRATE, SODIUM CHLORIDE, AND POTASSIUM CHLORIDE: 50; 4.5; 1.49 INJECTION, SOLUTION INTRAVENOUS at 09:03

## 2017-03-28 RX ADMIN — TIMOLOL MALEATE 1 DROP: 5 SOLUTION OPHTHALMIC at 08:03

## 2017-03-28 RX ADMIN — HEPARIN SODIUM 5000 UNITS: 5000 INJECTION, SOLUTION INTRAVENOUS; SUBCUTANEOUS at 09:03

## 2017-03-28 RX ADMIN — LEVETIRACETAM 1000 MG: 10 INJECTION INTRAVENOUS at 09:03

## 2017-03-28 RX ADMIN — CITALOPRAM HYDROBROMIDE 10 MG: 10 TABLET ORAL at 08:03

## 2017-03-28 RX ADMIN — AMLODIPINE BESYLATE 5 MG: 5 TABLET ORAL at 08:03

## 2017-03-28 RX ADMIN — HEPARIN SODIUM 5000 UNITS: 5000 INJECTION, SOLUTION INTRAVENOUS; SUBCUTANEOUS at 08:03

## 2017-03-28 NOTE — SUBJECTIVE & OBJECTIVE
Interval History:     Calm now    Still confused and does not answer any questions appropriately      Review of Systems   Unable to perform ROS: Other     Objective:     Vital Signs (Most Recent):  Temp: 99.1 °F (37.3 °C) (03/28/17 0746)  Pulse: 91 (03/28/17 0746)  Resp: 16 (03/28/17 0746)  BP: (!) 175/80 (03/28/17 0746)  SpO2: 95 % (03/28/17 0746) Vital Signs (24h Range):  Temp:  [97.6 °F (36.4 °C)-99.4 °F (37.4 °C)] 99.1 °F (37.3 °C)  Pulse:  [77-92] 91  Resp:  [16-18] 16  SpO2:  [91 %-95 %] 95 %  BP: (137-175)/(77-87) 175/80     Weight: 69.2 kg (152 lb 8.9 oz)  Body mass index is 23.97 kg/(m^2).  No intake or output data in the 24 hours ending 03/28/17 1245   Physical Exam   Constitutional: He appears well-nourished. No distress.   Cardiovascular: Normal rate.    Pulmonary/Chest: Effort normal.   Abdominal: Soft.   Neurological: He is alert.   Skin: Skin is warm. He is not diaphoretic.   Psychiatric: He has a normal mood and affect.   Nursing note and vitals reviewed.      Significant Labs:   BMP:   Recent Labs  Lab 03/28/17  0346   GLU 97      K 4.0      CO2 22*   BUN 10   CREATININE 0.8   CALCIUM 8.5*   MG 1.6     CBC:   Recent Labs  Lab 03/27/17  0548 03/28/17  0346   WBC 11.75 7.48   HGB 13.1* 11.3*   HCT 40.2 33.9*    224       Significant Imaging: NOne

## 2017-03-28 NOTE — PLAN OF CARE
Problem: Fall Risk (Adult)  Goal: Identify Related Risk Factors and Signs and Symptoms  Related risk factors and signs and symptoms are identified upon initiation of Human Response Clinical Practice Guideline (CPG)   Outcome: Ongoing (interventions implemented as appropriate)  Pt free from fall during this shift.

## 2017-03-28 NOTE — PROGRESS NOTES
"[facility] Hospital Medicine  Progress Note    Patient Name: Atif Neves  MRN: [id number]  Patient Class: IP- Inpatient   Admission Date: 3/23/2017  Length of Stay: 4 days  Attending Physician: Leyla Bettencourt MD  Primary Care Provider: Atif Christian MD    [facility] Medicine Team: [facility] HOSP MED  Leyla Bettencourt MD    Subjective:     Principal Problem:Delirium, acute    HPI:  "87 y/o gentleman presented to the ED today with acute onset of AMS.  At this time, he is confused, alone, and unable to provide any information related to events leading up to ED visit.  According to the records it appears that he has a hx of normal pressure hydrocephalus and recently underwent  shunt placement in February of this year.  He appears to have went to his 6 week post-op follow up with Dr. Sam on 3/22/17 and was at baseline and A/Ox3.   He reportedly was at baseline yesterday, but was found to be acutely alerted upon awaking this AM around 7:30. At this time he is screaming, calling for people that are not present in the room and conversing with the television. He is witnessed moving all 4 extremities.  He does follow simple commands but not consistently and provides yes / no responses to some questioning.  Additional PMH consists of HTN, BPH, macular degeneration, and HLD.  CT of the head was performed while in the ED and there appears to be no acute abnormality, but image was less than optimal related to motion artifact. He was found to have a WBC count of 16.1 and was evaluated by NSX and considered low probability for meningitis. U/A was not suggestive of UTI. Blood cultures were obtained and he was started empirically on vancomycin and cefepime."    Hospital Course:       Interval History: Patient with no events overnight, no new complaints. Patient confused; not eating well.  Data Review: Results recorded below were reviewed 3/27/2017.    Review of Systems   Unable to perform ROS: Mental status " change   Constitutional: Negative for fever.   Psychiatric/Behavioral: Positive for confusion.     Objective:     Vital Signs (Most Recent):  Temp: 97.6 °F (36.4 °C) (03/27/17 1631)  Pulse: 89 (03/27/17 2000)  Resp: 16 (03/27/17 1631)  BP: (!) 143/82 (03/27/17 1631)  SpO2: (!) 91 % (03/27/17 1631) Vital Signs (24h Range):  Temp:  [97.6 °F (36.4 °C)-99 °F (37.2 °C)] 97.6 °F (36.4 °C)  Pulse:  [69-89] 89  Resp:  [16-18] 16  SpO2:  [91 %-97 %] 91 %  BP: (143-158)/(73-82) 143/82     Weight: 66.4 kg (146 lb 6.2 oz)  Body mass index is 23 kg/(m^2).  No intake or output data in the 24 hours ending 03/27/17 2229   Physical Exam   Constitutional: He appears well-developed.   HENT:   Nose: Nose normal.   Mouth/Throat: Mucous membranes are not pale and not cyanotic.   Eyes: Conjunctivae and lids are normal. Pupils are equal.   Neck: Neck supple.   Cardiovascular: Normal rate, regular rhythm, S1 normal and S2 normal.    Pulmonary/Chest: Effort normal and breath sounds normal.   Abdominal: Soft. Bowel sounds are normal. There is no tenderness.   Musculoskeletal: He exhibits no edema.   Neurological: He is alert. He is disoriented.   Skin: Skin is warm and dry. He is not diaphoretic. No cyanosis. Nails show no clubbing.   Psychiatric: His affect is inappropriate. Cognition and memory are impaired.       Significant Labs:     CBC:     Recent Labs  Lab 03/26/17  0350 03/27/17  0548   WBC 11.63 11.75   HGB 13.6* 13.1*   HCT 40.0 40.2    216     CMP:     Recent Labs  Lab 03/26/17  0350 03/27/17  0548    139   K 3.4* 4.0    107   CO2 20* 23    98   BUN 13 16   CREATININE 0.8 0.9   CALCIUM 9.3 9.0   PROT 7.2 6.8   ALBUMIN 3.3* 2.8*   BILITOT 1.2* 0.7   ALKPHOS 134 111   * 79*   ALT 27 21   ANIONGAP 12 9   EGFRNONAA >60.0 >60.0     TSH:     Recent Labs  Lab 03/24/17  0109   TSH 0.228*     Assessment/Plan:      Current Hospital Problem List:    Active Hospital Problems    Diagnosis  POA    *Delirium,  acute [R41.0]  Yes     Priority: 1 - High     Last known normal 9pm 3/22. Found at 7:30am on 3/23 confused. No known trauma or seizure activity.    Acutely worsened 3/25 with aphasia, left ptosis (concern for ictal event).  Neg eeg and CT, but loaded and maintained on keppra.      Bacteremia due to Gram-positive bacteria [A49.9]  Yes     Priority: 2     NPH (normal pressure hydrocephalus) [G91.2]  Yes     Priority: 3      Shunt placed 2/6/17      Essential hypertension [I10]  Yes     Priority: 4     BPH (benign prostatic hypertrophy) with urinary obstruction [N40.1, N13.8]  Yes     Priority: 5     Ventriculo-peritoneal shunt status [Z98.2]  Not Applicable    Glaucoma [H40.9]  Yes      Resolved Hospital Problems    Diagnosis Date Resolved POA   No resolved problems to display.       Ordered Medications for management of current problems:    amlodipine  5 mg Oral Daily    atorvastatin  20 mg Oral Daily    citalopram  10 mg Oral Daily    dorzolamide  1 drop Left Eye Daily    heparin (porcine)  5,000 Units Subcutaneous Q12H    levetiracetam IVPB  1,000 mg Intravenous Q12H    tamsulosin  0.4 mg Oral Daily    thiamine (VITAMIN B1) IVPB  200 mg Intravenous Q24H    timolol maleate 0.5%  1 drop Left Eye Daily    vancomycin (VANCOCIN) IVPB  15 mg/kg Intravenous Q24H       IV Fluids: D5-0.45 NS with 20 meq potassium/liter    Risk  Patient has an abrupt change in neurologic status: Seizure and Delirium  Patient is currently on drug therapy requiring intensive monitoring for toxicity: Vancomycin    Anticipated [place] [facility]    Assessment and Plan by Problem:    * Delirium, acute  - remains altered, unclear etiology.  - continue all safety measures - high risk of injury / falls  - ammonia 23 on admission  - CT head unremarkable but less than optimal imaging related to motion artifact  - Neurology consulted; LP attempted. Appeared that infectious cause is possible given positive blood cultures  but cultures are polymicrobial. ID consulted; repeat BCs are negative so far.  - Slightly improved but not at all near baseline. Was seen normal in Clinic, by Dr. Sam, 1 day prior to admission.  - possible seizure on 3/25/17, continuing Keppra.    Bacteremia due to Gram-positive bacteria  - WBC's 16.1 on arrival   - Procalcitonin 0.24  - U/A negative for nitrites or leukocytes   - Blood cultures positive for GPC in 2 bottles  - Vanco and cefepime started empirically in the ED  - continued Vanco and transitioned to ceftriaxone; plan to deescalate as indicated by culture results  - seen by Neurosurgery while in ED - thought to be low probability for meningitis - if workup is negative consider reconsulting and possible LP. 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  - Consulted ID. BCs are polymicrobial:  ENTEROCOCCUS FAECALIS   STAPHYLOCOCCUS EPIDERMIDIS     CORYNEBACTERIUM JEIKEIUM (JK)    Antibiotics per ID recommendations; vancomycin x 14 days. LP if possible.    NPH (normal pressure hydrocephalus)  - patient of Dr. Sam - who recently underwent  shunt placement 2/6/2017  - evaluated by Neurosurgery while in the ED  - Neurology consulted    Essential hypertension  - BP fairly well controlled  - continue home antihypertensive regimen     BPH (benign prostatic hypertrophy) with urinary obstruction  - continue home tamsulosin   - bladder scan / straight cath PRN    Glaucoma  Hx of glaucoma  - continue home dorzolamide / timolol as directed     VTE Risk Mitigation         Ordered     heparin (porcine) injection 5,000 Units  Every 12 hours     Route:  Subcutaneous        03/24/17 0018     Medium Risk of VTE  Once      03/24/17 0018     Place sequential compression device  Until discontinued      03/24/17 0018     Place LIAM hose  Until discontinued      03/24/17 0018          Leyla Bettencourt MD  Department of Hospital Medicine   [facility]

## 2017-03-28 NOTE — PLAN OF CARE
Spoke with patient's daughter Colleen([phone]), patient's daughter chose [facility], notified [facility] to send referral, will follow.

## 2017-03-28 NOTE — PROGRESS NOTES
"[facility] Hospital Medicine  Progress Note    Patient Name: Atif Neves  MRN: [id number]  Patient Class: IP- Inpatient   Admission Date: 3/23/2017  Length of Stay: 5 days  Attending Physician: Claudio Phipps MD  Primary Care Provider: Atif Christian MD    [facility] Medicine Team: [facility] HOSP MED G Claudio Phipps MD    Subjective:     Principal Problem:Delirium, acute    HPI:  "87 y/o gentleman presented to the ED today with acute onset of AMS.  At this time, he is confused, alone, and unable to provide any information related to events leading up to ED visit.  According to the records it appears that he has a hx of normal pressure hydrocephalus and recently underwent  shunt placement in February of this year.  He appears to have went to his 6 week post-op follow up with Dr. Sam on 3/22/17 and was at baseline and A/Ox3.   He reportedly was at baseline yesterday, but was found to be acutely alerted upon awaking this AM around 7:30. At this time he is screaming, calling for people that are not present in the room and conversing with the television. He is witnessed moving all 4 extremities.  He does follow simple commands but not consistently and provides yes / no responses to some questioning.  Additional PMH consists of HTN, BPH, macular degeneration, and HLD.  CT of the head was performed while in the ED and there appears to be no acute abnormality, but image was less than optimal related to motion artifact. He was found to have a WBC count of 16.1 and was evaluated by NSX and considered low probability for meningitis. U/A was not suggestive of UTI. Blood cultures were obtained and he was started empirically on vancomycin and cefepime."    Hospital Course:       Interval History:     Calm now    Still confused and does not answer any questions appropriately      Review of Systems   Unable to perform ROS: Other     Objective:     Vital Signs (Most Recent):  Temp: 99.1 °F (37.3 °C) (03/28/17 " 0746)  Pulse: 91 (03/28/17 0746)  Resp: 16 (03/28/17 0746)  BP: (!) 175/80 (03/28/17 0746)  SpO2: 95 % (03/28/17 0746) Vital Signs (24h Range):  Temp:  [97.6 °F (36.4 °C)-99.4 °F (37.4 °C)] 99.1 °F (37.3 °C)  Pulse:  [77-92] 91  Resp:  [16-18] 16  SpO2:  [91 %-95 %] 95 %  BP: (137-175)/(77-87) 175/80     Weight: 69.2 kg (152 lb 8.9 oz)  Body mass index is 23.97 kg/(m^2).  No intake or output data in the 24 hours ending 03/28/17 1245   Physical Exam   Constitutional: He appears well-nourished. No distress.   Cardiovascular: Normal rate.    Pulmonary/Chest: Effort normal.   Abdominal: Soft.   Neurological: He is alert.   Skin: Skin is warm. He is not diaphoretic.   Psychiatric: He has a normal mood and affect.   Nursing note and vitals reviewed.      Significant Labs:   BMP:   Recent Labs  Lab 03/28/17  0346   GLU 97      K 4.0      CO2 22*   BUN 10   CREATININE 0.8   CALCIUM 8.5*   MG 1.6     CBC:   Recent Labs  Lab 03/27/17  0548 03/28/17  0346   WBC 11.75 7.48   HGB 13.1* 11.3*   HCT 40.2 33.9*    224       Significant Imaging: NOne    Assessment/Plan:      * Delirium, acute  - remains altered, unclear etiology.  - continue all safety measures - high risk of injury / falls  - ammonia 23 on admission  - CT head unremarkable but less than optimal imaging related to motion artifact  - Neurology consulted; LP attempted. Appeared that infectious cause is possible given positive blood cultures but cultures are polymicrobial. ID consulted; repeat BCs are negative so far.  - Slightly improved but not at all near baseline. Was seen normal in Clinic, by Dr. Sam, 1 day prior to admission.  - possible seizure on 3/25/17, continuing Keppra.    NPH (normal pressure hydrocephalus)  - patient of Dr. Sam - who recently underwent  shunt placement 2/6/2017  - evaluated by Neurosurgery while in the ED  - Neurology consulted    Bacteremia due to Gram-positive bacteria  - WBC's 16.1 on arrival   - Procalcitonin  0.24  - U/A negative for nitrites or leukocytes   - Blood cultures positive for GPC in 2 bottles  - Vanco and cefepime started empirically in the ED  - continued Vanco and transitioned to ceftriaxone; plan to deescalate as indicated by culture results  - seen by Neurosurgery while in ED - thought to be low probability for meningitis - if workup is negative consider reconsulting and possible LP. 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  - Consulted ID. BCs are polymicrobial:  ENTEROCOCCUS FAECALIS   STAPHYLOCOCCUS EPIDERMIDIS     CORYNEBACTERIUM JEIKEIUM (JK)    Antibiotics per ID recommendations; vancomycin x 14 days.     VTE Risk Mitigation         Ordered     heparin (porcine) injection 5,000 Units  Every 12 hours     Route:  Subcutaneous        03/24/17 0018     Medium Risk of VTE  Once      03/24/17 0018     Place sequential compression device  Until discontinued      03/24/17 0018     Place LIAM hose  Until discontinued      03/24/17 0018          Claudio Phipps MD  Department of Hospital Medicine   [facility]

## 2017-03-28 NOTE — ASSESSMENT & PLAN NOTE
- remains altered, unclear etiology.  - continue all safety measures - high risk of injury / falls  - ammonia 23 on admission  - CT head unremarkable but less than optimal imaging related to motion artifact  - Neurology consulted; LP attempted. Appeared that infectious cause is possible given positive blood cultures but cultures are polymicrobial. ID consulted; repeat BCs are negative so far.  - Slightly improved but not at all near baseline. Was seen normal in Clinic, by Dr. Sam, 1 day prior to admission.  - possible seizure on 3/25/17, continuing Keppra.

## 2017-03-28 NOTE — SUBJECTIVE & OBJECTIVE
Interval History: Patient with no events overnight, no new complaints. Patient confused; not eating well.  Data Review: Results recorded below were reviewed 3/27/2017.    Review of Systems   Unable to perform ROS: Mental status change   Constitutional: Negative for fever.   Psychiatric/Behavioral: Positive for confusion.     Objective:     Vital Signs (Most Recent):  Temp: 97.6 °F (36.4 °C) (03/27/17 1631)  Pulse: 89 (03/27/17 2000)  Resp: 16 (03/27/17 1631)  BP: (!) 143/82 (03/27/17 1631)  SpO2: (!) 91 % (03/27/17 1631) Vital Signs (24h Range):  Temp:  [97.6 °F (36.4 °C)-99 °F (37.2 °C)] 97.6 °F (36.4 °C)  Pulse:  [69-89] 89  Resp:  [16-18] 16  SpO2:  [91 %-97 %] 91 %  BP: (143-158)/(73-82) 143/82     Weight: 66.4 kg (146 lb 6.2 oz)  Body mass index is 23 kg/(m^2).  No intake or output data in the 24 hours ending 03/27/17 2229   Physical Exam   Constitutional: He appears well-developed.   HENT:   Nose: Nose normal.   Mouth/Throat: Mucous membranes are not pale and not cyanotic.   Eyes: Conjunctivae and lids are normal. Pupils are equal.   Neck: Neck supple.   Cardiovascular: Normal rate, regular rhythm, S1 normal and S2 normal.    Pulmonary/Chest: Effort normal and breath sounds normal.   Abdominal: Soft. Bowel sounds are normal. There is no tenderness.   Musculoskeletal: He exhibits no edema.   Neurological: He is alert. He is disoriented.   Skin: Skin is warm and dry. He is not diaphoretic. No cyanosis. Nails show no clubbing.   Psychiatric: His affect is inappropriate. Cognition and memory are impaired.       Significant Labs:     CBC:     Recent Labs  Lab 03/26/17  0350 03/27/17  0548   WBC 11.63 11.75   HGB 13.6* 13.1*   HCT 40.0 40.2    216     CMP:     Recent Labs  Lab 03/26/17  0350 03/27/17  0548    139   K 3.4* 4.0    107   CO2 20* 23    98   BUN 13 16   CREATININE 0.8 0.9   CALCIUM 9.3 9.0   PROT 7.2 6.8   ALBUMIN 3.3* 2.8*   BILITOT 1.2* 0.7   ALKPHOS 134 111   * 79*    ALT 27 21   ANIONGAP 12 9   EGFRNONAA >60.0 >60.0     TSH:     Recent Labs  Lab 03/24/17  0109   TSH 0.228*

## 2017-03-28 NOTE — ASSESSMENT & PLAN NOTE
- WBC's 16.1 on arrival   - Procalcitonin 0.24  - U/A negative for nitrites or leukocytes   - Blood cultures positive for GPC in 2 bottles  - Vanco and cefepime started empirically in the ED  - continued Vanco and transitioned to ceftriaxone; plan to deescalate as indicated by culture results  - seen by Neurosurgery while in ED - thought to be low probability for meningitis - if workup is negative consider reconsulting and possible LP. 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  - Consulted ID. BCs are polymicrobial:  ENTEROCOCCUS FAECALIS   STAPHYLOCOCCUS EPIDERMIDIS     CORYNEBACTERIUM JEIKEIUM (JK)    Antibiotics per ID recommendations; vancomycin x 14 days.

## 2017-03-28 NOTE — PROGRESS NOTES
"Brief Progress note    Patient more alert and calm today. His daughter says that when she walked in he said her name, but has been unable to have a conversation. He is no longer agitated or requiring restraints.    Exam:  Vitals:    17 1254   BP: (!) 156/74   Pulse: 78   Resp: 16   Temp: 98.3 °F (36.8 °C)     - Awake, alert. Does not follow commands  - Unable to state name,  or where he is - only responds with "Uh-huh", "ok", "alright" to all questions (?aphasia)  - Moves all extremities spontaneously      Recommendations:  - MRI brain with and without contrast - as patient is more calm today, may tolerate without sedation  - LP with sedation - unable to follow commands and is unlikely to be compliant with important positioning. Previous bedside attempt aborted    General neurology will follow up once the above investigations are done.       Signing Physician  Rosi Carpio MD  Neurology Resident, PGY2  Neuro Consult Spectralink # [phone]      "

## 2017-03-29 LAB
ALBUMIN SERPL BCP-MCNC: 2.6 G/DL
ALP SERPL-CCNC: 96 U/L
ALT SERPL W/O P-5'-P-CCNC: 17 U/L
ANION GAP SERPL CALC-SCNC: 6 MMOL/L
AST SERPL-CCNC: 48 U/L
BACTERIA BLD CULT: NORMAL
BASOPHILS # BLD AUTO: 0.03 K/UL
BASOPHILS NFR BLD: 0.4 %
BILIRUB SERPL-MCNC: 0.4 MG/DL
BUN SERPL-MCNC: 11 MG/DL
CALCIUM SERPL-MCNC: 8.9 MG/DL
CHLORIDE SERPL-SCNC: 102 MMOL/L
CO2 SERPL-SCNC: 26 MMOL/L
CREAT SERPL-MCNC: 0.9 MG/DL
DIFFERENTIAL METHOD: ABNORMAL
EOSINOPHIL # BLD AUTO: 0.2 K/UL
EOSINOPHIL NFR BLD: 2.8 %
ERYTHROCYTE [DISTWIDTH] IN BLOOD BY AUTOMATED COUNT: 15.2 %
EST. GFR  (AFRICAN AMERICAN): >60 ML/MIN/1.73 M^2
EST. GFR  (NON AFRICAN AMERICAN): >60 ML/MIN/1.73 M^2
GLUCOSE SERPL-MCNC: 99 MG/DL
HCT VFR BLD AUTO: 33.8 %
HGB BLD-MCNC: 11.2 G/DL
LYMPHOCYTES # BLD AUTO: 1.4 K/UL
LYMPHOCYTES NFR BLD: 20.8 %
MAGNESIUM SERPL-MCNC: 1.7 MG/DL
MCH RBC QN AUTO: 25.6 PG
MCHC RBC AUTO-ENTMCNC: 33.1 %
MCV RBC AUTO: 77 FL
MONOCYTES # BLD AUTO: 1 K/UL
MONOCYTES NFR BLD: 14.6 %
NEUTROPHILS # BLD AUTO: 4.2 K/UL
NEUTROPHILS NFR BLD: 61.3 %
PLATELET # BLD AUTO: 208 K/UL
PMV BLD AUTO: 10.1 FL
POTASSIUM SERPL-SCNC: 4.3 MMOL/L
PROT SERPL-MCNC: 6.3 G/DL
RBC # BLD AUTO: 4.37 M/UL
SODIUM SERPL-SCNC: 134 MMOL/L
WBC # BLD AUTO: 6.79 K/UL

## 2017-03-29 PROCEDURE — A9585 GADOBUTROL INJECTION: HCPCS | Performed by: INTERNAL MEDICINE

## 2017-03-29 PROCEDURE — 25000242 PHARM REV CODE 250 ALT 637 W/ HCPCS: Performed by: INTERNAL MEDICINE

## 2017-03-29 PROCEDURE — 25500020 PHARM REV CODE 255: Performed by: INTERNAL MEDICINE

## 2017-03-29 PROCEDURE — 63600175 PHARM REV CODE 636 W HCPCS: Performed by: INTERNAL MEDICINE

## 2017-03-29 PROCEDURE — 36415 COLL VENOUS BLD VENIPUNCTURE: CPT

## 2017-03-29 PROCEDURE — 63600175 PHARM REV CODE 636 W HCPCS: Performed by: PSYCHIATRY & NEUROLOGY

## 2017-03-29 PROCEDURE — 97530 THERAPEUTIC ACTIVITIES: CPT

## 2017-03-29 PROCEDURE — 11000001 HC ACUTE MED/SURG PRIVATE ROOM

## 2017-03-29 PROCEDURE — 25000003 PHARM REV CODE 250: Performed by: INTERNAL MEDICINE

## 2017-03-29 PROCEDURE — 94640 AIRWAY INHALATION TREATMENT: CPT

## 2017-03-29 PROCEDURE — 25000003 PHARM REV CODE 250: Performed by: NURSE PRACTITIONER

## 2017-03-29 PROCEDURE — 85025 COMPLETE CBC W/AUTO DIFF WBC: CPT

## 2017-03-29 PROCEDURE — 86580 TB INTRADERMAL TEST: CPT | Performed by: INTERNAL MEDICINE

## 2017-03-29 PROCEDURE — 99232 SBSQ HOSP IP/OBS MODERATE 35: CPT | Mod: ,,, | Performed by: INTERNAL MEDICINE

## 2017-03-29 PROCEDURE — 80053 COMPREHEN METABOLIC PANEL: CPT

## 2017-03-29 PROCEDURE — 83735 ASSAY OF MAGNESIUM: CPT

## 2017-03-29 RX ORDER — GADOBUTROL 604.72 MG/ML
10 INJECTION INTRAVENOUS
Status: COMPLETED | OUTPATIENT
Start: 2017-03-29 | End: 2017-03-29

## 2017-03-29 RX ORDER — IPRATROPIUM BROMIDE AND ALBUTEROL SULFATE 2.5; .5 MG/3ML; MG/3ML
3 SOLUTION RESPIRATORY (INHALATION) EVERY 4 HOURS PRN
Status: DISCONTINUED | OUTPATIENT
Start: 2017-03-29 | End: 2017-04-03 | Stop reason: HOSPADM

## 2017-03-29 RX ADMIN — GADOBUTROL 10 ML: 604.72 INJECTION INTRAVENOUS at 08:03

## 2017-03-29 RX ADMIN — HEPARIN SODIUM 5000 UNITS: 5000 INJECTION, SOLUTION INTRAVENOUS; SUBCUTANEOUS at 08:03

## 2017-03-29 RX ADMIN — HEPARIN SODIUM 5000 UNITS: 5000 INJECTION, SOLUTION INTRAVENOUS; SUBCUTANEOUS at 09:03

## 2017-03-29 RX ADMIN — IPRATROPIUM BROMIDE AND ALBUTEROL SULFATE 3 ML: .5; 3 SOLUTION RESPIRATORY (INHALATION) at 09:03

## 2017-03-29 RX ADMIN — TIMOLOL MALEATE 1 DROP: 5 SOLUTION OPHTHALMIC at 08:03

## 2017-03-29 RX ADMIN — TAMSULOSIN HYDROCHLORIDE 0.4 MG: 0.4 CAPSULE ORAL at 08:03

## 2017-03-29 RX ADMIN — LEVETIRACETAM 1000 MG: 10 INJECTION INTRAVENOUS at 08:03

## 2017-03-29 RX ADMIN — Medication 5 UNITS: at 04:03

## 2017-03-29 RX ADMIN — ATORVASTATIN CALCIUM 20 MG: 20 TABLET, FILM COATED ORAL at 08:03

## 2017-03-29 RX ADMIN — VANCOMYCIN HYDROCHLORIDE 750 MG: 1 INJECTION, POWDER, LYOPHILIZED, FOR SOLUTION INTRAVENOUS at 04:03

## 2017-03-29 RX ADMIN — LEVETIRACETAM 1000 MG: 10 INJECTION INTRAVENOUS at 09:03

## 2017-03-29 RX ADMIN — ACETAMINOPHEN 650 MG: 325 TABLET ORAL at 09:03

## 2017-03-29 RX ADMIN — DORZOLAMIDE HYDROCHLORIDE 1 DROP: 20 SOLUTION/ DROPS OPHTHALMIC at 08:03

## 2017-03-29 RX ADMIN — AMLODIPINE BESYLATE 10 MG: 10 TABLET ORAL at 08:03

## 2017-03-29 RX ADMIN — CITALOPRAM HYDROBROMIDE 10 MG: 10 TABLET ORAL at 08:03

## 2017-03-29 NOTE — PT/OT/SLP PROGRESS
Physical Therapy  Treatment    Atif Neves   MRN: [id number]   Admitting Diagnosis: Delirium, acute    PT Received On: 17  PT Start Time: 1240     PT Stop Time: 1311    PT Total Time (min): 31 min       Billable Minutes:  Therapeutic Activity 31 min    Treatment Type: Treatment  PT/PTA: PTA     PTA Visit Number: 1       General Precautions: Standard, fall  Orthopedic Precautions: N/A   Braces: N/A    Do you have any cultural, spiritual, Presybeterian conflicts, given your current situation?: none stated    Subjective:  Communicated with nsg prior to session.  Pt agreeable to therapy.     Pain Ratin/10              Pain Rating Post-Intervention: 0/10    Objective:   Patient found with: telemetry, supine, daughter in bedside chair.     Functional Mobility:  Bed Mobility:   Rolling/Turning Right: Maximum assistance, With leg lift, With side rail  Scooting/Bridging: Total Assistance, With assist of 2 (draw sheet to HOB and trendelenberg position of bed)  Supine to Sit: Maximum Assistance, With side rail (HOB elevated. TCS at L hip. )  Sit to Supine: Moderate Assistance, With leg lift (for trunk guidance and LE management.)    Transfers:  Sit <> Stand Assistance: Moderate Assistance (attempted x 8, pt unable to stand. Vcs for hand placement, tcs given as well. )  Sit <> Stand Assistive Device: Rolling Walker    Gait:   Gait Distance: unable to perform 2/2 lethargy    Balance:   Static Sit: POOR: Needs MODERATE assist to maintain  Dynamic Sit: FAIR+: Maintains balance through MINIMAL excursions of active trunk motion    Therapeutic Activities and Exercises:  White board updated.   Gait belt and gown donned around back.   Pt instructed in sequencing of transfer activities, but unable to perform.   Daughter educated on the importance of having father sitting in chair. She verbalized understanding.   Pt performed BLE therex with PROM x 10 ea: LAQ    AM-PAC 6 CLICK MOBILITY  How much help from another person does this  patient currently need?   1 = Unable, Total/Dependent Assistance  2 = A lot, Maximum/Moderate Assistance  3 = A little, Minimum/Contact Guard/Supervision  4 = None, Modified Rogers/Independent    Turning over in bed (including adjusting bedclothes, sheets and blankets)?: 2  Sitting down on and standing up from a chair with arms (e.g., wheelchair, bedside commode, etc.): 2  Moving from lying on back to sitting on the side of the bed?: 2  Moving to and from a bed to a chair (including a wheelchair)?: 2  Need to walk in hospital room?: 2  Climbing 3-5 steps with a railing?: 1  Total Score: 11    AM-PAC Raw Score CMS G-Code Modifier Level of Impairment Assistance   6 % Total / Unable   7 - 9 CM 80 - 100% Maximal Assist   10 - 14 CL 60 - 80% Moderate Assist   15 - 19 CK 40 - 60% Moderate Assist   20 - 22 CJ 20 - 40% Minimal Assist   23 CI 1-20% SBA / CGA   24 CH 0% Independent/ Mod I     Patient left with bed in chair position with all lines intact, call button in reach and daughter present.    Assessment:  Atif Neves is a 88 y.o. male with a medical diagnosis of Delirium, acute and presents with the rehab identified problem list below. Pt was unable to ariane therapy well today 2/2 lethargy potentially caused by medication as reported by the patient's daughter despite max encouragement. Pt participated some, but required a significant amount of assist as compared to his last tx session. Pt would continue to benefit from skilled PT to address the deficits in his plan of care.     Rehab identified problem list/impairments: Rehab identified problem list/impairments: weakness, impaired endurance, impaired self care skills, impaired functional mobilty, gait instability, impaired balance, decreased safety awareness, impaired cognition    Rehab potential is good.    Activity tolerance: Poor    Discharge recommendations: Discharge Facility/Level Of Care Needs: nursing facility, skilled     Barriers to discharge:  Barriers to Discharge: Decreased caregiver support    Equipment recommendations: Equipment Needed After Discharge: walker, rolling, bedside commode     GOALS:   Physical Therapy Goals        Problem: Physical Therapy Goal    Goal Priority Disciplines Outcome Goal Variances Interventions   Physical Therapy Goal     PT/OT, PT Ongoing (interventions implemented as appropriate)     Description:  Goals to be met by: 4/3/17     Patient will increase functional independence with mobility by performin. Supine to sit with SBA  2. Sit to supine with SBA  3. Sit to stand transfer with SBA  4. Bed to chair transfer with Stand-by Assistance using Rolling Walker  5. Gait  x 150 feet with Contact Guard Assistance using Rolling Walker.   6. Ascend/Descend 7 inch curb step with Minimal Assistance using Rolling Walker.  7. Lower extremity exercise program x20-30 reps per handout, with independence                PLAN:    Patient to be seen 4 x/week  to address the above listed problems via gait training, therapeutic activities, therapeutic exercises, neuromuscular re-education  Plan of Care expires:    Plan of Care reviewed with: patient, daughter         Keara BARTH Chelo, PTA  2017

## 2017-03-29 NOTE — PROGRESS NOTES
"[facility] Hospital Medicine  Progress Note    Patient Name: Atif Neves  MRN: [id number]  Patient Class: IP- Inpatient   Admission Date: 3/23/2017  Length of Stay: 6 days  Attending Physician: Claudio Phipps MD  Primary Care Provider: Atif Christian MD    [facility] Medicine Team: [facility] G Claudio Phipps MD    Subjective:     Principal Problem:Delirium, acute    HPI:  "87 y/o gentleman presented to the ED today with acute onset of AMS.  At this time, he is confused, alone, and unable to provide any information related to events leading up to ED visit.  According to the records it appears that he has a hx of normal pressure hydrocephalus and recently underwent  shunt placement in February of this year.  He appears to have went to his 6 week post-op follow up with Dr. Sam on 3/22/17 and was at baseline and A/Ox3.   He reportedly was at baseline yesterday, but was found to be acutely alerted upon awaking this AM around 7:30. At this time he is screaming, calling for people that are not present in the room and conversing with the television. He is witnessed moving all 4 extremities.  He does follow simple commands but not consistently and provides yes / no responses to some questioning.  Additional PMH consists of HTN, BPH, macular degeneration, and HLD.  CT of the head was performed while in the ED and there appears to be no acute abnormality, but image was less than optimal related to motion artifact. He was found to have a WBC count of 16.1 and was evaluated by NSX and considered low probability for meningitis. U/A was not suggestive of UTI. Blood cultures were obtained and he was started empirically on vancomycin and cefepime."    Hospital Course:       Interval History:     Less combative today.  Still not answering questions appropriately.      Review of Systems   Unable to perform ROS: Other     Objective:     Vital Signs (Most Recent):  Temp: 99.9 °F (37.7 °C) (03/29/17 " 1108)  Pulse: 84 (03/29/17 1108)  Resp: (!) 22 (03/29/17 1108)  BP: (!) 180/87 (03/29/17 1108)  SpO2: 98 % (03/29/17 1108) Vital Signs (24h Range):  Temp:  [97.9 °F (36.6 °C)-99.9 °F (37.7 °C)] 99.9 °F (37.7 °C)  Pulse:  [73-84] 84  Resp:  [16-22] 22  SpO2:  [93 %-98 %] 98 %  BP: (153-187)/(70-87) 180/87     Weight: 70.4 kg (155 lb 3.3 oz)  Body mass index is 24.38 kg/(m^2).    Intake/Output Summary (Last 24 hours) at 03/29/17 1428  Last data filed at 03/29/17 0841   Gross per 24 hour   Intake              100 ml   Output                0 ml   Net              100 ml      Physical Exam   Constitutional: He appears well-nourished.   HENT:   Head: Atraumatic.   Cardiovascular: Normal rate.    Pulmonary/Chest: Effort normal. He has wheezes.   Abdominal: Soft.   Neurological: He is alert.   Skin: Skin is warm.   Nursing note and vitals reviewed.      Significant Labs:   BMP:   Recent Labs  Lab 03/29/17  0356   GLU 99   *   K 4.3      CO2 26   BUN 11   CREATININE 0.9   CALCIUM 8.9   MG 1.7     CBC:   Recent Labs  Lab 03/28/17  0346 03/29/17  0356   WBC 7.48 6.79   HGB 11.3* 11.2*   HCT 33.9* 33.8*    208       Significant Imaging: None    Assessment/Plan:      * Delirium, acute  - remains altered, unclear etiology.  - continue all safety measures - high risk of injury / falls  - ammonia 23 on admission  - CT head unremarkable but less than optimal imaging related to motion artifact  - Neurology consulted; LP attempted. Appeared that infectious cause is possible given positive blood cultures but cultures are polymicrobial. ID consulted; repeat BCs are negative so far.  - Slightly improved but not at all near baseline. Was seen normal in Clinic, by Dr. Sam, 1 day prior to admission.  - possible seizure on 3/25/17, continuing Keppra.  - MRI pending    Essential hypertension  - BP fairly well controlled  - continue home antihypertensive regimen     NPH (normal pressure hydrocephalus)  - patient of   Cecy - who recently underwent  shunt placement 2/6/2017  - evaluated by Neurosurgery while in the ED  - Neurology consulted  - Discussed with Awilda SHOOK,  shunt is MRI safe    VTE Risk Mitigation         Ordered     heparin (porcine) injection 5,000 Units  Every 12 hours     Route:  Subcutaneous        03/24/17 0018     Medium Risk of VTE  Once      03/24/17 0018     Place sequential compression device  Until discontinued      03/24/17 0018     Place LIAM hose  Until discontinued      03/24/17 0018          Claudio Phipps MD  Department of Hospital Medicine   [facility]

## 2017-03-29 NOTE — PLAN OF CARE
Problem: Physical Therapy Goal  Goal: Physical Therapy Goal  Goals to be met by: 4/3/17     Patient will increase functional independence with mobility by performin. Supine to sit with SBA  2. Sit to supine with SBA  3. Sit to stand transfer with SBA  4. Bed to chair transfer with Stand-by Assistance using Rolling Walker  5. Gait x 150 feet with Contact Guard Assistance using Rolling Walker.   6. Ascend/Descend 7 inch curb step with Minimal Assistance using Rolling Walker.  7. Lower extremity exercise program x20-30 reps per handout, with independence   Outcome: Ongoing (interventions implemented as appropriate)  Pt ariane tx poorly. Could not engage in session 2/2 lethargy as a possible side effect of antibiotics despite max encouragement.

## 2017-03-29 NOTE — PLAN OF CARE
CM spoke with patient's son, Matthew, [phone]. Son is confused about different levels of care; CM explained that patient doesn't need continued acute care but will benefit from continued therapy and needs 2 weeks of IV abx. These services can be received in a SNF. Family wants patient to stay in [facility] or at [facility]. CM did explain that [facility] has a skilled unit on   [address] that patient can be referred to and son prefers this. [facility] consult in. Will follow.

## 2017-03-29 NOTE — PLAN OF CARE
[facility] denied patient, referral still pending at [facility], attempted to obtain additional options from family, patient sister Tete ([phone]) told me to leave list in the room and she will review, will follow.

## 2017-03-29 NOTE — PLAN OF CARE
Spoke with patient's daughter Colleen, Colleen would like additional referral sent to [facility], referral sent, will follow.

## 2017-03-29 NOTE — CONSULTS
PharmD Consult received for:  1.) Vancomycin dosing:  · Approximate CrCl=53 ml/min  · Bacteremia goal trough 15-20  · Patient initially started on 1 gram every 24 hours, with trough of 8.6  · Vancomycin changed yesterday to 750 mg every 12 hours   · Will recommend to keep current dose for now and assess next trough - ordered      **Note: Consults are reviewed Monday-Friday 7:30-4pm.     THE ABOVE RECOMMENDATIONS ARE ONLY SUGGESTED.THE RECOMMENDATIONS SHOULD BE CONSIDERED IN CONJUNCTION WITH ALL PATIENT FACTORS.    Thank you for the consult,  Anabel Melo, PharmD  [id number]

## 2017-03-29 NOTE — ASSESSMENT & PLAN NOTE
- remains altered, unclear etiology.  - continue all safety measures - high risk of injury / falls  - ammonia 23 on admission  - CT head unremarkable but less than optimal imaging related to motion artifact  - Neurology consulted; LP attempted. Appeared that infectious cause is possible given positive blood cultures but cultures are polymicrobial. ID consulted; repeat BCs are negative so far.  - Slightly improved but not at all near baseline. Was seen normal in Clinic, by Dr. Sam, 1 day prior to admission.  - possible seizure on 3/25/17, continuing Keppra.  - MRI pending

## 2017-03-29 NOTE — PROGRESS NOTES
Spoke with DOROTHY Zavala r/t son Matthew calling in concern of pt placement post discharge for antibiotics administration. Sally to follow up with Matthew (son) [phone]

## 2017-03-29 NOTE — PLAN OF CARE
Problem: Fall Risk (Adult)  Goal: Identify Related Risk Factors and Signs and Symptoms  Related risk factors and signs and symptoms are identified upon initiation of Human Response Clinical Practice Guideline (CPG)   Outcome: Ongoing (interventions implemented as appropriate)  Pt remained free from falls this shift. Bed kept in low position, X3 side rails up, bed brakes activated, call light within reach and audible, bed alarm activated and audible. Pt disoriented X3, no attempts to get out of bed this shift. Incontinence care provided when appropriate, meal tray assistance provided, and external stimulation decreased. Pt behaved calmly this shift not requiring sedative medications or restraints. Will continue to closely monitor pt.     Problem: Patient Care Overview  Goal: Plan of Care Review  Outcome: Ongoing (interventions implemented as appropriate)  Pt following plan of care well, pt with ordered scheduled outstanding Brain MRI to be done at 1930. Spoke w/ Anna in MRI r/t pt mentation and travel requirements this afternoon. Will sign off to evening RN. Pt still pleasantly confused, only oriented to self. Pt appears to be in pain, however unable to specify. Pt with noted audible wheezing. Fluids d/c'd this shift, PRN breathing treatments initiated. X1 administered. Will continue to trend. Matthew (son) and Colleen (daughter) updated on discharge disposition and requirements per DOROTHY Zavala.     Problem: Infection, Risk/Actual (Adult)  Goal: Identify Related Risk Factors and Signs and Symptoms  Related risk factors and signs and symptoms are identified upon initiation of Human Response Clinical Practice Guideline (CPG)   Outcome: Ongoing (interventions implemented as appropriate)  Pt with temperature spike to 100.2 this shift. PRN tylenol not yet provided, trending temperatures. All other VSs currently stable, though pt running hypertensive. Mid Vanc administration, temp re-evaluated trending back down to 99.9,  excess blankets removed, room temperature decreased. Will continue to trend and administer tylenol if necessary.     Problem: Pressure Ulcer Risk (Ricardo Scale) (Adult,Obstetrics,Pediatric)  Goal: Identify Related Risk Factors and Signs and Symptoms  Related risk factors and signs and symptoms are identified upon initiation of Human Response Clinical Practice Guideline (CPG)   Outcome: Ongoing (interventions implemented as appropriate)  Pt repositioned frequently using wedge positioner. No skin breakdown noted, incontinence care provided when necessary.     Problem: Confusion, Chronic (Adult)  Goal: Identify Related Risk Factors and Signs and Symptoms  Related risk factors and signs and symptoms are identified upon initiation of Human Response Clinical Practice Guideline (CPG)  Outcome: Ongoing (interventions implemented as appropriate)  Pt still pleasantly confused, with delayed responses. Pt only oriented to self throughout shift, and ability to follow only simple commands. Pt not agitated or combative. Will continue to trend and re-orient pt. Neuro checks every 4 hours. NSGY following, pending MRI of brain completion.

## 2017-03-29 NOTE — SUBJECTIVE & OBJECTIVE
Interval History:     Less combative today.  Still not answering questions appropriately.      Review of Systems   Unable to perform ROS: Other     Objective:     Vital Signs (Most Recent):  Temp: 99.9 °F (37.7 °C) (03/29/17 1108)  Pulse: 84 (03/29/17 1108)  Resp: (!) 22 (03/29/17 1108)  BP: (!) 180/87 (03/29/17 1108)  SpO2: 98 % (03/29/17 1108) Vital Signs (24h Range):  Temp:  [97.9 °F (36.6 °C)-99.9 °F (37.7 °C)] 99.9 °F (37.7 °C)  Pulse:  [73-84] 84  Resp:  [16-22] 22  SpO2:  [93 %-98 %] 98 %  BP: (153-187)/(70-87) 180/87     Weight: 70.4 kg (155 lb 3.3 oz)  Body mass index is 24.38 kg/(m^2).    Intake/Output Summary (Last 24 hours) at 03/29/17 1428  Last data filed at 03/29/17 0841   Gross per 24 hour   Intake              100 ml   Output                0 ml   Net              100 ml      Physical Exam   Constitutional: He appears well-nourished.   HENT:   Head: Atraumatic.   Cardiovascular: Normal rate.    Pulmonary/Chest: Effort normal. He has wheezes.   Abdominal: Soft.   Neurological: He is alert.   Skin: Skin is warm.   Nursing note and vitals reviewed.      Significant Labs:   BMP:   Recent Labs  Lab 03/29/17  0356   GLU 99   *   K 4.3      CO2 26   BUN 11   CREATININE 0.9   CALCIUM 8.9   MG 1.7     CBC:   Recent Labs  Lab 03/28/17  0346 03/29/17  0356   WBC 7.48 6.79   HGB 11.3* 11.2*   HCT 33.9* 33.8*    208       Significant Imaging: None

## 2017-03-29 NOTE — ASSESSMENT & PLAN NOTE
- patient of Dr. Sam - who recently underwent  shunt placement 2/6/2017  - evaluated by Neurosurgery while in the ED  - Neurology consulted  - Discussed with Awilda SHOOK,  shunt is MRI safe

## 2017-03-29 NOTE — PLAN OF CARE
Problem: Fall Risk (Adult)  Intervention: Monitor/Assist with Self Care    17   Functional Level Current   Ambulation --  4 - completely dependent   Transferring --  4 - completely dependent   Toileting --  2 - assistive person   Bathing --  2 - assistive person   Dressing --  2 - assistive person   Eating --  2 - assistive person   Communication --  2 - difficulty understanding (not related to language barrier)   Swallowing --  0 - swallows foods/liquids without difficulty   Daily Care Interventions   Self-Care Promotion independence encouraged --    Activity   Activity Assistance Provided --  assistance, 2 people;lift team assistance       Intervention: Reduce Risk/Promote Restraint Free Environment    17   Safety Interventions   Environmental Safety Modification --  clutter free environment maintained;lighting adjusted;room organization consistent   Prevent  Drop/Fall   Safety/Security Measures --  bed alarm set   Safety Interventions   Safety Precautions emergency equipment at bedside --        Intervention: Review Medications/Identify Contributors to Fall Risk    17   Safety Interventions   Medication Review/Management medications reviewed       Intervention: Patient Rounds    17   Safety Interventions   Patient Rounds bed in low position;bed wheels locked;call light in reach;clutter free environment maintained;ID band on;placement of personal items at bedside;toileting offered;visualized patient       Intervention: Safety Promotion/Fall Prevention    17   Safety Interventions   Safety Promotion/Fall Prevention assistive device/personal item within reach;bed alarm set;commode/urinal/bedpan at bedside;Fall Risk reviewed with patient/family;lighting adjusted;medications reviewed;nonskid shoes/socks when out of bed;side rails raised x 3       Intervention: Safety Precautions    17   Safety Interventions   Safety  Precautions emergency equipment at bedside         Goal: Identify Related Risk Factors and Signs and Symptoms  Related risk factors and signs and symptoms are identified upon initiation of Human Response Clinical Practice Guideline (CPG)   Outcome: Ongoing (interventions implemented as appropriate)    03/29/17 0122   Fall Risk   Related Risk Factors (Fall Risk) age-related changes;bladder function altered;confusion/agitation;gait/mobility problems;sensory deficits;sleep pattern alteration;environment unfamiliar   Signs and Symptoms (Fall Risk) presence of risk factors       Goal: Absence of Falls  Patient will demonstrate the desired outcomes by discharge/transition of care.   Outcome: Ongoing (interventions implemented as appropriate)    03/29/17 0122   Fall Risk (Adult)   Absence of Falls making progress toward outcome         Comments:   Fall precautions discussed with pt. Pt verbalized  understanding. Bed in low, locked position, call light within reach, bed alarm active and audiable. Pt remains free from falls this shift.

## 2017-03-30 ENCOUNTER — OUTPATIENT CASE MANAGEMENT (OUTPATIENT)
Dept: ADMINISTRATIVE | Facility: OTHER | Age: 82
End: 2017-03-30

## 2017-03-30 LAB
ALBUMIN SERPL BCP-MCNC: 2.5 G/DL
ALP SERPL-CCNC: 94 U/L
ALT SERPL W/O P-5'-P-CCNC: 17 U/L
ANION GAP SERPL CALC-SCNC: 9 MMOL/L
AST SERPL-CCNC: 49 U/L
BASOPHILS # BLD AUTO: 0.01 K/UL
BASOPHILS NFR BLD: 0.2 %
BILIRUB SERPL-MCNC: 0.5 MG/DL
BUN SERPL-MCNC: 12 MG/DL
CALCIUM SERPL-MCNC: 8.8 MG/DL
CHLORIDE SERPL-SCNC: 100 MMOL/L
CO2 SERPL-SCNC: 28 MMOL/L
CREAT SERPL-MCNC: 1 MG/DL
DIFFERENTIAL METHOD: ABNORMAL
EOSINOPHIL # BLD AUTO: 0.1 K/UL
EOSINOPHIL NFR BLD: 2.3 %
ERYTHROCYTE [DISTWIDTH] IN BLOOD BY AUTOMATED COUNT: 15.1 %
EST. GFR  (AFRICAN AMERICAN): >60 ML/MIN/1.73 M^2
EST. GFR  (NON AFRICAN AMERICAN): >60 ML/MIN/1.73 M^2
GLUCOSE SERPL-MCNC: 90 MG/DL
HCT VFR BLD AUTO: 33.4 %
HGB BLD-MCNC: 11.2 G/DL
LYMPHOCYTES # BLD AUTO: 0.9 K/UL
LYMPHOCYTES NFR BLD: 19.4 %
MAGNESIUM SERPL-MCNC: 1.9 MG/DL
MCH RBC QN AUTO: 25.8 PG
MCHC RBC AUTO-ENTMCNC: 33.5 %
MCV RBC AUTO: 77 FL
MONOCYTES # BLD AUTO: 1 K/UL
MONOCYTES NFR BLD: 19.8 %
NEUTROPHILS # BLD AUTO: 2.8 K/UL
NEUTROPHILS NFR BLD: 58.1 %
PLATELET # BLD AUTO: 222 K/UL
PMV BLD AUTO: 9.7 FL
POTASSIUM SERPL-SCNC: 4 MMOL/L
PROT SERPL-MCNC: 6.3 G/DL
RBC # BLD AUTO: 4.34 M/UL
SODIUM SERPL-SCNC: 137 MMOL/L
VANCOMYCIN TROUGH SERPL-MCNC: 12.1 UG/ML
WBC # BLD AUTO: 4.84 K/UL

## 2017-03-30 PROCEDURE — 11000001 HC ACUTE MED/SURG PRIVATE ROOM

## 2017-03-30 PROCEDURE — 63600175 PHARM REV CODE 636 W HCPCS: Performed by: PSYCHIATRY & NEUROLOGY

## 2017-03-30 PROCEDURE — 99233 SBSQ HOSP IP/OBS HIGH 50: CPT | Mod: GC,,, | Performed by: PSYCHIATRY & NEUROLOGY

## 2017-03-30 PROCEDURE — 36415 COLL VENOUS BLD VENIPUNCTURE: CPT

## 2017-03-30 PROCEDURE — 99232 SBSQ HOSP IP/OBS MODERATE 35: CPT | Mod: ,,, | Performed by: INTERNAL MEDICINE

## 2017-03-30 PROCEDURE — 25000003 PHARM REV CODE 250: Performed by: INTERNAL MEDICINE

## 2017-03-30 PROCEDURE — 83735 ASSAY OF MAGNESIUM: CPT

## 2017-03-30 PROCEDURE — 80202 ASSAY OF VANCOMYCIN: CPT

## 2017-03-30 PROCEDURE — 25000003 PHARM REV CODE 250: Performed by: NURSE PRACTITIONER

## 2017-03-30 PROCEDURE — 85025 COMPLETE CBC W/AUTO DIFF WBC: CPT

## 2017-03-30 PROCEDURE — 63600175 PHARM REV CODE 636 W HCPCS: Performed by: INTERNAL MEDICINE

## 2017-03-30 PROCEDURE — 97530 THERAPEUTIC ACTIVITIES: CPT

## 2017-03-30 PROCEDURE — 80053 COMPREHEN METABOLIC PANEL: CPT

## 2017-03-30 RX ADMIN — TAMSULOSIN HYDROCHLORIDE 0.4 MG: 0.4 CAPSULE ORAL at 08:03

## 2017-03-30 RX ADMIN — CITALOPRAM HYDROBROMIDE 10 MG: 10 TABLET ORAL at 08:03

## 2017-03-30 RX ADMIN — ATORVASTATIN CALCIUM 20 MG: 20 TABLET, FILM COATED ORAL at 08:03

## 2017-03-30 RX ADMIN — LEVETIRACETAM 1000 MG: 10 INJECTION INTRAVENOUS at 09:03

## 2017-03-30 RX ADMIN — LEVETIRACETAM 1000 MG: 10 INJECTION INTRAVENOUS at 08:03

## 2017-03-30 RX ADMIN — HEPARIN SODIUM 5000 UNITS: 5000 INJECTION, SOLUTION INTRAVENOUS; SUBCUTANEOUS at 08:03

## 2017-03-30 RX ADMIN — DORZOLAMIDE HYDROCHLORIDE 1 DROP: 20 SOLUTION/ DROPS OPHTHALMIC at 09:03

## 2017-03-30 RX ADMIN — THIAMINE HYDROCHLORIDE 200 MG: 100 INJECTION, SOLUTION INTRAMUSCULAR; INTRAVENOUS at 01:03

## 2017-03-30 RX ADMIN — TIMOLOL MALEATE 1 DROP: 5 SOLUTION OPHTHALMIC at 09:03

## 2017-03-30 RX ADMIN — AMLODIPINE BESYLATE 10 MG: 10 TABLET ORAL at 08:03

## 2017-03-30 RX ADMIN — VANCOMYCIN HYDROCHLORIDE 750 MG: 1 INJECTION, POWDER, LYOPHILIZED, FOR SOLUTION INTRAVENOUS at 05:03

## 2017-03-30 RX ADMIN — VANCOMYCIN HYDROCHLORIDE 750 MG: 1 INJECTION, POWDER, LYOPHILIZED, FOR SOLUTION INTRAVENOUS at 04:03

## 2017-03-30 NOTE — ASSESSMENT & PLAN NOTE
"Mr. 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 At baseline he is alert and oriented. Blood cultures have grown GPC and GNR (staph and enterococcus), with speciation pending - source remains unclear. LP was attempted at bedside 3/24, however patient was combative and non-compliant despite valium so the procedure was aborted. On rounds 3/25 there was concern that the patient may be actively seizing, so keppra 2g IV was ordered STAT with maintenance of 1g BID.     3/26, doing much better (no focal neuro deficits), but still delirious and not to baseline.  I suspect CNS infection as well as his bacteremia with possibly secondary seizures.  EEG was slow, but without seizure by yesterday afternoon (load of keppra).  Unable to get LP at bedside safely, so discussed empirically treating for encephalitis with ID team.  ID would prefer to get LP (will have to be under general anesthesia) to determine length of treatment.    3/27 Patient calm, still delirious. Only says variations of "I'm doing ok" in response to all questions. I'm wondering if this is an aphasia, and no MRI has been done to rule out a focal lesion to account for this. It would also be worth doing baseline dementia labs. Blood cultures are polymicrobial, still with no clear source (?CSF, ?endocarditis). MRI was unremarkable for any ischemic change or other causes of his continued confusion. This is likely due to a metabolic encephalopathy related to his bacteremia, and MRI and EEG have ruled out stroke or seizures. ID is ok with treating his bacteremia without a clear source, and they recommended a duration that would cover for possible meningitis.     3/30 MRI negative. EEG negative. Patient calm with continued perseveration.    Recommendations:   -- No further imaging or investigations needed at this time - continue treatment of bacteremia  -- OK for discharge from Neurology perspective  -- Follow up " with Neurosurgery for ongoing management of shunt       General Neurology will sign off, please call with any questions or concerns.

## 2017-03-30 NOTE — PROGRESS NOTES
An OPCM welcome Packet and consent form was created and mailed to the patient on 3/30/2017        Thank you,  Awilda Diaz, SSC

## 2017-03-30 NOTE — PROGRESS NOTES
[facility]  Neurology  Progress Note    Patient Name: Atif Neves  MRN: [id number]  Admission Date: 3/23/2017  Hospital Length of Stay: 7 days  Code Status: Full Code   Attending Provider: Claudio Phipps MD  Primary Care Physician: Atif Christian MD   Principal Problem:Delirium, acute      Subjective:     Interval history: No acute events overnight. He continues to respond inappropriately to questions, and perseverates on sentences. No family is at bedside, and he is unable to communicate if there are any changes.     Current Neurological Medications: Keppra    Current Facility-Administered Medications   Medication Dose Route Frequency Provider Last Rate Last Dose    acetaminophen tablet 650 mg  650 mg Oral Q4H PRN Go Torres NP   650 mg at 03/29/17 2141    albuterol-ipratropium 2.5mg-0.5mg/3mL nebulizer solution 3 mL  3 mL Nebulization Q4H PRN Claudio Phipps MD   3 mL at 03/29/17 2154    amlodipine tablet 10 mg  10 mg Oral Daily Claudio Phipps MD   10 mg at 03/30/17 0856    atorvastatin tablet 20 mg  20 mg Oral Daily Go Torres NP   20 mg at 03/30/17 0856    bisacodyl suppository 10 mg  10 mg Rectal Daily PRN Go Torres NP        citalopram tablet 10 mg  10 mg Oral Daily Go Torres NP   10 mg at 03/30/17 0856    dextrose 50% injection 12.5 g  12.5 g Intravenous PRN Go Torres NP        dextrose 50% injection 25 g  25 g Intravenous PRN Go Torres NP        dorzolamide 2 % ophthalmic solution 1 drop  1 drop Left Eye Daily Go Torres NP   1 drop at 03/30/17 0932    glucagon (human recombinant) injection 1 mg  1 mg Intramuscular PRN Go Torres NP        glucose chewable tablet 16 g  16 g Oral PRN Go Torres NP        glucose chewable tablet 24 g  24 g Oral PRN Go Torres NP        heparin (porcine) injection 5,000 Units  5,000 Units Subcutaneous Q12H Go Torres NP   5,000 Units at 03/30/17 0856    levetiracetam in NaCl (iso-os) IVPB 1,000 mg  1,000 mg  Intravenous Q12H Evelyn Jorge  mL/hr at 03/30/17 0903 1,000 mg at 03/30/17 0903    olanzapine tablet 2.5 mg  2.5 mg Oral TID PRN Leyla Bettencourt MD        ondansetron disintegrating tablet 8 mg  8 mg Oral Q8H PRN Go Torres NP        ondansetron injection 4 mg  4 mg Intravenous Q12H PRN Go Torres NP        polyethylene glycol packet 17 g  17 g Oral BID PRN Go Torres NP        ramelteon tablet 8 mg  8 mg Oral Nightly PRN Go Torres NP   8 mg at 03/24/17 2046    tamsulosin 24 hr capsule 0.4 mg  0.4 mg Oral Daily Go Torres NP   0.4 mg at 03/30/17 0856    thiamine (B-1) 200 mg in dextrose 5 % 50 mL IVPB  200 mg Intravenous Q24H Leyla Bettencourt MD 25 mL/hr at 03/30/17 0124 200 mg at 03/30/17 0124    timolol maleate 0.5% ophthalmic solution 1 drop  1 drop Left Eye Daily Go Torres NP   1 drop at 03/30/17 0932    vancomycin (VANCOCIN) 750 mg in dextrose 5 % 250 mL IVPB  750 mg Intravenous Q12H Claudio Phipps .7 mL/hr at 03/30/17 0430 750 mg at 03/30/17 0430       Review of Systems   Unable to perform ROS: Mental status change     Objective:     Vital Signs (Most Recent):  Temp: 98.6 °F (37 °C) (03/30/17 1222)  Pulse: 72 (03/30/17 1222)  Resp: 16 (03/30/17 1222)  BP: (!) 111/58 (03/30/17 1222)  SpO2: 96 % (03/30/17 1222) Vital Signs (24h Range):  Temp:  [98.4 °F (36.9 °C)-101.3 °F (38.5 °C)] 98.6 °F (37 °C)  Pulse:  [66-81] 72  Resp:  [16-18] 16  SpO2:  [93 %-98 %] 96 %  BP: (111-165)/(58-76) 111/58     Weight: 68.9 kg (151 lb 14.4 oz)  Body mass index is 23.86 kg/(m^2).    Physical Exam   Constitutional: He appears well-developed and well-nourished.   HENT:   Head: Normocephalic and atraumatic.   Eyes: EOM are normal. Pupils are equal, round, and reactive to light.   Pulmonary/Chest: Effort normal.   Neurological: He has normal strength.   Skin: Skin is warm and dry.   Nursing note and vitals reviewed.      NEUROLOGICAL EXAMINATION:     MENTAL STATUS   Level of consciousness:  "alert       Awake, alert. Makes eye contact. Does not answer orientation questions. Perseverates on "i'm doing all right" or talking about Rastafari.     CRANIAL NERVES     CN III, IV, VI   Pupils are equal, round, and reactive to light.  Extraocular motions are normal.     CN VII   Facial expression full, symmetric.     MOTOR EXAM   Muscle bulk: normal  Overall muscle tone: normal    Strength   Strength 5/5 throughout.       Significant Labs:   Blood Culture:   No results for input(s): LABBLOO in the last 48 hours.  CBC:     Recent Labs  Lab 03/29/17  0356 03/30/17  0404   WBC 6.79 4.84   HGB 11.2* 11.2*   HCT 33.8* 33.4*    222     CMP:     Recent Labs  Lab 03/29/17  0356 03/30/17  0404   GLU 99 90   * 137   K 4.3 4.0    100   CO2 26 28   BUN 11 12   CREATININE 0.9 1.0   CALCIUM 8.9 8.8   MG 1.7 1.9   PROT 6.3 6.3   ALBUMIN 2.6* 2.5*   BILITOT 0.4 0.5   ALKPHOS 96 94   AST 48* 49*   ALT 17 17   ANIONGAP 6* 9   EGFRNONAA >60.0 >60.0     Urine Culture: No results for input(s): LABURIN in the last 48 hours.  Urine Studies: No results for input(s): COLORU, APPEARANCEUA, PHUR, SPECGRAV, PROTEINUA, GLUCUA, KETONESU, BILIRUBINUA, OCCULTUA, NITRITE, UROBILINOGEN, LEUKOCYTESUR, RBCUA, WBCUA, BACTERIA, SQUAMEPITHEL, HYALINECASTS in the last 48 hours.    Invalid input(s): WRIGHTSUR    Significant Imaging: I have reviewed all pertinent imaging results/findings within the past 24 hours.    Assessment and Plan:     * Delirium, acute  Mr. 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 At baseline he is alert and oriented. Blood cultures have grown GPC and GNR (staph and enterococcus), with speciation pending - source remains unclear. LP was attempted at bedside 3/24, however patient was combative and non-compliant despite valium so the procedure was aborted. On rounds 3/25 there was concern that the patient may be actively seizing, so keppra 2g IV was ordered " "STAT with maintenance of 1g BID.     3/26, doing much better (no focal neuro deficits), but still delirious and not to baseline.  I suspect CNS infection as well as his bacteremia with possibly secondary seizures.  EEG was slow, but without seizure by yesterday afternoon (janet of keppra).  Unable to get LP at bedside safely, so discussed empirically treating for encephalitis with ID team.  ID would prefer to get LP (will have to be under general anesthesia) to determine length of treatment.    3/27 Patient calm, still delirious. Only says variations of "I'm doing ok" in response to all questions. I'm wondering if this is an aphasia, and no MRI has been done to rule out a focal lesion to account for this. It would also be worth doing baseline dementia labs. Blood cultures are polymicrobial, still with no clear source (?CSF, ?endocarditis). MRI was unremarkable for any ischemic change or other causes of his continued confusion. This is likely due to a metabolic encephalopathy related to his bacteremia, and MRI and EEG have ruled out stroke or seizures. ID is ok with treating his bacteremia without a clear source, and they recommended a duration that would cover for possible meningitis.     3/30 MRI negative. EEG negative. Patient calm with continued perseveration.    Recommendations:   -- No further imaging or investigations needed at this time - continue treatment of bacteremia  -- OK for discharge from Neurology perspective  -- Follow up with Neurosurgery for ongoing management of shunt       General Neurology will sign off, please call with any questions or concerns.        VTE Risk Mitigation         Ordered     heparin (porcine) injection 5,000 Units  Every 12 hours     Route:  Subcutaneous        03/24/17 0018     Medium Risk of VTE  Once      03/24/17 0018     Place sequential compression device  Until discontinued      03/24/17 0018     Place LIAM hose  Until discontinued      03/24/17 0018          Rosi Orellana" MD John  Neurology  [facility]

## 2017-03-30 NOTE — LETTER
March 30, 2017    Atif Neves  [address] [address]             Outpatient Case Management  [address]  [place] [address] Dear Atif Neves:    We understand that receiving many services from different doctors and healthcare providers is overwhelming. There are appointments to make, transportation to arrange, dietary instructions to understand, and new medications to obtain.    This is where [facility] can help.     You are eligible to receive Outpatient Case Management services when you have healthcare needs that require the coordination of many providers, treatments, and services. You also qualify if you need assistance with a new treatment plan.     There is no charge for this support. You may have been referred to this program from your doctor(s), hospital staff member(s), or insurance company but you always have a choice to participate or not participate. To participate, you must give us your permission to be enrolled.     When you are enrolled in the [facility] program, the  who is assigned to you is    Neli Frazier RN  [phone]    Depending on your needs and wishes, your  may speak with you by phone, visit you at your place of living (for example your home, skilled nursing facility, or rehabilitation facility), or meet you at your doctors office.     Your  will tell you why you have been selected to participate in the program and will complete an assessment of your needs. Then a personalized plan of care will be developed with you and or your caregiver.             Here are examples of the services your [facility]  provides.     Coordinate communication among multiple providers.   Arrange for transportation, doctors visits, durable medical equipment, home care services, and special clinics.    Provide coaching on how to manage your health condition.    Answer  questions about your health condition.   Help you understand your doctors treatment plan.    Provide additional instruction about your health condition, treatments, and medications.    Help you obtain information about your insurance coverage.    Advocate for your individual needs.    Request a Licensed Clinical  (LCSW) to visit you if you need their services. LCSWs help with long term planning (discussing placement options, advanced planning directives), financial planning, and assistance (for example rent, utilities, medication funding).     Your  will coordinate their activities with other outpatient services you are receiving. All services provided by [facility]  are coordinated with and communicated to your primary care physician.    Our goal is to help you manage your health condition(s) safely within your living environment, whether that is your home or a medical facility. We want to help you function at the healthiest and highest level possible.     Sincerely,      Slim Tom MD  Medical Director    Enclosures:    Frequently Asked Questions  Patient Rights and Responsibilities   Reporting a Grievance or Complaint  Consent/Release of Information  Stamped Addressed Envelope                  Frequently Asked Questions about [facility]    What is [facility]?  Outpatient Case management is not Home healthcare services. [facility]  do not provide hands-on care. [facility]  will work with your doctor to arrange for home health services, if needed. Home health services have a limited duration and there are some restrictions as to who can get these services. There is no prescribed limit to the amount of time you receive [facility] services. [facility]  are not agents of your insurance company. However, [facility] Case  Managers can help you obtain information from your insurance company.     Who are the [facility] ?  [facility]  are Registered Nurses and Social Workers. It is important to remember that you and your  are a team that works together with your primary care physician to create your individualized plan of care. The ultimate goal of your care plan is to help you implement your doctors treatment plan and to help you function at the highest level of health possible.     What are my rights as a patient?  It is important for you to know and understand your rights and responsibilities while receiving services from the [facility] program. We have enclosed a complete description of your rights and responsibilities. You can help to make your care more effective when you understand your right and responsibilities.     What is needed to be enrolled in the program?  You are only enrolled in the [facility] Program when you give us your consent to participate. You will find enclosed a consent form. You are receiving this letter because you or your caregivers have given us a verbal consent to enroll you in [facility] Program. We ask that you sign and return the enclosed written consent in the stamped self-addressed envelope.                           Patient Rights and Responsibilities    We consider you a partner in your care. When you are well informed, participate in treatment decisions and communicate openly with your doctor and other healthcare professionals, you help make your care more effective.     While you are in the Outpatient Case Management Program, your rights include the following:     You have a right to be provided services in a non-discriminatory manner in accordance with the provisions of Title VI of the Civil Rights Act of 1964, Section 504 of the Rehabilitation Act of 1973, the Age  Discrimination Act of 1975, the Americans with Disabilities Act as well as any other applicable Federal and State laws and regulations.     You have the right to a reasonable, timely response to your request or need for care, as well as the right to considerate and respectful care including an environment that preserves dignity and contributes to a positive self-image. You are responsible for being considerate and respectful of our staff.     You have a right to information regarding patient rights, advocacy services and complaint mechanisms, and the right to prompt resolution of any complaint. You or a designee has the right to participate in the resolution of ethical issues surrounding your care. You have a right to file a complaint if you feel that your rights have been infringed, without fear or penalty from [facility] or the federal government. You may file a complaint with the Director of Outpatient Case Management by calling [phone]. At any time, you may lodge a grievance with the [facility] by calling [phone], or the Joint Commission on Accreditation of Healthcare Organizations at [phone].     You, or someone acting on your behalf, have the right to understandable information on your health status, treatment and progress in order to make decisions. You have the right to know the nature, risks and alternatives to treatment. You have the right to be informed, when appropriate, regarding the outcome of the care that has been provided. You have the right to refuse treatment to the extent permitted by law, and the right to be informed of the alternatives and consequences of refusing treatment.     You, in collaboration with your physician, have the right to make decisions regarding care and the right to participate in the development and implementation of the plan of care and effective pain management. You have the right to know the name and professional status of  those responsible for the delivery of your care and treatment.       You have a right, within legal guidelines, to have a guardian, next-of-kin or legal designee exercises your patient rights when you are unable to do so. You have the right for your wishes regarding end-of-life decisions to be addressed by the healthcare team through advance directives. You have the right to personal privacy and confidentiality and to expect confidentiality of all records and communications pertaining to your care.      You have the right to receive communications about your health information confidentially. You have the right to request restrictions on the uses and disclosures of your health information. You have the right to inspect, copy, request amendments and receive an accounting of to whom we have disclosed your health information.     You have the right to be provided with interpretation services if you do not speak English; to alternative communication techniques if you are hearing or vision impaired; and to have any other resources needed on your behalf to ensure effective communication. These services are provided free of charge.     You have a right to personal safety (free from mental, physical, sexual and verbal abuse, neglect and exploitation). You have the right to access protective and advocacy services.     Advance Directives  A Patient Advocate is available to meet with patients to answer questions regarding advance directives.    Living Will  A document that outlines what medical treatment the patient does or does not want in the event the patient becomes unable to make those decisions at the appropriate time.    Durable Medical Power of   A document by which the patient designates an individual to be responsible for making medical decisions in the event the patient becomes unable to do so.    HIPAA Notice of Privacy Practices  Your medical information is governed by federal privacy laws. HIPAA  protects private medical information and how that information is disclosed. If you have a question regarding the HIPAA Notice of Privacy Practices, or if you believe your privacy rights have been violated, you may call our designated hotline at [phone].            Quality Improvement  Because we consistently strive to improve the care and service provided to our patients, we welcome your feedback. Your comments are an important part of our quality improvement process, as we like to know what we are doing right and which areas are in need of improvement. Our policy is to listen, be responsive and provide you with an appropriate and timely follow-up to your questions or concerns. Our goal is active patient and family involvement in all aspects of the care process.                                                                                  Reporting a Grievance or Complaint    During your time with the [facility] team you may have a grievance or complaint with our services. Your Patients Bill of Rights gives patients, families, and caregivers the right to express concerns and grievances and the right to expect a reasonable and timely response.     Your presentation of your concerns is not viewed negatively. It is an opportunity for us to improve the quality of our care and services we provide to you.     You may report your concerns directly to your , or you can phone in a complaint to:     Director of Outpatient Case Management  [phone]    You may also send a complaint letter to:    Director of Outpatient Case Management Services  [address] [place] [id number]    Tell us the details of your complaint and provide us with a contact phone number so we can contact you to obtain additional information. We will return a call to you within two business days of our receipt of your complaint, and to request additional information as needed. If you choose to  mail a letter, your complaint may take a few days longer to reach us.     All grievances will be addressed as quickly as possible. A grievance or complaint that involves situations or practices which place patients in immediate danger will be addressed as an urgent matter. We will work to resolve all other complaints within seven days of receipt. By that time, you will receive a phone call with either the resolution of your complaint, or a plan for corrective action. A formal written response will be sent to you within 30 days of receipt of your grievance.     If a resolution cannot be completed within 30 days, a letter will be sent to you or your family member with an estimated time for the final response.    Additionally, all patients have the right to file complaints with external agencies, without exception. Complaints/grievances can be addressed to the following agencies:            Patient Safety or Quality of Care Concerns  Office of Quality Monitoring   [facility] [place] [address]  [phone] Toll Free    HIPPA Privacy/Security Concerns  Office for Civil Rights Region IV  [place] Department of Health & Human Services  [address] [place] [address], [address]  [place] [address] [phone] Phone  [phone] TDD  [phone] Toll Free    Medicare/Medicaid Billing Concerns  [place] for Medicare & Medicaid Services  Region [address]  [address] [place] [address], [address]  [place] [address] [phone] Phone  [phone] Toll Free    General Concerns  [place] [facility] ([facility])  ([phone] Toll Free Complaint Hotline                                                              Consent Form/Release of Information    By signing--     (1) I agree I have read the Outpatient Case Management information provided to me;     (2) I agree to voluntarily participate in the Outpatient Case Management program;     (3) I understand I must consent to  participation in the Outpatient Case Management program during my first interview with my ;    (4) I consent to the discussion and release of my personal health information to my healthcare team (including my personal physician, my medical home care team, any specialty physician(s), and my [facility] team);     (5) I agree my consent is valid for the length of time I am receiving Outpatient Case Management;    (6) I agree to referrals to community resources which my Case Management team recommends for me. I agree to the release of my personal information and personal health information as necessary to referral sources.    ___________________________________________________________________  Patients Printed Name     ___________________________________________________________________  Patients Signature       Date    If patient is in being cared for, please complete this section:     ___________________________________________________________________  Printed Name of Person Caring For Patient   Relationship To Patient    ___________________________________________________________________   Signature of Person Caring For Patient     Date    PLEASE SIGN AND RETURN IN THE ENCLOSED PRE-ADDRESSED ENVELOPE.

## 2017-03-30 NOTE — ASSESSMENT & PLAN NOTE
- remains altered, unclear etiology.  - continue all safety measures - high risk of injury / falls  - ammonia 23 on admission  - CT head unremarkable but less than optimal imaging related to motion artifact  - Neurology consulted; LP attempted. Appeared that infectious cause is possible given positive blood cultures but cultures are polymicrobial. ID consulted; repeat BCs are negative so far.  - Slightly improved but not at all near baseline. Was seen normal in Clinic, by Dr. Sam, 1 day prior to admission.  - possible seizure on 3/25/17, continuing Keppra.  - MRI negative  - Neuro signed off

## 2017-03-30 NOTE — PLAN OF CARE
Problem: Physical Therapy Goal  Goal: Physical Therapy Goal  Goals to be met by: 4/3/17     Patient will increase functional independence with mobility by performin. Supine to sit with CGA  2. Sit to supine with CGA  3. Sit to stand transfer with SBA  4. Bed to chair transfer with Stand-by Assistance using Rolling Walker  5. Gait x 75 feet with Contact Guard Assistance using Rolling Walker.   6. Ascend/Descend 7 inch curb step with Minimal Assistance using Rolling Walker.  7. Lower extremity exercise program x20-30 reps per handout, with independence   Outcome: Ongoing (interventions implemented as appropriate)  Pt progressing towards goals. All goals remain appropriate at this time.     Anjana Jacinto, DELORES, PT  3/30/2017

## 2017-03-30 NOTE — SUBJECTIVE & OBJECTIVE
Subjective:     Interval history: No acute events overnight. He continues to respond inappropriately to questions, and perseverates on sentences. No family is at bedside, and he is unable to communicate if there are any changes.     Current Neurological Medications: Keppra    Current Facility-Administered Medications   Medication Dose Route Frequency Provider Last Rate Last Dose    acetaminophen tablet 650 mg  650 mg Oral Q4H PRN Go Torres NP   650 mg at 03/29/17 2141    albuterol-ipratropium 2.5mg-0.5mg/3mL nebulizer solution 3 mL  3 mL Nebulization Q4H PRN Claudio Phipps MD   3 mL at 03/29/17 2154    amlodipine tablet 10 mg  10 mg Oral Daily Claudio Phipps MD   10 mg at 03/30/17 0856    atorvastatin tablet 20 mg  20 mg Oral Daily Go Torres NP   20 mg at 03/30/17 0856    bisacodyl suppository 10 mg  10 mg Rectal Daily PRN Go Torres NP        citalopram tablet 10 mg  10 mg Oral Daily Go Torres NP   10 mg at 03/30/17 0856    dextrose 50% injection 12.5 g  12.5 g Intravenous PRN Go Torres NP        dextrose 50% injection 25 g  25 g Intravenous PRN Go Torres NP        dorzolamide 2 % ophthalmic solution 1 drop  1 drop Left Eye Daily Go Torres NP   1 drop at 03/30/17 0932    glucagon (human recombinant) injection 1 mg  1 mg Intramuscular PRN Go Torres NP        glucose chewable tablet 16 g  16 g Oral PRN Go Torres NP        glucose chewable tablet 24 g  24 g Oral PRN Go Torres NP        heparin (porcine) injection 5,000 Units  5,000 Units Subcutaneous Q12H Go Torres NP   5,000 Units at 03/30/17 0856    levetiracetam in NaCl (iso-os) IVPB 1,000 mg  1,000 mg Intravenous Q12H Evelyn Jorge  mL/hr at 03/30/17 0903 1,000 mg at 03/30/17 0903    olanzapine tablet 2.5 mg  2.5 mg Oral TID PRN Leyla Bettencourt MD        ondansetron disintegrating tablet 8 mg  8 mg Oral Q8H PRN Rusty. Way, NP        ondansetron injection 4 mg  4 mg Intravenous Q12H PRN Rusty. Way,  "NP        polyethylene glycol packet 17 g  17 g Oral BID PRN Go Torres NP        ramelteon tablet 8 mg  8 mg Oral Nightly PRN Go Torres NP   8 mg at 03/24/17 2046    tamsulosin 24 hr capsule 0.4 mg  0.4 mg Oral Daily Go Torres NP   0.4 mg at 03/30/17 0856    thiamine (B-1) 200 mg in dextrose 5 % 50 mL IVPB  200 mg Intravenous Q24H Leyla Bettencourt MD 25 mL/hr at 03/30/17 0124 200 mg at 03/30/17 0124    timolol maleate 0.5% ophthalmic solution 1 drop  1 drop Left Eye Daily Go Torres NP   1 drop at 03/30/17 0932    vancomycin (VANCOCIN) 750 mg in dextrose 5 % 250 mL IVPB  750 mg Intravenous Q12H Claudio Phipps .7 mL/hr at 03/30/17 0430 750 mg at 03/30/17 0430       Review of Systems   Unable to perform ROS: Mental status change     Objective:     Vital Signs (Most Recent):  Temp: 98.6 °F (37 °C) (03/30/17 1222)  Pulse: 72 (03/30/17 1222)  Resp: 16 (03/30/17 1222)  BP: (!) 111/58 (03/30/17 1222)  SpO2: 96 % (03/30/17 1222) Vital Signs (24h Range):  Temp:  [98.4 °F (36.9 °C)-101.3 °F (38.5 °C)] 98.6 °F (37 °C)  Pulse:  [66-81] 72  Resp:  [16-18] 16  SpO2:  [93 %-98 %] 96 %  BP: (111-165)/(58-76) 111/58     Weight: 68.9 kg (151 lb 14.4 oz)  Body mass index is 23.86 kg/(m^2).    Physical Exam   Constitutional: He appears well-developed and well-nourished.   HENT:   Head: Normocephalic and atraumatic.   Eyes: EOM are normal. Pupils are equal, round, and reactive to light.   Pulmonary/Chest: Effort normal.   Neurological: He has normal strength.   Skin: Skin is warm and dry.   Nursing note and vitals reviewed.      NEUROLOGICAL EXAMINATION:     MENTAL STATUS   Level of consciousness: alert       Awake, alert. Makes eye contact. Does not answer orientation questions. Perseverates on "i'm doing all right" or talking about [employer].     CRANIAL NERVES     CN III, IV, VI   Pupils are equal, round, and reactive to light.  Extraocular motions are normal.     CN VII   Facial expression full, symmetric. "     MOTOR EXAM   Muscle bulk: normal  Overall muscle tone: normal    Strength   Strength 5/5 throughout.       Significant Labs:   Blood Culture:   No results for input(s): LABBLOO in the last 48 hours.  CBC:     Recent Labs  Lab 03/29/17  0356 03/30/17  0404   WBC 6.79 4.84   HGB 11.2* 11.2*   HCT 33.8* 33.4*    222     CMP:     Recent Labs  Lab 03/29/17  0356 03/30/17  0404   GLU 99 90   * 137   K 4.3 4.0    100   CO2 26 28   BUN 11 12   CREATININE 0.9 1.0   CALCIUM 8.9 8.8   MG 1.7 1.9   PROT 6.3 6.3   ALBUMIN 2.6* 2.5*   BILITOT 0.4 0.5   ALKPHOS 96 94   AST 48* 49*   ALT 17 17   ANIONGAP 6* 9   EGFRNONAA >60.0 >60.0     Urine Culture: No results for input(s): LABURIN in the last 48 hours.  Urine Studies: No results for input(s): COLORU, APPEARANCEUA, PHUR, SPECGRAV, PROTEINUA, GLUCUA, KETONESU, BILIRUBINUA, OCCULTUA, NITRITE, UROBILINOGEN, LEUKOCYTESUR, RBCUA, WBCUA, BACTERIA, SQUAMEPITHEL, HYALINECASTS in the last 48 hours.    Invalid input(s): GALEN    Significant Imaging: I have reviewed all pertinent imaging results/findings within the past 24 hours.

## 2017-03-30 NOTE — PLAN OF CARE
Spoke with patient's son Matthew, [phone], explained to him that [facility] sister facility, [facility], is willing to accept patient, patient's son would like to get answer from [facility] before making choice, will follow and update.    1052 Referral to [facility] still under review.  1540 [facility] declined patient.  1540 Notified by [facility] representative that they don't administer IV ABX.    1550 Spoke with patient's son, Matthew, updated him on status of SNF placement, notified him that patient was declined by [facility], and [facility], I also informed him that [facility] and [facility] could possibly take patient.  Matthew was upset that his options were not available and stated that he needs to talk with family before making any other decisions.  I explained to Matthew that I will call him back in morning to see if he made a decision and that we need to actively work on getting patient to next level of care, will follow and update.

## 2017-03-30 NOTE — PROGRESS NOTES
Returned to room from MRI. No immediate signs of distress. Appears to be resting with eyes closed. Aroused by touch

## 2017-03-30 NOTE — PT/OT/SLP PROGRESS
Physical Therapy  Treatment    Atif Neves   MRN: [id number]   Admitting Diagnosis: Delirium, acute    PT Received On: 17  PT Start Time: 1445     PT Stop Time: 1513    PT Total Time (min): 28 min       Billable Minutes:  Therapeutic Activity 28 minutes    Treatment Type: Treatment  PT/PTA: PT     PTA Visit Number: 0       General Precautions: Standard, fall (delirium )  Orthopedic Precautions: N/A   Braces: N/A    Do you have any cultural, spiritual, Holiness conflicts, given your current situation?: none stated    Subjective:  Communicated with RN prior to session.  Pt found soiled supine in bed with no family present.     Pain Ratin/10  Pain Rating Post-Intervention: 0/10    Objective:   Patient found with: bed alarm, telemetry    Functional Mobility:  Bed Mobility:   Rolling/Turning to Left: Maximum assistance, With side rail (x 2 trials)  Rolling/Turning Right: Maximum assistance, With side rail (x 2 trials)  Supine to Sit: Maximum Assistance (from  R SL)  Sit to Supine: Minimum Assistance (at BLE)    Transfers:  Sit <> Stand Assistance: Minimum Assistance (x 4 trials)  Sit <> Stand Assistive Device:  (support of therapist)    Gait:   Gait Distance: 3 side steps towards HOB   Assistance 1: Minimum assistance  Gait Assistive Device:  (support of therapist)  Gait Pattern: swing-to gait  Gait Deviation(s): decreased alisia, increased time in double stance, decreased velocity of limb motion, decreased step length, decreased stride length, foot flat, decreased weight-shifting ability, forward lean    Balance:   Static Sit: SBA  Static Stand: CGA    Therapeutic Activities and Exercises:  Therapeutic activities aimed to increase pt's independence, safety, and efficiency with bed mobility and functional transfers. See above for assistance levels.  · 2 trials of rolling in B directions with max A and side ride via log roll. Pt required total A for perineal hygiene.    · 4 trials of sit to stand with support  of therapist and min A. Attempted transfer training with RW but patient unable to follow commands for AD management     AM-PAC 6 CLICK MOBILITY  How much help from another person does this patient currently need?   1 = Unable, Total/Dependent Assistance  2 = A lot, Maximum/Moderate Assistance  3 = A little, Minimum/Contact Guard/Supervision  4 = None, Modified [place]/Independent    Turning over in bed (including adjusting bedclothes, sheets and blankets)?: 2  Sitting down on and standing up from a chair with arms (e.g., wheelchair, bedside commode, etc.): 3  Moving from lying on back to sitting on the side of the bed?: 2  Moving to and from a bed to a chair (including a wheelchair)?: 3  Need to walk in hospital room?: 3  Climbing 3-5 steps with a railing?: 1  Total Score: 14    AM-PAC Raw Score CMS G-Code Modifier Level of Impairment Assistance   6 % Total / Unable   7 - 9 CM 80 - 100% Maximal Assist   10 - 14 CL 60 - 80% Moderate Assist   15 - 19 CK 40 - 60% Moderate Assist   20 - 22 CJ 20 - 40% Minimal Assist   23 CI 1-20% SBA / CGA   24 CH 0% Independent/ Mod I     Patient left supine with all lines intact, call button in reach, bed alarm on and RN Ma present.    Assessment:  Atif Neves is a 88 y.o. male with a medical diagnosis of Delirium, acute and presents with problems listed below. Pt progressing towards goals, but not at PLOF. Pt tolerated session fair but continues to present with confusion and minimal command follow. Pt completed bed mobility with max A and transfers with min A via support of therapist. Pt displays significant decreased safety awareness during all functional mobility. Pt would benefit from continued PT services to improve overall functional mobility. Recommend d/c to Skilled nursing facility to maximize functional independence.    Rehab identified problem list/impairments: Rehab identified problem list/impairments: weakness, impaired endurance, impaired self care skills,  impaired functional mobilty, gait instability, impaired balance, impaired cognition, decreased coordination, decreased lower extremity function, decreased safety awareness, decreased ROM    Rehab potential is fair.    Activity tolerance: Fair    Discharge recommendations: Discharge Facility/Level Of Care Needs: nursing facility, skilled     Barriers to discharge: Barriers to Discharge: Decreased caregiver support (increased assistance)    Equipment recommendations: Equipment Needed After Discharge:  (TBD at next level of care)     GOALS:   Physical Therapy Goals        Problem: Physical Therapy Goal    Goal Priority Disciplines Outcome Goal Variances Interventions   Physical Therapy Goal     PT/OT, PT Ongoing (interventions implemented as appropriate)     Description:  Goals to be met by: 4/3/17     Patient will increase functional independence with mobility by performin. Supine to sit with CGA  2. Sit to supine with CGA  3. Sit to stand transfer with SBA  4. Bed to chair transfer with Stand-by Assistance using Rolling Walker  5. Gait  x 75 feet with Contact Guard Assistance using Rolling Walker.   6. Ascend/Descend 7 inch curb step with Minimal Assistance using Rolling Walker.  7. Lower extremity exercise program x20-30 reps per handout, with independence                 PLAN:    Patient to be seen 4 x/week  to address the above listed problems via gait training, therapeutic activities, therapeutic exercises, neuromuscular re-education  Plan of Care expires:    Plan of Care reviewed with: patient      Anjana Jacinto, PT  2017

## 2017-03-30 NOTE — PLAN OF CARE
REFERRAL SENT TO     [facility] [phone]    PAULINE IS F/U.

## 2017-03-30 NOTE — SUBJECTIVE & OBJECTIVE
Interval History:     Patient continues to be confused    Review of Systems   Unable to perform ROS: Other     Objective:     Vital Signs (Most Recent):  Temp: 98.6 °F (37 °C) (03/30/17 1222)  Pulse: 72 (03/30/17 1222)  Resp: 16 (03/30/17 1222)  BP: (!) 111/58 (03/30/17 1222)  SpO2: 96 % (03/30/17 1222) Vital Signs (24h Range):  Temp:  [98.4 °F (36.9 °C)-101.3 °F (38.5 °C)] 98.6 °F (37 °C)  Pulse:  [66-81] 72  Resp:  [16-18] 16  SpO2:  [93 %-98 %] 96 %  BP: (111-165)/(58-76) 111/58     Weight: 68.9 kg (151 lb 14.4 oz)  Body mass index is 23.86 kg/(m^2).    Intake/Output Summary (Last 24 hours) at 03/30/17 1346  Last data filed at 03/30/17 1314   Gross per 24 hour   Intake              970 ml   Output                0 ml   Net              970 ml      Physical Exam   Constitutional: He appears well-nourished.   HENT:   Head: Atraumatic.   Cardiovascular: Normal rate.    Pulmonary/Chest: Effort normal.   Abdominal: Soft.   Neurological: He is alert.   Skin: Skin is warm.   Psychiatric: He has a normal mood and affect.   Vitals reviewed.      Significant Labs:   BMP:   Recent Labs  Lab 03/30/17  0404   GLU 90      K 4.0      CO2 28   BUN 12   CREATININE 1.0   CALCIUM 8.8   MG 1.9     CBC:   Recent Labs  Lab 03/29/17  0356 03/30/17  0404   WBC 6.79 4.84   HGB 11.2* 11.2*   HCT 33.8* 33.4*    222       Significant Imaging: None

## 2017-03-30 NOTE — PROGRESS NOTES
Transported to MRI by pt escort via stretcher. No signs of immediate distress. Appears to be resting with eyes closed, easily arousable. Denies pain and discomfort at this time

## 2017-03-31 LAB
ALBUMIN SERPL BCP-MCNC: 2.6 G/DL
ALP SERPL-CCNC: 89 U/L
ALT SERPL W/O P-5'-P-CCNC: 20 U/L
ANION GAP SERPL CALC-SCNC: 7 MMOL/L
AST SERPL-CCNC: 46 U/L
BACTERIA BLD CULT: NORMAL
BASOPHILS # BLD AUTO: 0.02 K/UL
BASOPHILS NFR BLD: 0.5 %
BILIRUB SERPL-MCNC: 0.4 MG/DL
BUN SERPL-MCNC: 12 MG/DL
CALCIUM SERPL-MCNC: 8.5 MG/DL
CHLORIDE SERPL-SCNC: 99 MMOL/L
CO2 SERPL-SCNC: 27 MMOL/L
CREAT SERPL-MCNC: 0.8 MG/DL
DIFFERENTIAL METHOD: ABNORMAL
EOSINOPHIL # BLD AUTO: 0.1 K/UL
EOSINOPHIL NFR BLD: 2.8 %
ERYTHROCYTE [DISTWIDTH] IN BLOOD BY AUTOMATED COUNT: 15 %
EST. GFR  (AFRICAN AMERICAN): >60 ML/MIN/1.73 M^2
EST. GFR  (NON AFRICAN AMERICAN): >60 ML/MIN/1.73 M^2
GLUCOSE SERPL-MCNC: 113 MG/DL
HCT VFR BLD AUTO: 33 %
HGB BLD-MCNC: 11.1 G/DL
LYMPHOCYTES # BLD AUTO: 0.9 K/UL
LYMPHOCYTES NFR BLD: 21.4 %
MAGNESIUM SERPL-MCNC: 1.6 MG/DL
MCH RBC QN AUTO: 25.6 PG
MCHC RBC AUTO-ENTMCNC: 33.6 %
MCV RBC AUTO: 76 FL
MONOCYTES # BLD AUTO: 0.7 K/UL
MONOCYTES NFR BLD: 16.5 %
NEUTROPHILS # BLD AUTO: 2.5 K/UL
NEUTROPHILS NFR BLD: 58.6 %
PLATELET # BLD AUTO: 230 K/UL
PMV BLD AUTO: 10 FL
POTASSIUM SERPL-SCNC: 3.7 MMOL/L
PROT SERPL-MCNC: 6.2 G/DL
RBC # BLD AUTO: 4.33 M/UL
SODIUM SERPL-SCNC: 133 MMOL/L
TB INDURATION 48 - 72 HR READ: 0 MM
WBC # BLD AUTO: 4.3 K/UL

## 2017-03-31 PROCEDURE — 63600175 PHARM REV CODE 636 W HCPCS: Performed by: INTERNAL MEDICINE

## 2017-03-31 PROCEDURE — 99232 SBSQ HOSP IP/OBS MODERATE 35: CPT | Mod: ,,, | Performed by: INTERNAL MEDICINE

## 2017-03-31 PROCEDURE — 80053 COMPREHEN METABOLIC PANEL: CPT

## 2017-03-31 PROCEDURE — 83735 ASSAY OF MAGNESIUM: CPT

## 2017-03-31 PROCEDURE — 36415 COLL VENOUS BLD VENIPUNCTURE: CPT

## 2017-03-31 PROCEDURE — 63600175 PHARM REV CODE 636 W HCPCS: Performed by: PSYCHIATRY & NEUROLOGY

## 2017-03-31 PROCEDURE — 02HV33Z INSERTION OF INFUSION DEVICE INTO SUPERIOR VENA CAVA, PERCUTANEOUS APPROACH: ICD-10-PCS | Performed by: INTERNAL MEDICINE

## 2017-03-31 PROCEDURE — 25000003 PHARM REV CODE 250: Performed by: INTERNAL MEDICINE

## 2017-03-31 PROCEDURE — 11000001 HC ACUTE MED/SURG PRIVATE ROOM

## 2017-03-31 PROCEDURE — 97110 THERAPEUTIC EXERCISES: CPT

## 2017-03-31 PROCEDURE — 85025 COMPLETE CBC W/AUTO DIFF WBC: CPT

## 2017-03-31 PROCEDURE — 97535 SELF CARE MNGMENT TRAINING: CPT

## 2017-03-31 PROCEDURE — 25000003 PHARM REV CODE 250: Performed by: NURSE PRACTITIONER

## 2017-03-31 RX ORDER — SODIUM CHLORIDE 0.9 % (FLUSH) 0.9 %
10 SYRINGE (ML) INJECTION
Status: DISCONTINUED | OUTPATIENT
Start: 2017-03-31 | End: 2017-04-03 | Stop reason: HOSPADM

## 2017-03-31 RX ORDER — LEVETIRACETAM 1000 MG/1
1000 TABLET ORAL 2 TIMES DAILY
Qty: 60 TABLET | Refills: 11
Start: 2017-03-31 | End: 2018-04-18 | Stop reason: SDUPTHER

## 2017-03-31 RX ORDER — POLYETHYLENE GLYCOL 3350 17 G/17G
17 POWDER, FOR SOLUTION ORAL 2 TIMES DAILY PRN
Qty: 1 BOTTLE | Refills: 3 | Status: SHIPPED | OUTPATIENT
Start: 2017-03-31 | End: 2019-01-22

## 2017-03-31 RX ORDER — SODIUM CHLORIDE 0.9 % (FLUSH) 0.9 %
10 SYRINGE (ML) INJECTION EVERY 6 HOURS
Status: DISCONTINUED | OUTPATIENT
Start: 2017-03-31 | End: 2017-04-03 | Stop reason: HOSPADM

## 2017-03-31 RX ADMIN — Medication 10 ML: at 10:03

## 2017-03-31 RX ADMIN — CITALOPRAM HYDROBROMIDE 10 MG: 10 TABLET ORAL at 09:03

## 2017-03-31 RX ADMIN — AMLODIPINE BESYLATE 10 MG: 10 TABLET ORAL at 09:03

## 2017-03-31 RX ADMIN — TAMSULOSIN HYDROCHLORIDE 0.4 MG: 0.4 CAPSULE ORAL at 09:03

## 2017-03-31 RX ADMIN — ATORVASTATIN CALCIUM 20 MG: 20 TABLET, FILM COATED ORAL at 09:03

## 2017-03-31 RX ADMIN — HEPARIN SODIUM 5000 UNITS: 5000 INJECTION, SOLUTION INTRAVENOUS; SUBCUTANEOUS at 10:03

## 2017-03-31 RX ADMIN — RAMELTEON 8 MG: 8 TABLET, FILM COATED ORAL at 10:03

## 2017-03-31 RX ADMIN — DORZOLAMIDE HYDROCHLORIDE 1 DROP: 20 SOLUTION/ DROPS OPHTHALMIC at 09:03

## 2017-03-31 RX ADMIN — LEVETIRACETAM 1000 MG: 10 INJECTION INTRAVENOUS at 10:03

## 2017-03-31 RX ADMIN — VANCOMYCIN HYDROCHLORIDE 750 MG: 1 INJECTION, POWDER, LYOPHILIZED, FOR SOLUTION INTRAVENOUS at 04:03

## 2017-03-31 RX ADMIN — TIMOLOL MALEATE 1 DROP: 5 SOLUTION OPHTHALMIC at 09:03

## 2017-03-31 RX ADMIN — VANCOMYCIN HYDROCHLORIDE 1000 MG: 1 INJECTION, POWDER, LYOPHILIZED, FOR SOLUTION INTRAVENOUS at 03:03

## 2017-03-31 RX ADMIN — OLANZAPINE 2.5 MG: 2.5 TABLET, FILM COATED ORAL at 10:03

## 2017-03-31 RX ADMIN — LEVETIRACETAM 1000 MG: 10 INJECTION INTRAVENOUS at 09:03

## 2017-03-31 RX ADMIN — THIAMINE HYDROCHLORIDE 200 MG: 100 INJECTION, SOLUTION INTRAMUSCULAR; INTRAVENOUS at 12:03

## 2017-03-31 RX ADMIN — HEPARIN SODIUM 5000 UNITS: 5000 INJECTION, SOLUTION INTRAVENOUS; SUBCUTANEOUS at 09:03

## 2017-03-31 NOTE — CONSULTS
PharmD Consult received for:  1.) Vancomycin dosing:  · Approximate CrCl=60 ml/min  · Bacteremia goal trough 15-20  · Vancomycin 750 mg every 12 hours currently ordered  · Trough = 12.1 (goal 15-20)  · Will recommend to increase to 1 gram every 12 hours with trough prior to 4th dose      **Note: Consults are reviewed Monday-Friday 7:30-4pm.     THE ABOVE RECOMMENDATIONS ARE ONLY SUGGESTED.THE RECOMMENDATIONS SHOULD BE CONSIDERED IN CONJUNCTION WITH ALL PATIENT FACTORS.    Thank you for the consult,  Anabel Melo, SofiaD  [phone]

## 2017-03-31 NOTE — PLAN OF CARE
Spoke with patient's daughter Colleen ([phone]), patient's daughter okay with sending referral to [facility], notified [facility] to send referral, will follow.    [facility] willing to submit auth, plan is discharge Monday, will follow.

## 2017-03-31 NOTE — CONSULTS
Double lumen PICC placed in right brachial vein, 35cm in length, 0cm exposed with arm circumference 33 cm.  Lot# [id number]

## 2017-03-31 NOTE — PT/OT/SLP PROGRESS
"Occupational Therapy  Treatment    Atif Neves   MRN: [id number]   Admitting Diagnosis: Delirium, acute    OT Date of Treatment: 03/31/17   OT Start Time: 1349  OT Stop Time: 1414  OT Total Time (min): 25 min    Billable Minutes:  Self Care/Home Management 17 min and Therapeutic Exercise 8 min    General Precautions: Standard, fall  Orthopedic Precautions: N/A  Braces: N/A         Subjective:  Communicated with nurse prior to session.  "I'm starting to get tired."    Pain Rating:  (pt indicated pain with movement of R LE on/off of bed - did not give numerical value to stated he only had pain with movement of leg)     Objective:  Patient found with: bed alarm, telemetry     Functional Mobility:  Bed Mobility:  Rolling/Turning Right: Supervision  Scooting/Bridging: Minimum Assistance  Supine to Sit: Minimum Assistance (min assist to move R LE due to pain with movement)  Sit to Supine: Minimum Assistance (assist to raise R LE onto bed)    Transfers:   Sit <> Stand Assistance: Contact Guard Assistance  Sit <> Stand Assistive Device: Rolling Walker        Activities of Daily Living:  Feeding Level of Assistance: Activity did not occur     UE Dressing Level of Assistance: Minimum assistance (min assist for tactile cues to initate [facility] gown while seated on EOB - able to brenna gown without assist)    LE Dressing Level of Assistance: Moderate assistance (pt able to doff B socks while seated on EOB but unable to brenna new pair - pt attempted to cross legs but unable to - pt attempted to side sit on bed but still not flexible enough to reach feet with socks)    Grooming Position: EOB  Grooming Level of Assistance: Supervision (wash face and hands with wash cloth)       Balance:   Static Sit: FAIR-: Maintains without assist but inconsistent   Dynamic Sit: FAIR+: Maintains balance through MINIMAL excursions of active trunk motion  Static Stand: N/A  Dynamic stand: N/A  ** Pt sat on EOB for ~20 minutes - pt initially " with good sitting balance but noted with gradual fatigue which led to bracing using B UEs on bed or bed rail, increasing post pelvic tilt and kyphotic posture, and posterior lean.  Towards end of session, pt began to lean towards L as well.  Pt able to respond well to cues to lean forward and correct posture but with increasing fatigue, correction decreased.      Therapeutic Activities and Exercises:  · Pt completed 1 set of 12 reps of B UE AROM exercises in order to work towards increasing his UB strength/endurance to assist with mobility and self care skills.  Pt completed the following exercises: shoulder flex/ext, elbow flex/ext, forearm sup/pro, and hand pumps.  Pt performed exercises while seated on EOB and noted with gradual posterior lean during shoulder flexion exercises.  Pt needed rest break between each set of exercises due to fatigue.  Pt only noted with shoulder flexion just past 90 degrees due to muscle weakness.       AM-PAC 6 CLICK ADL   How much help from another person does this patient currently need?   1 = Unable, Total/Dependent Assistance  2 = A lot, Maximum/Moderate Assistance  3 = A little, Minimum/Contact Guard/Supervision  4 = None, Modified [place]/Independent    Putting on and taking off regular lower body clothing? : 2  Bathing (including washing, rinsing, drying)?: 2  Toileting, which includes using toilet, bedpan, or urinal? : 1  Putting on and taking off regular upper body clothing?: 3  Taking care of personal grooming such as brushing teeth?: 3  Eating meals?: 3  Total Score: 14     AM-PAC Raw Score CMS G-Code Modifier Level of Impairment Assistance   6 % Total / Unable   7 - 9 CM 80 - 100% Maximal Assist   10 - 14 CL 60 - 80% Moderate Assist   15 - 19 CK 40 - 60% Moderate Assist   20 - 22 CJ 20 - 40% Minimal Assist   23 CI 1-20% SBA / CGA   24 CH 0% Independent/ Mod I     Patient left HOB elevated with call button in reach, bed alarm on and daughter  present    ASSESSMENT:  Atif Neves is a 88 y.o. male with a medical diagnosis of Delirium, acute and presents with decreased strength/endurance, decreased func mobility, and decreased self care skills.   Pt continues to make progress towards goals and goals remain appropriate at this time.  During today's session, pt was able to reach goal for LB dressing with mod assist.  Pt will continue to benefit from skilled OT services in order to work towards increasing his level of independence and participation with func mobility and self care skills.         Rehab identified problem list/impairments: Rehab identified problem list/impairments: weakness, impaired endurance, impaired self care skills, impaired functional mobilty, gait instability, impaired balance, impaired cognition, decreased coordination, decreased upper extremity function, decreased lower extremity function, decreased safety awareness, pain, decreased ROM, impaired coordination, impaired fine motor    Rehab potential is fair.    Activity tolerance: Fair    Discharge recommendations: Discharge Facility/Level Of Care Needs: nursing facility, skilled     Barriers to discharge: Barriers to Discharge: Decreased caregiver support    Equipment recommendations:       GOALS:   Occupational Therapy Goals        Problem: Occupational Therapy Goal    Goal Priority Disciplines Outcome Interventions   Occupational Therapy Goal     OT, PT/OT Ongoing (interventions implemented as appropriate)    Description:  Goals to be met by: 04/10     Patient will increase functional independence with ADLs by performing:    UE Dressing with Stand-by Assistance.  LE Dressing with Moderate Assistance. GOAL MET 03/31/17  Grooming while seated with Supervision.  Toileting from bedside commode with Moderate Assistance for hygiene and clothing management.   Stand pivot transfers with Stand-by Assistance.  Toilet transfer to bedside commode with Stand-by Assistance.                  Plan:  Patient to be seen 4 x/week to address the above listed problems via self-care/home management, therapeutic activities, therapeutic exercises  Plan of Care expires: 04/25/17  Plan of Care reviewed with: patient, daughter         Maureen Walsh, OT  03/31/2017

## 2017-03-31 NOTE — PROCEDURES
"Atif Neves is a 88 y.o. male patient.    Temp: 98.6 °F (37 °C) (03/31/17 0815)  Pulse: 63 (03/31/17 0900)  Resp: 20 (03/31/17 0815)  BP: (!) 141/64 (03/31/17 0900)  SpO2: (!) 93 % (03/31/17 0815)  Weight: 68.9 kg (151 lb 14.4 oz) (03/30/17 0400)  Height: 5' 6.9" (169.9 cm) (03/23/17 1038)    PICC  Date/Time: 3/31/2017 11:00 AM  Performed by: JONO GARRETT  Time out: Immediately prior to procedure a time out was called to verify the correct patient, procedure, equipment, support staff and site/side marked as required  Indications: med administration and vascular access  Anesthesia: local infiltration  Local anesthetic: lidocaine 1% without epinephrine  Anesthetic Total (mL): 2  Preparation: skin prepped with ChloraPrep  Skin prep agent dried: skin prep agent completely dried prior to procedure  Sterile barriers: all five maximum sterile barriers used - cap, mask, sterile gown, sterile gloves, and large sterile sheet  Hand hygiene: hand hygiene performed prior to central venous catheter insertion  Location details: right brachial  Catheter type: double lumen  Catheter size: 5 Fr  Catheter Length: 35cm    Ultrasound guidance: yes  Vessel Caliber: medium and patent, compressibility normal  Vascular Doppler: not done  Needle advanced into vessel with real time Ultrasound guidance.  Guidewire confirmed in vessel.  Image recorded and saved.  Sterile sheath used.  Number of attempts: 1  Technical procedures used: 3CG  Specimens: No  Implants: No  Assessment: placement verified by x-ray  Complications: none        Meliza Miah  3/31/2017  "

## 2017-03-31 NOTE — PLAN OF CARE
"   [facility]     Department of Hospital Medicine     [address] [place] [address]     [phone] after hours  [phone] fax       NURSING HOME ORDERS    03/31/2017    Admit to Nursing Home:  Skilled Bed                                                  Diagnoses:  Active Hospital Problems    Diagnosis  POA    *Delirium, acute [R41.0]  Yes     Last known normal 9pm 3/22. Found at 7:30am on 3/23 confused. No known trauma or seizure activity.    Acutely worsened 3/25 with aphasia, left ptosis (concern for ictal event).  Neg eeg and CT, but loaded and maintained on keppra.      Bacteremia due to Gram-positive bacteria [A49.9]  Yes    NPH (normal pressure hydrocephalus) [G91.2]  Yes     Shunt placed 2/6/17      Ventriculo-peritoneal shunt status [Z98.2]  Not Applicable    BPH (benign prostatic hypertrophy) with urinary obstruction [N40.1, N13.8]  Yes    Essential hypertension [I10]  Yes    Glaucoma [H40.9]  Yes      Resolved Hospital Problems    Diagnosis Date Resolved POA   No resolved problems to display.       Patient is homebound due to:  Delirium, acute    Allergies:  Review of patient's allergies indicates:   Allergen Reactions    Aspirin Swelling     States, "makes me sick." pt does not elaborate.        Vitals:       Every shift (Skilled Nursing patients)    Diet: Cardiac Diet    Acitivities:     - As tolerated    LABS:  Vancomycin Level prior to 4th dose.  Goal 15-20      CONSULTS:      Physical Therapy to evaluate and treat     Occupational Therapy to evaluate and treat            Medications: Discontinue all previous medication orders, if any. See new list below.     Atif Neves   Home Medication Instructions BEATA:[id number]    Printed on:03/31/17 1205   Medication Information                      amlodipine (NORVASC) 5 MG tablet  Take 1 tablet (5 mg total) by mouth once daily.             atorvastatin (LIPITOR) 20 MG tablet  Take 1 tablet (20 " mg total) by mouth once daily.             citalopram (CELEXA) 10 MG tablet  Take 10 mg by mouth once daily.             dorzolamide-timolol 2-0.5% (COSOPT) 22.3-6.8 mg/mL ophthalmic solution  Place 1 drop into the left eye once daily.             ergocalciferol (ERGOCALCIFEROL) 50,000 unit Cap  TK ONE C     PO ONCE PER MONTH             levetiracetam (KEPPRA) 1000 MG tablet  Take 1 tablet (1,000 mg total) by mouth 2 (two) times daily.             polyethylene glycol (GLYCOLAX) 17 gram/dose powder  Take 17 g by mouth 2 (two) times daily as needed (constipation). Adjust frequency for one small bowel movement a day             tamsulosin (FLOMAX) 0.4 mg Cp24  TK ONE C     PO 30 MINUTES AFTER THE SAME MEAL QD             VANCOMYCIN HCL (VANCOMYCIN 1 G/250 ML D5W, READY TO MIX SYSTEM,)  Inject 250 mLs (1,000 mg total) into the vein every 12 (twelve) hours.  End Date:  4/7/2017                     _________________________________  Claudio Phipps MD  03/31/2017

## 2017-03-31 NOTE — ASSESSMENT & PLAN NOTE
- remains altered, unclear etiology.  - continue all safety measures - high risk of injury / falls  - ammonia 23 on admission  - CT head unremarkable but less than optimal imaging related to motion artifact  - Neurology consulted; LP attempted. Appeared that infectious cause is possible given positive blood cultures but cultures are polymicrobial. ID consulted; repeat BCs are negative so far.  - Slightly improved but not at all near baseline. Was seen normal in Clinic, by Dr. Sam, 1 day prior to admission.  - possible seizure on 3/25/17, continuing Keppra.  - MRI negative  - Neuro signed off  - Discussed staring spell with neuro, unclear clinical significance, recs: no need for EEG and continue keppra and follow up with epilepsy

## 2017-03-31 NOTE — PROGRESS NOTES
Spoke w/ Dr. Phipps in [facility] r/t pt appeared to have seizure like activity. Pt was answering appropriately and then when sat up for breakfast went off into deep stare no longer following commands, pt extremely still. No tremors and facial twitches noted. Pt remained this way for about 10 minutes then spontaneously resolved and came back to baseline of answering appropriately and orientation to name. No new orders at this time, neurology following pt, MD to d/w Neuro.Will continue to closely monitor pt.

## 2017-03-31 NOTE — PROGRESS NOTES
"[facility] Hospital Medicine  Progress Note    Patient Name: Atif Neves  MRN: [id number]  Patient Class: IP- Inpatient   Admission Date: 3/23/2017  Length of Stay: 8 days  Attending Physician: Claudio Phipps MD  Primary Care Provider: Atif Christian MD    [facility] Medicine Team: [facility] G Claudio Phipps MD    Subjective:     Principal Problem:Delirium, acute    HPI:  "89 y/o gentleman presented to the ED today with acute onset of AMS.  At this time, he is confused, alone, and unable to provide any information related to events leading up to ED visit.  According to the records it appears that he has a hx of normal pressure hydrocephalus and recently underwent  shunt placement in February of this year.  He appears to have went to his 6 week post-op follow up with Dr. Sam on 3/22/17 and was at baseline and A/Ox3.   He reportedly was at baseline yesterday, but was found to be acutely alerted upon awaking this AM around 7:30. At this time he is screaming, calling for people that are not present in the room and conversing with the television. He is witnessed moving all 4 extremities.  He does follow simple commands but not consistently and provides yes / no responses to some questioning.  Additional PMH consists of HTN, BPH, macular degeneration, and HLD.  CT of the head was performed while in the ED and there appears to be no acute abnormality, but image was less than optimal related to motion artifact. He was found to have a WBC count of 16.1 and was evaluated by NSX and considered low probability for meningitis. U/A was not suggestive of UTI. Blood cultures were obtained and he was started empirically on vancomycin and cefepime."    Hospital Course:       Interval History:     Patient with staring episode this am per nursing.  He is back to his previous state now with offering non-appropriate answers to questions.      Review of Systems   All other systems reviewed and are " negative.    Objective:     Vital Signs (Most Recent):  Temp: 98.4 °F (36.9 °C) (03/31/17 1205)  Pulse: 71 (03/31/17 1205)  Resp: 20 (03/31/17 1205)  BP: 116/60 (03/31/17 1205)  SpO2: 95 % (03/31/17 1205) Vital Signs (24h Range):  Temp:  [98.4 °F (36.9 °C)-99.9 °F (37.7 °C)] 98.4 °F (36.9 °C)  Pulse:  [63-78] 71  Resp:  [16-20] 20  SpO2:  [93 %-96 %] 95 %  BP: (116-158)/(60-74) 116/60     Weight: 68.9 kg (151 lb 14.4 oz)  Body mass index is 23.86 kg/(m^2).    Intake/Output Summary (Last 24 hours) at 03/31/17 1521  Last data filed at 03/31/17 0920   Gross per 24 hour   Intake              640 ml   Output                0 ml   Net              640 ml      Physical Exam   Constitutional: He appears well-developed and well-nourished.   HENT:   Head: Atraumatic.   Cardiovascular: Normal rate.    Pulmonary/Chest: Effort normal.   Abdominal: Soft.   Neurological: He is alert.   Psychiatric: He has a normal mood and affect.   Nursing note and vitals reviewed.      Significant Labs:   BMP:   Recent Labs  Lab 03/31/17  0524   *   *   K 3.7   CL 99   CO2 27   BUN 12   CREATININE 0.8   CALCIUM 8.5*   MG 1.6     CBC:   Recent Labs  Lab 03/30/17  0404 03/31/17  0524   WBC 4.84 4.30   HGB 11.2* 11.1*   HCT 33.4* 33.0*    230       Significant Imaging: None    Assessment/Plan:      * Delirium, acute  - remains altered, unclear etiology.  - continue all safety measures - high risk of injury / falls  - ammonia 23 on admission  - CT head unremarkable but less than optimal imaging related to motion artifact  - Neurology consulted; LP attempted. Appeared that infectious cause is possible given positive blood cultures but cultures are polymicrobial. ID consulted; repeat BCs are negative so far.  - Slightly improved but not at all near baseline. Was seen normal in Clinic, by Dr. Sam, 1 day prior to admission.  - possible seizure on 3/25/17, continuing Keppra.  - MRI negative  - Neuro signed off  - Discussed staring  spell with neuro, unclear clinical significance, recs: no need for EEG and continue keppra and follow up with epilepsy    Bacteremia due to Gram-positive bacteria  - WBC's 16.1 on arrival   - Procalcitonin 0.24  - U/A negative for nitrites or leukocytes   - Blood cultures positive for GPC in 2 bottles  - Vanco and cefepime started empirically in the ED  - continued Vanco and transitioned to ceftriaxone; plan to deescalate as indicated by culture results  - seen by Neurosurgery while in ED - thought to be low probability for meningitis - if workup is negative consider reconsulting and possible LP. 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  - Consulted ID. BCs are polymicrobial:  ENTEROCOCCUS FAECALIS   STAPHYLOCOCCUS EPIDERMIDIS     CORYNEBACTERIUM CAITLINIUM (JK)    Antibiotics per ID recommendations; vancomycin x 14 days.     VTE Risk Mitigation         Ordered     heparin (porcine) injection 5,000 Units  Every 12 hours     Route:  Subcutaneous        03/24/17 0018     Medium Risk of VTE  Once      03/24/17 0018     Place sequential compression device  Until discontinued      03/24/17 0018     Place LIAM hose  Until discontinued      03/24/17 0018          Claudio Phipps MD  Department of Hospital Medicine   [facility]

## 2017-03-31 NOTE — PLAN OF CARE
Problem: Occupational Therapy Goal  Goal: Occupational Therapy Goal  Goals to be met by: 04/10     Patient will increase functional independence with ADLs by performing:    UE Dressing with Stand-by Assistance.  LE Dressing with Moderate Assistance. GOAL MET 03/31/17  Grooming while seated with Supervision.  Toileting from bedside commode with Moderate Assistance for hygiene and clothing management.   Stand pivot transfers with Stand-by Assistance.  Toilet transfer to bedside commode with Stand-by Assistance.   Outcome: Ongoing (interventions implemented as appropriate)  Pt was agreeable to OT session.  Pt continues to make progress towards goals and goals remain appropriate at this time.  During today's session, pt was able to reach goal for LB dressing with mod assist.  Pt will continue to benefit from skilled OT services in order to work towards increasing his level of independence and participation with func mobility and self care skills.      Maureen Walsh, OT  3/31/2017

## 2017-03-31 NOTE — SUBJECTIVE & OBJECTIVE
Interval History:     Patient with staring episode this am per nursing.  He is back to his previous state now with offering non-appropriate answers to questions.      Review of Systems   All other systems reviewed and are negative.    Objective:     Vital Signs (Most Recent):  Temp: 98.4 °F (36.9 °C) (03/31/17 1205)  Pulse: 71 (03/31/17 1205)  Resp: 20 (03/31/17 1205)  BP: 116/60 (03/31/17 1205)  SpO2: 95 % (03/31/17 1205) Vital Signs (24h Range):  Temp:  [98.4 °F (36.9 °C)-99.9 °F (37.7 °C)] 98.4 °F (36.9 °C)  Pulse:  [63-78] 71  Resp:  [16-20] 20  SpO2:  [93 %-96 %] 95 %  BP: (116-158)/(60-74) 116/60     Weight: 68.9 kg (151 lb 14.4 oz)  Body mass index is 23.86 kg/(m^2).    Intake/Output Summary (Last 24 hours) at 03/31/17 1521  Last data filed at 03/31/17 0920   Gross per 24 hour   Intake              640 ml   Output                0 ml   Net              640 ml      Physical Exam   Constitutional: He appears well-developed and well-nourished.   HENT:   Head: Atraumatic.   Cardiovascular: Normal rate.    Pulmonary/Chest: Effort normal.   Abdominal: Soft.   Neurological: He is alert.   Psychiatric: He has a normal mood and affect.   Nursing note and vitals reviewed.      Significant Labs:   BMP:   Recent Labs  Lab 03/31/17  0524   *   *   K 3.7   CL 99   CO2 27   BUN 12   CREATININE 0.8   CALCIUM 8.5*   MG 1.6     CBC:   Recent Labs  Lab 03/30/17  0404 03/31/17  0524   WBC 4.84 4.30   HGB 11.2* 11.1*   HCT 33.4* 33.0*    230       Significant Imaging: None

## 2017-04-01 LAB
ALBUMIN SERPL BCP-MCNC: 2.4 G/DL
ALP SERPL-CCNC: 85 U/L
ALT SERPL W/O P-5'-P-CCNC: 18 U/L
ANION GAP SERPL CALC-SCNC: 7 MMOL/L
AST SERPL-CCNC: 42 U/L
BASOPHILS # BLD AUTO: 0.02 K/UL
BASOPHILS NFR BLD: 0.4 %
BILIRUB SERPL-MCNC: 0.4 MG/DL
BUN SERPL-MCNC: 12 MG/DL
CALCIUM SERPL-MCNC: 8.2 MG/DL
CHLORIDE SERPL-SCNC: 103 MMOL/L
CO2 SERPL-SCNC: 29 MMOL/L
CREAT SERPL-MCNC: 0.9 MG/DL
DIFFERENTIAL METHOD: ABNORMAL
EOSINOPHIL # BLD AUTO: 0.1 K/UL
EOSINOPHIL NFR BLD: 2 %
ERYTHROCYTE [DISTWIDTH] IN BLOOD BY AUTOMATED COUNT: 15 %
EST. GFR  (AFRICAN AMERICAN): >60 ML/MIN/1.73 M^2
EST. GFR  (NON AFRICAN AMERICAN): >60 ML/MIN/1.73 M^2
GLUCOSE SERPL-MCNC: 88 MG/DL
HCT VFR BLD AUTO: 32 %
HGB BLD-MCNC: 10.8 G/DL
LYMPHOCYTES # BLD AUTO: 1.1 K/UL
LYMPHOCYTES NFR BLD: 25.3 %
MAGNESIUM SERPL-MCNC: 1.7 MG/DL
MCH RBC QN AUTO: 25.9 PG
MCHC RBC AUTO-ENTMCNC: 33.8 %
MCV RBC AUTO: 77 FL
MONOCYTES # BLD AUTO: 0.5 K/UL
MONOCYTES NFR BLD: 12 %
NEUTROPHILS # BLD AUTO: 2.7 K/UL
NEUTROPHILS NFR BLD: 60.1 %
PLATELET # BLD AUTO: 222 K/UL
PMV BLD AUTO: 9.9 FL
POTASSIUM SERPL-SCNC: 3.7 MMOL/L
PROT SERPL-MCNC: 5.7 G/DL
RBC # BLD AUTO: 4.17 M/UL
SODIUM SERPL-SCNC: 139 MMOL/L
WBC # BLD AUTO: 4.51 K/UL

## 2017-04-01 PROCEDURE — 80053 COMPREHEN METABOLIC PANEL: CPT

## 2017-04-01 PROCEDURE — 99231 SBSQ HOSP IP/OBS SF/LOW 25: CPT | Mod: ,,, | Performed by: INTERNAL MEDICINE

## 2017-04-01 PROCEDURE — 25000003 PHARM REV CODE 250: Performed by: INTERNAL MEDICINE

## 2017-04-01 PROCEDURE — 11000001 HC ACUTE MED/SURG PRIVATE ROOM

## 2017-04-01 PROCEDURE — 97116 GAIT TRAINING THERAPY: CPT

## 2017-04-01 PROCEDURE — 63600175 PHARM REV CODE 636 W HCPCS: Performed by: PSYCHIATRY & NEUROLOGY

## 2017-04-01 PROCEDURE — 83735 ASSAY OF MAGNESIUM: CPT

## 2017-04-01 PROCEDURE — 63600175 PHARM REV CODE 636 W HCPCS: Performed by: INTERNAL MEDICINE

## 2017-04-01 PROCEDURE — 85025 COMPLETE CBC W/AUTO DIFF WBC: CPT

## 2017-04-01 PROCEDURE — 25000003 PHARM REV CODE 250: Performed by: NURSE PRACTITIONER

## 2017-04-01 PROCEDURE — 97530 THERAPEUTIC ACTIVITIES: CPT

## 2017-04-01 RX ADMIN — LEVETIRACETAM 1000 MG: 10 INJECTION INTRAVENOUS at 10:04

## 2017-04-01 RX ADMIN — ACETAMINOPHEN 650 MG: 325 TABLET ORAL at 09:04

## 2017-04-01 RX ADMIN — ATORVASTATIN CALCIUM 20 MG: 20 TABLET, FILM COATED ORAL at 09:04

## 2017-04-01 RX ADMIN — CITALOPRAM HYDROBROMIDE 10 MG: 10 TABLET ORAL at 09:04

## 2017-04-01 RX ADMIN — HEPARIN SODIUM 5000 UNITS: 5000 INJECTION, SOLUTION INTRAVENOUS; SUBCUTANEOUS at 09:04

## 2017-04-01 RX ADMIN — Medication 10 ML: at 05:04

## 2017-04-01 RX ADMIN — VANCOMYCIN HYDROCHLORIDE 1000 MG: 1 INJECTION, POWDER, LYOPHILIZED, FOR SOLUTION INTRAVENOUS at 04:04

## 2017-04-01 RX ADMIN — Medication 10 ML: at 12:04

## 2017-04-01 RX ADMIN — VANCOMYCIN HYDROCHLORIDE 1000 MG: 1 INJECTION, POWDER, LYOPHILIZED, FOR SOLUTION INTRAVENOUS at 03:04

## 2017-04-01 RX ADMIN — TAMSULOSIN HYDROCHLORIDE 0.4 MG: 0.4 CAPSULE ORAL at 09:04

## 2017-04-01 RX ADMIN — TIMOLOL MALEATE 1 DROP: 5 SOLUTION OPHTHALMIC at 09:04

## 2017-04-01 RX ADMIN — LEVETIRACETAM 1000 MG: 10 INJECTION INTRAVENOUS at 09:04

## 2017-04-01 RX ADMIN — DORZOLAMIDE HYDROCHLORIDE 1 DROP: 20 SOLUTION/ DROPS OPHTHALMIC at 09:04

## 2017-04-01 RX ADMIN — AMLODIPINE BESYLATE 10 MG: 10 TABLET ORAL at 09:04

## 2017-04-01 RX ADMIN — HEPARIN SODIUM 5000 UNITS: 5000 INJECTION, SOLUTION INTRAVENOUS; SUBCUTANEOUS at 10:04

## 2017-04-01 NOTE — PROGRESS NOTES
"[facility] Hospital Medicine  Progress Note    Patient Name: Atif Neves  MRN: [id number]  Patient Class: IP- Inpatient   Admission Date: 3/23/2017  Length of Stay: 9 days  Attending Physician: Claudio Phipps MD  Primary Care Provider: Atif Christian MD    [facility] Medicine Team: [facility]  Claudio Phipps MD    Subjective:     Principal Problem:Delirium, acute    HPI:  "87 y/o gentleman presented to the ED today with acute onset of AMS.  At this time, he is confused, alone, and unable to provide any information related to events leading up to ED visit.  According to the records it appears that he has a hx of normal pressure hydrocephalus and recently underwent  shunt placement in February of this year.  He appears to have went to his 6 week post-op follow up with Dr. Sam on 3/22/17 and was at baseline and A/Ox3.   He reportedly was at baseline yesterday, but was found to be acutely alerted upon awaking this AM around 7:30. At this time he is screaming, calling for people that are not present in the room and conversing with the television. He is witnessed moving all 4 extremities.  He does follow simple commands but not consistently and provides yes / no responses to some questioning.  Additional PMH consists of HTN, BPH, macular degeneration, and HLD.  CT of the head was performed while in the ED and there appears to be no acute abnormality, but image was less than optimal related to motion artifact. He was found to have a WBC count of 16.1 and was evaluated by NSX and considered low probability for meningitis. U/A was not suggestive of UTI. Blood cultures were obtained and he was started empirically on vancomycin and cefepime."    Hospital Course:       Interval History:     Still delirious     Review of Systems   Unable to perform ROS: Other     Objective:     Vital Signs (Most Recent):  Temp: 99.6 °F (37.6 °C) (04/01/17 0742)  Pulse: 68 (04/01/17 0742)  Resp: 20 (04/01/17 0742)  BP: " (!) 151/72 (04/01/17 0742)  SpO2: (!) 94 % (04/01/17 0742) Vital Signs (24h Range):  Temp:  [98.3 °F (36.8 °C)-99.6 °F (37.6 °C)] 99.6 °F (37.6 °C)  Pulse:  [63-71] 68  Resp:  [16-20] 20  SpO2:  [93 %-95 %] 94 %  BP: (116-153)/(60-74) 151/72     Weight: 68.9 kg (151 lb 14.4 oz)  Body mass index is 23.86 kg/(m^2).    Intake/Output Summary (Last 24 hours) at 04/01/17 1145  Last data filed at 04/01/17 0923   Gross per 24 hour   Intake              600 ml   Output                0 ml   Net              600 ml      Physical Exam   Constitutional: He appears well-nourished.   HENT:   Head: Atraumatic.   Cardiovascular: Normal rate.    Pulmonary/Chest: Effort normal.   Abdominal: Soft.   Neurological: He is alert.   Skin: Skin is warm.   Nursing note and vitals reviewed.      Significant Labs:   BMP:   Recent Labs  Lab 04/01/17  0403   GLU 88      K 3.7      CO2 29   BUN 12   CREATININE 0.9   CALCIUM 8.2*   MG 1.7     CBC:   Recent Labs  Lab 03/31/17  0524 04/01/17  0403   WBC 4.30 4.51   HGB 11.1* 10.8*   HCT 33.0* 32.0*    222       Significant Imaging: None    Assessment/Plan:      * Delirium, acute  - remains altered, unclear etiology.  - continue all safety measures - high risk of injury / falls  - ammonia 23 on admission  - CT head unremarkable but less than optimal imaging related to motion artifact  - Neurology consulted; LP attempted. Appeared that infectious cause is possible given positive blood cultures but cultures are polymicrobial. ID consulted; repeat BCs are negative so far.  - Slightly improved but not at all near baseline. Was seen normal in Clinic, by Dr. Sam, 1 day prior to admission.  - possible seizure on 3/25/17, continuing Keppra.  - MRI negative  - Neuro signed off  - Discussed staring spell with neuro, unclear clinical significance, recs: no need for EEG and continue keppra and follow up with epilepsy    Bacteremia due to Gram-positive bacteria  - WBC's 16.1 on arrival   -  Procalcitonin 0.24  - U/A negative for nitrites or leukocytes   - Blood cultures positive for GPC in 2 bottles  - Vanco and cefepime started empirically in the ED  - continued Vanco and transitioned to ceftriaxone; plan to deescalate as indicated by culture results  - seen by Neurosurgery while in ED - thought to be low probability for meningitis - if workup is negative consider reconsulting and possible LP. 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  - Consulted ID. BCs are polymicrobial:  ENTEROCOCCUS FAECALIS   STAPHYLOCOCCUS EPIDERMIDIS     CORYNEBACTERIUM JEIKEIUM (JK)    Antibiotics per ID recommendations; vancomycin x 14 days.     VTE Risk Mitigation         Ordered     heparin (porcine) injection 5,000 Units  Every 12 hours     Route:  Subcutaneous        03/24/17 0018     Medium Risk of VTE  Once      03/24/17 0018     Place sequential compression device  Until discontinued      03/24/17 0018     Place LIAM hose  Until discontinued      03/24/17 0018          Claudio Phipps MD  Department of Hospital Medicine   [facility]

## 2017-04-01 NOTE — PLAN OF CARE
Problem: Physical Therapy Goal  Goal: Physical Therapy Goal  Goals to be met by: 4/3/17     Patient will increase functional independence with mobility by performin. Supine to sit with CGA  2. Sit to supine with CGA  3. Sit to stand transfer with SBA  4. Bed to chair transfer with Stand-by Assistance using Rolling Walker  5. Gait x 75 feet with Contact Guard Assistance using Rolling Walker.   6. Ascend/Descend 7 inch curb step with Minimal Assistance using Rolling Walker.  7. Lower extremity exercise program x20-30 reps per handout, with independence   Outcome: Ongoing (interventions implemented as appropriate)  Patient showed more difficulty with transfers due to decreased postural control and limited ability to follow motor commands.

## 2017-04-01 NOTE — SUBJECTIVE & OBJECTIVE
Interval History:     Still delirious     Review of Systems   Unable to perform ROS: Other     Objective:     Vital Signs (Most Recent):  Temp: 99.6 °F (37.6 °C) (04/01/17 0742)  Pulse: 68 (04/01/17 0742)  Resp: 20 (04/01/17 0742)  BP: (!) 151/72 (04/01/17 0742)  SpO2: (!) 94 % (04/01/17 0742) Vital Signs (24h Range):  Temp:  [98.3 °F (36.8 °C)-99.6 °F (37.6 °C)] 99.6 °F (37.6 °C)  Pulse:  [63-71] 68  Resp:  [16-20] 20  SpO2:  [93 %-95 %] 94 %  BP: (116-153)/(60-74) 151/72     Weight: 68.9 kg (151 lb 14.4 oz)  Body mass index is 23.86 kg/(m^2).    Intake/Output Summary (Last 24 hours) at 04/01/17 1145  Last data filed at 04/01/17 0923   Gross per 24 hour   Intake              600 ml   Output                0 ml   Net              600 ml      Physical Exam   Constitutional: He appears well-nourished.   HENT:   Head: Atraumatic.   Cardiovascular: Normal rate.    Pulmonary/Chest: Effort normal.   Abdominal: Soft.   Neurological: He is alert.   Skin: Skin is warm.   Nursing note and vitals reviewed.      Significant Labs:   BMP:   Recent Labs  Lab 04/01/17  0403   GLU 88      K 3.7      CO2 29   BUN 12   CREATININE 0.9   CALCIUM 8.2*   MG 1.7     CBC:   Recent Labs  Lab 03/31/17  0524 04/01/17  0403   WBC 4.30 4.51   HGB 11.1* 10.8*   HCT 33.0* 32.0*    222       Significant Imaging: None

## 2017-04-02 LAB
ALBUMIN SERPL BCP-MCNC: 2.2 G/DL
ALP SERPL-CCNC: 81 U/L
ALT SERPL W/O P-5'-P-CCNC: 21 U/L
ANION GAP SERPL CALC-SCNC: 9 MMOL/L
AST SERPL-CCNC: 43 U/L
BASOPHILS # BLD AUTO: 0.02 K/UL
BASOPHILS NFR BLD: 0.3 %
BILIRUB SERPL-MCNC: 0.3 MG/DL
BUN SERPL-MCNC: 10 MG/DL
CALCIUM SERPL-MCNC: 7.9 MG/DL
CHLORIDE SERPL-SCNC: 103 MMOL/L
CO2 SERPL-SCNC: 26 MMOL/L
CREAT SERPL-MCNC: 0.8 MG/DL
DIFFERENTIAL METHOD: ABNORMAL
EOSINOPHIL # BLD AUTO: 0.1 K/UL
EOSINOPHIL NFR BLD: 1.5 %
ERYTHROCYTE [DISTWIDTH] IN BLOOD BY AUTOMATED COUNT: 15.4 %
EST. GFR  (AFRICAN AMERICAN): >60 ML/MIN/1.73 M^2
EST. GFR  (NON AFRICAN AMERICAN): >60 ML/MIN/1.73 M^2
GLUCOSE SERPL-MCNC: 92 MG/DL
HCT VFR BLD AUTO: 32 %
HGB BLD-MCNC: 10.4 G/DL
LYMPHOCYTES # BLD AUTO: 1.1 K/UL
LYMPHOCYTES NFR BLD: 17.8 %
MAGNESIUM SERPL-MCNC: 1.8 MG/DL
MCH RBC QN AUTO: 25.7 PG
MCHC RBC AUTO-ENTMCNC: 32.5 %
MCV RBC AUTO: 79 FL
MONOCYTES # BLD AUTO: 0.5 K/UL
MONOCYTES NFR BLD: 7.6 %
NEUTROPHILS # BLD AUTO: 4.4 K/UL
NEUTROPHILS NFR BLD: 72.5 %
PLATELET # BLD AUTO: 209 K/UL
PMV BLD AUTO: 9.4 FL
POTASSIUM SERPL-SCNC: 3.5 MMOL/L
PROT SERPL-MCNC: 5.6 G/DL
RBC # BLD AUTO: 4.05 M/UL
SODIUM SERPL-SCNC: 138 MMOL/L
VANCOMYCIN TROUGH SERPL-MCNC: 17.6 UG/ML
WBC # BLD AUTO: 6.07 K/UL

## 2017-04-02 PROCEDURE — 25000003 PHARM REV CODE 250: Performed by: NURSE PRACTITIONER

## 2017-04-02 PROCEDURE — 63600175 PHARM REV CODE 636 W HCPCS: Performed by: INTERNAL MEDICINE

## 2017-04-02 PROCEDURE — 80053 COMPREHEN METABOLIC PANEL: CPT

## 2017-04-02 PROCEDURE — 80202 ASSAY OF VANCOMYCIN: CPT

## 2017-04-02 PROCEDURE — 11000001 HC ACUTE MED/SURG PRIVATE ROOM

## 2017-04-02 PROCEDURE — 25000003 PHARM REV CODE 250: Performed by: INTERNAL MEDICINE

## 2017-04-02 PROCEDURE — 85025 COMPLETE CBC W/AUTO DIFF WBC: CPT

## 2017-04-02 PROCEDURE — 63600175 PHARM REV CODE 636 W HCPCS: Performed by: PSYCHIATRY & NEUROLOGY

## 2017-04-02 PROCEDURE — 83735 ASSAY OF MAGNESIUM: CPT

## 2017-04-02 PROCEDURE — 99232 SBSQ HOSP IP/OBS MODERATE 35: CPT | Mod: ,,, | Performed by: INTERNAL MEDICINE

## 2017-04-02 RX ADMIN — Medication 10 ML: at 06:04

## 2017-04-02 RX ADMIN — TIMOLOL MALEATE 1 DROP: 5 SOLUTION OPHTHALMIC at 09:04

## 2017-04-02 RX ADMIN — TAMSULOSIN HYDROCHLORIDE 0.4 MG: 0.4 CAPSULE ORAL at 09:04

## 2017-04-02 RX ADMIN — ATORVASTATIN CALCIUM 20 MG: 20 TABLET, FILM COATED ORAL at 09:04

## 2017-04-02 RX ADMIN — VANCOMYCIN HYDROCHLORIDE 1000 MG: 1 INJECTION, POWDER, LYOPHILIZED, FOR SOLUTION INTRAVENOUS at 04:04

## 2017-04-02 RX ADMIN — LEVETIRACETAM 1000 MG: 10 INJECTION INTRAVENOUS at 09:04

## 2017-04-02 RX ADMIN — HEPARIN SODIUM 5000 UNITS: 5000 INJECTION, SOLUTION INTRAVENOUS; SUBCUTANEOUS at 09:04

## 2017-04-02 RX ADMIN — HEPARIN SODIUM 5000 UNITS: 5000 INJECTION, SOLUTION INTRAVENOUS; SUBCUTANEOUS at 08:04

## 2017-04-02 RX ADMIN — Medication 10 ML: at 11:04

## 2017-04-02 RX ADMIN — VANCOMYCIN HYDROCHLORIDE 1000 MG: 1 INJECTION, POWDER, LYOPHILIZED, FOR SOLUTION INTRAVENOUS at 03:04

## 2017-04-02 RX ADMIN — CITALOPRAM HYDROBROMIDE 10 MG: 10 TABLET ORAL at 09:04

## 2017-04-02 RX ADMIN — AMLODIPINE BESYLATE 10 MG: 10 TABLET ORAL at 09:04

## 2017-04-02 RX ADMIN — DORZOLAMIDE HYDROCHLORIDE 1 DROP: 20 SOLUTION/ DROPS OPHTHALMIC at 09:04

## 2017-04-02 NOTE — SUBJECTIVE & OBJECTIVE
Interval History:     Still delirious, no appropriate answers to questions.    Review of Systems   All other systems reviewed and are negative.    Objective:     Vital Signs (Most Recent):  Temp: 98.4 °F (36.9 °C) (04/02/17 1647)  Pulse: 70 (04/02/17 1647)  Resp: 16 (04/02/17 1647)  BP: 133/63 (04/02/17 1647)  SpO2: 95 % (04/02/17 1647) Vital Signs (24h Range):  Temp:  [98.4 °F (36.9 °C)-99.7 °F (37.6 °C)] 98.4 °F (36.9 °C)  Pulse:  [62-72] 70  Resp:  [16-18] 16  SpO2:  [92 %-95 %] 95 %  BP: (111-135)/(56-63) 133/63     Weight: 68.9 kg (151 lb 14.4 oz)  Body mass index is 23.86 kg/(m^2).  No intake or output data in the 24 hours ending 04/02/17 1657   Physical Exam   Constitutional: He appears well-nourished.   HENT:   Head: Atraumatic.   Cardiovascular: Normal rate.    Pulmonary/Chest: Effort normal.   Abdominal: He exhibits distension.   Neurological: He is alert.   Nursing note and vitals reviewed.      Significant Labs:   BMP:   Recent Labs  Lab 04/02/17  0420   GLU 92      K 3.5      CO2 26   BUN 10   CREATININE 0.8   CALCIUM 7.9*   MG 1.8     CBC:   Recent Labs  Lab 04/01/17  0403 04/02/17  0420   WBC 4.51 6.07   HGB 10.8* 10.4*   HCT 32.0* 32.0*    209       Significant Imaging: KUB reviewee

## 2017-04-02 NOTE — PROGRESS NOTES
"[facility] Hospital Medicine  Progress Note    Patient Name: Atif Neves  MRN: [id number]  Patient Class: IP- Inpatient   Admission Date: 3/23/2017  Length of Stay: 10 days  Attending Physician: Claudio Phipps MD  Primary Care Provider: Atif Christian MD    [facility] Medicine Team: [facility] HOSP MED  Claudio Phipps MD    Subjective:     Principal Problem:Delirium, acute    HPI:  "87 y/o gentleman presented to the ED today with acute onset of AMS.  At this time, he is confused, alone, and unable to provide any information related to events leading up to ED visit.  According to the records it appears that he has a hx of normal pressure hydrocephalus and recently underwent  shunt placement in February of this year.  He appears to have went to his 6 week post-op follow up with Dr. Sam on 3/22/17 and was at baseline and A/Ox3.   He reportedly was at baseline yesterday, but was found to be acutely alerted upon awaking this AM around 7:30. At this time he is screaming, calling for people that are not present in the room and conversing with the television. He is witnessed moving all 4 extremities.  He does follow simple commands but not consistently and provides yes / no responses to some questioning.  Additional PMH consists of HTN, BPH, macular degeneration, and HLD.  CT of the head was performed while in the ED and there appears to be no acute abnormality, but image was less than optimal related to motion artifact. He was found to have a WBC count of 16.1 and was evaluated by NSX and considered low probability for meningitis. U/A was not suggestive of UTI. Blood cultures were obtained and he was started empirically on vancomycin and cefepime."    Hospital Course:       Interval History:     Still delirious, no appropriate answers to questions.    Review of Systems   All other systems reviewed and are negative.    Objective:     Vital Signs (Most Recent):  Temp: 98.4 °F (36.9 °C) (04/02/17 " 1647)  Pulse: 70 (04/02/17 1647)  Resp: 16 (04/02/17 1647)  BP: 133/63 (04/02/17 1647)  SpO2: 95 % (04/02/17 1647) Vital Signs (24h Range):  Temp:  [98.4 °F (36.9 °C)-99.7 °F (37.6 °C)] 98.4 °F (36.9 °C)  Pulse:  [62-72] 70  Resp:  [16-18] 16  SpO2:  [92 %-95 %] 95 %  BP: (111-135)/(56-63) 133/63     Weight: 68.9 kg (151 lb 14.4 oz)  Body mass index is 23.86 kg/(m^2).  No intake or output data in the 24 hours ending 04/02/17 1657   Physical Exam   Constitutional: He appears well-nourished.   HENT:   Head: Atraumatic.   Cardiovascular: Normal rate.    Pulmonary/Chest: Effort normal.   Abdominal: He exhibits distension.   Neurological: He is alert.   Nursing note and vitals reviewed.      Significant Labs:   BMP:   Recent Labs  Lab 04/02/17  0420   GLU 92      K 3.5      CO2 26   BUN 10   CREATININE 0.8   CALCIUM 7.9*   MG 1.8     CBC:   Recent Labs  Lab 04/01/17  0403 04/02/17  0420   WBC 4.51 6.07   HGB 10.8* 10.4*   HCT 32.0* 32.0*    209       Significant Imaging: KUB reviewee    Assessment/Plan:      BPH (benign prostatic hypertrophy) with urinary obstruction  - continue home tamsulosin   - bladder scan / straight cath PRN    Bacteremia due to Gram-positive bacteria  - WBC's 16.1 on arrival   - Procalcitonin 0.24  - U/A negative for nitrites or leukocytes   - Blood cultures positive for GPC in 2 bottles  - Vanco and cefepime started empirically in the ED  - continued Vanco and transitioned to ceftriaxone; plan to deescalate as indicated by culture results  - seen by Neurosurgery while in ED - thought to be low probability for meningitis - if workup is negative consider reconsulting and possible LP. 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  - Consulted ID. BCs are polymicrobial:  ENTEROCOCCUS FAECALIS   STAPHYLOCOCCUS EPIDERMIDIS     CORYNEBACTERIUM JEIKEIUM (JK)    Antibiotics per ID recommendations; vancomycin x 14 days.     VTE Risk Mitigation          Ordered     heparin (porcine) injection 5,000 Units  Every 12 hours     Route:  Subcutaneous        03/24/17 0018     Medium Risk of VTE  Once      03/24/17 0018     Place sequential compression device  Until discontinued      03/24/17 0018     Place LIAM hose  Until discontinued      03/24/17 0018          Claudio Phipps MD  Department of Hospital Medicine   [facility]

## 2017-04-03 ENCOUNTER — OUTPATIENT CASE MANAGEMENT (OUTPATIENT)
Dept: ADMINISTRATIVE | Facility: OTHER | Age: 82
End: 2017-04-03

## 2017-04-03 VITALS
HEART RATE: 72 BPM | BODY MASS INDEX: 23.84 KG/M2 | SYSTOLIC BLOOD PRESSURE: 131 MMHG | WEIGHT: 151.88 LBS | HEIGHT: 67 IN | TEMPERATURE: 99 F | DIASTOLIC BLOOD PRESSURE: 77 MMHG | OXYGEN SATURATION: 93 % | RESPIRATION RATE: 18 BRPM

## 2017-04-03 LAB
ALBUMIN SERPL BCP-MCNC: 2.2 G/DL
ALP SERPL-CCNC: 81 U/L
ALT SERPL W/O P-5'-P-CCNC: 27 U/L
ANION GAP SERPL CALC-SCNC: 8 MMOL/L
ANISOCYTOSIS BLD QL SMEAR: SLIGHT
AST SERPL-CCNC: 45 U/L
BASOPHILS # BLD AUTO: 0.02 K/UL
BASOPHILS NFR BLD: 0.3 %
BILIRUB SERPL-MCNC: 0.3 MG/DL
BUN SERPL-MCNC: 10 MG/DL
BURR CELLS BLD QL SMEAR: ABNORMAL
CALCIUM SERPL-MCNC: 7.9 MG/DL
CHLORIDE SERPL-SCNC: 103 MMOL/L
CO2 SERPL-SCNC: 27 MMOL/L
CREAT SERPL-MCNC: 0.8 MG/DL
DIFFERENTIAL METHOD: ABNORMAL
EOSINOPHIL # BLD AUTO: 0.1 K/UL
EOSINOPHIL NFR BLD: 1.3 %
ERYTHROCYTE [DISTWIDTH] IN BLOOD BY AUTOMATED COUNT: 15.3 %
EST. GFR  (AFRICAN AMERICAN): >60 ML/MIN/1.73 M^2
EST. GFR  (NON AFRICAN AMERICAN): >60 ML/MIN/1.73 M^2
GLUCOSE SERPL-MCNC: 91 MG/DL
HCT VFR BLD AUTO: 30.3 %
HGB BLD-MCNC: 10.1 G/DL
HYPOCHROMIA BLD QL SMEAR: ABNORMAL
LYMPHOCYTES # BLD AUTO: 1 K/UL
LYMPHOCYTES NFR BLD: 13.5 %
MAGNESIUM SERPL-MCNC: 1.7 MG/DL
MCH RBC QN AUTO: 25.4 PG
MCHC RBC AUTO-ENTMCNC: 33.3 %
MCV RBC AUTO: 76 FL
MONOCYTES # BLD AUTO: 0.6 K/UL
MONOCYTES NFR BLD: 8 %
NEUTROPHILS # BLD AUTO: 5.8 K/UL
NEUTROPHILS NFR BLD: 76.9 %
OVALOCYTES BLD QL SMEAR: ABNORMAL
PLATELET # BLD AUTO: 214 K/UL
PLATELET BLD QL SMEAR: ABNORMAL
PMV BLD AUTO: 9.8 FL
POIKILOCYTOSIS BLD QL SMEAR: SLIGHT
POTASSIUM SERPL-SCNC: 3.6 MMOL/L
PROT SERPL-MCNC: 5.5 G/DL
RBC # BLD AUTO: 3.97 M/UL
SODIUM SERPL-SCNC: 138 MMOL/L
VANCOMYCIN TROUGH SERPL-MCNC: 14.2 UG/ML
WBC # BLD AUTO: 7.62 K/UL

## 2017-04-03 PROCEDURE — 25000003 PHARM REV CODE 250: Performed by: NURSE PRACTITIONER

## 2017-04-03 PROCEDURE — 99239 HOSP IP/OBS DSCHRG MGMT >30: CPT | Mod: ,,, | Performed by: INTERNAL MEDICINE

## 2017-04-03 PROCEDURE — 85025 COMPLETE CBC W/AUTO DIFF WBC: CPT

## 2017-04-03 PROCEDURE — 63600175 PHARM REV CODE 636 W HCPCS: Performed by: INTERNAL MEDICINE

## 2017-04-03 PROCEDURE — 94640 AIRWAY INHALATION TREATMENT: CPT

## 2017-04-03 PROCEDURE — 97116 GAIT TRAINING THERAPY: CPT

## 2017-04-03 PROCEDURE — 80202 ASSAY OF VANCOMYCIN: CPT

## 2017-04-03 PROCEDURE — 97535 SELF CARE MNGMENT TRAINING: CPT

## 2017-04-03 PROCEDURE — 25000003 PHARM REV CODE 250: Performed by: INTERNAL MEDICINE

## 2017-04-03 PROCEDURE — 83735 ASSAY OF MAGNESIUM: CPT

## 2017-04-03 PROCEDURE — 25000242 PHARM REV CODE 250 ALT 637 W/ HCPCS: Performed by: INTERNAL MEDICINE

## 2017-04-03 PROCEDURE — 80053 COMPREHEN METABOLIC PANEL: CPT

## 2017-04-03 PROCEDURE — 94799 UNLISTED PULMONARY SVC/PX: CPT

## 2017-04-03 PROCEDURE — 97530 THERAPEUTIC ACTIVITIES: CPT

## 2017-04-03 PROCEDURE — 63600175 PHARM REV CODE 636 W HCPCS: Performed by: PSYCHIATRY & NEUROLOGY

## 2017-04-03 RX ADMIN — LEVETIRACETAM 1000 MG: 10 INJECTION INTRAVENOUS at 09:04

## 2017-04-03 RX ADMIN — VANCOMYCIN HYDROCHLORIDE 1000 MG: 1 INJECTION, POWDER, LYOPHILIZED, FOR SOLUTION INTRAVENOUS at 04:04

## 2017-04-03 RX ADMIN — TAMSULOSIN HYDROCHLORIDE 0.4 MG: 0.4 CAPSULE ORAL at 09:04

## 2017-04-03 RX ADMIN — VANCOMYCIN HYDROCHLORIDE 1000 MG: 1 INJECTION, POWDER, LYOPHILIZED, FOR SOLUTION INTRAVENOUS at 05:04

## 2017-04-03 RX ADMIN — ATORVASTATIN CALCIUM 20 MG: 20 TABLET, FILM COATED ORAL at 09:04

## 2017-04-03 RX ADMIN — DORZOLAMIDE HYDROCHLORIDE 1 DROP: 20 SOLUTION/ DROPS OPHTHALMIC at 09:04

## 2017-04-03 RX ADMIN — IPRATROPIUM BROMIDE AND ALBUTEROL SULFATE 3 ML: .5; 3 SOLUTION RESPIRATORY (INHALATION) at 12:04

## 2017-04-03 RX ADMIN — Medication 10 ML: at 12:04

## 2017-04-03 RX ADMIN — AMLODIPINE BESYLATE 10 MG: 10 TABLET ORAL at 09:04

## 2017-04-03 RX ADMIN — CITALOPRAM HYDROBROMIDE 10 MG: 10 TABLET ORAL at 09:04

## 2017-04-03 RX ADMIN — Medication 10 ML: at 05:04

## 2017-04-03 RX ADMIN — HEPARIN SODIUM 5000 UNITS: 5000 INJECTION, SOLUTION INTRAVENOUS; SUBCUTANEOUS at 09:04

## 2017-04-03 RX ADMIN — TIMOLOL MALEATE 1 DROP: 5 SOLUTION OPHTHALMIC at 09:04

## 2017-04-03 NOTE — PLAN OF CARE
Problem: Patient Care Overview  Goal: Plan of Care Review  Outcome: Ongoing (interventions implemented as appropriate)  Patient is aaox4. Patient VS are stable and the patient is afebrile. The patient is progressing through the care plan. Patient safety measures are in place; patient bed locked at the lowest position, bed side table within reach, side rails up x2, non skid socks applied, patient belongings are within reach, call light within reach, room free of clutter. Will continue to monitor patient.

## 2017-04-03 NOTE — PLAN OF CARE
"    [facility]    HOME HEALTH ORDERS  FACE TO FACE ENCOUNTER    Patient Name: Atif Neves  YOB: 1928    PCP: Atif Christian MD   PCP Address: [address] [facility] [place] [address]  PCP Phone Number: [phone]  PCP Fax: [phone]    Discharging Team(s): [facility]    Encounter Date: 04/03/2017    Admit to Home Health    Diagnoses:  Active Hospital Problems    Diagnosis  POA    *Delirium, acute [R41.0]  Yes     Last known normal 9pm 3/22. Found at 7:30am on 3/23 confused. No known trauma or seizure activity.    Acutely worsened 3/25 with aphasia, left ptosis (concern for ictal event).  Neg eeg and CT, but loaded and maintained on keppra.      Bacteremia due to Gram-positive bacteria [A49.9]  Yes    NPH (normal pressure hydrocephalus) [G91.2]  Yes     Shunt placed 2/6/17      Ventriculo-peritoneal shunt status [Z98.2]  Not Applicable    BPH (benign prostatic hypertrophy) with urinary obstruction [N40.1, N13.8]  Yes    Essential hypertension [I10]  Yes    Glaucoma [H40.9]  Yes      Resolved Hospital Problems    Diagnosis Date Resolved POA   No resolved problems to display.       Future Appointments  Date Time Provider Department Center   4/7/2017 8:15 AM Zoe Kee PT [facility]   4/19/2017 1:00 PM AUDIOGRAM, AUDIO [facility] AUDIO [facility]   4/19/2017 2:15 PM Elton Butts III, MD [facility] ENT [facility]   7/25/2017 9:45 AM Jose Lopez MD [facility] UROLOGY [facility]           I have seen and examined this patient face to face today. My clinical findings that support the need for the home health skilled services and home bound status are the following:  Weakness/numbness causing balance and gait disturbance due to Weakness/Debility making it taxing to leave home.    Allergies:  Review of patient's allergies indicates:   Allergen Reactions    Aspirin Swelling     States, "makes me sick." pt does not elaborate.        Diet: cardiac " diet    Activities: activity as tolerated    Nursing:   SN to complete comprehensive assessment including routine vital signs. Instruct on disease process and s/s of complications to report to MD. Review/verify medication list sent home with the patient at time of discharge  and instruct patient/caregiver as needed. Frequency may be adjusted depending on start of care date.    Notify MD if SBP > 160 or < 90; DBP > 90 or < 50; HR > 120 or < 50; Temp > 101; Other:        CONSULTS:    Physical Therapy to evaluate and treat. Evaluate for home safety and equipment needs; Establish/upgrade home exercise program. Perform / instruct on therapeutic exercises, gait training, transfer training, and Range of Motion.  Occupational Therapy to evaluate and treat. Evaluate home environment for safety and equipment needs. Perform/Instruct on transfers, ADL training, ROM, and therapeutic exercises.    MISCELLANEOUS CARE:  Home Infusion Therapy:   SN to perform Infusion Therapy/Central Line Care.  Review Central Line Care & Central Line Flush with patient.    Administer (drug and dose): Vancomycin 1250 mg q12    Last dose given: 4/3/17                         Home dose due: 4/3/17    Scrub the Hub: Prior to accessing the line, always perform a 30 second alcohol scrub  Each lumen of the central line is to be flushed at least daily with 10 mL Normal Saline and 3 mL Heparin flush (100 units/mL)  Skilled Nurse (SN) may draw blood from IV access  Blood Draw Procedure:   - Aspirate at least 5 mL of blood   - Discard   - Obtain specimen   - Change posiflow cap   - Flush with 20 mL Normal Saline followed by a                 3-5 mL Heparin flush (100 units/mL)  Central :   - Sterile dressing changes are done weekly and as needed.   - Use chlor-hexadine scrub to cleanse site, apply Biopatch to insertion site,       apply securement device dressing   - Posi-flow caps are changed weekly and after EVERY lab draw.   - If sterile  gauze is under dressing to control oozing,                 dressing change must be performed every 24 hours until gauze is not needed.    Needs Vanc Trough prior to 4th dose.  Goal is 15-20.    End Date is 4/7/17    Medications: Review discharge medications with patient and family and provide education.      Current Discharge Medication List      START taking these medications    Details   levetiracetam (KEPPRA) 1000 MG tablet Take 1 tablet (1,000 mg total) by mouth 2 (two) times daily.  Qty: 60 tablet, Refills: 11      VANCOMYCIN HCL   1250 mg q 12 hours (1,250 mg total) into the vein every 12 (twelve) hours.         CONTINUE these medications which have CHANGED    Details   polyethylene glycol (GLYCOLAX) 17 gram/dose powder Take 17 g by mouth 2 (two) times daily as needed (constipation). Adjust frequency for one small bowel movement a day  Qty: 1 Bottle, Refills: 3         CONTINUE these medications which have NOT CHANGED    Details   amlodipine (NORVASC) 5 MG tablet Take 1 tablet (5 mg total) by mouth once daily.  Qty: 30 tablet, Refills: 11      atorvastatin (LIPITOR) 20 MG tablet Take 1 tablet (20 mg total) by mouth once daily.  Qty: 30 tablet, Refills: 6      citalopram (CELEXA) 10 MG tablet Take 10 mg by mouth once daily.      dorzolamide-timolol 2-0.5% (COSOPT) 22.3-6.8 mg/mL ophthalmic solution Place 1 drop into the left eye once daily.      ergocalciferol (ERGOCALCIFEROL) 50,000 unit Cap TK ONE C     PO ONCE PER MONTH  Refills: 1      tamsulosin (FLOMAX) 0.4 mg Cp24 TK ONE C     PO 30 MINUTES AFTER THE SAME MEAL QD  Refills: 1             I certify that this patient is confined to his home and needs intermittent skilled nursing care, physical therapy and occupational therapy.

## 2017-04-03 NOTE — PLAN OF CARE
Problem: Physical Therapy Goal  Goal: Physical Therapy Goal  Goals to be met by: 4/3/17     Patient will increase functional independence with mobility by performin. Supine to sit with CGA  2. Sit to supine with CGA  3. Sit to stand transfer with SBA  4. Bed to chair transfer with Stand-by Assistance using Rolling Walker  5. Gait x 75 feet with Contact Guard Assistance using Rolling Walker.   6. Ascend/Descend 7 inch curb step with Minimal Assistance using Rolling Walker.  7. Lower extremity exercise program x20-30 reps per handout, with independence   Patient goals remain appropriate.  Patient will continue to benefit from further PT services.  Eliseo Corral, PTA

## 2017-04-03 NOTE — PLAN OF CARE
Home health and IV infusions are arranged, patient receiving dose of IV antibiotic now, arranged [facility] transport for 6:30pm, will follow for additional needs.

## 2017-04-03 NOTE — PT/OT/SLP PROGRESS
Occupational Therapy  Treatment    Atif Neves   MRN: [id number]   Admitting Diagnosis: Delirium, acute    OT Date of Treatment: 17   OT Start Time: 1308  OT Stop Time: 1356  OT Total Time (min): 48 min    Billable Minutes:  Self Care/Home Management 30 and Therapeutic Activity 18    General Precautions: Standard, fall  Orthopedic Precautions: N/A  Braces: N/A     Subjective:  Communicated with RN prior to session.    Pain Ratin/10  Pain Rating Post-Intervention: 0/10    Objective:  Patient found with: peripheral IV, telemetry.  Daughter present during session.       Functional Mobility:  Bed Mobility:  Rolling/Turning Right: Contact guard assistance  Scooting/Bridging: Contact Guard Assistance towards EOB; at end of session pt able to bridge and propel body upwards towards HOB  Supine to Sit: Minimum Assistance for trunk and LE management  Sit to Supine: Contact Guard Assistance    Transfers:  Sit <> Stand Assistance: Minimum Assistance x 6 trials from EOB  -verbal and tactile cues given to assist with proper hand placement on RW and to ensure proper base of support. Impulsivity with decreased safety awareness noted during task.    Sit <> Stand Assistive Device: Rolling Walker    Functional Ambulation: Pt took 5 side steps to left towards HOB with Min A and RW.  Cues for foot placement and increased time required.    Activities of Daily Living:     Toileting Where Assessed: Bed level  Toileting Level of Assistance: Total assistance for doffing soiled brief, donning clean brief, and perrorming manju care while standing; pt able to tolerate standing for nearly ten minutes (with rest breaks in between) during task.  Verbal and tactile cues given to facilitate achieving and maintaining upright position during task with good follow through noted.  -Increased time required for activity  Grooming:  SBA for washing face with cloth utilizing LUE while seated EOB.    LE dressing:  Pt attempted to doff socks x 3  trials utilizing figure four technique; however, unable to elevate LE to place on opposite knee.  Task ultimately not performed.      Balance:   Static Sit: FAIR+: Able to take MINIMAL challenges from all directions  Dynamic Sit: FAIR: Cannot move trunk without losing balance  Static Stand: FAIR: Maintains without assist but unable to take challenges  Dynamic stand: FAIR: Needs CONTACT GUARD during gait    Therapeutic Activities and Exercises:  *Pt sat EOB for ~30 minutes with CGA-SBA given to maintain upright posture.  Throughout session OT called attention to posture and provided cues for pt to activate core muscles and adjust.    *Pt performed 3 bilateral UE exercises (holding onto towel) to address endurance needed for ADLs: 2 sets x 10 reps with rest breaks taken in between each set.  Tactile cues and CGA given during task to facilitate with proper form.  -chest press  -bicep curls  -forward rows  *Pt performed 6 sit to stands throughout session and worked on standing tolerance to address endurance needed for mobility and ADLs in standing.    *At end of session pt able to bridge and scoot body upwards towards HOB x 3 trials.    *POC reviewed with pt and daughter.      AM-PAC 6 CLICK ADL   How much help from another person does this patient currently need?   1 = Unable, Total/Dependent Assistance  2 = A lot, Maximum/Moderate Assistance  3 = A little, Minimum/Contact Guard/Supervision  4 = None, Modified [place]/Independent    Putting on and taking off regular lower body clothing? : 2  Bathing (including washing, rinsing, drying)?: 2  Toileting, which includes using toilet, bedpan, or urinal? : 2  Putting on and taking off regular upper body clothing?: 3  Taking care of personal grooming such as brushing teeth?: 3  Eating meals?: 3  Total Score: 15     AM-PAC Raw Score CMS G-Code Modifier Level of Impairment Assistance   6 % Total / Unable   7 - 9 CM 80 - 100% Maximal Assist   10 - 14 CL 60 - 80%  Moderate Assist   15 - 19 CK 40 - 60% Moderate Assist   20 - 22 CJ 20 - 40% Minimal Assist   23 CI 1-20% SBA / CGA   24 CH 0% Independent/ Mod I     Patient left HOB elevated with all lines intact, call button in reach and daughter present    ASSESSMENT:  Atif Neves is a 88 y.o. male with a medical diagnosis of Delirium, acute and presents with decreased endurance, impaired balance, and weakness impacting performance with ADLs and mobility.  Pt demonstrated increased standing tolerance during toileting this date remaining in upright position for nearly ten minutes with 2 rest breaks taken while manju care performed.  Decreased safety awareness and impulsivity noted during transfers and mobility requiring increased verbal and tactile cues to help pt achieve postural stability and promote safety.  Pt motivated and agreeable to all activity during session.  He would continue to benefit from skilled OT services to address problems listed below and increase overall functional independence prior to returning home.    Rehab identified problem list/impairments: Rehab identified problem list/impairments: weakness, impaired endurance, impaired self care skills, impaired functional mobilty, impaired cognition, gait instability, impaired balance, decreased lower extremity function, decreased coordination, decreased safety awareness, decreased ROM    Rehab potential is good.    Activity tolerance: Good    Discharge recommendations: Discharge Facility/Level Of Care Needs: nursing facility, skilled     Barriers to discharge: Barriers to Discharge: Inaccessible home environment, Decreased caregiver support    Equipment recommendations: wheelchair, walker, rolling, bedside commode, bath bench     GOALS:   Occupational Therapy Goals        Problem: Occupational Therapy Goal    Goal Priority Disciplines Outcome Interventions   Occupational Therapy Goal     OT, PT/OT Ongoing (interventions implemented as appropriate)    Description:   Goals to be met by: 04/10     Patient will increase functional independence with ADLs by performing:    UE Dressing with Stand-by Assistance.  LE Dressing with Moderate Assistance. GOAL MET 03/31/17  Grooming while seated with Supervision.  Toileting from bedside commode with Moderate Assistance for hygiene and clothing management.   Stand pivot transfers with Stand-by Assistance.  Toilet transfer to bedside commode with Stand-by Assistance.                 Plan:  Patient to be seen 4 x/week to address the above listed problems via self-care/home management, therapeutic activities, therapeutic exercises  Plan of Care expires: 04/25/17  Plan of Care reviewed with: patient, daughter         Kinga ELIZALDE BEN Jeffery  04/03/2017

## 2017-04-03 NOTE — PROGRESS NOTES
4/3/2017  1535  Review of EMR, Pt. Is currently in the hospital admit was 3/23   Dx Delirium, per IPCM notes anticipated discharge to home today with HH and IV infusion.  Call to daughter Colleen, [phone], explained enrollment in OPCM and given my contact information.  Informed I will follow up later this week to complete nursing assessment with the patient, and help to coordinate care post acute stay.  Colleen verbalized understanding, offers no questions at this time, reports patient is going home with her.        Follow - up Plan:    Collaborate with IPCM regarding discharge plan  Collaborate with post acute providers  Complete assessment with patient/care giver    CARLYLE Guillen

## 2017-04-03 NOTE — PLAN OF CARE
DOROTHY sexton'd call from patient's daughter asking if they could just take patient home and provide care there. Informed daughter that a family member would need to be taught IV administration but that could easily be arranged. Daughter would like [facility]  set up and IV abx at home. DOROTHY Sanchez notified and will assist. Will follow.

## 2017-04-03 NOTE — CONSULTS
PharmD Consult received for:  1.) Vancomycin dosing:  · Approximate CrCl=60 ml/min  · Bacteremia goal trough 15-20  · Vancomycin 1000 mg every 12 hours currently ordered  · Trough = 14.2 (goal 15-20)  · Will recommend to keep current dosing regimen as trough is close to goal and seems patient is improving. Check weekly troughs while patient is on therapy or more often if renal function changes.  · Patient to be d/'c'ed, will sign off.      **Note: Consults are reviewed Monday-Friday 7:30-4pm.     THE ABOVE RECOMMENDATIONS ARE ONLY SUGGESTED.THE RECOMMENDATIONS SHOULD BE CONSIDERED IN CONJUNCTION WITH ALL PATIENT FACTORS.    Thank you for the consult,  Anabel Melo, SofiaD  [id number]

## 2017-04-03 NOTE — PLAN OF CARE
[facility] unable to staff until Wed, so patient will have [facility]. Daughter aware and in agreement.

## 2017-04-03 NOTE — PT/OT/SLP PROGRESS
Physical Therapy  Treatment    Atif Neves   MRN: [id number]   Admitting Diagnosis: Delirium, acute    PT Received On: 17  PT Start Time: 1128     PT Stop Time: 1154    PT Total Time (min): 26 min       Billable Minutes:  Gait [id number], Therapeutic Activity 10 and Therapeutic Exercise 6    Treatment Type: Treatment  PT/PTA: PTA     PTA Visit Number: 2       General Precautions: Standard, fall  Orthopedic Precautions: N/A   Braces:      Do you have any cultural, spiritual, [employer]alism conflicts, given your current situation?: none stated    Subjective:  Communicated with NSG prior to session.  Patient stated he wanted to get up.    Pain Ratin/10                   Objective:   Patient found with: peripheral IV, telemetry    Functional Mobility:  Bed Mobility:   Supine to Sit: Minimum Assistance  Sit to Supine: Minimum Assistance    Transfers:  Sit <> Stand Assistance: Minimum Assistance  Sit <> Stand Assistive Device: Rolling Walker    Gait:   Gait Distance: 11 feet  Assistance 1: Minimum assistance  Gait Assistive Device: Rolling walker (Required mod assistance for walker mgmnt)  Gait Pattern: 3-point gait  Gait Deviation(s): decreased alisia, increased time in double stance, decreased step length, decreased stride length, decreased swing-to-stance ratio, decreased toe-to-floor clearance, lateral lean, foot flat, decreased weight-shifting ability      Balance:   Static Sit: FAIR+: Able to take MINIMAL challenges from all directions  Dynamic Sit: FAIR+: Maintains balance through MINIMAL excursions of active trunk motion  Static Stand: POOR+: Needs MINIMAL assist to maintain  Dynamic stand: POOR: N/A     Therapeutic Activities and Exercises:  Patient performed supine exercises AP,hip ABD/ADD and HS x15-20 reps;seated LAQ x20 reps B LE's  Patient tolerated sitting EOB 3-4 min SBA     AM-PAC 6 CLICK MOBILITY  How much help from another person does this patient currently need?   1 = Unable, Total/Dependent  Assistance  2 = A lot, Maximum/Moderate Assistance  3 = A little, Minimum/Contact Guard/Supervision  4 = None, Modified Energy/Independent    Turning over in bed (including adjusting bedclothes, sheets and blankets)?: 3  Sitting down on and standing up from a chair with arms (e.g., wheelchair, bedside commode, etc.): 3  Moving from lying on back to sitting on the side of the bed?: 3  Moving to and from a bed to a chair (including a wheelchair)?: 3  Need to walk in hospital room?: 3  Climbing 3-5 steps with a railing?: 2  Total Score: 17    AM-PAC Raw Score CMS G-Code Modifier Level of Impairment Assistance   6 % Total / Unable   7 - 9 CM 80 - 100% Maximal Assist   10 - 14 CL 60 - 80% Moderate Assist   15 - 19 CK 40 - 60% Moderate Assist   20 - 22 CJ 20 - 40% Minimal Assist   23 CI 1-20% SBA / CGA   24 CH 0% Independent/ Mod I     Patient left supine with all lines intact, call button in reach and bed alarm on.    Assessment:  Atif Neves is a 88 y.o. male with a medical diagnosis of Delirium, acute and presents with decrease strength, coordination, balance and endurance.  Patient requires assistance with all mobility and transfers.  Patient was able to ambulate with min assistance.  Patient still confused but able to follow commands. Patient progressing toward goals.  Patient would benefit from further IP therapy..    Rehab identified problem list/impairments: Rehab identified problem list/impairments: weakness, impaired endurance, impaired self care skills, impaired functional mobilty, gait instability, impaired balance, impaired cognition, decreased coordination, decreased lower extremity function, impaired coordination    Rehab potential is good.    Activity tolerance: Fair    Discharge recommendations: Discharge Facility/Level Of Care Needs: nursing facility, skilled     Barriers to discharge: Barriers to Discharge: Inaccessible home environment, Decreased caregiver support    Equipment  recommendations: Equipment Needed After Discharge: wheelchair, walker, rolling, bedside commode, bath bench     GOALS:   Physical Therapy Goals        Problem: Physical Therapy Goal    Goal Priority Disciplines Outcome Goal Variances Interventions   Physical Therapy Goal     PT/OT, PT Ongoing (interventions implemented as appropriate)     Description:  Goals to be met by: 4/3/17     Patient will increase functional independence with mobility by performin. Supine to sit with CGA  2. Sit to supine with CGA  3. Sit to stand transfer with SBA  4. Bed to chair transfer with Stand-by Assistance using Rolling Walker  5. Gait  x 75 feet with Contact Guard Assistance using Rolling Walker.   6. Ascend/Descend 7 inch curb step with Minimal Assistance using Rolling Walker.  7. Lower extremity exercise program x20-30 reps per handout, with independence                 PLAN:    Patient to be seen 4 x/week  to address the above listed problems via gait training, therapeutic activities, therapeutic exercises, neuromuscular re-education  Plan of Care expires: 17  Plan of Care reviewed with: patient         Eliseo Corral II, PTA  2017

## 2017-04-03 NOTE — PLAN OF CARE
Patient discharging home with [facility], notified Emily with [facility] and Gosia with [facility], will follow for additional needs.

## 2017-04-03 NOTE — PLAN OF CARE
Problem: Occupational Therapy Goal  Goal: Occupational Therapy Goal  Goals to be met by: 04/10     Patient will increase functional independence with ADLs by performing:    UE Dressing with Stand-by Assistance.  LE Dressing with Moderate Assistance. GOAL MET 03/31/17  Grooming while seated with Supervision.  Toileting from bedside commode with Moderate Assistance for hygiene and clothing management.   Stand pivot transfers with Stand-by Assistance.  Toilet transfer to bedside commode with Stand-by Assistance.      POC remains appropriate.     BEN Wilson  4/3/2017

## 2017-04-04 ENCOUNTER — TELEPHONE (OUTPATIENT)
Dept: INTERNAL MEDICINE | Facility: CLINIC | Age: 82
End: 2017-04-04

## 2017-04-04 NOTE — TELEPHONE ENCOUNTER
----- Message from Kam Ayala sent at 4/4/2017  1:59 PM CDT -----  Contact: Kyleigh Melendez  [phone]  Kyleigh called and stated that she would like to review the above pt's medications, advice    Thanks

## 2017-04-04 NOTE — TELEPHONE ENCOUNTER
Pt nurse states pt is very confused about meds and daughter is as well per pt nurse . Pt nurse states pt do not have rx for Citalopram , ergocalciferol and flomax .Please advise refills if patient suppose to take these meds

## 2017-04-05 ENCOUNTER — TELEPHONE (OUTPATIENT)
Dept: HEPATOLOGY | Facility: CLINIC | Age: 82
End: 2017-04-05

## 2017-04-05 NOTE — TELEPHONE ENCOUNTER
----- Message from Tran Castro sent at 4/5/2017 10:08 AM CDT -----  Contact: Colleen/Daughter/ [phone]   Colleen is calling to speak with someone in the office. Pt was discharge from the hospital and he was supposed to have some medication sent to the pharmacy. The pharmacy do not have any medication for the pt. Please call and advise     Thank you

## 2017-04-05 NOTE — TELEPHONE ENCOUNTER
Pt daughter states pt was in the hospital and was discharge on meds . Pt daughter states she didnt't know patient was suppose to have meds . Pt daughter states and the meds are not at the pharmacy . Please advise

## 2017-04-05 NOTE — TELEPHONE ENCOUNTER
I spoke with Kyleigh, patient was discharged on Keppra but script was not given or sent to pharmacy.  I called script to [facility].

## 2017-04-05 NOTE — TELEPHONE ENCOUNTER
----- Message from Valencia Grace sent at 4/5/2017  8:17 AM CDT -----  Contact: Kyleigh/ [facility]/ [phone] cell  The nurse is calling to speak with someone in the office regarding the pt's levetiracetam (KEPPRA) 1000 MG tablet prescription.  The prescription was given to the pt when he was admitted to the hospital by Dr. Claudio Phipps MD, but was printed out and sent to the pharmacy.  Ms. Arizmendi would like to speak with someone in the office today, if possible.  She also wants to inform the office that the dose of VANCOMYCIN HCL (VANCOMYCIN 1 G/250 ML D5W, READY TO MIX SYSTEM,) was not given on last night.  Please call and advise.    Thank you

## 2017-04-05 NOTE — TELEPHONE ENCOUNTER
----- Message from Valencia Grace sent at 4/5/2017  8:17 AM CDT -----  Contact: Kyleigh/ [facility]/ [phone] cell  The nurse is calling to speak with someone in the office regarding the pt's levetiracetam (KEPPRA) 1000 MG tablet prescription.  The prescription was given to the pt when he was admitted to the hospital by Dr. Claudio Phipps MD, but was printed out and sent to the pharmacy.  Ms. Arizmendi would like to speak with someone in the office today, if possible.  She also wants to inform the office that the dose of VANCOMYCIN HCL (VANCOMYCIN 1 G/250 ML D5W, READY TO MIX SYSTEM,) was not given on last night.  Please call and advise.    Thank you

## 2017-04-06 ENCOUNTER — TELEPHONE (OUTPATIENT)
Dept: INTERNAL MEDICINE | Facility: CLINIC | Age: 82
End: 2017-04-06

## 2017-04-06 ENCOUNTER — OUTPATIENT CASE MANAGEMENT (OUTPATIENT)
Dept: ADMINISTRATIVE | Facility: OTHER | Age: 82
End: 2017-04-06

## 2017-04-06 ENCOUNTER — TELEPHONE (OUTPATIENT)
Dept: NEUROSURGERY | Facility: CLINIC | Age: 82
End: 2017-04-06

## 2017-04-06 NOTE — TELEPHONE ENCOUNTER
----- Message from Landon Reed sent at 4/6/2017 12:03 PM CDT -----  Contact: Jerry with [facility] [phone]  Caller is requesting a return call regarding the vancomycin and removing the picc line, pls call

## 2017-04-06 NOTE — PROGRESS NOTES
4/6/2017  1400  Review of EMR, patient was d/c from [facility] main [facility] to home with HH and home infusion.  Call to Pt's numbers in attempt to complete nursing assessment, left detailed message x2 requesting call back.       4/6/2017  1422  Received return call from daughter Colleen Lopez, able to complete partial assessment.  Pt. Was recently d/c from hospital, daughter reports patient is doing well.  Pt. Is being followed by  [facility], [phone] Spoke with Kathy CM @ , and confirms a SN PT OT and she has requested approval for an aide to visit.  Informed Kathy patient is being followed by [facility], and given my contact information.  Pt. Is also receiving IV Vancomycin by [facility].  Reviewed Picc line care, hand washing with daughter.  Colleen confirms labs drawn by .  Colleen reports patient is having less confusion, but not yet at his base line. Colleen reports that Pt. Is using [facility], reviewed home safety information re reducing clutter, and confirmed not area/through rugs in the home.  Will send Safety and fall prevention education to her address.  Colleen offers no questions/concerns at this time.  Reviewed [facility] on call number and Colleen confirms she has it and is aware of what it is.  Review of upcoming appointments with daughter, she is aware.  Informed of follow up phone calls, and she agrees with weekly follow up.         Follow - up Plan:    Collaborate with [facility] regarding discharge plan  Collaborate with post acute providers  Complete med rec, depression screen    CARLYLE Guillen

## 2017-04-06 NOTE — TELEPHONE ENCOUNTER
I  spoke with Christine and notified her that we are tryig to fine out the order provider on this patient vancomycin per Dr Christian

## 2017-04-06 NOTE — TELEPHONE ENCOUNTER
Advised that Dr. Sam is not managing Vanc/ PICC at this time. Advised to contact patient's PCP. Understanding verbalized.

## 2017-04-06 NOTE — TELEPHONE ENCOUNTER
----- Message from Titus Moreno MA sent at 4/6/2017  1:30 PM CDT -----  Contact: Jerry mcneill/[facility]-[phone]  Please advise that Jerry is calling to Verify that the Pt last visit for Vancomycin Iv Therapy is tomorrow. She stated that they will Orders to remove the Picc. Please call. Thanks!

## 2017-04-07 NOTE — TELEPHONE ENCOUNTER
----- Message from Tran Castro sent at 4/7/2017 10:48 AM CDT -----  Contact: Jerry/[facility]/ [phone]   Jerry is calling to speak with someone in the office about the end care on the vancomycin. Please call and advise     Thank you

## 2017-04-10 ENCOUNTER — OFFICE VISIT (OUTPATIENT)
Dept: PRIMARY CARE CLINIC | Facility: CLINIC | Age: 82
End: 2017-04-10
Payer: MEDICARE

## 2017-04-10 VITALS
HEART RATE: 85 BPM | OXYGEN SATURATION: 95 % | HEIGHT: 67 IN | DIASTOLIC BLOOD PRESSURE: 64 MMHG | SYSTOLIC BLOOD PRESSURE: 118 MMHG

## 2017-04-10 DIAGNOSIS — R78.81 BACTEREMIA DUE TO GRAM-POSITIVE BACTERIA: ICD-10-CM

## 2017-04-10 DIAGNOSIS — G91.2 NPH (NORMAL PRESSURE HYDROCEPHALUS): ICD-10-CM

## 2017-04-10 DIAGNOSIS — Z98.2 VENTRICULO-PERITONEAL SHUNT STATUS: ICD-10-CM

## 2017-04-10 DIAGNOSIS — E78.2 MIXED HYPERLIPIDEMIA: ICD-10-CM

## 2017-04-10 DIAGNOSIS — N13.8 BPH (BENIGN PROSTATIC HYPERTROPHY) WITH URINARY OBSTRUCTION: ICD-10-CM

## 2017-04-10 DIAGNOSIS — E55.9 VITAMIN D DEFICIENCY: ICD-10-CM

## 2017-04-10 DIAGNOSIS — F41.1 GENERALIZED ANXIETY DISORDER: ICD-10-CM

## 2017-04-10 DIAGNOSIS — N40.1 BPH (BENIGN PROSTATIC HYPERTROPHY) WITH URINARY OBSTRUCTION: ICD-10-CM

## 2017-04-10 DIAGNOSIS — I10 ESSENTIAL HYPERTENSION: Primary | ICD-10-CM

## 2017-04-10 DIAGNOSIS — D50.9 MICROCYTIC ANEMIA: ICD-10-CM

## 2017-04-10 PROCEDURE — 99999 PR PBB SHADOW E&M-EST. PATIENT-LVL III: CPT | Mod: PBBFAC,,,

## 2017-04-10 PROCEDURE — 99499 UNLISTED E&M SERVICE: CPT | Mod: S$GLB,,, | Performed by: INTERNAL MEDICINE

## 2017-04-10 RX ORDER — CITALOPRAM 10 MG/1
10 TABLET ORAL DAILY
Qty: 90 TABLET | Refills: 4 | Status: SHIPPED | OUTPATIENT
Start: 2017-04-10 | End: 2018-04-18 | Stop reason: SDUPTHER

## 2017-04-10 RX ORDER — ERGOCALCIFEROL 1.25 MG/1
50000 CAPSULE ORAL
Qty: 6 CAPSULE | Refills: 3 | Status: SHIPPED | OUTPATIENT
Start: 2017-04-10 | End: 2018-06-29 | Stop reason: SDUPTHER

## 2017-04-10 RX ORDER — TAMSULOSIN HYDROCHLORIDE 0.4 MG/1
0.4 CAPSULE ORAL DAILY
Qty: 90 CAPSULE | Refills: 4 | Status: SHIPPED | OUTPATIENT
Start: 2017-04-10 | End: 2018-06-20 | Stop reason: SDUPTHER

## 2017-04-10 NOTE — PROGRESS NOTES
Name &  verified. Allergies reviewed. Pt tolerated both injections well. Pt advised to remain in the clinic for 15 mins and report any adverse reactions.

## 2017-04-10 NOTE — PROGRESS NOTES
PharmD Priority Clinic Note      Date of Discharge: 4/3/17      Patient presents to clinic with his two daughters for follow-up after recent discharge from the hospital on 4/3/17. Patient was admitted on 3/23/17 for altered mental status and GPC bacteremia that was treated with a 14 day course of vancomycin. He reports pain from the PICC line that was inserted for his vancomycin administration at home. He is otherwise well and his daughters report that his mental status is much improved since treatment of bacteremia and discharge from hospital. His daughters have a good understanding of what medications he is taking and how to administer them.     Patient's medication regimen at discharge was as follows:             amlodipine 5 MG tablet   Commonly known as:  NORVASC   Take 1 tablet (5 mg total) by mouth once daily.       atorvastatin 20 MG tablet   Commonly known as:  LIPITOR   Take 1 tablet (20 mg total) by mouth once daily.       citalopram 10 MG tablet   Commonly known as:  CELEXA       dorzolamide-timolol 2-0.5% 22.3-6.8 mg/mL ophthalmic solution   Commonly known as:  COSOPT       ergocalciferol 50,000 unit Cap   Commonly known as:  ERGOCALCIFEROL       levetiracetam 1000 MG tablet   Commonly known as:  KEPPRA   Take 1 tablet (1,000 mg total) by mouth 2 (two) times daily.       polyethylene glycol 17 gram/dose powder   Commonly known as:  GLYCOLAX   Take 17 g by mouth 2 (two) times daily as needed (constipation). Adjust frequency for one small bowel movement a day       tamsulosin 0.4 mg Cp24   Commonly known as:  FLOMAX             Medications have been reviewed and reconciled.    In home Medication list is consistent with discharge medication regimen.       No medication discrepancies identified    Wt Readings from Last 3 Encounters:   03/30/17 68.9 kg (151 lb 14.4 oz)   03/22/17 70.5 kg (155 lb 8 oz)   02/23/17 68.5 kg (151 lb 0.2 oz)     Temp Readings from Last 3 Encounters:   04/03/17 99.2 °F (37.3 °C)  (Oral)   03/22/17 97.4 °F (36.3 °C) (Oral)   02/17/17 97.6 °F (36.4 °C)     BP Readings from Last 3 Encounters:   04/10/17 118/64   04/03/17 131/77   03/22/17 (!) 141/71     Pulse Readings from Last 3 Encounters:   04/10/17 85   04/03/17 72   03/22/17 66     Lab Results   Component Value Date    WBC 7.62 04/03/2017       Medication Therapy Plan for Medication related problems since discharge and Associated Resolutions:    1. Medication education needs: Yes      Medication Therapy Plan:  1. Patient education: Patient and daughters educated on current home medications and patients daughter display understanding of medication regimen.       Jeny Elizondo, PharmD   PGY-1 Pharmacy Resident

## 2017-04-10 NOTE — PROGRESS NOTES
"[facility]  New Visit Progress Note   Recent Hospital Discharge     PRESENTING HISTORY     Chief Complaint/Reason for Visit:  Follow up Hospital Discharge   Chief Complaint   Patient presents with    Hospital Follow Up     PCP: Atif Christian MD    History of Present Illness: Mr. Atif Neves is a 88 y.o. male who was recently admitted to the hospital.    Patient Name: Atif Neves  MRN: [id number]  Patient Class: IP- Inpatient   Admission Date: 3/23/2017  Discharge Date: 4/3/17  Attending Physician: Claudio Phipps MD  Primary Care Provider: Atif Christian MD     [facility] Medicine Team: [facility] Claudio Phipps MD    HPI:  "89 y/o gentleman presented to the ED today with acute onset of AMS. At this time, he is confused, alone, and unable to provide any information related to events leading up to ED visit. According to the records it appears that he has a hx of normal pressure hydrocephalus and recently underwent  shunt placement in February of this year. He appears to have went to his 6 week post-op follow up with Dr. Sam on 3/22/17 and was at baseline and A/Ox3. He reportedly was at baseline yesterday, but was found to be acutely alerted upon awaking this AM around 7:30. At this time he is screaming, calling for people that are not present in the room and conversing with the television. He is witnessed moving all 4 extremities. He does follow simple commands but not consistently and provides yes / no responses to some questioning. Additional PMH consists of HTN, BPH, macular degeneration, and HLD. CT of the head was performed while in the ED and there appears to be no acute abnormality, but image was less than optimal related to motion artifact. He was found to have a WBC count of 16.1 and was evaluated by NSX and considered low probability for meningitis. U/A was not suggestive of UTI. Blood cultures were obtained and he was started empirically on vancomycin and cefepime."     Hospital Course:  * " Delirium, acute  - remains altered, unclear etiology.  - continue all safety measures - high risk of injury / falls  - ammonia 23 on admission  - CT head unremarkable but less than optimal imaging related to motion artifact  - Neurology consulted; LP attempted. Appeared that infectious cause is possible given positive blood cultures but cultures are polymicrobial. ID consulted; repeat BCs are negative so far.  - Slightly improved but not at all near baseline. Was seen normal in Clinic, by Dr. Sam, 1 day prior to admission.  - possible seizure on 3/25/17, continuing Keppra.  - MRI negative  - Neuro signed off  - Discussed staring spell with neuro, unclear clinical significance, recs: no need for EEG and continue keppra and follow up with epilepsy     Bacteremia due to Gram-positive bacteria  - WBC's 16.1 on arrival   - Procalcitonin 0.24  - U/A negative for nitrites or leukocytes   - Blood cultures positive for GPC in 2 bottles  - Vanco and cefepime started empirically in the ED  - continued Vanco and transitioned to ceftriaxone; plan to deescalate as indicated by culture results  - seen by Neurosurgery while in ED - thought to be low probability for meningitis - if workup is negative consider reconsulting and possible LP. 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  - Consulted ID. BCs are polymicrobial:       ENTEROCOCCUS FAECALIS    STAPHYLOCOCCUS EPIDERMIDIS      CORYNEBACTERIUM CAITLINIUM (JK)     Antibiotics per ID recommendations; vancomycin x 14 days.    __________________________________________________________________    Today:  He is off Vancomycin now.  Eating better.  Not confused to family. Doing well.  He complains of right PICC line pain. No chest pain or SOB.    PAST HISTORY:     Past Medical History:   Diagnosis Date    JONATAN (acute kidney injury) 06/2016    Bacteremia due to Gram-positive bacteria 3/24/2017    Antibiotics per ID recommendations; vancomycin  x 14 days.     BPH (benign prostatic hypertrophy) with urinary obstruction 6/16/2016    Cystoid macular edema, left eye 8/4/2016    Essential hypertension 6/16/2016    Essential hypertension     Generalized anxiety disorder 6/16/2016    Glaucoma     Irregular heartbeat     Macular degeneration     Microcytic anemia 4/10/2017    Mixed hyperlipidemia 6/16/2016    Nonexudative age-related macular degeneration, bilateral, intermediate dry stage 8/4/2016    Normal pressure hydrocephalus     NPH (normal pressure hydrocephalus) 2/6/2017    Shunt placed 2/6/17    Urinary retention 06/2016    Ventriculo-peritoneal shunt status 2/6/2017       Past Surgical History:   Procedure Laterality Date    TRANSURETHRAL RESECTION OF PROSTATE  08/31/2016       Family History   Problem Relation Age of Onset    Heart disease Brother     Cancer Daughter 55     Breast    Cancer Daughter 54     Breast       Social History     Social History    Marital status:      Spouse name: N/A    Number of children: N/A    Years of education: N/A     Occupational History          Social History Main Topics    Smoking status: Former Smoker     Packs/day: 1.00     Years: 18.00     Quit date: 1950    Smokeless tobacco: Never Used    Alcohol use No    Drug use: No    Sexual activity: Not Asked     Other Topics Concern    None     Social History Narrative       MEDICATIONS & ALLERGIES:     Current Outpatient Prescriptions on File Prior to Visit   Medication Sig Dispense Refill    amlodipine (NORVASC) 5 MG tablet Take 1 tablet (5 mg total) by mouth once daily. 30 tablet 11    atorvastatin (LIPITOR) 20 MG tablet Take 1 tablet (20 mg total) by mouth once daily. 30 tablet 6    citalopram (CELEXA) 10 MG tablet Take 10 mg by mouth once daily.      dorzolamide-timolol 2-0.5% (COSOPT) 22.3-6.8 mg/mL ophthalmic solution Place 1 drop into the left eye once daily.      ergocalciferol (ERGOCALCIFEROL) 50,000 unit Cap TK ONE  "C     PO ONCE PER MONTH  1    levetiracetam (KEPPRA) 1000 MG tablet Take 1 tablet (1,000 mg total) by mouth 2 (two) times daily. 60 tablet 11    polyethylene glycol (GLYCOLAX) 17 gram/dose powder Take 17 g by mouth 2 (two) times daily as needed (constipation). Adjust frequency for one small bowel movement a day 1 Bottle 3    tamsulosin (FLOMAX) 0.4 mg Cp24 TK ONE C     PO 30 MINUTES AFTER THE SAME MEAL QD  1            No current facility-administered medications on file prior to visit.         Review of patient's allergies indicates:   Allergen Reactions    Aspirin Swelling     States, "makes me sick." pt does not elaborate.        OBJECTIVE:     Vital Signs:  Vitals:    04/10/17 1413   BP: 118/64   Pulse: 85     Wt Readings from Last 1 Encounters:   03/30/17 0400 68.9 kg (151 lb 14.4 oz)   03/29/17 0400 70.4 kg (155 lb 3.3 oz)   03/28/17 0400 69.2 kg (152 lb 8.9 oz)   03/27/17 0400 66.4 kg (146 lb 6.2 oz)   03/26/17 0400 66.5 kg (146 lb 9.7 oz)   03/25/17 0400 65.7 kg (144 lb 13.5 oz)   03/23/17 1037 70.5 kg (155 lb 6.8 oz)     There is no height or weight on file to calculate BMI.     Physical Exam:  General: Well developed, well nourished. No distress.  HEENT: Head is normocephalic, atraumatic.  Eyes: Clear conjunctiva.  Neck: Supple, symmetrical neck; trachea midline.  Lungs: Clear to auscultation bilaterally and normal respiratory effort.  Cardiovascular: Heart with regular rate and rhythm.    Extremities: No LE edema.   RUE with PICC, no swelling or redness. I cleaned the PICC site with betadine and removed the PICC line per protocol.  Abdomen: Abdomen is soft, non-tender non-distended with normal bowel sounds.  Skin: Skin color, texture, turgor normal. No rashes.  Psychiatric: Not depressed.    Laboratory  Lab Results   Component Value Date    WBC 7.62 04/03/2017    HGB 10.1 (L) 04/03/2017    HCT 30.3 (L) 04/03/2017    MCV 76 (L) 04/03/2017     04/03/2017     BMP  Lab Results   Component Value " Date     04/03/2017    K 3.6 04/03/2017     04/03/2017    CO2 27 04/03/2017    BUN 10 04/03/2017    CREATININE 0.8 04/03/2017    CALCIUM 7.9 (L) 04/03/2017    ANIONGAP 8 04/03/2017    ESTGFRAFRICA >60.0 04/03/2017    EGFRNONAA >60.0 04/03/2017     Lab Results   Component Value Date    ALT 27 04/03/2017    AST 45 (H) 04/03/2017    ALKPHOS 81 04/03/2017    BILITOT 0.3 04/03/2017     Lab Results   Component Value Date    INR 0.9 02/06/2017     Results for RORY FREEMAN (MRN [id number]) as of 4/10/2017 14:18   Ref. Range 3/27/2017 15:35 3/30/2017 14:56 4/2/2017 00:10 4/3/2017 04:43   Vancomycin-Trough Latest Ref Range: 10.0 - 22.0 ug/mL 8.6 (L) 12.1 17.6 14.2       TRANSITION OF CARE:     [facility] Contact Note: 4-5-17    Family and/or Caretaker present at visit?  Yes.  Diagnostic tests reviewed/disposition: No diagnosic tests pending after this hospitalization.  Disease/illness education: Staph bacteremia, history of  shunt.  Home health/community services discussion/referrals: Patient has home health established at [facility] for therapy..   Establishment or re-establishment of referral orders for community resources: No other necessary community resources.   Discussion with other health care providers: No discussion with other health care providers necessary.     Medications Reconciliation:   I have reconciled the patient's home medications and discharge medications with the patient/family. I have updated all changes.  Refer to After-Visit Medication List.    ASSESSMENT & PLAN:     Essential hypertension  - Controlled on amlodipine 5 mg daily.    Bacteremia due to Gram-positive bacteria  - s/p 14 days of Vancomycin (last dose on Saturday).    PICC removed today.    NPH (normal pressure hydrocephalus)  Ventriculo-peritoneal shunt status  - On Seizure prophylaxis with Keppra.    BPH (benign prostatic hypertrophy) with urinary obstruction  Refilled: -     tamsulosin (FLOMAX) 0.4 mg Cp24; Take 1 capsule  (0.4 mg total) by mouth once daily.  Dispense: 90 capsule; Refill: 4    Mixed hyperlipidemia  - On atorvastatin 20 mg daily.    Generalized anxiety disorder      Refilled:  -     citalopram (CELEXA) 10 MG tablet; Take 1 tablet (10 mg total) by mouth once daily.  Dispense: 90 tablet; Refill: 4    Microcytic anemia  - Instructed family to increased red meat intake for iron.    Vitamin D deficiency  - Refilled:  -     ergocalciferol (ERGOCALCIFEROL) 50,000 unit Cap; Take 1 capsule (50,000 Units total) by mouth every 30 days.  Dispense: 6 capsule; Refill: 3    Scheduled Follow-up :  Future Appointments  Date Time Provider Department Center   4/19/2017 1:00 PM AUDIOGRAM, AUDIO [facility] AUDIO [facility]   4/19/2017 2:15 PM Elton Butts III, MD [facility] ENT [facility]   5/3/2017 11:30 AM Atif Christian MD [facility] IM [facility] PC   7/25/2017 9:45 AM Jose Lopez MD [facility] UROLOGY [facility]       After Visit Medication List :     Medication List          This list is accurate as of: 4/10/17  2:55 PM.  Always use your most recent med list.                     amlodipine 5 MG tablet   Commonly known as:  NORVASC   Take 1 tablet (5 mg total) by mouth once daily.       atorvastatin 20 MG tablet   Commonly known as:  LIPITOR   Take 1 tablet (20 mg total) by mouth once daily.       citalopram 10 MG tablet   Commonly known as:  CELEXA   Take 1 tablet (10 mg total) by mouth once daily.       dorzolamide-timolol 2-0.5% 22.3-6.8 mg/mL ophthalmic solution   Commonly known as:  COSOPT       ergocalciferol 50,000 unit Cap   Commonly known as:  ERGOCALCIFEROL   Take 1 capsule (50,000 Units total) by mouth every 30 days.       levetiracetam 1000 MG tablet   Commonly known as:  KEPPRA   Take 1 tablet (1,000 mg total) by mouth 2 (two) times daily.       polyethylene glycol 17 gram/dose powder   Commonly known as:  GLYCOLAX   Take 17 g by mouth 2 (two) times daily as needed (constipation). Adjust frequency for one small bowel movement a day        tamsulosin 0.4 mg Cp24   Commonly known as:  FLOMAX   Take 1 capsule (0.4 mg total) by mouth once daily.            Where to Get Your Medications      [id number] - [facility] [address] AT [facility] [address]  [address] [facility] [address]     Phone:  [phone]     citalopram 10 MG tablet    ergocalciferol 50,000 unit Cap    tamsulosin 0.4 mg Cp24             Signing Physician:  Kenrick Way MD

## 2017-04-10 NOTE — MR AVS SNAPSHOT
[facility]  [address]  [facility] [address]  Phone: [phone]                  Atif DUQUE Edinson   4/10/2017 2:00 PM   Office Visit    Description:  Male : 1928   Provider:  [facility]IC[facility]   Department:  [facility]           Reason for Visit     Hospital Follow Up           Diagnoses this Visit        Comments    Essential hypertension    -  Primary     Bacteremia due to Gram-positive bacteria         NPH (normal pressure hydrocephalus)         Ventriculo-peritoneal shunt status         BPH (benign prostatic hypertrophy) with urinary obstruction         Mixed hyperlipidemia         Generalized anxiety disorder         Microcytic anemia         Vitamin D deficiency                To Do List           Future Appointments        Provider Department Dept Phone    2017 1:00 PM AUDIOGRAM, AUDIO [facility] Audiology [phone]    2017 2:15 PM Elton Butts III, MD [facility] Otorhinolaryngology [phone]    5/3/2017 11:30 AM Atif Christian MD [facility] Internal Medicine [phone]    2017 9:45 AM Jose Lopez MD [facility] Urology [phone]      Goals (5 Years of Data)              17    Patient/caregiver will have knowledge of resources available in order to obtain the services that are needed prior to discharge from [facility].   Improving    Notes - Note edited  2017  2:44 PM by Neli Frazier RN    Overall Time to Completion  1 month from 2017    Short Term Goals  Patient/caregiver will verbalize understanding and will follow hospital discharge plan 2 weeks.  Interventions   · Collaborate with Inpatient case management regarding Discharge Plan.  · Collaborate with Post-Acute Provider regarding treatment plan.  · Provide contact information for Outpatient Case Management contact information.   Status  · Partially met          Patient/caregiver will have Safety Plan in place prior to discharge from  [facility].       Notes - Note edited  4/6/2017  2:54 PM by Neli Frazier RN    Overall Time to Completion  2 months from 04/06/2017    Short Term Goals  Patient/caregiver will put in place 2 keeping room free of clutter and making sure rooms are well lit measures to decrease the risk of patient falls within 1 month.  Interventions   · Recognize and provide educational material (CHAGO).   Status  · Partially met         Clinical Reference Documents Added to Patient Instructions       Document    CARING FOR A PERSON WITH DELIRIUM (ENGLISH)    FALL PREVENTION (ENGLISH)    FALLS IN THE HOME, PREVENTING (ENGLISH)               These Medications        Disp Refills Start End    ergocalciferol (ERGOCALCIFEROL) 50,000 unit Cap 6 capsule 3 4/10/2017     Take 1 capsule (50,000 Units total) by mouth every 30 days. - Oral    Pharmacy: [facility] [address] [place] [address] [address] AT [address] Ph #: [phone]       citalopram (CELEXA) 10 MG tablet 90 tablet 4 4/10/2017     Take 1 tablet (10 mg total) by mouth once daily. - Oral    Pharmacy: [facility] [address] [place] [address] [address] AT [address] Ph #: [phone]       tamsulosin (FLOMAX) 0.4 mg Cp24 90 capsule 4 4/10/2017     Take 1 capsule (0.4 mg total) by mouth once daily. - Oral    Pharmacy: [facility] [address] [place] [address] [address] AT [address] Ph #: [phone]         [facility] On Call     [facility] On Call Nurse Care Line - 24/7 Assistance  Unless otherwise directed by your provider, please contact [facility], our nurse care line that is available for 24/7 assistance.     Registered nurses in the [facility] provide: appointment scheduling, clinical advisement, health education, and other advisory services.  Call: [phone] (toll free)               Medications           Message regarding  Medications     Verify the changes and/or additions to your medication regime listed below are the same as discussed with your clinician today.  If any of these changes or additions are incorrect, please notify your healthcare provider.        CHANGE how you are taking these medications     Start Taking Instead of    ergocalciferol (ERGOCALCIFEROL) 50,000 unit Cap ergocalciferol (ERGOCALCIFEROL) 50,000 unit Cap    Dosage:  Take 1 capsule (50,000 Units total) by mouth every 30 days. Dosage:  TK ONE C     PO ONCE PER MONTH    Reason for Change:  Reorder     citalopram (CELEXA) 10 MG tablet citalopram (CELEXA) 10 MG tablet    Dosage:  Take 1 tablet (10 mg total) by mouth once daily. Dosage:  Take 10 mg by mouth once daily.    Reason for Change:  Reorder     tamsulosin (FLOMAX) 0.4 mg Cp24 tamsulosin (FLOMAX) 0.4 mg Cp24    Dosage:  Take 1 capsule (0.4 mg total) by mouth once daily. Dosage:  TK ONE C     PO 30 MINUTES AFTER THE SAME MEAL QD    Reason for Change:  Reorder       STOP taking these medications     VANCOMYCIN HCL (VANCOMYCIN 1 G/250 ML D5W, READY TO MIX SYSTEM,) Inject 250 mLs (1,000 mg total) into the vein every 12 (twelve) hours.           Verify that the below list of medications is an accurate representation of the medications you are currently taking.  If none reported, the list may be blank. If incorrect, please contact your healthcare provider. Carry this list with you in case of emergency.           Current Medications     amlodipine (NORVASC) 5 MG tablet Take 1 tablet (5 mg total) by mouth once daily.    atorvastatin (LIPITOR) 20 MG tablet Take 1 tablet (20 mg total) by mouth once daily.    citalopram (CELEXA) 10 MG tablet Take 1 tablet (10 mg total) by mouth once daily.    dorzolamide-timolol 2-0.5% (COSOPT) 22.3-6.8 mg/mL ophthalmic solution Place 1 drop into the left eye once daily.    ergocalciferol (ERGOCALCIFEROL) 50,000 unit Cap Take 1 capsule (50,000 Units total) by mouth every 30 days.     "polyethylene glycol (GLYCOLAX) 17 gram/dose powder Take 17 g by mouth 2 (two) times daily as needed (constipation). Adjust frequency for one small bowel movement a day    tamsulosin (FLOMAX) 0.4 mg Cp24 Take 1 capsule (0.4 mg total) by mouth once daily.    levetiracetam (KEPPRA) 1000 MG tablet Take 1 tablet (1,000 mg total) by mouth 2 (two) times daily.           Clinical Reference Information           Your Vitals Were     BP Pulse Height SpO2          118/64 (BP Location: Left arm, Patient Position: Sitting, BP Method: Manual) 85 5' 7" (1.702 m) 95%        Blood Pressure          Most Recent Value    BP  118/64      Allergies as of 4/10/2017     Aspirin      Immunizations Administered on Date of Encounter - 4/10/2017     Name Date Dose VIS Date Route    Pneumococcal Conjugate - 13 Valent  Incomplete 0.5 mL 11/5/2015 Intramuscular    TD  Incomplete 0.5 mL 2/24/2015 Intramuscular      Orders Placed During Today's Visit      Normal Orders This Visit    Pneumococcal Conjugate Vaccine (13 Valent) (IM)     Td Vaccinie       Language Assistance Services     ATTENTION: Language assistance services are available, free of charge. Please call [phone].      ATENCIÓN: Si habla español, tiene a corral disposición servicios gratuitos de asistencia lingüística. Llame al [phone].     JOSÉ MIGUEL Ý: N?u b?n nói Ti?ng Vi?t, có các d?ch v? h? tr? ngôn ng? mi?n phí dành cho b?n. G?i s? [phone].         [facility] complies with applicable Federal civil rights laws and does not discriminate on the basis of race, color, national origin, age, disability, or sex.        "

## 2017-04-10 NOTE — Clinical Note
[facility] Visit (Post Discharge Follow-up) Today:  - Our clinic physicians and nurses plan to follow the patient up for any medical issues in the [facility] for 30 days post discharge.  Future Appointments: 4/19/2017  1:00 PM    AUDIOGRAM, AUDIO           [facility] AUDIO     Marlon bri 4/19/2017  2:15 PM    Elton Butts III, * [facility] ENT       [facility] 5/3/2017   11:30 AM   Atif Christian MD         [facility] IM        [facility] PCW  7/25/2017  9:45 AM    Jose Lopez MD      [facility] UROLOGY   [facility]

## 2017-04-12 NOTE — DISCHARGE SUMMARY
[facility] Hospital Medicine  Discharge Summary      Patient Name: Atif Neves  MRN: [id number]  Admission Date: 3/23/2017  Hospital Length of Stay: 11 days  Discharge Date and Time: 4/3/2017  6:58 PM  Attending Physician: No att. providers found   Discharging Provider: Claudio Phipps MD  Primary Care Provider: Atif Christian MD    [facility] Medicine Team: [facility]  Claudio Phipps MD    HPI:     87 y/o gentleman who is new to me presents to the ED today with acute onset of AMS. At this time, he is confused, alone, and unable to provide any information related to events leading up to ED visit. According to the records it appears that he has a hx of normal pressure hydrocephalus and recently underwent  shunt placement in February of this year. He appears to have went to his 6 week post-op follow up with Dr. Sam on 3/22/17 and was at baseline and A/Ox3. He reportedly was at baseline yesterday, but was found to be acutely alerted upon awaking this AM around 7:30. At this time he is screaming, calling for people that are not present in the room and conversing with the television. He is witnessed moving all 4 extremities. He does follow simple commands but not consistently and provides yes / no responses to some questioning. Additional PMH consists of HTN, BPH, macular degeneration, and HLD. CT of the head was performed while in the ED and there appears to be no acute abnormality, but image was less than optimal related to motion artifact. He was found to have a WBC count of 16.1 and was evaluated by NSX and considered low probability for meningitis. U/A was not suggestive of UTI. Blood cultures were obtained and he was started empirically on vancomycin and cefepime    Procedure(s) (LRB):  PUNCTURE-LUMBAR (N/A)  MAGNETIC RESONANCE IMAGING (N/A)      Indwelling Lines/Drains at time of discharge:   Lines/Drains/Airways     Peripherally Inserted Central Catheter Line                 PICC Double  Lumen 03/31/17 1100 right brachial 12 days              Hospital Course:     The patient was found to have a polymicrobial bacteremia.  He was seen by ID and treated with IV vancomycin and will complete a total of two weeks of therapy.  He had some gradual improvement of his mental status.  His LP was aborted due to patient agitation.  MRI was negative for any acute process.  His family declined SNF and he was discharged home.      Consults:   Consults         Status Ordering Provider     [facility] Medicine PharmD Consult  Once     Provider:  (Not yet assigned)    Completed PEDRITO CASILLAS     Inpatient consult to Infectious Diseases  Once     Provider:  (Not yet assigned)    Completed RAYSA CARBONE     Inpatient consult to Neurology  Once     Provider:  (Not yet assigned)    Completed ROBERTO URBANO     Inpatient consult to Neurosurgery  Once     Provider:  (Not yet assigned)    Completed MEL BURKS     Inpatient consult to PICC team ([facility])  Once     Provider:  (Not yet assigned)    Completed RAYSA CARBONE     Inpatient consult to PICC team ([facility])  Once     Provider:  (Not yet assigned)    Completed PEDRITO CASILLAS     Inpatient consult to PICC team ([facility])  Once     Provider:  (Not yet assigned)    Completed PEDRITO CASILLAS     Inpatient consult to SNF [facility]  Once     Provider:  (Not yet assigned)    Completed PEDRITO CASILLAS            Pending Diagnostic Studies:     Procedure Component Value Units Date/Time    Drug screen panel, emergency [[id number]] Collected:  03/23/17 1731    Order Status:  Sent Lab Status:  In process Updated:  03/23/17 1731    Specimen:  Urine from Urine, Catheterized         Final Active Diagnoses:    Diagnosis Date Noted POA    Bacteremia due to Gram-positive bacteria [A49.9] 03/24/2017 Unknown    NPH (normal pressure hydrocephalus) [G91.2] 02/06/2017 Unknown    Ventriculo-peritoneal shunt status [Z98.2] 02/06/2017 Unknown    BPH (benign prostatic hypertrophy) with  urinary obstruction [N40.1, N13.8] 06/16/2016 Unknown    Essential hypertension [I10] 06/16/2016 Unknown      Problems Resolved During this Admission:    Diagnosis Date Noted Date Resolved POA    PRINCIPAL PROBLEM:  Delirium, acute [R41.0]  04/10/2017 Unknown    Glaucoma [H40.9] 06/16/2016 04/10/2017 Unknown      Discharged Condition: good    Disposition: Home or Self Care    Follow Up:  Follow-up Information     Follow up with [facility]  [place].    Specialties:  Home Health Services, Physical Therapy, Occupational Therapy    Contact information:    [address]  [address]  [place] [address]  [phone]          Follow up with [facility].    Specialties:  Pharmacist, DME Provider, IV Infusion    Contact information:    [address]  [place] [address]  [phone]          Patient Instructions:     Ambulatory referral to Outpatient Case Management   Referral Priority: Routine Referral Type: Consultation   Referral Reason: Specialty Services Required    Number of Visits Requested: 1      Ambulatory consult to Neurology Epilepsy   Referral Priority: Routine Referral Type: Consultation   Referral Reason: Specialty Services Required    Requested Specialty: Neurology    Number of Visits Requested: 1      Diet general     Diet general       Medications:  Reconciled Home Medications:   Discharge Medication List as of 4/3/2017  6:52 PM      START taking these medications    Details   levetiracetam (KEPPRA) 1000 MG tablet Take 1 tablet (1,000 mg total) by mouth 2 (two) times daily., Starting 3/31/2017, Until Sat 3/31/18, No Print      VANCOMYCIN HCL (VANCOMYCIN 1 G/250 ML D5W, READY TO MIX SYSTEM,) Inject 250 mLs (1,000 mg total) into the vein every 12 (twelve) hours., Starting 3/31/2017, Until Discontinued, No Print         CONTINUE these medications which have CHANGED    Details   polyethylene glycol (GLYCOLAX) 17 gram/dose powder Take 17 g by mouth 2 (two) times  daily as needed (constipation). Adjust frequency for one small bowel movement a day, Starting 3/31/2017, Until Discontinued, Normal         CONTINUE these medications which have NOT CHANGED    Details   amlodipine (NORVASC) 5 MG tablet Take 1 tablet (5 mg total) by mouth once daily., Starting 6/18/2016, Until Sun 6/18/17, Normal      atorvastatin (LIPITOR) 20 MG tablet Take 1 tablet (20 mg total) by mouth once daily., Starting 3/3/2017, Until Discontinued, Normal      dorzolamide-timolol 2-0.5% (COSOPT) 22.3-6.8 mg/mL ophthalmic solution Place 1 drop into the left eye once daily., Until Discontinued, Historical Med      citalopram (CELEXA) 10 MG tablet Take 10 mg by mouth once daily., Until Discontinued, Historical Med      ergocalciferol (ERGOCALCIFEROL) 50,000 unit Cap TK ONE C     PO ONCE PER MONTH, Historical Med      tamsulosin (FLOMAX) 0.4 mg Cp24 TK ONE C     PO 30 MINUTES AFTER THE SAME MEAL QD, Historical Med           Time spent on the discharge of patient: 35 minutes    Claudio hPipps MD  Department of Hospital Medicine  [facility]

## 2017-04-17 ENCOUNTER — OUTPATIENT CASE MANAGEMENT (OUTPATIENT)
Dept: ADMINISTRATIVE | Facility: OTHER | Age: 82
End: 2017-04-17

## 2017-04-17 NOTE — PROGRESS NOTES
4/17/2017  1115  Follow up call to patient this am.  Reached patient on home # [phone].  Pt. Answers questions for himself, able to confirm two patient identifiers, and patient agreeable to call.  Reviewed last weeks call with daughter Colleen with patient.  Reviewed [facility] appointment with patient, and he offers no questions/concerns.  Noted medication reconciliation completed, and education needed of family.  Pt. Reports daughter manages his medication, and he is compliant with taking as prescribed. Completed PHQ2 depression screen with patient, and patient denies any feelings of depression.  Pt. Reports ambulation with RW, denies any recent falls, reviewed home safety re clutter, area rugs/matts.  Patient states he is very cautious.  Pt. Reports HH is still visiting, [facility], [phone].    Pt. Reports no concerns, informed him I would follow up in one week and he is agreeable.      Follow up Plan:    Call to Colleen (daughter)  [facility] services

## 2017-04-24 ENCOUNTER — TELEPHONE (OUTPATIENT)
Dept: INTERNAL MEDICINE | Facility: CLINIC | Age: 82
End: 2017-04-24

## 2017-04-24 ENCOUNTER — OUTPATIENT CASE MANAGEMENT (OUTPATIENT)
Dept: ADMINISTRATIVE | Facility: OTHER | Age: 82
End: 2017-04-24

## 2017-04-24 NOTE — TELEPHONE ENCOUNTER
----- Message from Titus Moreno MA sent at 4/24/2017  2:31 PM CDT -----  Contact: Ashley osito/[facility]-[phone]  Please advise that Ashley is calling to Notify the Dr that the Pt had a Fall this morning with no injury. She was informed during Today's Visit. Thanks!

## 2017-04-24 NOTE — PROGRESS NOTES
4/24/2017  1136  Follow up call to patients home number [phone], able to leave a detailed message requesting call back.  CARLYLE Guillen

## 2017-05-01 ENCOUNTER — OUTPATIENT CASE MANAGEMENT (OUTPATIENT)
Dept: ADMINISTRATIVE | Facility: OTHER | Age: 82
End: 2017-05-01

## 2017-05-01 NOTE — PROGRESS NOTES
5/1/2017  1159  Follow up call attempted with Pt., no answer able to leave detailed message requested return call.  Call to patients daughter Colleen Lopez, [phone], no answer able to leave detailed message requesting return call.   Able to follow up with [facility], [phone].  Spoke with CM in the office, and she confirmed that SN closed case end of April.  Pt. Is no longer receiving HH services.   This is the second unsuccessful attempt to follow up with patient will send a written request to home address for return call.  CARLYLE Guillen

## 2017-05-01 NOTE — LETTER
May 1, 2017    Atif DUQUE Ed[place]  [address] [address]             [facility] [address] [place] [address] Dear MR. Atif Neves:    I work with [facility] Outpatient Case Management Department. I have been unsuccessful at reaching you to follow-up to see how you have been doing. Please call me back at your earliest convenience to discuss your health care needs.      You can reach me at [phone] from 8:00AM to 4:30 PM on Monday thru Friday. The Outpatient Care Management department can be reached at [phone].  [facility] also has a program where a nurse is available 24/7 to answer questions or provide medical advice, their number is [phone].    Thanks,      CARLYLE Guillen  Outpatient Complex Care Management

## 2017-05-02 PROCEDURE — 90714 TD VACC NO PRESV 7 YRS+ IM: CPT | Mod: S$GLB,,, | Performed by: INTERNAL MEDICINE

## 2017-05-02 PROCEDURE — G0009 ADMIN PNEUMOCOCCAL VACCINE: HCPCS | Mod: 59,S$GLB,, | Performed by: INTERNAL MEDICINE

## 2017-05-02 PROCEDURE — 99496 TRANSJ CARE MGMT HIGH F2F 7D: CPT | Mod: S$GLB,,, | Performed by: INTERNAL MEDICINE

## 2017-05-02 PROCEDURE — 90670 PCV13 VACCINE IM: CPT | Mod: S$GLB,,, | Performed by: INTERNAL MEDICINE

## 2017-05-02 PROCEDURE — 90471 IMMUNIZATION ADMIN: CPT | Mod: 59,S$GLB,, | Performed by: INTERNAL MEDICINE

## 2017-05-03 ENCOUNTER — OFFICE VISIT (OUTPATIENT)
Dept: INTERNAL MEDICINE | Facility: CLINIC | Age: 82
End: 2017-05-03
Payer: MEDICARE

## 2017-05-03 VITALS
WEIGHT: 140.44 LBS | HEART RATE: 68 BPM | HEIGHT: 67 IN | DIASTOLIC BLOOD PRESSURE: 70 MMHG | SYSTOLIC BLOOD PRESSURE: 130 MMHG | BODY MASS INDEX: 22.04 KG/M2

## 2017-05-03 DIAGNOSIS — G91.2 NPH (NORMAL PRESSURE HYDROCEPHALUS): ICD-10-CM

## 2017-05-03 DIAGNOSIS — E78.2 MIXED HYPERLIPIDEMIA: ICD-10-CM

## 2017-05-03 DIAGNOSIS — R82.90 ABNORMAL URINE: ICD-10-CM

## 2017-05-03 DIAGNOSIS — R78.81 BACTEREMIA DUE TO GRAM-POSITIVE BACTERIA: Primary | ICD-10-CM

## 2017-05-03 DIAGNOSIS — I10 ESSENTIAL HYPERTENSION: ICD-10-CM

## 2017-05-03 PROCEDURE — 99999 PR PBB SHADOW E&M-EST. PATIENT-LVL III: CPT | Mod: PBBFAC,,, | Performed by: INTERNAL MEDICINE

## 2017-05-03 PROCEDURE — 99214 OFFICE O/P EST MOD 30 MIN: CPT | Mod: S$GLB,,, | Performed by: INTERNAL MEDICINE

## 2017-05-03 PROCEDURE — 1160F RVW MEDS BY RX/DR IN RCRD: CPT | Mod: S$GLB,,, | Performed by: INTERNAL MEDICINE

## 2017-05-03 PROCEDURE — 1126F AMNT PAIN NOTED NONE PRSNT: CPT | Mod: S$GLB,,, | Performed by: INTERNAL MEDICINE

## 2017-05-03 PROCEDURE — 99499 UNLISTED E&M SERVICE: CPT | Mod: S$GLB,,, | Performed by: INTERNAL MEDICINE

## 2017-05-03 PROCEDURE — 1159F MED LIST DOCD IN RCRD: CPT | Mod: S$GLB,,, | Performed by: INTERNAL MEDICINE

## 2017-05-03 NOTE — PROGRESS NOTES
CHIEF COMPLAINT:  Followup.    HISTORY OF PRESENT ILLNESS:  The patient is an 88-year-old gentleman who is   status post  shunt for normal pressure hydrocephalus who was admitted to the   hospital for mental status change.  The patient was found to be bacteremic.  He   was treated and released.  He did get [facility].  His   PICC line was removed.  He was receiving PT/OT.  This was completed last week.    He is here today with his two daughters, one that he lives with and one that he   does not.  The one that he lives with feels that he is not back to his old self.    His other daughter feels like he is pretty much back to baseline.  When asked   what the problem is, he still has some trouble with memory.  Sometimes he will   ask the same question twice.  He does have some days that are better than   others.  She also reports that he has fallen twice, once coming out of the   bathroom, he was holding the door to balance himself when he went down.  He fell   on his butt, no loss of consciousness.  The second time was today, getting in   the car to come here to clinic.  He did not actually fall down.  He started to   go down, but they were able to catch him.    REVIEW OF SYSTEMS:  No fever or chills, no visual change.  He does have   decreased hearing.  He was supposed to see ENT, but they missed that   appointment.  No trouble swallowing.  He has got a good appetite.  No chest   pain, no shortness of breath.  He is having a bowel movement every other day,   which is his usual.  No weakness in the arms or legs.  No numbness or tingling.    PHYSICAL EXAMINATION:  GENERAL APPEARANCE:  No acute distress.  HEENT:  Conjunctivae are clear.  He does have bilateral lens replacements.  TMs   are clear.  Nasal septum is midline without discharge.  Oropharynx shows he does   have dentures in place.  NECK:  Trachea is midline without JVD.  PULMONARY:  Good inspiratory, expiratory breath sounds are heard.   Lungs are   clear to auscultation.  CARDIOVASCULAR:  S1, S2.  EXTREMITIES:  Without edema.  ABDOMEN:  Nontender, nondistended, without hepatosplenomegaly.    ASSESSMENT:  1.  History of bacteremia.  2.  Status post  shunt for normal pressure hydrocephalus.  3.  Hypertension.  4.  Hyperlipidemia.    PLAN:  We will put the patient in for CBC, CMP, UA and culture.  He is to follow   up in one month for reevaluation.      EDITH/ANABEL  dd: 05/03/2017 14:09:32 (CDT)  td: 05/04/2017 07:25:20 (CDT)  Doc ID   #[id number]  Job ID #[id number]    CC:

## 2017-05-08 ENCOUNTER — LAB VISIT (OUTPATIENT)
Dept: LAB | Facility: HOSPITAL | Age: 82
End: 2017-05-08
Attending: INTERNAL MEDICINE
Payer: MEDICARE

## 2017-05-08 ENCOUNTER — PATIENT MESSAGE (OUTPATIENT)
Dept: INTERNAL MEDICINE | Facility: CLINIC | Age: 82
End: 2017-05-08

## 2017-05-08 ENCOUNTER — OUTPATIENT CASE MANAGEMENT (OUTPATIENT)
Dept: ADMINISTRATIVE | Facility: OTHER | Age: 82
End: 2017-05-08

## 2017-05-08 DIAGNOSIS — I10 ESSENTIAL HYPERTENSION: ICD-10-CM

## 2017-05-08 DIAGNOSIS — R78.81 BACTEREMIA DUE TO GRAM-POSITIVE BACTERIA: ICD-10-CM

## 2017-05-08 DIAGNOSIS — R78.81 BACTEREMIA DUE TO GRAM-POSITIVE BACTERIA: Primary | ICD-10-CM

## 2017-05-08 NOTE — PROGRESS NOTES
5/8/2017  1143  Follow up call to patient's cell number this am, [phone] able to reach patients daughter, Colleen Lopez, his primary caregiver.  Colleen reports that patient is doing well.  Just returned from having blood work done at [facility].  Colleen reports that she provides transportation for patient to his appointments.  Colleen reports no falls since the last call, she states that patient uses his rolling walking when ambulating and that the walker adds stability when he is moving.  Colleen reports being close by when he is using bathroom, and getting dressed.  Review home safety with caregiver.  Colleen reports that HH is no longer following.  States she has no needs currently.  Reviewed upcoming appointment in June for Health assessment with daughter, she states she is aware of appointment.  Reviewed [facility] on call number and daughter states the number is by her phone.   Confirmed follow up call with daughter for one week, she is agreeable.      Follow up Plan:    Call to Colleen (daughter)  Review [facility]  Fall Safety    CARLYLE Guillen

## 2017-05-09 ENCOUNTER — TELEPHONE (OUTPATIENT)
Dept: INTERNAL MEDICINE | Facility: CLINIC | Age: 82
End: 2017-05-09

## 2017-05-09 NOTE — TELEPHONE ENCOUNTER
----- Message from Atif Christian MD sent at 5/8/2017  6:54 PM CDT -----  Please contact patient. Notify that his blood cont is better. His electrolytes were okay. I would like to see him in one month.

## 2017-05-11 RX ORDER — CIPROFLOXACIN 750 MG/1
750 TABLET, FILM COATED ORAL DAILY
Qty: 7 TABLET | Refills: 0 | Status: SHIPPED | OUTPATIENT
Start: 2017-05-11 | End: 2017-09-18

## 2017-05-12 ENCOUNTER — TELEPHONE (OUTPATIENT)
Dept: INTERNAL MEDICINE | Facility: CLINIC | Age: 82
End: 2017-05-12

## 2017-05-12 NOTE — TELEPHONE ENCOUNTER
Spoke with pt's daughter and informed his results nad that a medication has been called into the pharmacy.

## 2017-05-12 NOTE — TELEPHONE ENCOUNTER
----- Message from Atif Christian MD sent at 5/11/2017  2:48 PM CDT -----  Please contact patient or his family. His urine sample looked normal. His urine culture did grow out a bacteria. I would like him to take an antibiotic called Cipro. He is to take it daily for 7 days. A prescription was sent to his pharmacy.

## 2017-05-15 ENCOUNTER — TELEPHONE (OUTPATIENT)
Dept: INTERNAL MEDICINE | Facility: CLINIC | Age: 82
End: 2017-05-15

## 2017-05-16 ENCOUNTER — OUTPATIENT CASE MANAGEMENT (OUTPATIENT)
Dept: ADMINISTRATIVE | Facility: OTHER | Age: 82
End: 2017-05-16

## 2017-05-16 NOTE — PROGRESS NOTES
5/16/2017  1015  Attempted follow up call to daughter today, [phone].  No answer able to leave detailed message requesting return call.  Will follow up in two weeks time, this RN out next week.      Follow up Plan:    Call to Colleen (daughter)  Review [facility]  Fall Safety    CARLYLE Guillen

## 2017-05-29 ENCOUNTER — OUTPATIENT CASE MANAGEMENT (OUTPATIENT)
Dept: ADMINISTRATIVE | Facility: OTHER | Age: 82
End: 2017-05-29

## 2017-05-29 NOTE — PROGRESS NOTES
"5/29/2017  1334  Follow up call to patient's home number [phone], no answer, unable to leave message.  Call to daughter Colleen, [phone], agreeable to follow up call.  Colleen reports that patient is doing well, states he is at his "base line" for his dementia, states he is confused most time.  Colleen reports that patient completed his Cipro x 7days that was recently prescribed for a UTI.  Per daughter no S&S currently for UTI.  Colleen reports patient has good appetite, and has no concerns re diet.  Per Colleen no recent falls, reviewed [facility] re home safety and fall prevention with daughter, she offers no questions re education provided. Colleen reports working on audiology appointment for patient re his hearing.  Colleen offers no needs at this time, agreeable to follow up call in one week.  Confirmed she has my contact information, encouraged call with any needs.  Reviewed appointments with daughter.     Follow up Plan:    Review [facility]  Fall Safety    CARLYLE Guillen    "

## 2017-06-05 ENCOUNTER — OUTPATIENT CASE MANAGEMENT (OUTPATIENT)
Dept: ADMINISTRATIVE | Facility: OTHER | Age: 82
End: 2017-06-05

## 2017-06-05 NOTE — PROGRESS NOTES
6/5/2017  1312  Follow up call to patient @ [phone], no answer, was able to leave detailed message requesting return call.      Follow up Plan:    [facility]  Fall Safety    CARLYLE Guillen

## 2017-06-09 ENCOUNTER — OFFICE VISIT (OUTPATIENT)
Dept: INTERNAL MEDICINE | Facility: CLINIC | Age: 82
End: 2017-06-09
Payer: MEDICARE

## 2017-06-09 VITALS
OXYGEN SATURATION: 98 % | SYSTOLIC BLOOD PRESSURE: 130 MMHG | DIASTOLIC BLOOD PRESSURE: 64 MMHG | TEMPERATURE: 98 F | BODY MASS INDEX: 23.15 KG/M2 | WEIGHT: 147.5 LBS | HEIGHT: 67 IN | HEART RATE: 62 BPM

## 2017-06-09 DIAGNOSIS — B96.89 UTI DUE TO KLEBSIELLA SPECIES: ICD-10-CM

## 2017-06-09 DIAGNOSIS — R41.89 COGNITIVE IMPAIRMENT: ICD-10-CM

## 2017-06-09 DIAGNOSIS — G91.2 NPH (NORMAL PRESSURE HYDROCEPHALUS): Primary | ICD-10-CM

## 2017-06-09 DIAGNOSIS — N39.0 UTI DUE TO KLEBSIELLA SPECIES: ICD-10-CM

## 2017-06-09 DIAGNOSIS — R78.81 BACTEREMIA: ICD-10-CM

## 2017-06-09 DIAGNOSIS — R41.3 MEMORY LOSS: ICD-10-CM

## 2017-06-09 PROCEDURE — 99214 OFFICE O/P EST MOD 30 MIN: CPT | Mod: S$GLB,,, | Performed by: INTERNAL MEDICINE

## 2017-06-09 PROCEDURE — 1159F MED LIST DOCD IN RCRD: CPT | Mod: S$GLB,,, | Performed by: INTERNAL MEDICINE

## 2017-06-09 PROCEDURE — 99999 PR PBB SHADOW E&M-EST. PATIENT-LVL IV: CPT | Mod: PBBFAC,,, | Performed by: INTERNAL MEDICINE

## 2017-06-09 PROCEDURE — 1126F AMNT PAIN NOTED NONE PRSNT: CPT | Mod: S$GLB,,, | Performed by: INTERNAL MEDICINE

## 2017-06-09 PROCEDURE — 99499 UNLISTED E&M SERVICE: CPT | Mod: S$GLB,,, | Performed by: INTERNAL MEDICINE

## 2017-06-11 NOTE — PROGRESS NOTES
CHIEF COMPLAINT:  Followup.    HISTORY OF PRESENT ILLNESS:  The patient is an 88-year-old gentleman who we see   for hypertension as well as normal pressure hydrocephalus who is status post    shunt, who comes in today for followup of bacteremia.  When we had last seen the   patient, we checked a urine sample on him.  His urine looked fine, but his   culture grew out Klebsiella species.  The patient was treated with Cipro.  He is   here today with his daughter.  He is doing well, no new complaints; however,   she tends to indicate that his memory is still not where it used to be.    REVIEW OF SYSTEMS:  The patient reports no fever or chills.  No chest pain.  He   does walk about half a block.  No nausea or vomiting.  The patient is a little   bit scared to walk.  He is afraid that he will fall.    PHYSICAL EXAMINATION:  GENERAL APPEARANCE:  No acute distress.  HEENT:  Conjunctivae clear.  He does have bilateral cataracts.  Nasal septum is   midline without discharge.  Oropharynx is without erythema.  NECK:  Trachea is midline without JVD.  PULMONARY:  Good inspiratory, expiratory breath sounds are heard.  Lungs are   clear to auscultation.  CARDIOVASCULAR:  S1, S2.  EXTREMITIES:  Without edema.  ABDOMEN:  Nontender, nondistended, without hepatosplenomegaly.    ASSESSMENT:  1.  Status post bacteremia, currently doing well.  2.  Klebsiella UTI, currently resolved.  3.  Normal pressure hydrocephalus.  4.  Memory loss.    PLAN:  We will refer the patient to Neurology for evaluation of cognitive   impairment and memory loss.  The patient is to follow up with us in three months   for reevaluation.      JDS/HN  dd: 06/11/2017 19:00:56 (CDT)  td: 06/12/2017 11:00:45 (CDT)  Doc ID   #[id number]  Job ID #[id number]    CC:       Answers for HPI/ROS submitted by the patient on 6/7/2017   activity change: Yes  unexpected weight change: No  neck pain: No  hearing loss: Yes  rhinorrhea: No  trouble swallowing: No  eye discharge:  No  visual disturbance: Yes  chest tightness: No  wheezing: No  chest pain: No  palpitations: No  blood in stool: No  constipation: Yes  vomiting: No  diarrhea: No  polydipsia: Yes  polyuria: No  difficulty urinating: No  urgency: Yes  hematuria: No  joint swelling: No  arthralgias: Yes  headaches: No  weakness: Yes  confusion: Yes  dysphoric mood: No

## 2017-06-15 ENCOUNTER — OUTPATIENT CASE MANAGEMENT (OUTPATIENT)
Dept: ADMINISTRATIVE | Facility: OTHER | Age: 82
End: 2017-06-15

## 2017-06-15 NOTE — PROGRESS NOTES
6/15/2017  1508  Attempted follow up call, no answer but was able to leave detailed message requesting call back.      Follow up Plan:    [facility]  Fall Safety    CARLYLE Guillen

## 2017-06-20 ENCOUNTER — OUTPATIENT CASE MANAGEMENT (OUTPATIENT)
Dept: ADMINISTRATIVE | Facility: OTHER | Age: 82
End: 2017-06-20

## 2017-06-20 NOTE — PROGRESS NOTES
6/20/2017  1015  3rd Attempt to complete follow up for Outpatient Care Management, I will mail a letter with my contact information requesting a return call.  CARLYLE Guillen

## 2017-06-20 NOTE — LETTER
June 20, 2017    Atif DUNIA [place]  [address] [address]             [facility] [address] [place] [address] Dear Mr. Atif Neves:        I work with [facility] Outpatient Case Management Department. I have been unsuccessful at reaching you to follow-up to see how you have been doing.  If you require any future assistance or if any new concerns or problems arise, please do not hesitate to call.      The Outpatient Case Management Department can be reached at [phone] from 8:00AM to 4:30PM on Monday thru Friday.  [facility] also has a program where a nurse is available 24/7 to answer questions or provide medical advice, their number is [phone].     Thanks,      CARLYLE Guillen  Out Patient Complex Case Management

## 2017-06-23 ENCOUNTER — OFFICE VISIT (OUTPATIENT)
Dept: NEUROLOGY | Facility: CLINIC | Age: 82
End: 2017-06-23
Payer: MEDICARE

## 2017-06-23 VITALS
HEIGHT: 67 IN | BODY MASS INDEX: 22.76 KG/M2 | WEIGHT: 145 LBS | SYSTOLIC BLOOD PRESSURE: 137 MMHG | DIASTOLIC BLOOD PRESSURE: 70 MMHG | HEART RATE: 65 BPM

## 2017-06-23 DIAGNOSIS — G91.2 NORMAL PRESSURE HYDROCEPHALUS: Primary | ICD-10-CM

## 2017-06-23 DIAGNOSIS — Z11.4 ENCOUNTER FOR SCREENING FOR HIV: ICD-10-CM

## 2017-06-23 DIAGNOSIS — R41.3 MEMORY LOSS, SHORT TERM: ICD-10-CM

## 2017-06-23 PROCEDURE — 1159F MED LIST DOCD IN RCRD: CPT | Mod: S$GLB,,, | Performed by: PSYCHIATRY & NEUROLOGY

## 2017-06-23 PROCEDURE — 99499 UNLISTED E&M SERVICE: CPT | Mod: S$GLB,,, | Performed by: PSYCHIATRY & NEUROLOGY

## 2017-06-23 PROCEDURE — 1126F AMNT PAIN NOTED NONE PRSNT: CPT | Mod: S$GLB,,, | Performed by: PSYCHIATRY & NEUROLOGY

## 2017-06-23 PROCEDURE — 99214 OFFICE O/P EST MOD 30 MIN: CPT | Mod: S$GLB,,, | Performed by: PSYCHIATRY & NEUROLOGY

## 2017-06-23 PROCEDURE — 99999 PR PBB SHADOW E&M-EST. PATIENT-LVL III: CPT | Mod: PBBFAC,,, | Performed by: PSYCHIATRY & NEUROLOGY

## 2017-06-23 NOTE — PROGRESS NOTES
Subjective:       Patient ID: Atif Neves is a 88 y.o. male.    Chief Complaint:  Memory Loss and Normal Pressure Hydrocephalus      History of Present Illness  HPI  Additional Complaints:  Dementia  He is here for evaluation and treatment of cognitive problems. He is accompanied by patient and daughter. Primary caregiver is patient and daughter. The family and the patient identify problems with changes in short term memory. Family and patient report problems with none. Family and patient are concerned about  Overall short term memory, however, they are not concerned about medication errors, wandering, driving, cooking and inability to maintain adequate nutrition. Medication administration: family monitors medication usage    Functional Assessment:   Activities of Daily Living (ADLs):    He is independent in the following: ambulation, feeding, continence, grooming, toileting and dressing  Requires assistance with the following: bathing and hygiene        Review of Systems  Review of Systems   Constitutional: Negative.    HENT: Negative.    Eyes: Positive for visual disturbance.        Macular degeneration   Respiratory: Negative.    Cardiovascular: Negative.    Gastrointestinal: Positive for constipation.   Genitourinary: Negative.    Musculoskeletal: Negative.    Skin: Negative.    Hematological: Negative.    Psychiatric/Behavioral: Positive for confusion.       Objective:      Neurologic Exam     Mental Status   Oriented to person.   Oriented to place.   Disoriented to year, month, date and season.   Registration: recalls 3 of 3 objects. Follows 3 step commands.   Attention: normal. Concentration: decreased.   Speech: speech is normal   Level of consciousness: alert  Knowledge: good. Able to perform simple calculations.     Cranial Nerves   Cranial nerves II through XII intact.     CN III, IV, VI   Pupils are equal, round, and reactive to light.  Extraocular motions are normal.     Motor Exam   Muscle bulk:  normal  Overall muscle tone: normal    Strength   Strength 5/5 throughout.     Sensory Exam   Light touch normal.   Vibration normal.   Proprioception normal.   Pinprick normal.   Graphesthesia: normal  Stereognosis: normal    Gait, Coordination, and Reflexes     Gait  Gait: normal    Coordination   Romberg: positive  Finger to nose coordination: normal  Heel to shin coordination: abnormal  Tandem walking coordination: normal    Tremor   Resting tremor: absent  Intention tremor: absent  Action tremor: absent    Reflexes   Reflexes 2+ except as noted.       Physical Exam   Constitutional: He appears well-developed and well-nourished.   HENT:   Head: Normocephalic and atraumatic.   Eyes: EOM are normal. Pupils are equal, round, and reactive to light.   Neck: Normal range of motion. Neck supple.   Cardiovascular: Normal rate, regular rhythm and normal heart sounds.    Pulmonary/Chest: Effort normal and breath sounds normal.   Abdominal: Soft. Bowel sounds are normal.   Neurological: He has normal strength. He has an abnormal Heel to Shin Test and an abnormal Romberg Test. He has a normal Finger-Nose-Finger Test and a normal Tandem Gait Test. Gait normal.   Psychiatric: He has a normal mood and affect. His speech is normal and behavior is normal. Judgment and thought content normal.   Vitals reviewed.        Assessment:     Problem List Items Addressed This Visit     None      Visit Diagnoses     Normal pressure hydrocephalus    -  Primary    Relevant Orders    Sedimentation rate, manual    Vitamin B1 (Thiamine)    Methylmalonic acid, serum    Comprehensive metabolic panel    RPR    T UPTAKE    CT head without contrast    Neuropsychological testing    Memory loss, short term        Relevant Orders    Sedimentation rate, manual    Vitamin B1 (Thiamine)    Methylmalonic acid, serum    Comprehensive metabolic panel    HIV-1 and HIV-2 antibodies    RPR    T UPTAKE    T4    Vitamin B12    TSH    CT head without contrast     Neuropsychological testing    EEG,w/awake & drowsy record    Encounter for screening for HIV         Relevant Orders    HIV-1 and HIV-2 antibodies        RTC 6 weeks.    Plan:

## 2017-06-23 NOTE — LETTER
June 23, 2017      Atif Christian MD  [address]  [place] [address]           [facility]  [address]  [place] [address]  Phone: [phone]          Patient: Atif Neves   MR Number: [id number]   YOB: 1928   Date of Visit: 6/23/2017       Dear Dr. Atif Christian:    Thank you for referring Atif Neves to me for evaluation. Attached you will find relevant portions of my assessment and plan of care.    If you have questions, please do not hesitate to call me. I look forward to following Atif Neves along with you.    Sincerely,    Valerie Cameron MD    Enclosure  CC:  No Recipients    If you would like to receive this communication electronically, please contact [email or web address] or [phone] to request more information on [employer] Link access.    For providers and/or their staff who would like to refer a patient to [facility], please contact us through our one-stop-shop provider referral line, [facility], at [phone].    If you feel you have received this communication in error or would no longer like to receive these types of communications, please e-mail [email or web address]

## 2017-07-03 ENCOUNTER — OUTPATIENT CASE MANAGEMENT (OUTPATIENT)
Dept: ADMINISTRATIVE | Facility: OTHER | Age: 82
End: 2017-07-03

## 2017-07-03 RX ORDER — CIPROFLOXACIN 750 MG/1
TABLET, FILM COATED ORAL
Qty: 7 TABLET | Refills: 0 | OUTPATIENT
Start: 2017-07-03

## 2017-07-03 NOTE — PROGRESS NOTES
Please note this patient was mailed an Outpatient Case Management Patient Satisfaction Discharge Survey on 7/3/2017.    Please contact [facility] at ext. [phone] with any questions.    Thank you,  Awilda Diaz

## 2017-07-10 ENCOUNTER — TELEPHONE (OUTPATIENT)
Dept: NEUROLOGY | Facility: CLINIC | Age: 82
End: 2017-07-10

## 2017-07-10 NOTE — TELEPHONE ENCOUNTER
Call returned/Spoke osito/Alex Stallings scheduled for Friday 07/14/2017 @ 10:30 (back to back), she advised me that she would get info from the computer (My Chart).

## 2017-07-10 NOTE — TELEPHONE ENCOUNTER
----- Message from Minoo Brownlee sent at 7/8/2017 10:24 AM CDT -----  Contact: Pt daughter, Colleen Macias called in about wanting to rs appt that was missed on 7/6. Colleen would like to schedule appt accordingly to how they were. Colleen would like the nurse to give her a call back in regards to this matter          Colleen can be reached at [phone]      Thank You

## 2017-07-14 ENCOUNTER — HOSPITAL ENCOUNTER (OUTPATIENT)
Dept: NEUROLOGY | Facility: CLINIC | Age: 82
Discharge: HOME | End: 2017-07-14
Payer: MEDICARE

## 2017-07-14 ENCOUNTER — HOSPITAL ENCOUNTER (OUTPATIENT)
Dept: RADIOLOGY | Facility: HOSPITAL | Age: 82
Discharge: HOME | End: 2017-07-14
Attending: PSYCHIATRY & NEUROLOGY
Payer: MEDICARE

## 2017-07-14 DIAGNOSIS — R41.3 MEMORY LOSS, SHORT TERM: ICD-10-CM

## 2017-07-14 DIAGNOSIS — G91.2 NORMAL PRESSURE HYDROCEPHALUS: ICD-10-CM

## 2017-07-14 PROCEDURE — 95816 EEG AWAKE AND DROWSY: CPT | Mod: S$GLB,,, | Performed by: PSYCHIATRY & NEUROLOGY

## 2017-07-14 PROCEDURE — 95816 PR EEG,W/AWAKE & DROWSY RECORD: ICD-10-PCS | Mod: S$GLB,,, | Performed by: PSYCHIATRY & NEUROLOGY

## 2017-07-14 PROCEDURE — 70450 CT HEAD/BRAIN W/O DYE: CPT | Mod: 26,,, | Performed by: RADIOLOGY

## 2017-07-14 PROCEDURE — 70450 CT HEAD/BRAIN W/O DYE: CPT | Mod: TC

## 2017-07-14 NOTE — PROCEDURES
ROUTINE ELECTROENCEPHALOGRAM REPORT      Atif Neves  [id number]  9/28/1928    DATE OF SERVICE: 7/14/17    REQUESTING PROVIDER: Valerie Cameron MD    REASON FOR CONSULT:  89yo M with NPH and memory loss.    PRIOR EEG:  R-EEG, 3/28/17: Impression:  Mildly abnormal EEG with background disorganized in slow and waking state, indicating the presence of a mild and relatively static encephalopathy without evidence of focal changes nor an epileptic process    MEDICATIONS:   Current Outpatient Prescriptions   Medication Sig    amlodipine (NORVASC) 5 MG tablet Take 1 tablet (5 mg total) by mouth once daily.    atorvastatin (LIPITOR) 20 MG tablet Take 1 tablet (20 mg total) by mouth once daily.    ciprofloxacin HCl (CIPRO) 750 MG tablet Take 1 tablet (750 mg total) by mouth once daily at 6am.    citalopram (CELEXA) 10 MG tablet Take 1 tablet (10 mg total) by mouth once daily.    dorzolamide-timolol 2-0.5% (COSOPT) 22.3-6.8 mg/mL ophthalmic solution Place 1 drop into the left eye once daily.    ergocalciferol (ERGOCALCIFEROL) 50,000 unit Cap Take 1 capsule (50,000 Units total) by mouth every 30 days.    levetiracetam (KEPPRA) 1000 MG tablet Take 1 tablet (1,000 mg total) by mouth 2 (two) times daily.    polyethylene glycol (GLYCOLAX) 17 gram/dose powder Take 17 g by mouth 2 (two) times daily as needed (constipation). Adjust frequency for one small bowel movement a day    tamsulosin (FLOMAX) 0.4 mg Cp24 Take 1 capsule (0.4 mg total) by mouth once daily.     No current facility-administered medications for this encounter.      METHODOLOGY   Electroencephalographic (EEG) recording is with electrodes placed according to the International 10-20 placement system.  Thirty two (32) channels of digital signal (sampling rate of 512/sec) including T1 and T2 was simultaneously recorded from the scalp and may include  EKG, EMG, and/or eye monitors.  Recording band pass was 0.1 to 512 hz.  Digital video recording of the patient is  simultaneously recorded with the EEG.  The patient is instructed report clinical symptoms which may occur during the recording session.  EEG and video recording is stored and archived in digital format. Activation procedures which include photic stimulation, hyperventilation and instructing patients to perform simple task are done in selected patients.    The EEG is displayed on a monitor screen and can be reviewed using different montages.  Computer assisted analysis is employed to detect spike and electrographic seizure activity.   The entire record is submitted for computer analysis.  The entire recording is visually reviewed and the times identified by computer analysis as being spikes or seizures are reviewed again.  Compresses spectral analysis (CSA) is also performed on the activity recorded from each individual channel.  This is displayed as a power display of frequencies from 0 to 30 Hz over time.   The CSA is reviewed looking for asymmetries in power between homologous areas of the scalp and then compared with the original EEG recording.     [employer] software was also utilized in the review of this study.  This software suite analyzes the EEG recording in multiple domains.  Coherence and rhythmicity is computed to identify EEG sections which may contain organized seizures.  Each channel undergoes analysis to detect presence of spike and sharp waves which have special and morphological characteristic of epileptic activity.  The routine EEG recording is converted from spacial into frequency domain.  This is then displayed comparing homologous areas to identify areas of significant asymmetry.  Algorithm to identify non-cortically generated artifact is used to separate eye movement, EMG and other artifact from the EEG    EEG FINGINGS  Background activity:   The background rhythm was characterized by theta-alpha (6-8 Hz) activity with a 8.5 Hz posterior dominant alpha rhythm at 30-70 microvolts.   Symmetry and  continuity: The background was continuous and symmetric    Sleep:   Normal drowsiness was seen.    Activation procedures:   Hyperventilation was performed with no abnormalities seen  Photic stimulation was performed with no abnormalities seen    Abnormal activity:   No epileptiform discharges, periodic discharges, lateralized rhythmic delta activity or electrographic seizures were seen.    IMPRESSION:   This is a normal awake and drowsy routine EEG.    A normal EEG does not rule out seizures/epilepsy.  CLINICAL CORRELATION IS RECOMMENDED.    Cary Fontanez MD, BARBARA(), FACNS.  Neurology-Epilepsy.

## 2017-07-18 ENCOUNTER — OFFICE VISIT (OUTPATIENT)
Dept: OTOLARYNGOLOGY | Facility: CLINIC | Age: 82
End: 2017-07-18
Payer: MEDICARE

## 2017-07-18 ENCOUNTER — CLINICAL SUPPORT (OUTPATIENT)
Dept: AUDIOLOGY | Facility: CLINIC | Age: 82
End: 2017-07-18
Payer: MEDICARE

## 2017-07-18 VITALS
HEIGHT: 67 IN | BODY MASS INDEX: 22.98 KG/M2 | WEIGHT: 146.38 LBS | SYSTOLIC BLOOD PRESSURE: 154 MMHG | DIASTOLIC BLOOD PRESSURE: 66 MMHG | HEART RATE: 57 BPM | TEMPERATURE: 98 F

## 2017-07-18 DIAGNOSIS — H90.3 SENSORINEURAL HEARING LOSS, BILATERAL: Primary | ICD-10-CM

## 2017-07-18 DIAGNOSIS — Z77.122 HISTORY OF EXPOSURE TO NOISE: ICD-10-CM

## 2017-07-18 PROCEDURE — 1126F AMNT PAIN NOTED NONE PRSNT: CPT | Mod: S$GLB,,, | Performed by: OTOLARYNGOLOGY

## 2017-07-18 PROCEDURE — 1159F MED LIST DOCD IN RCRD: CPT | Mod: S$GLB,,, | Performed by: OTOLARYNGOLOGY

## 2017-07-18 PROCEDURE — 99999 PR PBB SHADOW E&M-EST. PATIENT-LVL I: CPT | Mod: PBBFAC,,,

## 2017-07-18 PROCEDURE — 99203 OFFICE O/P NEW LOW 30 MIN: CPT | Mod: S$GLB,,, | Performed by: OTOLARYNGOLOGY

## 2017-07-18 PROCEDURE — 92557 COMPREHENSIVE HEARING TEST: CPT | Mod: S$GLB,,, | Performed by: AUDIOLOGIST

## 2017-07-18 PROCEDURE — 99999 PR PBB SHADOW E&M-EST. PATIENT-LVL IV: CPT | Mod: PBBFAC,,, | Performed by: OTOLARYNGOLOGY

## 2017-07-18 PROCEDURE — 92567 TYMPANOMETRY: CPT | Mod: S$GLB,,, | Performed by: AUDIOLOGIST

## 2017-07-18 NOTE — PROGRESS NOTES
Subjective:       Patient ID: Atif Neves is a 88 y.o. male.    Chief Complaint: No chief complaint on file.    HPI: Mr. Neves is an 88 year old bespectacled AAM wearing bifocal glasses who is accompanied by his daughter today. She has to speak forcefully in front of his face in order to communicate effectively with him.  He worked at [employer] for 21 years.   He is here for a hearing assessment.  He denies tinnitus perception.  He does indicate an off balance feeling at times.He has a history of epilepsy since childhood.   He carries a history of normal pressure hydrocephalus, status post an endoscopic  shunt procedure performed in February of this year.  He was evaluated by Dr. Cameron of the neurology service 6/23/17.  He has a history of macular degeneration.  He also has a history of short-term memory loss.      A CT of scan of the head without contrast was completed 7/14/17.  There was evidence of a stable right parietal vascular catheter which was stable  With moderate distention of the lateral and third ventricles.  There is continued age-appropriate generalized cerebral volume loss with a mild degree of patchy decreased attenuation of the supratentorial white matter.  There was no evidence for acute intracranial hemorrhage or definite new abnormal parenchymal attenuation.  His medical problem list includes BPH, mixed hyperlipidemia, generalized anxiety disorder, normal pressure hydrocephalus, bacteremia due to gram-positive bacteria,  shunt status, microcytic anemia.     PMH: High blood pressure, seizures, hearing loss   PSH: See medical record   Family history: Heart disease, high blood pressure, high cholesterol, seizures, arthritis, hearing loss   ALLERGIES: Aspirin   Habits: The patient quit smoking 30 years ago   Occupation: Retired   Review of Systems   Ears: Positive for hearing loss, ear pressure and ringing in ear.    Nose:  Positive for snoring.    Cardiovascular:  Positive for history of high  blood pressure.   Gastrointestinal:  Positive for blood in stool.   Other:  Positive for kidney problem, bladder problem, weakness, slurred, confusion, heat intolerance and cold intolerance. Negative for rash.   His  medication list includes Keppra and Celexa among others      He has completed an audiometric study perforem by [facility] audiology service.  The study is duplicated below and the results are discuseed with him.    Objective:          Blood pressure 154/66 pulse 57 temperature 97.5 height 5 feet 7 inches weight 146 pounds   Gen.:-American male who sits quietly in the chair in no acute distress; he is obviously hard of hearing as I have to speak up to above the forceful voice to communicate with him today.    Physical Exam   Constitutional: He is oriented to person, place, and time. He appears well-developed and well-nourished.   HENT:   Head: Normocephalic.   Right Ear: Hearing, tympanic membrane and ear canal normal. No drainage. No foreign bodies. No mastoid tenderness. Tympanic membrane is not perforated. No decreased hearing is noted.   Left Ear: Hearing, tympanic membrane and ear canal normal. No drainage. No foreign bodies. No mastoid tenderness. Tympanic membrane is not perforated. No decreased hearing is noted.   Ears:    Nose: No nose lacerations, nasal deformity, septal deviation or nasal septal hematoma. No epistaxis. Right sinus exhibits no maxillary sinus tenderness and no frontal sinus tenderness. Left sinus exhibits no maxillary sinus tenderness and no frontal sinus tenderness.       Mouth/Throat: Uvula is midline, oropharynx is clear and moist and mucous membranes are normal. He does not have dentures. No oral lesions. No trismus in the jaw. No uvula swelling or dental caries. No oropharyngeal exudate or tonsillar abscesses.       Neck: No thyromegaly present.   Pulmonary/Chest: Effort normal. No stridor.   Lymphadenopathy:     He has no cervical adenopathy.   Neurological: He is  alert and oriented to person, place, and time. He displays weakness.   Skin: No rash noted.   Psychiatric: His behavior is normal. His speech is slurred.       Assessment:       1. Sensorineural hearing loss, bilateral    2. History of exposure to noise     3.     S/p  shunt  4.      Short term memory loss  5.      Long hx epilepsy   Plan:     Audiometry reviewed: significant bilateral SNHL  Pt. is a candidate form hearing amplification for one or both ears  Copy of audiogram/YANIRA Christine's card/Rx to obtain hearing aid(s) provided  Yearly audiometric monitoring encouraged

## 2017-07-18 NOTE — LETTER
July 18, 2017      Atif Christian MD  [address]  [place] [address]           [facility]  [address]  [facility] [address]  Phone: [phone]  Fax: [phone]          Patient: Atif Neves   MR Number: [id number]   YOB: 1928   Date of Visit: 7/18/2017       Dear Dr. Atif Christian:    Thank you for referring Atif Neves to me for evaluation. Attached you will find relevant portions of my assessment and plan of care.    If you have questions, please do not hesitate to call me. I look forward to following Atif Neves along with you.    Sincerely,    Elton Butts III, MD    Enclosure  CC:  No Recipients    If you would like to receive this communication electronically, please contact [email or web address] or [phone] to request more information on "[employer] Link access.    For providers and/or their staff who would like to refer a patient to [facility], please contact us through our one-stop-shop provider referral line, [facility], at [phone].    If you feel you have received this communication in error or would no longer like to receive these types of communications, please e-mail [email or web address]

## 2017-07-19 DIAGNOSIS — R89.9 ABNORMAL LABORATORY TEST RESULT: Primary | ICD-10-CM

## 2017-07-25 ENCOUNTER — OFFICE VISIT (OUTPATIENT)
Dept: UROLOGY | Facility: CLINIC | Age: 82
End: 2017-07-25
Attending: UROLOGY
Payer: MEDICARE

## 2017-07-25 VITALS
BODY MASS INDEX: 22.91 KG/M2 | DIASTOLIC BLOOD PRESSURE: 67 MMHG | SYSTOLIC BLOOD PRESSURE: 149 MMHG | WEIGHT: 146 LBS | HEIGHT: 67 IN | HEART RATE: 68 BPM

## 2017-07-25 DIAGNOSIS — N40.1 BENIGN NON-NODULAR PROSTATIC HYPERPLASIA WITH LOWER URINARY TRACT SYMPTOMS: Primary | ICD-10-CM

## 2017-07-25 LAB
BILIRUB SERPL-MCNC: NORMAL MG/DL
BLOOD URINE, POC: NORMAL
COLOR, POC UA: YELLOW
GLUCOSE UR QL STRIP: NORMAL
KETONES UR QL STRIP: NORMAL
LEUKOCYTE ESTERASE URINE, POC: NORMAL
NITRITE, POC UA: NORMAL
PH, POC UA: 5
POC RESIDUAL URINE VOLUME: 16 ML (ref 0–100)
PROTEIN, POC: NORMAL
SPECIFIC GRAVITY, POC UA: 1.02
UROBILINOGEN, POC UA: NORMAL

## 2017-07-25 PROCEDURE — 1159F MED LIST DOCD IN RCRD: CPT | Mod: S$GLB,,, | Performed by: UROLOGY

## 2017-07-25 PROCEDURE — 1126F AMNT PAIN NOTED NONE PRSNT: CPT | Mod: S$GLB,,, | Performed by: UROLOGY

## 2017-07-25 PROCEDURE — 51798 US URINE CAPACITY MEASURE: CPT | Mod: S$GLB,,, | Performed by: UROLOGY

## 2017-07-25 PROCEDURE — 81002 URINALYSIS NONAUTO W/O SCOPE: CPT | Mod: S$GLB,,, | Performed by: UROLOGY

## 2017-07-25 PROCEDURE — 99213 OFFICE O/P EST LOW 20 MIN: CPT | Mod: 25,S$GLB,, | Performed by: UROLOGY

## 2017-07-25 PROCEDURE — 99999 PR PBB SHADOW E&M-EST. PATIENT-LVL II: CPT | Mod: PBBFAC,,, | Performed by: UROLOGY

## 2017-07-25 NOTE — PROGRESS NOTES
"Subjective:      Atif Neves is a 88 y.o. male who returns today regarding his BPH.    He is s/p TURP 8/31/16. Doing well now. Reported mild SUNDEEP last visit but that is now resolved. Off BPH meds. AUASS is 1/0.    The following portions of the patient's history were reviewed and updated as appropriate: allergies, current medications, past family history, past medical history, past social history, past surgical history and problem list.    Review of Systems  A comprehensive multipoint review of systems was negative except as otherwise stated in the HPI.     Objective:   Vitals: BP (!) 149/67 (BP Location: Right arm, Patient Position: Sitting, BP Method: Automatic)   Pulse 68   Ht 5' 7" (1.702 m)   Wt 66.2 kg (146 lb)   BMI 22.87 kg/m²     Physical Exam   General: alert and oriented, no acute distress  Respiratory: Symmetric expansion, non-labored breathing  Neuro: no gross deficits  Psych: normal judgment and insight, normal mood/affect and non-anxious    Bladder Scan PVR: 16cc      Lab Review   Urinalysis demonstrates negative for all components  Lab Results   Component Value Date    WBC 7.68 05/08/2017    HGB 11.9 (L) 05/08/2017    HCT 36.0 (L) 05/08/2017    MCV 78 (L) 05/08/2017     05/08/2017     Lab Results   Component Value Date    CREATININE 0.8 07/14/2017    BUN 13 07/14/2017       Assessment and Plan:   1. Benign non-nodular prostatic hyperplasia with lower urinary tract symptoms  -- Doing well s/p TURP  -- FU 1 year     "

## 2017-07-26 ENCOUNTER — CLINICAL SUPPORT (OUTPATIENT)
Dept: AUDIOLOGY | Facility: CLINIC | Age: 82
End: 2017-07-26

## 2017-07-26 DIAGNOSIS — H90.3 SENSORINEURAL HEARING LOSS, BILATERAL: Primary | ICD-10-CM

## 2017-07-26 PROCEDURE — 99499 UNLISTED E&M SERVICE: CPT | Mod: S$GLB,,, | Performed by: OTOLARYNGOLOGY

## 2017-07-26 NOTE — PROGRESS NOTES
Atif Neves was seen in the clinic today for a hearing aid consult. He was accompanied by his daughter.    Pricing and styles were discussed. Mr. Neves decided to order a pair of the Widex Fashion 110 BTEs with slim tubes in copper brown. The contract was signed and Mr. Neves was given a copy.     Mr. Neves is going to go through care credit, once he gets approved, his daughter is going to call the clinic to let me know to order the hearing aids. At that time I will schedule his appointment to  the hearing aids,.

## 2017-08-02 ENCOUNTER — CLINICAL SUPPORT (OUTPATIENT)
Dept: AUDIOLOGY | Facility: CLINIC | Age: 82
End: 2017-08-02

## 2017-08-02 DIAGNOSIS — H90.3 SENSORINEURAL HEARING LOSS, BILATERAL: Primary | ICD-10-CM

## 2017-08-02 NOTE — PROGRESS NOTES
Atif Neves was seen in the clinic today to  his Widex Unique 110 fashion hearing aids with #2 1.4 slim tubes and medium closed domes. He was accompanied by both his daughters.    Acclimatization was set to 4. The feedback manager was performed. The alerts were demonstrated. The volume control was activated. Mr. Neves was pleased with the sound of the hearing aids. Proper insertion/removal, how to change the batteries, and care and maintenance were reviewed with him and his daughters.     A 2 week follow-up appointment was scheduled. Mr. Neves was encouraged to call the clinic if he needed to be seen sooner.

## 2017-08-23 ENCOUNTER — CLINICAL SUPPORT (OUTPATIENT)
Dept: AUDIOLOGY | Facility: CLINIC | Age: 82
End: 2017-08-23

## 2017-08-23 DIAGNOSIS — H90.3 SENSORINEURAL HEARING LOSS, BILATERAL: Primary | ICD-10-CM

## 2017-08-23 PROCEDURE — 99499 UNLISTED E&M SERVICE: CPT | Mod: S$GLB,,, | Performed by: OTOLARYNGOLOGY

## 2017-08-23 NOTE — PROGRESS NOTES
Atif Neves was seen in the clinic today for his first hearing aid follow-up. He was accompanied by his daughter.    Overall gain was increased 2 dB. Care and maintenance (how to change the wax filters) were reviewed. Mr. Neves was counseled on the importance of wearing the hearing aids every day.     A one year appointment reminder was put in to the Epic system to remind Mr. Neves to return to the clinic for his annual hearing test and hearing aid follow-up. He was encouraged to call the clinic if he needed to be seen sooner.

## 2017-08-28 ENCOUNTER — PATIENT MESSAGE (OUTPATIENT)
Dept: ADMINISTRATIVE | Facility: OTHER | Age: 82
End: 2017-08-28

## 2017-09-18 ENCOUNTER — OFFICE VISIT (OUTPATIENT)
Dept: INTERNAL MEDICINE | Facility: CLINIC | Age: 82
End: 2017-09-18
Payer: MEDICARE

## 2017-09-18 VITALS
BODY MASS INDEX: 24.01 KG/M2 | HEART RATE: 72 BPM | WEIGHT: 153 LBS | HEIGHT: 67 IN | SYSTOLIC BLOOD PRESSURE: 120 MMHG | DIASTOLIC BLOOD PRESSURE: 70 MMHG

## 2017-09-18 DIAGNOSIS — I10 ESSENTIAL HYPERTENSION: ICD-10-CM

## 2017-09-18 DIAGNOSIS — Z98.2 S/P VP SHUNT: ICD-10-CM

## 2017-09-18 DIAGNOSIS — N40.1 BPH WITH URINARY OBSTRUCTION: ICD-10-CM

## 2017-09-18 DIAGNOSIS — E78.5 HYPERLIPIDEMIA, UNSPECIFIED HYPERLIPIDEMIA TYPE: ICD-10-CM

## 2017-09-18 DIAGNOSIS — G91.2 NPH (NORMAL PRESSURE HYDROCEPHALUS): ICD-10-CM

## 2017-09-18 DIAGNOSIS — Z00.00 ANNUAL PHYSICAL EXAM: Primary | ICD-10-CM

## 2017-09-18 DIAGNOSIS — N13.8 BPH WITH URINARY OBSTRUCTION: ICD-10-CM

## 2017-09-18 PROCEDURE — 99499 UNLISTED E&M SERVICE: CPT | Mod: S$GLB,,, | Performed by: INTERNAL MEDICINE

## 2017-09-18 PROCEDURE — 99999 PR PBB SHADOW E&M-EST. PATIENT-LVL III: CPT | Mod: PBBFAC,,, | Performed by: INTERNAL MEDICINE

## 2017-09-18 PROCEDURE — 99397 PER PM REEVAL EST PAT 65+ YR: CPT | Mod: S$GLB,,, | Performed by: INTERNAL MEDICINE

## 2017-09-28 RX ORDER — ATORVASTATIN CALCIUM 20 MG/1
TABLET, FILM COATED ORAL
Qty: 30 TABLET | Refills: 5 | Status: SHIPPED | OUTPATIENT
Start: 2017-09-28 | End: 2018-04-01 | Stop reason: SDUPTHER

## 2017-09-29 NOTE — PROGRESS NOTES
TB injection administered to Left FA. Site marked, dated and initialed. Site to be read no sooner than  4/1/17 at 1630. Will sign off to evening RN.    conducted a detailed discussion... I had a detailed discussion with the patient and/or guardian regarding the historical points, exam findings, and any diagnostic results supporting the discharge/admit diagnosis.

## 2018-02-02 ENCOUNTER — TELEPHONE (OUTPATIENT)
Dept: NEUROSURGERY | Facility: CLINIC | Age: 83
End: 2018-02-02

## 2018-02-02 NOTE — TELEPHONE ENCOUNTER
Spoke with Colleen. Asked how her dad was doing. She said he's fine. Told her no imaging was needed. V/u.    ----- Message from Landon Reed sent at 2/2/2018 12:32 PM CST -----  Contact: Colleen ( daughter ) @ [phone]   Patient is scheduled on 3-27 are any imaginings needed, caller is expecting an update

## 2018-02-19 ENCOUNTER — PES CALL (OUTPATIENT)
Dept: ADMINISTRATIVE | Facility: CLINIC | Age: 83
End: 2018-02-19

## 2018-03-22 ENCOUNTER — OFFICE VISIT (OUTPATIENT)
Dept: OPHTHALMOLOGY | Facility: CLINIC | Age: 83
End: 2018-03-22
Payer: MEDICARE

## 2018-03-22 DIAGNOSIS — H35.352 CME (CYSTOID MACULAR EDEMA), LEFT: Primary | ICD-10-CM

## 2018-03-22 DIAGNOSIS — H35.372 EPIRETINAL MEMBRANE, LEFT: ICD-10-CM

## 2018-03-22 PROCEDURE — 92014 COMPRE OPH EXAM EST PT 1/>: CPT | Mod: S$GLB,,, | Performed by: OPHTHALMOLOGY

## 2018-03-22 PROCEDURE — 92226 PR SPECIAL EYE EXAM, SUBSEQUENT: CPT | Mod: LT,S$GLB,, | Performed by: OPHTHALMOLOGY

## 2018-03-22 PROCEDURE — 99999 PR PBB SHADOW E&M-EST. PATIENT-LVL III: CPT | Mod: PBBFAC,,, | Performed by: OPHTHALMOLOGY

## 2018-03-22 PROCEDURE — 92134 CPTRZ OPH DX IMG PST SGM RTA: CPT | Mod: S$GLB,,, | Performed by: OPHTHALMOLOGY

## 2018-03-22 NOTE — PROGRESS NOTES
HPI     Eye Problem    Additional comments: yearly check           Comments   DLS 12/29/16- No changes x last visit        OCT - OD - No ME  OS - thinning and CME in MP bundle    Prior FA - No leakage OU  OS - late ONH uptake      A/P    1) ERM OS  S/p 23g PPV/MP/ICG/EL/kenalog 1/18/10 - with Delacroce    2) CME OS   S/p focal OS 3/23/11    S/p avastin OS x2 (1/4/16) and multiple injections with Alberto  Pt not aware of vision changes OS    Given OD better eye and relatively stable acuity, will continue observation OS    3) PCIOL OU        4. Dry AMD OU      1 yr OCT

## 2018-03-27 ENCOUNTER — TELEPHONE (OUTPATIENT)
Dept: NEUROSURGERY | Facility: CLINIC | Age: 83
End: 2018-03-27

## 2018-03-27 ENCOUNTER — OFFICE VISIT (OUTPATIENT)
Dept: NEUROSURGERY | Facility: CLINIC | Age: 83
End: 2018-03-27
Payer: MEDICARE

## 2018-03-27 VITALS
DIASTOLIC BLOOD PRESSURE: 86 MMHG | BODY MASS INDEX: 26.64 KG/M2 | HEART RATE: 88 BPM | WEIGHT: 169.75 LBS | HEIGHT: 67 IN | SYSTOLIC BLOOD PRESSURE: 180 MMHG

## 2018-03-27 DIAGNOSIS — Z98.2 S/P VENTRICULOPERITONEAL SHUNT: ICD-10-CM

## 2018-03-27 DIAGNOSIS — G91.2 NPH (NORMAL PRESSURE HYDROCEPHALUS): Primary | ICD-10-CM

## 2018-03-27 DIAGNOSIS — R93.89 ABNORMAL FINDINGS ON DIAGNOSTIC IMAGING OF OTHER SPECIFIED BODY STRUCTURES: ICD-10-CM

## 2018-03-27 PROCEDURE — 99213 OFFICE O/P EST LOW 20 MIN: CPT | Mod: S$GLB,,, | Performed by: NEUROLOGICAL SURGERY

## 2018-03-27 PROCEDURE — 99999 PR PBB SHADOW E&M-EST. PATIENT-LVL III: CPT | Mod: PBBFAC,,, | Performed by: NEUROLOGICAL SURGERY

## 2018-03-27 NOTE — PATIENT INSTRUCTIONS
I will order the patient a CT Head. I will contact the patient with his results if it is abnormal.

## 2018-03-27 NOTE — PROGRESS NOTES
"Subjective:    I, Nadira Rutledge, am scribing for, and in the presence of, Dr. Swapnil Sam.     Patient ID: Atif Neves is a 89 y.o. male.    Chief Complaint: Follow-up    HPI   Pt is an 89 yo male with NPH who presents today for FU. On 2/6/2017, pt underwent placement of an endoscopic  shunt. Pt reports that his memory is stable since surgery. His gait has improved, but he is still dragging his left leg. Pt states he has increased his appetite and physical activity. Overall, pt states he is doing well without new complications.       Review of Systems   Constitutional: Negative for chills and fever.   HENT: Negative.    Eyes: Negative.    Respiratory: Negative.    Cardiovascular: Negative.    Gastrointestinal: Negative.    Endocrine: Negative.    Genitourinary: Negative.    Musculoskeletal: Negative.    Skin: Negative.    Allergic/Immunologic: Negative.    Neurological: Negative for tremors, weakness, light-headedness, numbness and headaches.   Hematological: Negative.    Psychiatric/Behavioral: Negative.        Past Medical History:   Diagnosis Date    JONATAN (acute kidney injury) 06/2016    Bacteremia due to Gram-positive bacteria 3/24/2017    Antibiotics per ID recommendations; vancomycin x 14 days.     BPH (benign prostatic hypertrophy) with urinary obstruction 6/16/2016    Cystoid macular edema, left eye 8/4/2016    Essential hypertension 6/16/2016    Essential hypertension     Generalized anxiety disorder 6/16/2016    Glaucoma     Irregular heartbeat     Macular degeneration     Microcytic anemia 4/10/2017    Mixed hyperlipidemia 6/16/2016    Nonexudative age-related macular degeneration, bilateral, intermediate dry stage 8/4/2016    Normal pressure hydrocephalus     NPH (normal pressure hydrocephalus) 2/6/2017    Shunt placed 2/6/17    Urinary retention 06/2016    Ventriculo-peritoneal shunt status 2/6/2017       Objective:     BP (!) 180/86   Pulse 88   Ht 5' 6.5" (1.689 m)   Wt 77 kg " (169 lb 12.1 oz)   BMI 26.99 kg/m²     Physical Exam   Constitutional: He is oriented to person, place, and time. He appears well-developed and well-nourished.   Eyes: Pupils are equal, round, and reactive to light.   Neurological: He is alert and oriented to person, place, and time. No cranial nerve deficit. Gait (wide-base gait) abnormal.         I, Dr. Swapnil Sam, personally performed the services described in this documentation. All medical record entries made by the scribe were at my direction and in my presence.  I have reviewed the chart and agree that the record reflects my personal performance and is accurate and complete. Swapnil Sam MD.  10:42 AM 03/27/2018    Assessment:       NPH some improvement with  shunt.    Plan:   I will order the patient a CT Head. I will contact the patient with his results if it is abnormal.

## 2018-04-01 RX ORDER — ATORVASTATIN CALCIUM 20 MG/1
TABLET, FILM COATED ORAL
Qty: 30 TABLET | Refills: 5 | Status: SHIPPED | OUTPATIENT
Start: 2018-04-01 | End: 2018-09-23 | Stop reason: SDUPTHER

## 2018-04-10 ENCOUNTER — HOSPITAL ENCOUNTER (OUTPATIENT)
Dept: RADIOLOGY | Facility: HOSPITAL | Age: 83
Discharge: HOME | End: 2018-04-10
Attending: NEUROLOGICAL SURGERY
Payer: MEDICARE

## 2018-04-10 DIAGNOSIS — R93.89 ABNORMAL FINDINGS ON DIAGNOSTIC IMAGING OF OTHER SPECIFIED BODY STRUCTURES: ICD-10-CM

## 2018-04-10 PROCEDURE — 70450 CT HEAD/BRAIN W/O DYE: CPT | Mod: TC

## 2018-04-10 PROCEDURE — 70450 CT HEAD/BRAIN W/O DYE: CPT | Mod: 26,,, | Performed by: RADIOLOGY

## 2018-04-18 DIAGNOSIS — F41.1 GENERALIZED ANXIETY DISORDER: ICD-10-CM

## 2018-04-18 RX ORDER — CITALOPRAM 10 MG/1
10 TABLET ORAL DAILY
Qty: 90 TABLET | Refills: 3 | Status: SHIPPED | OUTPATIENT
Start: 2018-04-18 | End: 2019-04-09 | Stop reason: SDUPTHER

## 2018-04-18 RX ORDER — LEVETIRACETAM 1000 MG/1
1000 TABLET ORAL 2 TIMES DAILY
Qty: 60 TABLET | Refills: 11
Start: 2018-04-18 | End: 2018-06-28 | Stop reason: SDUPTHER

## 2018-06-20 DIAGNOSIS — G91.2 NPH (NORMAL PRESSURE HYDROCEPHALUS): ICD-10-CM

## 2018-06-20 DIAGNOSIS — E78.5 HYPERLIPIDEMIA, UNSPECIFIED HYPERLIPIDEMIA TYPE: Primary | ICD-10-CM

## 2018-06-20 DIAGNOSIS — E55.9 VITAMIN D DEFICIENCY: ICD-10-CM

## 2018-06-20 RX ORDER — ERGOCALCIFEROL 1.25 MG/1
50000 CAPSULE ORAL
Qty: 6 CAPSULE | Refills: 3 | Status: CANCELLED | OUTPATIENT
Start: 2018-06-20

## 2018-06-20 RX ORDER — TAMSULOSIN HYDROCHLORIDE 0.4 MG/1
0.4 CAPSULE ORAL DAILY
Qty: 90 CAPSULE | Refills: 0 | Status: SHIPPED | OUTPATIENT
Start: 2018-06-20 | End: 2018-09-17 | Stop reason: SDUPTHER

## 2018-06-25 ENCOUNTER — PATIENT MESSAGE (OUTPATIENT)
Dept: INTERNAL MEDICINE | Facility: CLINIC | Age: 83
End: 2018-06-25

## 2018-06-25 DIAGNOSIS — E55.9 VITAMIN D DEFICIENCY: ICD-10-CM

## 2018-06-25 RX ORDER — ERGOCALCIFEROL 1.25 MG/1
50000 CAPSULE ORAL
Qty: 6 CAPSULE | Refills: 1 | OUTPATIENT
Start: 2018-06-25

## 2018-06-28 ENCOUNTER — LAB VISIT (OUTPATIENT)
Dept: LAB | Facility: HOSPITAL | Age: 83
End: 2018-06-28
Attending: INTERNAL MEDICINE
Payer: MEDICARE

## 2018-06-28 ENCOUNTER — OFFICE VISIT (OUTPATIENT)
Dept: INTERNAL MEDICINE | Facility: CLINIC | Age: 83
End: 2018-06-28
Payer: MEDICARE

## 2018-06-28 VITALS
DIASTOLIC BLOOD PRESSURE: 80 MMHG | SYSTOLIC BLOOD PRESSURE: 148 MMHG | TEMPERATURE: 98 F | HEART RATE: 78 BPM | BODY MASS INDEX: 27.34 KG/M2 | WEIGHT: 174.19 LBS | HEIGHT: 67 IN

## 2018-06-28 DIAGNOSIS — I10 HYPERTENSION, ESSENTIAL: Primary | ICD-10-CM

## 2018-06-28 DIAGNOSIS — G91.2 NPH (NORMAL PRESSURE HYDROCEPHALUS): ICD-10-CM

## 2018-06-28 DIAGNOSIS — E78.5 HYPERLIPIDEMIA, UNSPECIFIED HYPERLIPIDEMIA TYPE: ICD-10-CM

## 2018-06-28 DIAGNOSIS — J06.9 UPPER RESPIRATORY TRACT INFECTION, UNSPECIFIED TYPE: ICD-10-CM

## 2018-06-28 DIAGNOSIS — E55.9 VITAMIN D DEFICIENCY: ICD-10-CM

## 2018-06-28 LAB
25(OH)D3+25(OH)D2 SERPL-MCNC: 26 NG/ML
ALBUMIN SERPL BCP-MCNC: 4 G/DL
ALP SERPL-CCNC: 153 U/L
ALT SERPL W/O P-5'-P-CCNC: 19 U/L
ANION GAP SERPL CALC-SCNC: 7 MMOL/L
AST SERPL-CCNC: 21 U/L
BASOPHILS # BLD AUTO: 0.02 K/UL
BASOPHILS NFR BLD: 0.2 %
BILIRUB SERPL-MCNC: 1.3 MG/DL
BUN SERPL-MCNC: 11 MG/DL
CALCIUM SERPL-MCNC: 9.8 MG/DL
CHLORIDE SERPL-SCNC: 108 MMOL/L
CHOLEST SERPL-MCNC: 144 MG/DL
CHOLEST/HDLC SERPL: 2.5 {RATIO}
CO2 SERPL-SCNC: 27 MMOL/L
CREAT SERPL-MCNC: 1 MG/DL
DIFFERENTIAL METHOD: ABNORMAL
EOSINOPHIL # BLD AUTO: 0.2 K/UL
EOSINOPHIL NFR BLD: 1.5 %
ERYTHROCYTE [DISTWIDTH] IN BLOOD BY AUTOMATED COUNT: 18.6 %
EST. GFR  (AFRICAN AMERICAN): >60 ML/MIN/1.73 M^2
EST. GFR  (NON AFRICAN AMERICAN): >60 ML/MIN/1.73 M^2
GLUCOSE SERPL-MCNC: 93 MG/DL
HCT VFR BLD AUTO: 34.1 %
HDLC SERPL-MCNC: 57 MG/DL
HDLC SERPL: 39.6 %
HGB BLD-MCNC: 10.2 G/DL
LDLC SERPL CALC-MCNC: 75.4 MG/DL
LYMPHOCYTES # BLD AUTO: 1.7 K/UL
LYMPHOCYTES NFR BLD: 16.5 %
MCH RBC QN AUTO: 21.6 PG
MCHC RBC AUTO-ENTMCNC: 29.9 G/DL
MCV RBC AUTO: 72 FL
MONOCYTES # BLD AUTO: 0.6 K/UL
MONOCYTES NFR BLD: 6.3 %
NEUTROPHILS # BLD AUTO: 7.6 K/UL
NEUTROPHILS NFR BLD: 75.5 %
NONHDLC SERPL-MCNC: 87 MG/DL
PLATELET # BLD AUTO: 332 K/UL
PMV BLD AUTO: 9.7 FL
POIKILOCYTOSIS BLD QL SMEAR: SLIGHT
POLYCHROMASIA BLD QL SMEAR: ABNORMAL
POTASSIUM SERPL-SCNC: 4.1 MMOL/L
PROT SERPL-MCNC: 7.9 G/DL
RBC # BLD AUTO: 4.73 M/UL
SCHISTOCYTES BLD QL SMEAR: ABNORMAL
SODIUM SERPL-SCNC: 142 MMOL/L
TRIGL SERPL-MCNC: 58 MG/DL
WBC # BLD AUTO: 10.06 K/UL

## 2018-06-28 PROCEDURE — 99999 PR PBB SHADOW E&M-EST. PATIENT-LVL IV: CPT | Mod: PBBFAC,,, | Performed by: PHYSICIAN ASSISTANT

## 2018-06-28 PROCEDURE — 82306 VITAMIN D 25 HYDROXY: CPT

## 2018-06-28 PROCEDURE — 80061 LIPID PANEL: CPT

## 2018-06-28 PROCEDURE — 99499 UNLISTED E&M SERVICE: CPT | Mod: S$GLB,,, | Performed by: PHYSICIAN ASSISTANT

## 2018-06-28 PROCEDURE — 80053 COMPREHEN METABOLIC PANEL: CPT

## 2018-06-28 PROCEDURE — 36415 COLL VENOUS BLD VENIPUNCTURE: CPT

## 2018-06-28 PROCEDURE — 85025 COMPLETE CBC W/AUTO DIFF WBC: CPT

## 2018-06-28 PROCEDURE — 99213 OFFICE O/P EST LOW 20 MIN: CPT | Mod: S$GLB,,, | Performed by: PHYSICIAN ASSISTANT

## 2018-06-28 RX ORDER — LEVETIRACETAM 1000 MG/1
1000 TABLET ORAL 2 TIMES DAILY
Qty: 60 TABLET | Refills: 11 | Status: SHIPPED | OUTPATIENT
Start: 2018-06-28 | End: 2019-08-16 | Stop reason: SDUPTHER

## 2018-06-28 NOTE — PATIENT INSTRUCTIONS

## 2018-06-28 NOTE — PROGRESS NOTES
"Subjective:       Patient ID: Atif Neves is a 89 y.o. male.        Chief Complaint: Cough and Flank Pain (R)    Atif Neves is an established patient of Atif Christian MD here today for urgent care visit.    Dry cough for a few days but has resolved as of today.  No chest pain or shortness of breath.  No fever.      He notes "right side pain" because "I haven't eaten."  He reports he occasionally gets a little right side cramping when he waits too long to eat and it resolves when he eats.  No N/V/D/C.  No urinary sx.      He needs a refill of keppra.      Wants to get lab work today.           Review of Systems   Constitutional: Negative for appetite change, chills, fatigue and fever.   HENT: Negative for congestion and sore throat.    Eyes: Negative for visual disturbance.   Respiratory: Positive for cough (resolved). Negative for chest tightness and shortness of breath.    Cardiovascular: Negative for chest pain, palpitations and leg swelling.   Gastrointestinal: Negative for abdominal pain, blood in stool, constipation, diarrhea, nausea and vomiting.   Genitourinary: Negative for dysuria, flank pain, frequency, hematuria and urgency.   Musculoskeletal: Negative for arthralgias and back pain.   Skin: Negative for rash.   Neurological: Negative for dizziness, syncope, weakness and headaches.   Psychiatric/Behavioral: Negative for dysphoric mood and sleep disturbance. The patient is not nervous/anxious.        Objective:      Physical Exam   Constitutional: He appears well-developed and well-nourished. No distress.   HENT:   Head: Normocephalic and atraumatic.   Right Ear: Tympanic membrane, external ear and ear canal normal.   Left Ear: Tympanic membrane, external ear and ear canal normal.   Nose: Nose normal.   Mouth/Throat: Oropharynx is clear and moist.   Eyes: Conjunctivae are normal. Pupils are equal, round, and reactive to light.   Cardiovascular: Normal rate, regular rhythm and normal heart sounds.  " "Exam reveals no gallop and no friction rub.    No murmur heard.  Pulmonary/Chest: Effort normal and breath sounds normal. No respiratory distress.   Abdominal: Soft. There is no tenderness. There is no rebound and no guarding.   Musculoskeletal: He exhibits no edema.   Neurological: He is alert.   Skin: Skin is warm and dry. He is not diaphoretic.   Psychiatric: He has a normal mood and affect.   Nursing note and vitals reviewed.      Assessment:       1. Hypertension, essential    2. Hyperlipidemia, unspecified hyperlipidemia type    3. Upper respiratory tract infection, unspecified type        Plan:       Atif was seen today for cough and flank pain.    Diagnoses and all orders for this visit:    Hypertension, essential: unsure if he is taking amlodipine? advised to check at home and let me know    Hyperlipidemia, unspecified hyperlipidemia type: continue statin therapy, lab work today    Upper respiratory tract infection, unspecified type: cough has resolved    Other orders  -     levETIRAcetam (KEPPRA) 1000 MG tablet; Take 1 tablet (1,000 mg total) by mouth 2 (two) times daily.    Pt has been given instructions populated from [employer] database and has verbalized understanding of the after visit summary and information contained wherein.    Follow up with a primary care provider. May go to ER for acute shortness of breath, lightheadedness, fever, or any other emergent complaints or changes in condition.    "This note will be shared with the patient"    No future appointments.            "

## 2018-06-29 ENCOUNTER — TELEPHONE (OUTPATIENT)
Dept: INTERNAL MEDICINE | Facility: CLINIC | Age: 83
End: 2018-06-29

## 2018-06-29 DIAGNOSIS — E55.9 VITAMIN D DEFICIENCY: ICD-10-CM

## 2018-06-29 RX ORDER — ERGOCALCIFEROL 1.25 MG/1
50000 CAPSULE ORAL
Qty: 6 CAPSULE | Refills: 3 | Status: SHIPPED | OUTPATIENT
Start: 2018-06-29 | End: 2019-09-13 | Stop reason: SDUPTHER

## 2018-06-29 NOTE — TELEPHONE ENCOUNTER
----- Message from Atif Christian MD sent at 6/29/2018 11:11 AM CDT -----  Please call the patient regarding his abnormal result.His vitamin D level was low.. I sent in a prescription for his vitamin that he takes once a month. He needs a follow up appointment with me in one month for his blood pressure.

## 2018-07-27 ENCOUNTER — OFFICE VISIT (OUTPATIENT)
Dept: INTERNAL MEDICINE | Facility: CLINIC | Age: 83
End: 2018-07-27
Payer: MEDICARE

## 2018-07-27 VITALS
DIASTOLIC BLOOD PRESSURE: 74 MMHG | HEART RATE: 82 BPM | BODY MASS INDEX: 27.06 KG/M2 | SYSTOLIC BLOOD PRESSURE: 134 MMHG | OXYGEN SATURATION: 97 % | TEMPERATURE: 98 F | WEIGHT: 172.38 LBS | HEIGHT: 67 IN

## 2018-07-27 DIAGNOSIS — I10 HYPERTENSION, ESSENTIAL: Primary | ICD-10-CM

## 2018-07-27 DIAGNOSIS — E78.5 HYPERLIPIDEMIA, UNSPECIFIED HYPERLIPIDEMIA TYPE: ICD-10-CM

## 2018-07-27 DIAGNOSIS — Z79.899 LONG TERM CURRENT USE OF THERAPEUTIC DRUG: ICD-10-CM

## 2018-07-27 DIAGNOSIS — G91.2 NPH (NORMAL PRESSURE HYDROCEPHALUS): ICD-10-CM

## 2018-07-27 DIAGNOSIS — E55.9 VITAMIN D DEFICIENCY: ICD-10-CM

## 2018-07-27 PROCEDURE — 99214 OFFICE O/P EST MOD 30 MIN: CPT | Mod: S$GLB,,, | Performed by: INTERNAL MEDICINE

## 2018-07-27 PROCEDURE — 99999 PR PBB SHADOW E&M-EST. PATIENT-LVL III: CPT | Mod: PBBFAC,,, | Performed by: INTERNAL MEDICINE

## 2018-07-27 NOTE — PROGRESS NOTES
CHIEF COMPLAINT:  Followup of blood pressure.    HISTORY OF PRESENT ILLNESS:  The patient is an 89-year-old gentleman with a   history of normal pressure hydrocephalus status post  shunt, hypertension,   hyperlipidemia and BPH, who comes in today for followup of hypertension.  The   patient was seen recently by Ms. Kimble.  There was a question whether he was   taking his amlodipine on a daily basis.  The patient is here today with his   daughter.  They have been taking his morning meds very consistently.  They do   have sometimes problems with the evening meds.  It seems like this would be his   Keppra and his tamsulosin he might be missing doses on.  Otherwise, no new   complaints.    REVIEW OF SYSTEMS:  The patient reports otherwise feeling okay.  No chest pain.    No shortness of breath.  He does not indicate any memory issues; however, his   daughter feels his memory is getting a little worse.  He still has some left leg   weakness.    PHYSICAL EXAMINATION:  GENERAL APPEARANCE:  No acute distress.  NECK:  Trachea was midline without JVD.  PULMONARY:  Good inspiratory, expiratory breath sounds are heard.  Lungs are   clear to auscultation.  CARDIOVASCULAR:  S1, S2.  EXTREMITIES:  Without edema.  ABDOMEN:  Nontender, nondistended, without hepatosplenomegaly.    ASSESSMENT:  1.  Hypertension.  The patient appears to be back on his blood pressure   medication.  2.  History of vitamin D deficiency.  The patient on vitamin D supplementation.  3.  Hyperlipidemia.  4.  Normal pressure hydrocephalus.  5.  Long-term use of Keppra.    PLAN:  We will put the patient in for a Keppra level as well as CBC, CMP, lipid   panel and vitamin D to be done one week before his followup visit for his   physical in September.      PHANI  dd: 07/27/2018 10:34:37 (CDT)  td: 07/27/2018 14:06:07 (CDT)  Doc ID   #[id number]  Job ID #[id number]    CC:

## 2018-08-21 ENCOUNTER — LAB VISIT (OUTPATIENT)
Dept: LAB | Facility: OTHER | Age: 83
End: 2018-08-21
Attending: INTERNAL MEDICINE
Payer: MEDICARE

## 2018-08-21 DIAGNOSIS — Z79.899 LONG TERM CURRENT USE OF THERAPEUTIC DRUG: ICD-10-CM

## 2018-08-21 DIAGNOSIS — E78.5 HYPERLIPIDEMIA, UNSPECIFIED HYPERLIPIDEMIA TYPE: ICD-10-CM

## 2018-08-21 DIAGNOSIS — E55.9 VITAMIN D DEFICIENCY: ICD-10-CM

## 2018-08-21 DIAGNOSIS — I10 HYPERTENSION, ESSENTIAL: ICD-10-CM

## 2018-08-21 LAB
25(OH)D3+25(OH)D2 SERPL-MCNC: 26 NG/ML
ALBUMIN SERPL BCP-MCNC: 3.9 G/DL
ALP SERPL-CCNC: 147 U/L
ALT SERPL W/O P-5'-P-CCNC: 23 U/L
ANION GAP SERPL CALC-SCNC: 9 MMOL/L
ANISOCYTOSIS BLD QL SMEAR: SLIGHT
AST SERPL-CCNC: 28 U/L
BASOPHILS # BLD AUTO: 0.03 K/UL
BASOPHILS NFR BLD: 0.4 %
BILIRUB SERPL-MCNC: 1.2 MG/DL
BUN SERPL-MCNC: 12 MG/DL
CALCIUM SERPL-MCNC: 9.3 MG/DL
CHLORIDE SERPL-SCNC: 107 MMOL/L
CHOLEST SERPL-MCNC: 136 MG/DL
CHOLEST/HDLC SERPL: 2.5 {RATIO}
CO2 SERPL-SCNC: 23 MMOL/L
CREAT SERPL-MCNC: 1.1 MG/DL
DIFFERENTIAL METHOD: ABNORMAL
EOSINOPHIL # BLD AUTO: 0.2 K/UL
EOSINOPHIL NFR BLD: 2.6 %
ERYTHROCYTE [DISTWIDTH] IN BLOOD BY AUTOMATED COUNT: 17.7 %
EST. GFR  (AFRICAN AMERICAN): >60 ML/MIN/1.73 M^2
EST. GFR  (NON AFRICAN AMERICAN): 59 ML/MIN/1.73 M^2
GLUCOSE SERPL-MCNC: 90 MG/DL
HCT VFR BLD AUTO: 36 %
HDLC SERPL-MCNC: 55 MG/DL
HDLC SERPL: 40.4 %
HGB BLD-MCNC: 10.6 G/DL
HYPOCHROMIA BLD QL SMEAR: ABNORMAL
LDLC SERPL CALC-MCNC: 66.8 MG/DL
LYMPHOCYTES # BLD AUTO: 2.2 K/UL
LYMPHOCYTES NFR BLD: 25.8 %
MCH RBC QN AUTO: 20.7 PG
MCHC RBC AUTO-ENTMCNC: 29.4 G/DL
MCV RBC AUTO: 70 FL
MONOCYTES # BLD AUTO: 0.5 K/UL
MONOCYTES NFR BLD: 6 %
NEUTROPHILS # BLD AUTO: 5.4 K/UL
NEUTROPHILS NFR BLD: 65.2 %
NONHDLC SERPL-MCNC: 81 MG/DL
OVALOCYTES BLD QL SMEAR: ABNORMAL
PLATELET # BLD AUTO: 300 K/UL
PLATELET BLD QL SMEAR: ABNORMAL
PMV BLD AUTO: 9.8 FL
POIKILOCYTOSIS BLD QL SMEAR: SLIGHT
POLYCHROMASIA BLD QL SMEAR: ABNORMAL
POTASSIUM SERPL-SCNC: 4 MMOL/L
PROT SERPL-MCNC: 8 G/DL
RBC # BLD AUTO: 5.13 M/UL
SCHISTOCYTES BLD QL SMEAR: PRESENT
SODIUM SERPL-SCNC: 139 MMOL/L
TRIGL SERPL-MCNC: 71 MG/DL
WBC # BLD AUTO: 8.36 K/UL

## 2018-08-21 PROCEDURE — 80061 LIPID PANEL: CPT

## 2018-08-21 PROCEDURE — 80175 DRUG SCREEN QUAN LAMOTRIGINE: CPT

## 2018-08-21 PROCEDURE — 82306 VITAMIN D 25 HYDROXY: CPT

## 2018-08-21 PROCEDURE — 36415 COLL VENOUS BLD VENIPUNCTURE: CPT

## 2018-08-21 PROCEDURE — 80053 COMPREHEN METABOLIC PANEL: CPT

## 2018-08-21 PROCEDURE — 85025 COMPLETE CBC W/AUTO DIFF WBC: CPT

## 2018-08-23 ENCOUNTER — PATIENT MESSAGE (OUTPATIENT)
Dept: INTERNAL MEDICINE | Facility: CLINIC | Age: 83
End: 2018-08-23

## 2018-08-23 LAB — LAMOTRIGINE SERPL-MCNC: <0.2 UG/ML (ref 2–15)

## 2018-09-18 RX ORDER — TAMSULOSIN HYDROCHLORIDE 0.4 MG/1
0.4 CAPSULE ORAL DAILY
Qty: 90 CAPSULE | Refills: 3 | Status: SHIPPED | OUTPATIENT
Start: 2018-09-18 | End: 2019-01-22

## 2018-09-24 RX ORDER — ATORVASTATIN CALCIUM 20 MG/1
TABLET, FILM COATED ORAL
Qty: 90 TABLET | Refills: 0 | Status: SHIPPED | OUTPATIENT
Start: 2018-09-24 | End: 2018-12-22 | Stop reason: SDUPTHER

## 2018-10-03 ENCOUNTER — TELEPHONE (OUTPATIENT)
Dept: INTERNAL MEDICINE | Facility: CLINIC | Age: 83
End: 2018-10-03

## 2018-10-03 NOTE — TELEPHONE ENCOUNTER
----- Message from Zoe LESLIE Caputo sent at 10/3/2018 10:25 AM CDT -----  Contact: PT Portal Request  Appointment Request From: Atif Neves    With Provider: Atif Christian MD [[facility]]    Preferred Date Range: Any    Preferred Times: Any time    Reason for visit: Existing Patient    Comments:  Please reschedule appt.  We got lost coming to 10/3/2018 appt.

## 2018-12-22 RX ORDER — ATORVASTATIN CALCIUM 20 MG/1
TABLET, FILM COATED ORAL
Qty: 90 TABLET | Refills: 0 | Status: SHIPPED | OUTPATIENT
Start: 2018-12-22 | End: 2019-03-20 | Stop reason: SDUPTHER

## 2019-01-10 ENCOUNTER — OFFICE VISIT (OUTPATIENT)
Dept: INTERNAL MEDICINE | Facility: CLINIC | Age: 84
End: 2019-01-10
Payer: MEDICARE

## 2019-01-10 VITALS
WEIGHT: 175.94 LBS | HEIGHT: 68 IN | SYSTOLIC BLOOD PRESSURE: 118 MMHG | OXYGEN SATURATION: 97 % | HEART RATE: 88 BPM | DIASTOLIC BLOOD PRESSURE: 76 MMHG | BODY MASS INDEX: 26.66 KG/M2

## 2019-01-10 DIAGNOSIS — H90.0 CONDUCTIVE HEARING LOSS, BILATERAL: ICD-10-CM

## 2019-01-10 DIAGNOSIS — H35.30 AMD (AGE RELATED MACULAR DEGENERATION): ICD-10-CM

## 2019-01-10 DIAGNOSIS — Z98.2 S/P VP SHUNT: ICD-10-CM

## 2019-01-10 DIAGNOSIS — I10 HYPERTENSION, ESSENTIAL: ICD-10-CM

## 2019-01-10 DIAGNOSIS — Z00.00 ANNUAL PHYSICAL EXAM: Primary | ICD-10-CM

## 2019-01-10 DIAGNOSIS — E55.9 VITAMIN D DEFICIENCY: ICD-10-CM

## 2019-01-10 DIAGNOSIS — E78.5 HYPERLIPIDEMIA, UNSPECIFIED HYPERLIPIDEMIA TYPE: ICD-10-CM

## 2019-01-10 DIAGNOSIS — Z87.438 HISTORY OF BPH: ICD-10-CM

## 2019-01-10 DIAGNOSIS — Z90.79 S/P TURP: ICD-10-CM

## 2019-01-10 PROCEDURE — 99999 PR PBB SHADOW E&M-EST. PATIENT-LVL IV: ICD-10-PCS | Mod: PBBFAC,HCNC,, | Performed by: INTERNAL MEDICINE

## 2019-01-10 PROCEDURE — 99999 PR PBB SHADOW E&M-EST. PATIENT-LVL IV: CPT | Mod: PBBFAC,HCNC,, | Performed by: INTERNAL MEDICINE

## 2019-01-10 PROCEDURE — 99397 PR PREVENTIVE VISIT,EST,65 & OVER: ICD-10-PCS | Mod: HCNC,S$GLB,, | Performed by: INTERNAL MEDICINE

## 2019-01-10 PROCEDURE — 99397 PER PM REEVAL EST PAT 65+ YR: CPT | Mod: HCNC,S$GLB,, | Performed by: INTERNAL MEDICINE

## 2019-01-10 NOTE — PROGRESS NOTES
CHIEF COMPLAINT:  Physical exam.    HISTORY OF PRESENT ILLNESS:  The patient is a 89-year-old gentleman who is   status post  shunt for normal pressure hydrocephalus, who is currently being   treated for hypertension, BPH, hyperlipidemia and age-related macular   degeneration, who comes in today with his daughter for his annual checkup.  The   patient has no new complaints.    REVIEW OF SYSTEMS:  Weight has been staying about the same.  He reports seeing   pretty good.  He is due for an eye exam.  He does have problems with   sensorineural hearing loss with the left being worse than the right.  He was   given a hearing aid, which he does not use.  It tends to make him aggravated.    No trouble with swallowing.  He has got a good appetite.  His daughter reports   he does not miss a meal.  No chest pain, no shortness of breath.  He does walk   with a walker.  No nausea, vomiting, abdominal pain, no bowel changes, no   bladder changes.  He does report he has a good stream.  He had a TURP performed   by Dr. Lopez.  His last visit with him was in July 2017.  He is on tamsulosin.    He does not get up at night to urinate.  He does report some urinary urgency.    He does have problems with dry skin.  He does bathe with Dial soap, but does use   Aveeno body lotion.  He does have history of a  shunt for normal pressure   hydrocephalus.  His last visit with Dr. Sam was in April or March of 2018.    PHYSICAL EXAMINATION:  GENERAL APPEARANCE:  No acute distress.  HEENT:  Conjunctivae clear.  He does have bilateral lens replacements.  TMs are   clear.  Nasal septum is midline without discharge.  Oropharynx is without   erythema.  He does have dentures in place, both upper and lower.  Trachea is   midline without JVD, without thyromegaly.  PULMONARY:  Good inspiratory, expiratory breath sounds are heard.  Lungs are   clear to auscultation.  CARDIOVASCULAR:  S1, S2.  2+ carotid pulses without bruits.  EXTREMITIES:  Without  edema.  ABDOMEN:  Nontender, nondistended, without hepatosplenomegaly.    ASSESSMENT:  1.  Physical exam.  2.  Hypertension, currently stable.  3.  Hyperlipidemia.  4.  Hearing loss.  5.  Status post  shunt.  6.  Status post TURP for BPH.  7.  Age-related macular degeneration.  8.  Vitamin D deficiency.    PLAN:  We will put the patient in for a CBC, CMP, lipid panel, and vitamin D   level.  We will also refer the patient to Urology as well as Ophthalmology.  We   will also see about getting a flu shot today as well as a Shingrix.      JDS/HN  dd: 01/10/2019 12:09:45 (CST)  td: 01/10/2019 23:19:11 (CST)  Doc ID   #[id number]  Job ID #[id number]    CC:

## 2019-01-22 ENCOUNTER — OFFICE VISIT (OUTPATIENT)
Dept: UROLOGY | Facility: CLINIC | Age: 84
End: 2019-01-22
Attending: UROLOGY
Payer: MEDICARE

## 2019-01-22 VITALS
WEIGHT: 175.94 LBS | DIASTOLIC BLOOD PRESSURE: 75 MMHG | HEIGHT: 68 IN | SYSTOLIC BLOOD PRESSURE: 164 MMHG | BODY MASS INDEX: 26.66 KG/M2 | HEART RATE: 67 BPM

## 2019-01-22 DIAGNOSIS — N40.1 BENIGN NON-NODULAR PROSTATIC HYPERPLASIA WITH LOWER URINARY TRACT SYMPTOMS: Primary | ICD-10-CM

## 2019-01-22 LAB
BILIRUB SERPL-MCNC: NORMAL MG/DL
BLOOD URINE, POC: NORMAL
COLOR, POC UA: YELLOW
GLUCOSE UR QL STRIP: NORMAL
KETONES UR QL STRIP: NORMAL
LEUKOCYTE ESTERASE URINE, POC: NORMAL
NITRITE, POC UA: NORMAL
PH, POC UA: 5
POC RESIDUAL URINE VOLUME: 15 ML (ref 0–100)
PROTEIN, POC: NORMAL
SPECIFIC GRAVITY, POC UA: 1.01
UROBILINOGEN, POC UA: NORMAL

## 2019-01-22 PROCEDURE — 51798 US URINE CAPACITY MEASURE: CPT | Mod: HCNC,S$GLB,, | Performed by: UROLOGY

## 2019-01-22 PROCEDURE — 81002 POCT URINE DIPSTICK WITHOUT MICROSCOPE: ICD-10-PCS | Mod: HCNC,S$GLB,, | Performed by: UROLOGY

## 2019-01-22 PROCEDURE — 51798 POCT BLADDER SCAN: ICD-10-PCS | Mod: HCNC,S$GLB,, | Performed by: UROLOGY

## 2019-01-22 PROCEDURE — 1101F PR PT FALLS ASSESS DOC 0-1 FALLS W/OUT INJ PAST YR: ICD-10-PCS | Mod: CPTII,HCNC,S$GLB, | Performed by: UROLOGY

## 2019-01-22 PROCEDURE — 99213 OFFICE O/P EST LOW 20 MIN: CPT | Mod: HCNC,25,S$GLB, | Performed by: UROLOGY

## 2019-01-22 PROCEDURE — 81002 URINALYSIS NONAUTO W/O SCOPE: CPT | Mod: HCNC,S$GLB,, | Performed by: UROLOGY

## 2019-01-22 PROCEDURE — 1101F PT FALLS ASSESS-DOCD LE1/YR: CPT | Mod: CPTII,HCNC,S$GLB, | Performed by: UROLOGY

## 2019-01-22 PROCEDURE — 99213 PR OFFICE/OUTPT VISIT, EST, LEVL III, 20-29 MIN: ICD-10-PCS | Mod: HCNC,25,S$GLB, | Performed by: UROLOGY

## 2019-01-22 NOTE — LETTER
January 22, 2019      Atif Christian MD  [address]  [facility] [address]           [facility]  [address] [place] [address], [address]  [facility] [address]  Phone: [phone]  Fax: [phone]          Patient: Atif Neves   MR Number: [id number]   YOB: 1928   Date of Visit: 1/22/2019       Dear Dr. Atif Christian:    Thank you for referring Atif Neves to me for evaluation. Attached you will find relevant portions of my assessment and plan of care.    If you have questions, please do not hesitate to call me. I look forward to following Atif Neves along with you.    Sincerely,    Jose Lopez MD    Enclosure  CC:  No Recipients    If you would like to receive this communication electronically, please contact [email or web address] or [phone] to request more information on [employer] Link access.    For providers and/or their staff who would like to refer a patient to [facility], please contact us through our one-stop-shop provider referral line, [facility], at [phone].    If you feel you have received this communication in error or would no longer like to receive these types of communications, please e-mail [email or web address]

## 2019-01-23 ENCOUNTER — TELEPHONE (OUTPATIENT)
Dept: INTERNAL MEDICINE | Facility: CLINIC | Age: 84
End: 2019-01-23

## 2019-01-23 NOTE — TELEPHONE ENCOUNTER
----- Message from Atif Christian MD sent at 1/22/2019  4:34 PM CST -----  Please call the patient regarding his abnormal result.His vitamin D level was low. Has he been taking his ergocalciferol? That is a vitamin D supplement that you take once a month.

## 2019-01-24 ENCOUNTER — TELEPHONE (OUTPATIENT)
Dept: INTERNAL MEDICINE | Facility: CLINIC | Age: 84
End: 2019-01-24

## 2019-01-24 NOTE — TELEPHONE ENCOUNTER
----- Message from Ty Melendez sent at 1/24/2019  1:30 PM CST -----  Contact: Daughter [phone]  Patient is returning a phone call.  Who left a message for the patient: Hazel  Does patient know what this is regarding:  Not sure of call  Comments:     Please call an advise  Thank you

## 2019-03-20 RX ORDER — ATORVASTATIN CALCIUM 20 MG/1
TABLET, FILM COATED ORAL
Qty: 90 TABLET | Refills: 1 | Status: SHIPPED | OUTPATIENT
Start: 2019-03-20 | End: 2019-09-13 | Stop reason: SDUPTHER

## 2019-04-09 DIAGNOSIS — F41.1 GENERALIZED ANXIETY DISORDER: ICD-10-CM

## 2019-04-10 RX ORDER — CITALOPRAM 10 MG/1
TABLET ORAL
Qty: 90 TABLET | Refills: 0 | Status: SHIPPED | OUTPATIENT
Start: 2019-04-10 | End: 2019-07-11 | Stop reason: SDUPTHER

## 2019-07-11 DIAGNOSIS — F41.1 GENERALIZED ANXIETY DISORDER: ICD-10-CM

## 2019-07-11 RX ORDER — CITALOPRAM 10 MG/1
TABLET ORAL
Qty: 90 TABLET | Refills: 1 | Status: SHIPPED | OUTPATIENT
Start: 2019-07-11 | End: 2019-10-09 | Stop reason: SDUPTHER

## 2019-07-18 ENCOUNTER — PATIENT MESSAGE (OUTPATIENT)
Dept: INTERNAL MEDICINE | Facility: CLINIC | Age: 84
End: 2019-07-18

## 2019-07-22 ENCOUNTER — PATIENT MESSAGE (OUTPATIENT)
Dept: INTERNAL MEDICINE | Facility: CLINIC | Age: 84
End: 2019-07-22

## 2019-07-31 ENCOUNTER — LAB VISIT (OUTPATIENT)
Dept: LAB | Facility: HOSPITAL | Age: 84
End: 2019-07-31
Attending: PHYSICIAN ASSISTANT
Payer: MEDICARE

## 2019-07-31 ENCOUNTER — OFFICE VISIT (OUTPATIENT)
Dept: INTERNAL MEDICINE | Facility: CLINIC | Age: 84
End: 2019-07-31
Payer: MEDICARE

## 2019-07-31 ENCOUNTER — TELEPHONE (OUTPATIENT)
Dept: INTERNAL MEDICINE | Facility: CLINIC | Age: 84
End: 2019-07-31

## 2019-07-31 VITALS
HEART RATE: 86 BPM | SYSTOLIC BLOOD PRESSURE: 124 MMHG | OXYGEN SATURATION: 98 % | WEIGHT: 181.44 LBS | BODY MASS INDEX: 27.59 KG/M2 | DIASTOLIC BLOOD PRESSURE: 72 MMHG

## 2019-07-31 DIAGNOSIS — Z98.2 VENTRICULO-PERITONEAL SHUNT STATUS: ICD-10-CM

## 2019-07-31 DIAGNOSIS — D50.9 MICROCYTIC ANEMIA: Primary | ICD-10-CM

## 2019-07-31 DIAGNOSIS — I10 HYPERTENSION, ESSENTIAL: ICD-10-CM

## 2019-07-31 DIAGNOSIS — R53.83 FATIGUE, UNSPECIFIED TYPE: Primary | ICD-10-CM

## 2019-07-31 DIAGNOSIS — Z74.09 IMPAIRED FUNCTIONAL MOBILITY, BALANCE, GAIT, AND ENDURANCE: ICD-10-CM

## 2019-07-31 DIAGNOSIS — R41.3 MEMORY LOSS: ICD-10-CM

## 2019-07-31 DIAGNOSIS — G91.2 NPH (NORMAL PRESSURE HYDROCEPHALUS): ICD-10-CM

## 2019-07-31 DIAGNOSIS — R53.83 FATIGUE, UNSPECIFIED TYPE: ICD-10-CM

## 2019-07-31 PROBLEM — R78.81 BACTEREMIA DUE TO GRAM-POSITIVE BACTERIA: Status: RESOLVED | Noted: 2017-03-24 | Resolved: 2019-07-31

## 2019-07-31 LAB
ALBUMIN SERPL BCP-MCNC: 3.8 G/DL (ref 3.5–5.2)
ALP SERPL-CCNC: 139 U/L (ref 55–135)
ALT SERPL W/O P-5'-P-CCNC: 12 U/L (ref 10–44)
ANION GAP SERPL CALC-SCNC: 8 MMOL/L (ref 8–16)
AST SERPL-CCNC: 18 U/L (ref 10–40)
BASOPHILS # BLD AUTO: 0.04 K/UL (ref 0–0.2)
BASOPHILS NFR BLD: 0.4 % (ref 0–1.9)
BILIRUB SERPL-MCNC: 0.7 MG/DL (ref 0.1–1)
BUN SERPL-MCNC: 13 MG/DL (ref 8–23)
CALCIUM SERPL-MCNC: 9.5 MG/DL (ref 8.7–10.5)
CHLORIDE SERPL-SCNC: 105 MMOL/L (ref 95–110)
CO2 SERPL-SCNC: 27 MMOL/L (ref 23–29)
CREAT SERPL-MCNC: 1 MG/DL (ref 0.5–1.4)
DIFFERENTIAL METHOD: ABNORMAL
EOSINOPHIL # BLD AUTO: 0.1 K/UL (ref 0–0.5)
EOSINOPHIL NFR BLD: 1.2 % (ref 0–8)
ERYTHROCYTE [DISTWIDTH] IN BLOOD BY AUTOMATED COUNT: 20.2 % (ref 11.5–14.5)
EST. GFR  (AFRICAN AMERICAN): >60 ML/MIN/1.73 M^2
EST. GFR  (NON AFRICAN AMERICAN): >60 ML/MIN/1.73 M^2
GLUCOSE SERPL-MCNC: 83 MG/DL (ref 70–110)
HCT VFR BLD AUTO: 32.2 % (ref 40–54)
HGB BLD-MCNC: 8.8 G/DL (ref 14–18)
IRON SERPL-MCNC: 11 UG/DL (ref 45–160)
LYMPHOCYTES # BLD AUTO: 1.4 K/UL (ref 1–4.8)
LYMPHOCYTES NFR BLD: 14.3 % (ref 18–48)
MCH RBC QN AUTO: 19.1 PG (ref 27–31)
MCHC RBC AUTO-ENTMCNC: 27.3 G/DL (ref 32–36)
MCV RBC AUTO: 70 FL (ref 82–98)
MONOCYTES # BLD AUTO: 0.7 K/UL (ref 0.3–1)
MONOCYTES NFR BLD: 7.3 % (ref 4–15)
NEUTROPHILS # BLD AUTO: 7.5 K/UL (ref 1.8–7.7)
NEUTROPHILS NFR BLD: 76.4 % (ref 38–73)
NRBC BLD-RTO: 0 /100 WBC
PLATELET # BLD AUTO: 325 K/UL (ref 150–350)
PMV BLD AUTO: 10.4 FL (ref 9.2–12.9)
POTASSIUM SERPL-SCNC: 3.9 MMOL/L (ref 3.5–5.1)
PROT SERPL-MCNC: 7.8 G/DL (ref 6–8.4)
RBC # BLD AUTO: 4.6 M/UL (ref 4.6–6.2)
SATURATED IRON: 2 % (ref 20–50)
SODIUM SERPL-SCNC: 140 MMOL/L (ref 136–145)
TOTAL IRON BINDING CAPACITY: 463 UG/DL (ref 250–450)
TRANSFERRIN SERPL-MCNC: 313 MG/DL (ref 200–375)
TSH SERPL DL<=0.005 MIU/L-ACNC: 0.86 UIU/ML (ref 0.4–4)
WBC # BLD AUTO: 9.8 K/UL (ref 3.9–12.7)

## 2019-07-31 PROCEDURE — 84443 ASSAY THYROID STIM HORMONE: CPT | Mod: HCNC

## 2019-07-31 PROCEDURE — 99999 PR PBB SHADOW E&M-EST. PATIENT-LVL IV: CPT | Mod: PBBFAC,HCNC,, | Performed by: PHYSICIAN ASSISTANT

## 2019-07-31 PROCEDURE — 99999 PR PBB SHADOW E&M-EST. PATIENT-LVL IV: ICD-10-PCS | Mod: PBBFAC,HCNC,, | Performed by: PHYSICIAN ASSISTANT

## 2019-07-31 PROCEDURE — 1101F PT FALLS ASSESS-DOCD LE1/YR: CPT | Mod: HCNC,CPTII,S$GLB, | Performed by: PHYSICIAN ASSISTANT

## 2019-07-31 PROCEDURE — 85025 COMPLETE CBC W/AUTO DIFF WBC: CPT | Mod: HCNC

## 2019-07-31 PROCEDURE — 80053 COMPREHEN METABOLIC PANEL: CPT | Mod: HCNC

## 2019-07-31 PROCEDURE — 83540 ASSAY OF IRON: CPT | Mod: HCNC

## 2019-07-31 PROCEDURE — 99214 PR OFFICE/OUTPT VISIT, EST, LEVL IV, 30-39 MIN: ICD-10-PCS | Mod: HCNC,S$GLB,, | Performed by: PHYSICIAN ASSISTANT

## 2019-07-31 PROCEDURE — 1101F PR PT FALLS ASSESS DOC 0-1 FALLS W/OUT INJ PAST YR: ICD-10-PCS | Mod: HCNC,CPTII,S$GLB, | Performed by: PHYSICIAN ASSISTANT

## 2019-07-31 PROCEDURE — 99214 OFFICE O/P EST MOD 30 MIN: CPT | Mod: HCNC,S$GLB,, | Performed by: PHYSICIAN ASSISTANT

## 2019-07-31 PROCEDURE — 36415 COLL VENOUS BLD VENIPUNCTURE: CPT | Mod: HCNC

## 2019-07-31 NOTE — TELEPHONE ENCOUNTER
----- Message from Dina Frias PA-C sent at 7/31/2019  4:49 PM CDT -----  Check with lab to add on iron/tibc. Thanks.

## 2019-07-31 NOTE — PROGRESS NOTES
"Subjective:       Patient ID: Atif Neves is a 89 y.o. male.    Chief Complaint: Follow-up    HPI     Established pt of Atif Christian MD (new to me)    Presents to clinic with his daughter Colleen. She is concerned as patient has not been as active lately, over the past several months. Pt c/o having low energy. He reports he is sleeping good, has a great appetite. Occ arthralgias of back. Ambulates with walker. No recent falls. Had Home PT in the past and desires to try sessions again.    Answers for HPI/ROS submitted by the patient(daughter Colleen) on 7/31/2019   activity change: Yes  unexpected weight change: No  neck pain: No  hearing loss: Yes  rhinorrhea: No  trouble swallowing: No  eye discharge: No  visual disturbance: No  chest tightness: No  wheezing: No  chest pain: No  palpitations: No  blood in stool: No  constipation: No  vomiting: No  diarrhea: No  polydipsia: No  polyuria: No  difficulty urinating: No  urgency: No  hematuria: No  joint swelling: No  arthralgias: Yes  headaches: No  weakness: Yes (pt denies, he describes as low energy and "trouble getting around" like he used to)  confusion: Yes (pt states memory is "pretty good", has hx of short term memory loss)  dysphoric mood: No    Review of Systems   Constitutional: Positive for activity change. Negative for unexpected weight change.   HENT: Positive for hearing loss (chronic). Negative for rhinorrhea and trouble swallowing.    Eyes: Negative for discharge and visual disturbance.   Respiratory: Negative for chest tightness and wheezing.    Cardiovascular: Negative for chest pain and palpitations.   Gastrointestinal: Negative for blood in stool, constipation, diarrhea and vomiting.   Endocrine: Negative for polydipsia and polyuria.   Genitourinary: Negative for difficulty urinating, hematuria and urgency.   Musculoskeletal: Positive for arthralgias. Negative for joint swelling and neck pain.   Neurological: Negative for headaches. "   Psychiatric/Behavioral: Negative for dysphoric mood. Confusion: memory loss.       Objective: /72   Pulse 86   Wt 82.3 kg (181 lb 7 oz)   SpO2 98%   BMI 27.59 kg/m²         Physical Exam   Constitutional: He appears well-developed and well-nourished. No distress.   HENT:   Head: Normocephalic and atraumatic.   Right Ear: External ear normal.   Left Ear: External ear normal.   Mouth/Throat: Oropharynx is clear and moist.   Eyes: Pupils are equal, round, and reactive to light.   Cardiovascular: Normal rate and regular rhythm. Exam reveals no friction rub.   No murmur heard.  Pulmonary/Chest: Effort normal and breath sounds normal. He has no wheezes. He has no rales.   Abdominal: Soft. Bowel sounds are normal. There is no tenderness.   Musculoskeletal: He exhibits no edema.   Slow gait with assistance of rolling walker   Lymphadenopathy:     He has no cervical adenopathy.   Neurological: He is alert. He has normal strength. He displays no tremor. He exhibits normal muscle tone. Coordination normal.   oriented to person and place   Skin: Skin is warm and dry. No rash noted.   Psychiatric: He has a normal mood and affect.   Vitals reviewed.      Assessment:       1. Fatigue, unspecified type    2. Hypertension, essential    3. NPH (normal pressure hydrocephalus)    4. Memory loss    5. Impaired functional mobility, balance, gait, and endurance    6. Ventriculo-peritoneal shunt status        Plan:         Atif was seen today for follow-up.    Diagnoses and all orders for this visit:    Fatigue, unspecified type  -     Comprehensive metabolic panel; Future  -     CBC auto differential; Future  -     TSH; Future  -     Ambulatory referral to Home Health    Hypertension, essential  -     Comprehensive metabolic panel; Future  -     CBC auto differential; Future    NPH (normal pressure hydrocephalus)  -     Ambulatory referral to Neurology  -     Ambulatory referral to Home Health    Memory loss  -     Ambulatory  referral to Neurology    Impaired functional mobility, balance, gait, and endurance  -     Ambulatory referral to Neurology  -     Ambulatory referral to Home Health    Ventriculo-peritoneal shunt status      Will update lab as above  Recommend Neurology f/u  Also place referral for  Physical Therapy for strengthening and conditioning  Recommend f/u with PCP next available.     Dina Frias PA-C

## 2019-08-01 ENCOUNTER — TELEPHONE (OUTPATIENT)
Dept: INTERNAL MEDICINE | Facility: CLINIC | Age: 84
End: 2019-08-01

## 2019-08-01 DIAGNOSIS — D50.9 IRON DEFICIENCY ANEMIA, UNSPECIFIED IRON DEFICIENCY ANEMIA TYPE: Primary | ICD-10-CM

## 2019-08-01 DIAGNOSIS — D50.9 MICROCYTIC ANEMIA: Primary | ICD-10-CM

## 2019-08-01 RX ORDER — FERROUS SULFATE 324(65)MG
325 TABLET, DELAYED RELEASE (ENTERIC COATED) ORAL DAILY
Qty: 30 TABLET | Refills: 3 | Status: SHIPPED | OUTPATIENT
Start: 2019-08-01 | End: 2019-09-03 | Stop reason: SDUPTHER

## 2019-08-01 NOTE — TELEPHONE ENCOUNTER
Called and spoke with daughter about recent lab.     Decline in h/h  Component      Latest Ref Rng & Units 7/31/2019 1/22/2019 8/21/2018 6/28/2018   Hemoglobin      14.0 - 18.0 g/dL 8.8 (L) 10.0 (L) 10.6 (L) 10.2 (L)   Hematocrit      40.0 - 54.0 % 32.2 (L) 34.2 (L) 36.0 (L) 34.1 (L)     Component      Latest Ref Rng & Units 5/8/2017   Hemoglobin      14.0 - 18.0 g/dL 11.9 (L)   Hematocrit      40.0 - 54.0 % 36.0 (L)       Component      Latest Ref Rng & Units 7/31/2019   Iron      45 - 160 ug/dL 11 (L)   Transferrin      200 - 375 mg/dL 313   TIBC      250 - 450 ug/dL 463 (H)   Saturated Iron      20 - 50 % 2 (L)       Has hx of microcytic anemia for over the past 2 years per chart review  Not currently on iron supplements.   Pt denies overt bleeding.   Recommend starting iron supplements and recheck of CBC in the next 4-5 weeks.   Will schedule to have on 9/5, day of Medicare AWV appt.   Will also route to PCP    Dina Frias PA-C

## 2019-08-02 ENCOUNTER — TELEPHONE (OUTPATIENT)
Dept: INTERNAL MEDICINE | Facility: CLINIC | Age: 84
End: 2019-08-02

## 2019-08-02 PROCEDURE — G0180 MD CERTIFICATION HHA PATIENT: HCPCS | Mod: ,,, | Performed by: INTERNAL MEDICINE

## 2019-08-02 PROCEDURE — G0180 PR HOME HEALTH MD CERTIFICATION: ICD-10-PCS | Mod: ,,, | Performed by: INTERNAL MEDICINE

## 2019-08-02 NOTE — TELEPHONE ENCOUNTER
----- Message from Atif Christian MD sent at 8/1/2019  6:29 PM CDT -----  Please contact and schedule a stool for occult blood. Order is in.

## 2019-08-16 ENCOUNTER — EXTERNAL HOME HEALTH (OUTPATIENT)
Dept: HOME HEALTH SERVICES | Facility: HOSPITAL | Age: 84
End: 2019-08-16
Payer: MEDICARE

## 2019-08-19 RX ORDER — LEVETIRACETAM 1000 MG/1
TABLET ORAL
Qty: 60 TABLET | Refills: 3 | Status: SHIPPED | OUTPATIENT
Start: 2019-08-19 | End: 2020-03-23

## 2019-08-27 ENCOUNTER — PATIENT MESSAGE (OUTPATIENT)
Dept: INTERNAL MEDICINE | Facility: CLINIC | Age: 84
End: 2019-08-27

## 2019-09-03 ENCOUNTER — OFFICE VISIT (OUTPATIENT)
Dept: INTERNAL MEDICINE | Facility: CLINIC | Age: 84
End: 2019-09-03
Payer: MEDICARE

## 2019-09-03 ENCOUNTER — LAB VISIT (OUTPATIENT)
Dept: LAB | Facility: HOSPITAL | Age: 84
End: 2019-09-03
Attending: INTERNAL MEDICINE
Payer: MEDICARE

## 2019-09-03 VITALS
WEIGHT: 180.31 LBS | HEART RATE: 77 BPM | HEIGHT: 68 IN | BODY MASS INDEX: 27.33 KG/M2 | DIASTOLIC BLOOD PRESSURE: 69 MMHG | OXYGEN SATURATION: 98 % | SYSTOLIC BLOOD PRESSURE: 132 MMHG

## 2019-09-03 DIAGNOSIS — Z98.2 VENTRICULO-PERITONEAL SHUNT STATUS: ICD-10-CM

## 2019-09-03 DIAGNOSIS — G91.2 NPH (NORMAL PRESSURE HYDROCEPHALUS): ICD-10-CM

## 2019-09-03 DIAGNOSIS — R06.2 WHEEZING: Primary | ICD-10-CM

## 2019-09-03 DIAGNOSIS — D50.9 MICROCYTIC ANEMIA: ICD-10-CM

## 2019-09-03 DIAGNOSIS — I10 HYPERTENSION, ESSENTIAL: ICD-10-CM

## 2019-09-03 DIAGNOSIS — E78.5 HYPERLIPIDEMIA, UNSPECIFIED HYPERLIPIDEMIA TYPE: ICD-10-CM

## 2019-09-03 DIAGNOSIS — D50.9 IRON DEFICIENCY ANEMIA, UNSPECIFIED IRON DEFICIENCY ANEMIA TYPE: ICD-10-CM

## 2019-09-03 DIAGNOSIS — R53.83 FATIGUE, UNSPECIFIED TYPE: ICD-10-CM

## 2019-09-03 LAB
BASOPHILS # BLD AUTO: 0.03 K/UL (ref 0–0.2)
BASOPHILS NFR BLD: 0.3 % (ref 0–1.9)
DIFFERENTIAL METHOD: ABNORMAL
EOSINOPHIL # BLD AUTO: 0.2 K/UL (ref 0–0.5)
EOSINOPHIL NFR BLD: 1.8 % (ref 0–8)
ERYTHROCYTE [DISTWIDTH] IN BLOOD BY AUTOMATED COUNT: 19.8 % (ref 11.5–14.5)
HCT VFR BLD AUTO: 31 % (ref 40–54)
HGB BLD-MCNC: 9 G/DL (ref 14–18)
IRON SERPL-MCNC: 12 UG/DL (ref 45–160)
LYMPHOCYTES # BLD AUTO: 1.3 K/UL (ref 1–4.8)
LYMPHOCYTES NFR BLD: 15.1 % (ref 18–48)
MCH RBC QN AUTO: 19.2 PG (ref 27–31)
MCHC RBC AUTO-ENTMCNC: 29 G/DL (ref 32–36)
MCV RBC AUTO: 66 FL (ref 82–98)
MONOCYTES # BLD AUTO: 0.7 K/UL (ref 0.3–1)
MONOCYTES NFR BLD: 7.4 % (ref 4–15)
NEUTROPHILS # BLD AUTO: 6.7 K/UL (ref 1.8–7.7)
NEUTROPHILS NFR BLD: 75.4 % (ref 38–73)
PLATELET # BLD AUTO: 349 K/UL (ref 150–350)
PMV BLD AUTO: 10.2 FL (ref 9.2–12.9)
RBC # BLD AUTO: 4.69 M/UL (ref 4.6–6.2)
RETICS/RBC NFR AUTO: 1.6 % (ref 0.4–2)
SATURATED IRON: 3 % (ref 20–50)
TOTAL IRON BINDING CAPACITY: 465 UG/DL (ref 250–450)
TRANSFERRIN SERPL-MCNC: 314 MG/DL (ref 200–375)
WBC # BLD AUTO: 8.88 K/UL (ref 3.9–12.7)

## 2019-09-03 PROCEDURE — 85025 COMPLETE CBC W/AUTO DIFF WBC: CPT | Mod: HCNC

## 2019-09-03 PROCEDURE — 99999 PR PBB SHADOW E&M-EST. PATIENT-LVL III: CPT | Mod: PBBFAC,HCNC,, | Performed by: INTERNAL MEDICINE

## 2019-09-03 PROCEDURE — 99214 OFFICE O/P EST MOD 30 MIN: CPT | Mod: HCNC,S$GLB,, | Performed by: INTERNAL MEDICINE

## 2019-09-03 PROCEDURE — 85045 AUTOMATED RETICULOCYTE COUNT: CPT | Mod: HCNC

## 2019-09-03 PROCEDURE — 83540 ASSAY OF IRON: CPT | Mod: HCNC

## 2019-09-03 PROCEDURE — 99214 PR OFFICE/OUTPT VISIT, EST, LEVL IV, 30-39 MIN: ICD-10-PCS | Mod: HCNC,S$GLB,, | Performed by: INTERNAL MEDICINE

## 2019-09-03 PROCEDURE — 1101F PR PT FALLS ASSESS DOC 0-1 FALLS W/OUT INJ PAST YR: ICD-10-PCS | Mod: HCNC,CPTII,S$GLB, | Performed by: INTERNAL MEDICINE

## 2019-09-03 PROCEDURE — 99999 PR PBB SHADOW E&M-EST. PATIENT-LVL III: ICD-10-PCS | Mod: PBBFAC,HCNC,, | Performed by: INTERNAL MEDICINE

## 2019-09-03 PROCEDURE — 1101F PT FALLS ASSESS-DOCD LE1/YR: CPT | Mod: HCNC,CPTII,S$GLB, | Performed by: INTERNAL MEDICINE

## 2019-09-03 PROCEDURE — 36415 COLL VENOUS BLD VENIPUNCTURE: CPT | Mod: HCNC

## 2019-09-03 RX ORDER — FERROUS SULFATE 324(65)MG
325 TABLET, DELAYED RELEASE (ENTERIC COATED) ORAL DAILY
Qty: 30 TABLET | Refills: 3 | Status: SHIPPED | OUTPATIENT
Start: 2019-09-03 | End: 2020-03-28 | Stop reason: SDUPTHER

## 2019-09-03 NOTE — PROGRESS NOTES
CHIEF COMPLAINT:  Followup.    HISTORY OF PRESENT ILLNESS:  The patient is a 89-year-old gentleman who is   status post  shunt for normal pressure hydrocephalus and is currently being   treated for hypertension, BPH, hyperlipidemia and age-related macular   degeneration, who presents today for a six-month followup when the patient was   last seen was for his physical in January.  He was recently seen by our nurse   practitioner for complaints of fatigue and low energy.  He was found to have an   iron deficiency anemia and iron supplement was started.  However, the patient is   not taking it.  The patient comes in today with his daughter who reports that   the patient has been wheezing frequently.  Symptoms started about one to two   months ago, will happen several times throughout the day.  He is having an   episode where he will need to take a deep breath in.  They can hear him wheeze.    It lasts just a few moments.  It does not stop him with his activities of daily   living.    REVIEW OF SYSTEMS:  Please see patient entered review of systems.  It should be   noted that the patient's daughter entertain review of systems.  In addition, he   reports no fever or chills.  She does state that he has been sleeping more.  He   has three bowel movements a week where he used to go every day.  She does report   he gets agitated more, but feels it is related to him not being able to do   things like he used to.    PHYSICAL EXAMINATION:  GENERAL APPEARANCE:  No acute distress.  HEENT:  Conjunctivae are clear.  The patient does have bilateral lens   replacements.  TMs are obscured by wax.  Nasal septum is midline without   discharge.  Oropharynx is without erythema.  The patient has no upper or lower   teeth present.  NECK:  Trachea was midline without JVD.  PULMONARY:  Good inspiratory, expiratory breath sounds are heard.  Lungs are   clear to auscultation.  CARDIOVASCULAR:  S1, S2.  2+ carotid pulses without  bruits.  EXTREMITIES:  Without edema.  ABDOMEN:  Nontender, nondistended, without hepatosplenomegaly.    COMMENTS:  The patient's blood test results from 07/31/2019 were reviewed.  His   H and H was 8.8 and 32.2 with platelet count of 325.  His BUN and creatinine was   13 and 1.0.  His iron level was 11, TIBC was 463 and iron saturation was 2.    ASSESSMENT:  1.  Iron deficiency anemia.  2.  Wheezing.  3.  Fatigue.  4.  History of  shunt for normal pressure hydrocephalus.  5.  Hypertension.  6.  Hyperlipidemia.    PLAN:  We will put the patient in for a CBC, serum iron, reticulocyte count.  We   will resend the patient's iron supplement to the pharmacy.  This was pointed   out to the patient's daughter.  He is to take one a day.      JDS/HN  dd: 09/03/2019 10:28:31 (CDT)  td: 09/03/2019 22:32:35 (CDT)  Doc ID   #[id number]  Job ID #[id number]    CC:     Answers for HPI/ROS submitted by the patient on 9/1/2019   activity change: Yes  unexpected weight change: No  neck pain: No  rhinorrhea: No  trouble swallowing: No  eye discharge: No  visual disturbance: No  chest tightness: No  wheezing: Yes  chest pain: No  palpitations: No  blood in stool: No  constipation: No  vomiting: No  diarrhea: No  polydipsia: No  polyuria: Yes  difficulty urinating: No  urgency: No  hematuria: No  joint swelling: No  arthralgias: Yes  headaches: No  weakness: Yes  confusion: Yes  dysphoric mood: Yes

## 2019-09-04 ENCOUNTER — TELEPHONE (OUTPATIENT)
Dept: INTERNAL MEDICINE | Facility: CLINIC | Age: 84
End: 2019-09-04

## 2019-09-09 ENCOUNTER — TELEPHONE (OUTPATIENT)
Dept: INTERNAL MEDICINE | Facility: CLINIC | Age: 84
End: 2019-09-09

## 2019-09-09 NOTE — TELEPHONE ENCOUNTER
----- Message from Atif Christian MD sent at 9/7/2019  6:15 AM CDT -----  Please call the patient regarding his abnormal result. Please contact patient's daughter and see if he is taking his iron supplement. A prescription was sent into the pharmacy. He is to keep his follow up appointment with me.

## 2019-09-09 NOTE — TELEPHONE ENCOUNTER
No, they had not received the iron med that was sent 9/3/2019. His daughter will  med today and keep f/u appointment next month.

## 2019-09-13 DIAGNOSIS — E55.9 VITAMIN D DEFICIENCY: ICD-10-CM

## 2019-09-13 RX ORDER — TAMSULOSIN HYDROCHLORIDE 0.4 MG/1
CAPSULE ORAL
Qty: 90 CAPSULE | Refills: 0 | OUTPATIENT
Start: 2019-09-13

## 2019-09-13 RX ORDER — ATORVASTATIN CALCIUM 20 MG/1
TABLET, FILM COATED ORAL
Qty: 90 TABLET | Refills: 1 | Status: SHIPPED | OUTPATIENT
Start: 2019-09-13 | End: 2020-04-08 | Stop reason: SDUPTHER

## 2019-09-13 RX ORDER — ERGOCALCIFEROL 1.25 MG/1
CAPSULE ORAL
Qty: 6 CAPSULE | Refills: 0 | Status: SHIPPED | OUTPATIENT
Start: 2019-09-13 | End: 2020-02-04

## 2019-09-20 ENCOUNTER — OFFICE VISIT (OUTPATIENT)
Dept: HOME HEALTH SERVICES | Facility: CLINIC | Age: 84
End: 2019-09-20
Payer: MEDICARE

## 2019-09-20 VITALS
DIASTOLIC BLOOD PRESSURE: 90 MMHG | BODY MASS INDEX: 26.98 KG/M2 | HEART RATE: 72 BPM | HEIGHT: 68 IN | WEIGHT: 178 LBS | SYSTOLIC BLOOD PRESSURE: 134 MMHG

## 2019-09-20 DIAGNOSIS — F41.1 GENERALIZED ANXIETY DISORDER: ICD-10-CM

## 2019-09-20 DIAGNOSIS — D50.9 MICROCYTIC ANEMIA: ICD-10-CM

## 2019-09-20 DIAGNOSIS — R41.3 MEMORY LOSS, SHORT TERM: ICD-10-CM

## 2019-09-20 DIAGNOSIS — N40.1 BENIGN PROSTATIC HYPERPLASIA WITH URINARY OBSTRUCTION: ICD-10-CM

## 2019-09-20 DIAGNOSIS — E78.2 MIXED HYPERLIPIDEMIA: ICD-10-CM

## 2019-09-20 DIAGNOSIS — H35.352 CME (CYSTOID MACULAR EDEMA), LEFT: ICD-10-CM

## 2019-09-20 DIAGNOSIS — I70.0 ATHEROSCLEROSIS OF AORTA: ICD-10-CM

## 2019-09-20 DIAGNOSIS — I10 ESSENTIAL HYPERTENSION: ICD-10-CM

## 2019-09-20 DIAGNOSIS — Z00.00 ENCOUNTER FOR PREVENTIVE HEALTH EXAMINATION: Primary | ICD-10-CM

## 2019-09-20 DIAGNOSIS — Z98.2 VENTRICULO-PERITONEAL SHUNT STATUS: ICD-10-CM

## 2019-09-20 DIAGNOSIS — N13.8 BENIGN PROSTATIC HYPERPLASIA WITH URINARY OBSTRUCTION: ICD-10-CM

## 2019-09-20 PROCEDURE — G0439 PR MEDICARE ANNUAL WELLNESS SUBSEQUENT VISIT: ICD-10-PCS | Mod: S$GLB,,, | Performed by: NURSE PRACTITIONER

## 2019-09-20 PROCEDURE — G0439 PPPS, SUBSEQ VISIT: HCPCS | Mod: S$GLB,,, | Performed by: NURSE PRACTITIONER

## 2019-09-20 RX ORDER — TAMSULOSIN HYDROCHLORIDE 0.4 MG/1
0.4 CAPSULE ORAL DAILY
COMMUNITY
End: 2020-02-04

## 2019-09-20 RX ORDER — LANOLIN ALCOHOL/MO/W.PET/CERES
100 CREAM (GRAM) TOPICAL DAILY
COMMUNITY
End: 2020-04-08 | Stop reason: SDUPTHER

## 2019-09-20 NOTE — PROGRESS NOTES
"Atif Neves presented for a  Medicare AWV and comprehensive Health Risk Assessment today. The following components were reviewed and updated:    · Medical history  · Family History  · Social history  · Allergies and Current Medications  · Health Risk Assessment  · Health Maintenance  · Care Team     ** See Completed Assessments for Annual Wellness Visit within the encounter summary.**       The following assessments were completed:  · Living Situation  · CAGE  · Depression Screening  · Timed Get Up and Go  · Whisper Test  · Cognitive Function Screening  ·   ·   ·   · Nutrition Screening  · ADL Screening  · PAQ Screening    Vitals:    09/20/19 1122   BP: (!) 134/90   Pulse: 72   Weight: 80.7 kg (178 lb)   Height: 5' 8" (1.727 m)     Body mass index is 27.06 kg/m².  Physical Exam   Constitutional: He is oriented to person, place, and time. He appears well-developed and well-nourished.   HENT:   Head: Normocephalic and atraumatic.   Eyes: Pupils are equal, round, and reactive to light.   Neck: Normal range of motion.   Cardiovascular: Normal rate, regular rhythm and normal heart sounds.   Pulmonary/Chest: Effort normal and breath sounds normal. No respiratory distress.   Abdominal: Soft. Bowel sounds are normal. He exhibits no distension.   Musculoskeletal: He exhibits no edema.   Unsteady gait  Walker present   Neurological: He is alert and oriented to person, place, and time.   Skin: Skin is warm and dry.   Psychiatric: He has a normal mood and affect. His behavior is normal.         Diagnoses and health risks identified today and associated recommendations/orders:    1. Encounter for preventive health examination  Assessment completed. Preventive measure reviewed.    2. Atherosclerosis of aorta  Stable, followed by PCP.    3. Ventriculo-peritoneal shunt status  Stable, followed by Neurology.    4. Memory loss, short term  Stable, followed by Neurology.    5. Generalized anxiety disorder  Stable, followed by " PCP.    6. CME (cystoid macular edema), left  Stable, followed by Optometry.    7. Mixed hyperlipidemia  Stable, followed by PCP.    8. Essential hypertension  Stable, followed by PCP.    9. Benign prostatic hyperplasia with urinary obstruction  Stable, followed by Urology..    10. Microcytic anemia  Stable, followed by PCP.    Provided Atif with a 5-10 year written screening schedule and personal prevention plan. Recommendations were developed using the USPSTF age appropriate recommendations. Education, counseling, and referrals were provided as needed. After Visit Summary printed and given to patient which includes a list of additional screenings\tests needed.    No follow-ups on file.    Mira Guzman, TISHA  I offered to discuss end of life issues, including information on how to make advance directives that the patient could use to name someone who would make medical decisions on their behalf if they became too ill to make themselves.    ___Patient declined  _X_Patient is interested, I provided paper work and offered to discuss.

## 2019-09-20 NOTE — PATIENT INSTRUCTIONS
Counseling and Referral of Other Preventative  (Italic type indicates deductible and co-insurance are waived)    Patient Name: Atif Neves  Today's Date: 9/20/2019    Health Maintenance       Date Due Completion Date    Shingles Vaccine (1 of 2) 09/28/1978 ---    Pneumococcal Vaccine (65+ Low/Medium Risk) (2 of 2 - PPSV23) 04/10/2018 4/10/2017    Influenza Vaccine (1) 09/01/2019 8/27/2015    Lipid Panel 01/22/2024 1/22/2019    TETANUS VACCINE 04/10/2027 4/10/2017        No orders of the defined types were placed in this encounter.    The following information is provided to all patients.  This information is to help you find resources for any of the problems found today that may be affecting your health:                Living healthy guide: www.[facility].gov      Understanding Diabetes: www.diabetes.org      Eating healthy: www.cdc.gov/healthyweight      [facility] home safety checklist: www.cdc.gov/steadi/patient.html      Agency on Aging: www.goea.[place].[facility]      Alcoholics anonymous (AA): www.aa.org      Physical Activity: www.shira.nih.gov/kr3tjvq      Tobacco use: www.quitwithusla.org

## 2019-10-09 DIAGNOSIS — F41.1 GENERALIZED ANXIETY DISORDER: ICD-10-CM

## 2019-10-10 RX ORDER — CITALOPRAM 10 MG/1
TABLET ORAL
Qty: 90 TABLET | Refills: 1 | Status: SHIPPED | OUTPATIENT
Start: 2019-10-10 | End: 2020-04-08 | Stop reason: SDUPTHER

## 2019-10-21 ENCOUNTER — PATIENT MESSAGE (OUTPATIENT)
Dept: INTERNAL MEDICINE | Facility: CLINIC | Age: 84
End: 2019-10-21

## 2019-10-24 ENCOUNTER — TELEPHONE (OUTPATIENT)
Dept: INTERNAL MEDICINE | Facility: CLINIC | Age: 84
End: 2019-10-24

## 2019-10-24 NOTE — TELEPHONE ENCOUNTER
----- Message from Flavia Nolasco sent at 10/24/2019  1:21 PM CDT -----  Contact: daughter/sherice/[phone]  Pt daughter called in regards to a email she sent to the office which is (l is there a way that someone can come to see Mr. Neves on a monthly basis at  home because it is kind of hard to get him to your office for regular visit.). They would like a call back ASAP.     Please advise

## 2019-11-12 ENCOUNTER — TELEPHONE (OUTPATIENT)
Dept: INTERNAL MEDICINE | Facility: CLINIC | Age: 84
End: 2019-11-12

## 2019-11-12 NOTE — TELEPHONE ENCOUNTER
----- Message from Sugey Phillips sent at 11/12/2019 11:01 AM CST -----  Contact: Pt request via [facility]   Message     Appointment Request From: Atif Neves    With Provider: Atif Christian MD [[facility] - Internal Medicine]    Preferred Date Range: 11/22/2019 - 11/29/2019    Preferred Times: Tuesday Morning, Tuesday Afternoon    Reason for visit: memory problem and missed follow-up appt.    Comments:  forgetfulness and sleep problem

## 2019-11-18 ENCOUNTER — TELEPHONE (OUTPATIENT)
Dept: PRIMARY CARE CLINIC | Facility: CLINIC | Age: 84
End: 2019-11-18

## 2019-11-18 NOTE — TELEPHONE ENCOUNTER
Please get more information on this patient's insurance, does he have [employer] Medicare?    Also, which area is he in? Would family allow an NP into the home until a doctor can see the patient?    Thanks,  PHUONG Monae MD; CONSUELO Aguilar Staff             Dr. Richards,   Can you assist with doing home visits with this pt. Please advise.    Previous Messages      ----- Message -----   From: Atif Christian MD   Sent: 11/16/2019   4:57 PM CST   To: Gino FALCON Staff     Please contact [facility] and see if we have anyone that does home visits. If yes, how can this patient be seen.

## 2019-11-22 ENCOUNTER — OFFICE VISIT (OUTPATIENT)
Dept: INTERNAL MEDICINE | Facility: CLINIC | Age: 84
End: 2019-11-22
Payer: MEDICARE

## 2019-11-22 ENCOUNTER — DOCUMENTATION ONLY (OUTPATIENT)
Dept: INTERNAL MEDICINE | Facility: CLINIC | Age: 84
End: 2019-11-22

## 2019-11-22 ENCOUNTER — LAB VISIT (OUTPATIENT)
Dept: LAB | Facility: HOSPITAL | Age: 84
End: 2019-11-22
Attending: INTERNAL MEDICINE
Payer: MEDICARE

## 2019-11-22 VITALS
BODY MASS INDEX: 26.4 KG/M2 | DIASTOLIC BLOOD PRESSURE: 80 MMHG | HEART RATE: 95 BPM | SYSTOLIC BLOOD PRESSURE: 124 MMHG | HEIGHT: 68 IN | WEIGHT: 174.19 LBS | OXYGEN SATURATION: 97 % | TEMPERATURE: 98 F

## 2019-11-22 DIAGNOSIS — D50.9 IRON DEFICIENCY ANEMIA, UNSPECIFIED IRON DEFICIENCY ANEMIA TYPE: ICD-10-CM

## 2019-11-22 DIAGNOSIS — I10 HYPERTENSION, ESSENTIAL: ICD-10-CM

## 2019-11-22 DIAGNOSIS — D50.9 IRON DEFICIENCY ANEMIA, UNSPECIFIED IRON DEFICIENCY ANEMIA TYPE: Primary | ICD-10-CM

## 2019-11-22 DIAGNOSIS — E55.9 VITAMIN D DEFICIENCY: ICD-10-CM

## 2019-11-22 DIAGNOSIS — Z98.2 S/P VP SHUNT: ICD-10-CM

## 2019-11-22 DIAGNOSIS — E78.5 HYPERLIPIDEMIA, UNSPECIFIED HYPERLIPIDEMIA TYPE: ICD-10-CM

## 2019-11-22 LAB
ALBUMIN SERPL BCP-MCNC: 4.1 G/DL (ref 3.5–5.2)
ALP SERPL-CCNC: 157 U/L (ref 55–135)
ALT SERPL W/O P-5'-P-CCNC: 17 U/L (ref 10–44)
ANION GAP SERPL CALC-SCNC: 9 MMOL/L (ref 8–16)
ANISOCYTOSIS BLD QL SMEAR: SLIGHT
AST SERPL-CCNC: 21 U/L (ref 10–40)
BASOPHILS # BLD AUTO: 0.03 K/UL (ref 0–0.2)
BASOPHILS NFR BLD: 0.3 % (ref 0–1.9)
BILIRUB SERPL-MCNC: 0.7 MG/DL (ref 0.1–1)
BUN SERPL-MCNC: 12 MG/DL (ref 10–30)
CALCIUM SERPL-MCNC: 10.1 MG/DL (ref 8.7–10.5)
CHLORIDE SERPL-SCNC: 104 MMOL/L (ref 95–110)
CHOLEST SERPL-MCNC: 182 MG/DL (ref 120–199)
CHOLEST/HDLC SERPL: 3.1 {RATIO} (ref 2–5)
CO2 SERPL-SCNC: 28 MMOL/L (ref 23–29)
CREAT SERPL-MCNC: 1.1 MG/DL (ref 0.5–1.4)
DIFFERENTIAL METHOD: ABNORMAL
EOSINOPHIL # BLD AUTO: 0.1 K/UL (ref 0–0.5)
EOSINOPHIL NFR BLD: 0.6 % (ref 0–8)
ERYTHROCYTE [DISTWIDTH] IN BLOOD BY AUTOMATED COUNT: 21.1 % (ref 11.5–14.5)
EST. GFR  (AFRICAN AMERICAN): >60 ML/MIN/1.73 M^2
EST. GFR  (NON AFRICAN AMERICAN): 58 ML/MIN/1.73 M^2
GLUCOSE SERPL-MCNC: 102 MG/DL (ref 70–110)
HCT VFR BLD AUTO: 39 % (ref 40–54)
HDLC SERPL-MCNC: 58 MG/DL (ref 40–75)
HDLC SERPL: 31.9 % (ref 20–50)
HGB BLD-MCNC: 11.5 G/DL (ref 14–18)
IRON SERPL-MCNC: 25 UG/DL (ref 45–160)
LDLC SERPL CALC-MCNC: 106.8 MG/DL (ref 63–159)
LYMPHOCYTES # BLD AUTO: 1.6 K/UL (ref 1–4.8)
LYMPHOCYTES NFR BLD: 14 % (ref 18–48)
MCH RBC QN AUTO: 21 PG (ref 27–31)
MCHC RBC AUTO-ENTMCNC: 29.5 G/DL (ref 32–36)
MCV RBC AUTO: 71 FL (ref 82–98)
MONOCYTES # BLD AUTO: 0.6 K/UL (ref 0.3–1)
MONOCYTES NFR BLD: 5.5 % (ref 4–15)
NEUTROPHILS # BLD AUTO: 9.2 K/UL (ref 1.8–7.7)
NEUTROPHILS NFR BLD: 79.6 % (ref 38–73)
NONHDLC SERPL-MCNC: 124 MG/DL
PLATELET # BLD AUTO: 345 K/UL (ref 150–350)
PLATELET BLD QL SMEAR: ABNORMAL
PMV BLD AUTO: 9.8 FL (ref 9.2–12.9)
POIKILOCYTOSIS BLD QL SMEAR: SLIGHT
POTASSIUM SERPL-SCNC: 4.3 MMOL/L (ref 3.5–5.1)
PROT SERPL-MCNC: 8.3 G/DL (ref 6–8.4)
RBC # BLD AUTO: 5.48 M/UL (ref 4.6–6.2)
SATURATED IRON: 5 % (ref 20–50)
SODIUM SERPL-SCNC: 141 MMOL/L (ref 136–145)
TOTAL IRON BINDING CAPACITY: 456 UG/DL (ref 250–450)
TRANSFERRIN SERPL-MCNC: 308 MG/DL (ref 200–375)
TRIGL SERPL-MCNC: 86 MG/DL (ref 30–150)
WBC # BLD AUTO: 11.52 K/UL (ref 3.9–12.7)

## 2019-11-22 PROCEDURE — 99999 PR PBB SHADOW E&M-EST. PATIENT-LVL IV: CPT | Mod: PBBFAC,HCNC,, | Performed by: INTERNAL MEDICINE

## 2019-11-22 PROCEDURE — 83540 ASSAY OF IRON: CPT | Mod: HCNC

## 2019-11-22 PROCEDURE — 80053 COMPREHEN METABOLIC PANEL: CPT | Mod: HCNC

## 2019-11-22 PROCEDURE — 36415 COLL VENOUS BLD VENIPUNCTURE: CPT | Mod: HCNC

## 2019-11-22 PROCEDURE — 1159F MED LIST DOCD IN RCRD: CPT | Mod: HCNC,S$GLB,, | Performed by: INTERNAL MEDICINE

## 2019-11-22 PROCEDURE — 99215 OFFICE O/P EST HI 40 MIN: CPT | Mod: HCNC,S$GLB,, | Performed by: INTERNAL MEDICINE

## 2019-11-22 PROCEDURE — 99215 PR OFFICE/OUTPT VISIT, EST, LEVL V, 40-54 MIN: ICD-10-PCS | Mod: HCNC,S$GLB,, | Performed by: INTERNAL MEDICINE

## 2019-11-22 PROCEDURE — 1101F PT FALLS ASSESS-DOCD LE1/YR: CPT | Mod: HCNC,CPTII,S$GLB, | Performed by: INTERNAL MEDICINE

## 2019-11-22 PROCEDURE — 1159F PR MEDICATION LIST DOCUMENTED IN MEDICAL RECORD: ICD-10-PCS | Mod: HCNC,S$GLB,, | Performed by: INTERNAL MEDICINE

## 2019-11-22 PROCEDURE — 1126F PR PAIN SEVERITY QUANTIFIED, NO PAIN PRESENT: ICD-10-PCS | Mod: HCNC,S$GLB,, | Performed by: INTERNAL MEDICINE

## 2019-11-22 PROCEDURE — 85025 COMPLETE CBC W/AUTO DIFF WBC: CPT | Mod: HCNC

## 2019-11-22 PROCEDURE — 80061 LIPID PANEL: CPT | Mod: HCNC

## 2019-11-22 PROCEDURE — 1101F PR PT FALLS ASSESS DOC 0-1 FALLS W/OUT INJ PAST YR: ICD-10-PCS | Mod: HCNC,CPTII,S$GLB, | Performed by: INTERNAL MEDICINE

## 2019-11-22 PROCEDURE — 99999 PR PBB SHADOW E&M-EST. PATIENT-LVL IV: ICD-10-PCS | Mod: PBBFAC,HCNC,, | Performed by: INTERNAL MEDICINE

## 2019-11-22 PROCEDURE — 1126F AMNT PAIN NOTED NONE PRSNT: CPT | Mod: HCNC,S$GLB,, | Performed by: INTERNAL MEDICINE

## 2019-11-23 NOTE — PROGRESS NOTES
CC:  Follow-up.    HPI:  Patient is a 89-year-old gentleman with a history normal pressure hydrocephalus status post  shunt, hypertension, BPH, hyperlipidemia and age related macular degeneration presents today for follow-up.  His daughter who was with him, reports there is nothing new.  She does report he has become a little bit more forgetful as well as little more easily irritated.  Patient had a  shunt on February 6, 2017.  He was last seen by Dr. Sam on 03/27/2018.  A CT scan of the head was done on 04/10/2018 .  Patient walks walker.  His mental status did improve after the surgery.  Now, his daughter reports he may forget some things that he tell him but not everything.  When we last seen the patient, he had an iron deficiency anemia.  He was started on iron supplementations.  It does not appear the stool samples were done.    ROS please see patient and review of systems.  This was entered in by the patient's daughter.Answers for HPI/ROS submitted by the patient on 11/21/2019   activity change: No  unexpected weight change: No  neck pain: No  hearing loss: No  rhinorrhea: No  trouble swallowing: No  eye discharge: No  visual disturbance: No  chest tightness: No  wheezing: No  chest pain: No  palpitations: No  blood in stool: No  constipation: No  vomiting: No  diarrhea: No  polydipsia: No  polyuria: Yes  difficulty urinating: No  urgency: Yes  hematuria: No  joint swelling: No  arthralgias: Yes  headaches: No  weakness: Yes  confusion: Yes  dysphoric mood: Yes  Questions asked to the patient included [place] eating.  He is eating 3 males a day plus snacks he states he is always hungry.  He reports no problems with his eyes or ears no chest pain or shortness of breath.  The patient states that he reads his Bible almost daily.  He reads different books of the Bible.  He likes to watch Western is on TV.  No problems with nausea or vomiting.  No abdominal pain. He has a bowel movement pretty much every other  day.  His daughter indicates that he urinates a lot; but, he states he only goes twice a day.  No problems with the skin.  His daughter reports that his left leg drags which is not new.  This has been prior to his surgery.    Physical exam:  General appearance no acute distress  HEENT conjunctiva is clear.  He has bilateral lens replacements.  TMs are clear.  Nasal septum is midline without discharge. Oropharynx is without erythema.  Trachea is midline without JVD.  Pulmonary:  Good inspiratory, expiratory breath sounds are heard.  Lungs are clear to auscultation.  Cardiovascular:  S1-S2, rhythm is normal.  GI:  Abdomen was nontender, nondistended without hepatosplenomegaly.  Extremities:  Without edema.    Assessment:  1.  Iron deficiency anemia  2.  Hypertension  3.  Hyperlipidemia  4.  Vitamin-D deficiency  5.  Status post  shunt.    Plan:  1.  Will order CBC, iron and TIBC.  2.  Will order a fit kit  3.  Will order CMP and lipid panel.  4.  The patient will be changing PCPs in anticipation of home visits.

## 2019-12-02 ENCOUNTER — TELEPHONE (OUTPATIENT)
Dept: INTERNAL MEDICINE | Facility: CLINIC | Age: 84
End: 2019-12-02

## 2019-12-02 NOTE — TELEPHONE ENCOUNTER
----- Message from Atif Christian MD sent at 11/30/2019  6:18 AM CST -----  Please call the patient regarding his abnormal result. Please contact patient's daughter. His blood tests show that he has an iron deficiency anemia. We had ordered a stool for occult blood in August that did not get done. I had just seen him and he was given a Fit-Kit. Please verify they have it and see if they sent it in. This will check for blood in his bowels.

## 2019-12-05 ENCOUNTER — TELEPHONE (OUTPATIENT)
Dept: PRIMARY CARE CLINIC | Facility: CLINIC | Age: 84
End: 2019-12-05

## 2019-12-05 DIAGNOSIS — G91.2 NPH (NORMAL PRESSURE HYDROCEPHALUS): Primary | ICD-10-CM

## 2019-12-05 DIAGNOSIS — H35.352 CME (CYSTOID MACULAR EDEMA), LEFT: ICD-10-CM

## 2019-12-05 DIAGNOSIS — R41.3 MEMORY LOSS, SHORT TERM: ICD-10-CM

## 2019-12-05 NOTE — TELEPHONE ENCOUNTER
Pt will establish care with me in the home 2/1, but will need to also be followed by home NP.  Order placed for this.

## 2019-12-11 ENCOUNTER — PATIENT OUTREACH (OUTPATIENT)
Dept: ADMINISTRATIVE | Facility: HOSPITAL | Age: 84
End: 2019-12-11

## 2019-12-11 NOTE — PROGRESS NOTES
Spoke with daughter, she did forget to put the collection time and date on the FIT KIT. I will be mailing her a new one

## 2019-12-12 ENCOUNTER — CARE AT HOME (OUTPATIENT)
Dept: HOME HEALTH SERVICES | Facility: CLINIC | Age: 84
End: 2019-12-12
Payer: MEDICARE

## 2019-12-12 ENCOUNTER — LAB VISIT (OUTPATIENT)
Dept: LAB | Facility: HOSPITAL | Age: 84
End: 2019-12-12
Attending: INTERNAL MEDICINE
Payer: MEDICARE

## 2019-12-12 VITALS
OXYGEN SATURATION: 97 % | SYSTOLIC BLOOD PRESSURE: 132 MMHG | RESPIRATION RATE: 18 BRPM | DIASTOLIC BLOOD PRESSURE: 62 MMHG | HEART RATE: 79 BPM | TEMPERATURE: 97 F

## 2019-12-12 DIAGNOSIS — D50.9 IRON DEFICIENCY ANEMIA, UNSPECIFIED IRON DEFICIENCY ANEMIA TYPE: ICD-10-CM

## 2019-12-12 DIAGNOSIS — N40.1 BENIGN PROSTATIC HYPERPLASIA WITH URINARY OBSTRUCTION: ICD-10-CM

## 2019-12-12 DIAGNOSIS — F41.1 GENERALIZED ANXIETY DISORDER: ICD-10-CM

## 2019-12-12 DIAGNOSIS — G91.2 NPH (NORMAL PRESSURE HYDROCEPHALUS): Primary | ICD-10-CM

## 2019-12-12 DIAGNOSIS — D50.9 MICROCYTIC ANEMIA: ICD-10-CM

## 2019-12-12 DIAGNOSIS — Z98.2 VENTRICULO-PERITONEAL SHUNT STATUS: ICD-10-CM

## 2019-12-12 DIAGNOSIS — N13.8 BENIGN PROSTATIC HYPERPLASIA WITH URINARY OBSTRUCTION: ICD-10-CM

## 2019-12-12 DIAGNOSIS — R41.3 MEMORY LOSS, SHORT TERM: ICD-10-CM

## 2019-12-12 DIAGNOSIS — E78.2 MIXED HYPERLIPIDEMIA: ICD-10-CM

## 2019-12-12 LAB — HEMOCCULT STL QL IA: NEGATIVE

## 2019-12-12 PROCEDURE — 82274 ASSAY TEST FOR BLOOD FECAL: CPT | Mod: HCNC

## 2019-12-12 PROCEDURE — 99350 PR HOME VISIT,ESTAB PATIENT,LEVEL IV: ICD-10-PCS | Mod: ,,, | Performed by: NURSE PRACTITIONER

## 2019-12-12 PROCEDURE — 1126F AMNT PAIN NOTED NONE PRSNT: CPT | Mod: S$GLB,,, | Performed by: NURSE PRACTITIONER

## 2019-12-12 PROCEDURE — 1159F MED LIST DOCD IN RCRD: CPT | Mod: S$GLB,,, | Performed by: NURSE PRACTITIONER

## 2019-12-12 PROCEDURE — 99497 PR ADVNCD CARE PLAN 30 MIN: ICD-10-PCS | Mod: S$GLB,,, | Performed by: NURSE PRACTITIONER

## 2019-12-12 PROCEDURE — 99350 HOME/RES VST EST HIGH MDM 60: CPT | Mod: ,,, | Performed by: NURSE PRACTITIONER

## 2019-12-12 PROCEDURE — 1101F PT FALLS ASSESS-DOCD LE1/YR: CPT | Mod: CPTII,S$GLB,, | Performed by: NURSE PRACTITIONER

## 2019-12-12 PROCEDURE — 99497 ADVNCD CARE PLAN 30 MIN: CPT | Mod: S$GLB,,, | Performed by: NURSE PRACTITIONER

## 2019-12-12 PROCEDURE — 1101F PR PT FALLS ASSESS DOC 0-1 FALLS W/OUT INJ PAST YR: ICD-10-PCS | Mod: CPTII,S$GLB,, | Performed by: NURSE PRACTITIONER

## 2019-12-12 PROCEDURE — 1126F PR PAIN SEVERITY QUANTIFIED, NO PAIN PRESENT: ICD-10-PCS | Mod: S$GLB,,, | Performed by: NURSE PRACTITIONER

## 2019-12-12 PROCEDURE — 1159F PR MEDICATION LIST DOCUMENTED IN MEDICAL RECORD: ICD-10-PCS | Mod: S$GLB,,, | Performed by: NURSE PRACTITIONER

## 2019-12-12 NOTE — PROGRESS NOTES
[facility]  Medical Home Visit    Visit Date: 12/12/2019  Encounter Provider: Kenzie Beckett NP  PCP:  Atif Christian MD    Subjective:      Patient ID: Atif Neves is a 89 y.o. male.    Consult Requested By:  Dr. Andreea Funes  Reason for Consult:  Establish Care with [facility]    Atif is being seen at home due to mobility issues    Chief Complaint: Establish Care; Hyperlipidemia; Anemia; Memory Loss; and Benign Prostatic Hypertrophy    Pt is being seen today to establish care with [facility] as it is becoming difficult for his caregiver to get patient to MD due to decline in mobility. He has a history of V/P shunt for hydrocephalus, HLD, anemia, anxiety disorder. He was previously a patient of Dr Christian before changing to this service. No recent ER visits or hospitalizations. Most recent hospital visit was in 2017 for his shunt placement. He lives with his daughter. He denies any complaints at this time. Daughter is present and reports she is his POA. Daughter reports he refuses to go to MD office and it is getting more difficult to get him in and out if car.  Pt is smiling, happy and upbeat. Appears to be in good health. Daughter reports Jignesh is arranging pill packing for her at this time    Review of Systems   Reason unable to perform ROS: pt answers no to all questions/daughter assist.   Constitutional: Negative.    HENT: Negative.    Eyes: Negative.    Respiratory: Negative.    Cardiovascular: Negative.    Gastrointestinal: Negative.    Endocrine: Negative.    Genitourinary: Positive for frequency.   Musculoskeletal: Negative.    Skin: Negative.    Hematological: Negative.    Psychiatric/Behavioral:        Short-term memory       Assessments:  · Environmental: single story home, clean, adequate lighting and temp  · Functional Status: requires assistance for meals and bathing  · Safety: fall risk-small dog  · Nutritional: adequate  · Home Health/DME/Supplies: none/walker    Objective:    Physical Exam   Constitutional: He appears well-developed and well-nourished.   HENT:   Head: Normocephalic and atraumatic.   Eyes: Pupils are equal, round, and reactive to light. Conjunctivae are normal.   Neck: Normal range of motion. Neck supple.   Cardiovascular: Normal rate and regular rhythm.   Pulmonary/Chest: Effort normal and breath sounds normal.   Abdominal: Soft. Bowel sounds are normal.   Musculoskeletal: Normal range of motion. He exhibits no edema or deformity.   Neurological: He is alert.   Skin: Skin is warm and dry. Capillary refill takes less than 2 seconds.   Psychiatric: He has a normal mood and affect. His behavior is normal.   Short term memory impaired, oriented to place and self   Vitals reviewed.      Vitals:    12/12/19 1146   BP: 132/62   Pulse: 79   Resp: 18   Temp: 97.3 °F (36.3 °C)   SpO2: 97%   PainSc: 0-No pain     There is no height or weight on file to calculate BMI.    Assessment:   Atif was seen today for establish care, hyperlipidemia, anemia, memory loss and benign prostatic hypertrophy.    Diagnoses and all orders for this visit:    NPH (normal pressure hydrocephalus)    Ventriculo-peritoneal shunt status    Memory loss, short term    Mixed hyperlipidemia    Benign prostatic hyperplasia with urinary obstruction    Microcytic anemia    Generalized anxiety disorder        Plan:     Ethical / Legal: Advance Care Planning   · Surrogate decision maker:  Name Marcie, Relationship: daughter  · Code Status:  Full  · [place]:  none  · Other advance directive: none, Capacity to make medical decisions: impaired, Conflict none       Atif was seen today for establish care, hyperlipidemia, anemia, memory loss and benign prostatic hypertrophy.    Diagnoses and all orders for this visit:    NPH (normal pressure hydrocephalus)  Ventriculo-peritoneal shunt status  Stable, shunt placed 2017, last MRI 2018  [facility] no changes to mental status or neuro since 2018.  She would like to  keep pt in home and avoid testing  Instructed to notify for any changes/concerns    Memory loss, short term  Mild, short term involoved  Unable to tell date, month or year or president  Does identify his daughter and all children, can state his   Continue to monitor, notify for worsening    Mixed hyperlipidemia  Lab Results   Component Value Date    CHOL 182 2019    CHOL 137 2019    CHOL 136 2018     Lab Results   Component Value Date    HDL 58 2019    HDL 57 2019    HDL 55 2018     Lab Results   Component Value Date    LDLCALC 106.8 2019    LDLCALC 65.8 2019    LDLCALC 66.8 2018     Lab Results   Component Value Date    TRIG 86 2019    TRIG 71 2019    TRIG 71 2018     Lab Results   Component Value Date    CHOLHDL 31.9 2019    CHOLHDL 41.6 2019    CHOLHDL 40.4 2018     Stable ob statin, continue to monitor  Benign prostatic hyperplasia with urinary obstruction  Stable on Flomax  Continue to montor    Microcytic anemia  Lab Results   Component Value Date    WBC 11.52 2019    HGB 11.5 (L) 2019    HCT 39.0 (L) 2019    MCV 71 (L) 2019     2019       Stable, no symptoms. Pt recently mailed fit kit for eval of GI bleed.  Awaiting results  Continue iron and vitamins  Generalized anxiety disorder  Stable on Celexa  Continue to monitor    Pt with Marcie GRECO(daughter), daughter does not believe he has ever executed any advanced directives. Discussed end of life goals, palliative care, and hospice. Pt would like to remain in his home and reduce or avoid hospitalizations. Discussed CPR, intubation, mechanical vent, PEG and artifical nutrition. Daughter will consider and discuss with family. [place] Post left with daughter to review and assist with decision making.  Total face-to-face time was 60 min, 50% of this was spent on counseling and coordination of care. 30 min spent on advanced care planning. Will  follow up about 3 months    Were controlled substances prescribed?  No    Follow Up Appointments:   Future Appointments   Date Time Provider Department Center   12/12/2019  1:00 PM Kenzie Beckett NP [address]   2/4/2020 10:00 AM [facility]       Signature:  Kenzie Beckett NP    Patient consent obtained prior to treatment at this visit.

## 2019-12-13 ENCOUNTER — PATIENT MESSAGE (OUTPATIENT)
Dept: INTERNAL MEDICINE | Facility: CLINIC | Age: 84
End: 2019-12-13

## 2019-12-13 ENCOUNTER — TELEPHONE (OUTPATIENT)
Dept: INTERNAL MEDICINE | Facility: CLINIC | Age: 84
End: 2019-12-13

## 2019-12-14 ENCOUNTER — TELEPHONE (OUTPATIENT)
Dept: INTERNAL MEDICINE | Facility: CLINIC | Age: 84
End: 2019-12-14

## 2019-12-14 NOTE — TELEPHONE ENCOUNTER
----- Message from Atif Christian MD sent at 12/13/2019  5:32 PM CST -----  Please contact patient. His stool sample was normal. Please see if he is taking his iron supplement.

## 2020-01-17 ENCOUNTER — CARE AT HOME (OUTPATIENT)
Dept: HOME HEALTH SERVICES | Facility: CLINIC | Age: 85
End: 2020-01-17
Payer: MEDICARE

## 2020-01-17 VITALS
SYSTOLIC BLOOD PRESSURE: 124 MMHG | DIASTOLIC BLOOD PRESSURE: 62 MMHG | TEMPERATURE: 98 F | HEART RATE: 82 BPM | RESPIRATION RATE: 16 BRPM | OXYGEN SATURATION: 98 %

## 2020-01-17 DIAGNOSIS — E78.2 MIXED HYPERLIPIDEMIA: ICD-10-CM

## 2020-01-17 DIAGNOSIS — D50.9 MICROCYTIC ANEMIA: ICD-10-CM

## 2020-01-17 DIAGNOSIS — G91.2 NPH (NORMAL PRESSURE HYDROCEPHALUS): Primary | ICD-10-CM

## 2020-01-17 DIAGNOSIS — N13.8 BENIGN PROSTATIC HYPERPLASIA WITH URINARY OBSTRUCTION: ICD-10-CM

## 2020-01-17 DIAGNOSIS — R41.3 MEMORY LOSS, SHORT TERM: ICD-10-CM

## 2020-01-17 DIAGNOSIS — N40.1 BENIGN PROSTATIC HYPERPLASIA WITH URINARY OBSTRUCTION: ICD-10-CM

## 2020-01-17 PROCEDURE — 1101F PT FALLS ASSESS-DOCD LE1/YR: CPT | Mod: CPTII,S$GLB,, | Performed by: NURSE PRACTITIONER

## 2020-01-17 PROCEDURE — 1126F AMNT PAIN NOTED NONE PRSNT: CPT | Mod: S$GLB,,, | Performed by: NURSE PRACTITIONER

## 2020-01-17 PROCEDURE — 99349 HOME/RES VST EST MOD MDM 40: CPT | Mod: S$GLB,,, | Performed by: NURSE PRACTITIONER

## 2020-01-17 PROCEDURE — 99349 PR HOME VISIT,ESTAB PATIENT,LEVEL III: ICD-10-PCS | Mod: S$GLB,,, | Performed by: NURSE PRACTITIONER

## 2020-01-17 PROCEDURE — 1126F PR PAIN SEVERITY QUANTIFIED, NO PAIN PRESENT: ICD-10-PCS | Mod: S$GLB,,, | Performed by: NURSE PRACTITIONER

## 2020-01-17 PROCEDURE — 1159F PR MEDICATION LIST DOCUMENTED IN MEDICAL RECORD: ICD-10-PCS | Mod: S$GLB,,, | Performed by: NURSE PRACTITIONER

## 2020-01-17 PROCEDURE — 1101F PR PT FALLS ASSESS DOC 0-1 FALLS W/OUT INJ PAST YR: ICD-10-PCS | Mod: CPTII,S$GLB,, | Performed by: NURSE PRACTITIONER

## 2020-01-17 PROCEDURE — 1159F MED LIST DOCD IN RCRD: CPT | Mod: S$GLB,,, | Performed by: NURSE PRACTITIONER

## 2020-01-17 NOTE — PROGRESS NOTES
[facility]  Medical Home Visit    Visit Date: 1/17/2020  Encounter Provider: Kenzie Beckett NP  PCP:  Atif Christian MD    Subjective:      Patient ID: Atif Neves is a 89 y.o. male.    Consult Requested By:  Kenzie Beckett  Reason for Consult:  Routine Follow Up    Atif is being seen at home due to homebound status.      Chief Complaint: Dementia; Hyperlipidemia; and Hypertension    Pt is a 89 y/o AAM being seen today for routine follow up. He has a history of NPH, HTN, anemia, HLD. He is found sitting in his home watching TV. Pt is awake and alert. He is pleasant and cooperative. Daughter reports no changes or problems at this time. He is compliant with his medications    Review of Systems   Constitutional: Negative.    HENT: Negative.    Respiratory: Negative.    Cardiovascular: Negative.    Gastrointestinal: Negative.    Endocrine: Negative.    Genitourinary: Positive for difficulty urinating.   Musculoskeletal: Negative.    Neurological: Negative.    Hematological: Negative.    Psychiatric/Behavioral: Positive for confusion.       Assessments:  · Environmental: clean single story home, adequate lighting and temp  · Functional Status: requires assistance  · Safety: small dog in home  · Nutritional: adequate  · Home Health/DME/Supplies: none/walker    Objective:   Physical Exam   Constitutional: He appears well-developed and well-nourished. No distress.   HENT:   Head: Normocephalic and atraumatic.   Eyes: Pupils are equal, round, and reactive to light. EOM are normal.   Neck: Normal range of motion. Neck supple.   Cardiovascular: Normal rate, regular rhythm and normal heart sounds.   Pulmonary/Chest: Effort normal and breath sounds normal.   Abdominal: Soft. Bowel sounds are normal.   Musculoskeletal: Normal range of motion. He exhibits no edema.   Neurological: He is alert. No cranial nerve deficit.   Skin: Skin is warm and dry.   Psychiatric: He has a normal mood and affect. His behavior is  normal.   Vitals reviewed.      Vitals:    01/17/20 1120   BP: 124/62   Pulse: 82   Resp: 16   Temp: 97.8 °F (36.6 °C)   SpO2: 98%   PainSc: 0-No pain     There is no height or weight on file to calculate BMI.    Assessment:   Atif was seen today for dementia, hyperlipidemia and hypertension.    Diagnoses and all orders for this visit:    NPH (normal pressure hydrocephalus)  -     [facility]    Memory loss, short term    Mixed hyperlipidemia    Benign prostatic hyperplasia with urinary obstruction  -     Ambulatory Referral to [facility] - Medical [facility] Palliative    Microcytic anemia        Plan:          Atif was seen today for dementia, hyperlipidemia and hypertension.    Diagnoses and all orders for this visit:    NPH (normal pressure hydrocephalus)-       Memory loss, short term  Stable, pt is pleasantly confused, cannot recall what he ate for breakfast  Continue to monitor    Mixed hyperlipidemia  Stable on statin  Continue to monitor    Benign prostatic hyperplasia with urinary obstruction  -     [facility]  Stable on medication  Continue Flomax    Anemia  Stable  Continue iron and vitamin supplements  Will recheck CBC in 3 months      Were controlled substances prescribed?  No    Follow Up Appointments:   Future Appointments   Date Time Provider Department Center   2/4/2020 10:00 AM [facility] [address] [facility]   4/17/2020 11:00 AM Kenzie Beckett NP [address]       Signature:  Kenzie Beckett NP    Patient consent obtained prior to treatment at this visit.

## 2020-02-04 ENCOUNTER — OFFICE VISIT (OUTPATIENT)
Dept: PRIMARY CARE CLINIC | Facility: CLINIC | Age: 85
End: 2020-02-04
Payer: MEDICARE

## 2020-02-04 VITALS — SYSTOLIC BLOOD PRESSURE: 118 MMHG | OXYGEN SATURATION: 98 % | HEART RATE: 71 BPM | DIASTOLIC BLOOD PRESSURE: 76 MMHG

## 2020-02-04 DIAGNOSIS — I70.0 ATHEROSCLEROSIS OF AORTA: ICD-10-CM

## 2020-02-04 DIAGNOSIS — Z98.2 VENTRICULO-PERITONEAL SHUNT STATUS: ICD-10-CM

## 2020-02-04 DIAGNOSIS — F33.42 RECURRENT MAJOR DEPRESSIVE DISORDER, IN FULL REMISSION: ICD-10-CM

## 2020-02-04 DIAGNOSIS — G91.2 DEMENTIA ASSOCIATED WITH NORMAL PRESSURE HYDROCEPHALUS: ICD-10-CM

## 2020-02-04 DIAGNOSIS — D50.9 IRON DEFICIENCY ANEMIA, UNSPECIFIED IRON DEFICIENCY ANEMIA TYPE: ICD-10-CM

## 2020-02-04 DIAGNOSIS — I10 ESSENTIAL HYPERTENSION: ICD-10-CM

## 2020-02-04 DIAGNOSIS — E78.2 MIXED HYPERLIPIDEMIA: ICD-10-CM

## 2020-02-04 DIAGNOSIS — G91.2 NPH (NORMAL PRESSURE HYDROCEPHALUS): ICD-10-CM

## 2020-02-04 DIAGNOSIS — R56.9 SEIZURE: ICD-10-CM

## 2020-02-04 DIAGNOSIS — F02.80 DEMENTIA ASSOCIATED WITH NORMAL PRESSURE HYDROCEPHALUS: ICD-10-CM

## 2020-02-04 DIAGNOSIS — E55.9 VITAMIN D DEFICIENCY: ICD-10-CM

## 2020-02-04 DIAGNOSIS — F02.80 DEMENTIA ASSOCIATED WITH OTHER UNDERLYING DISEASE WITHOUT BEHAVIORAL DISTURBANCE: ICD-10-CM

## 2020-02-04 PROCEDURE — 99499 RISK ADDL DX/OHS AUDIT: ICD-10-PCS | Mod: HCNC,S$GLB,, | Performed by: INTERNAL MEDICINE

## 2020-02-04 PROCEDURE — 1159F PR MEDICATION LIST DOCUMENTED IN MEDICAL RECORD: ICD-10-PCS | Mod: HCNC,S$GLB,, | Performed by: INTERNAL MEDICINE

## 2020-02-04 PROCEDURE — 1159F MED LIST DOCD IN RCRD: CPT | Mod: HCNC,S$GLB,, | Performed by: INTERNAL MEDICINE

## 2020-02-04 PROCEDURE — 99497 ADVNCD CARE PLAN 30 MIN: CPT | Mod: HCNC,S$GLB,, | Performed by: INTERNAL MEDICINE

## 2020-02-04 PROCEDURE — 1101F PT FALLS ASSESS-DOCD LE1/YR: CPT | Mod: HCNC,CPTII,S$GLB, | Performed by: INTERNAL MEDICINE

## 2020-02-04 PROCEDURE — 99215 PR OFFICE/OUTPT VISIT, EST, LEVL V, 40-54 MIN: ICD-10-PCS | Mod: HCNC,S$GLB,, | Performed by: INTERNAL MEDICINE

## 2020-02-04 PROCEDURE — 99499 UNLISTED E&M SERVICE: CPT | Mod: HCNC,S$GLB,, | Performed by: INTERNAL MEDICINE

## 2020-02-04 PROCEDURE — 99215 OFFICE O/P EST HI 40 MIN: CPT | Mod: HCNC,S$GLB,, | Performed by: INTERNAL MEDICINE

## 2020-02-04 PROCEDURE — 1170F PR FUNCTIONAL STATUS ASSESSED: ICD-10-PCS | Mod: HCNC,S$GLB,, | Performed by: INTERNAL MEDICINE

## 2020-02-04 PROCEDURE — 1170F FXNL STATUS ASSESSED: CPT | Mod: HCNC,S$GLB,, | Performed by: INTERNAL MEDICINE

## 2020-02-04 PROCEDURE — 99497 PR ADVNCD CARE PLAN 30 MIN: ICD-10-PCS | Mod: HCNC,S$GLB,, | Performed by: INTERNAL MEDICINE

## 2020-02-04 PROCEDURE — 1101F PR PT FALLS ASSESS DOC 0-1 FALLS W/OUT INJ PAST YR: ICD-10-PCS | Mod: HCNC,CPTII,S$GLB, | Performed by: INTERNAL MEDICINE

## 2020-02-04 NOTE — ASSESSMENT & PLAN NOTE
"Noted on CT abd 6/2016: "There is atherosclerotic calcification of the aorta and its branches."  · Asymptomatic, will follow  "

## 2020-02-04 NOTE — ASSESSMENT & PLAN NOTE
S/p  shunt 2/2017.  No recent eval with NS or neuro and pt now homebound.    · Will follow for chg in symptoms.  No routine f/u indicated.

## 2020-02-04 NOTE — Clinical Note
Would you be able to folow this pt.  Saw him yesterday and he lives in [facility].  He would not need f/u for 4 to 6 months andis followed by home NP

## 2020-02-04 NOTE — ASSESSMENT & PLAN NOTE
Pt with NPH with shunt and microvascular chges on MRI.   Followed by neuro in the past.  Positive RPR with low titer on labs 1:1 2017.    · Supportive care.

## 2020-02-04 NOTE — PROGRESS NOTES
"Primary Care Provider Appointment    Subjective:      Patient ID: Atif Neves is a 89 y.o. male in home for home visit to establish care.       Chief Complaint: To establish care    HPI:  This is a 89-year-old male followed by Dr. Chrsitian in epic risk score of 16% with no admissions are ED visits within the past year. the patient is homebound.  The patient has normal pressure hydrocephalus with a  shunt placed 02/2017, generalized anxiety disorder, hyperlipidemia, hypertension, BPH status post TURP 08/2016, dementia, iron deficiency anemia, vitamin-D deficiency  01/17/2020 patient seen by home nurse practitioner.  No changes made it visit.  11/22/2019 patient seen by Dr. Christian for follow-up and a fit kit was ordered for iron deficiency anemia.  There are no specialty visits in the chart in the past year.  Pt notes lives in home with daughter and GS.  He is assisted by his son also who lives nearby.    Pt without c/o today. He walks in the home with a walker.   He states "I don't want anything done"  His daughter is the POA and has documentation but was unable to give today.  She competed a LW and LAPOST today.  Copies were placed in media section of the chart.      Advance Care Planning     Living Will  During this visit, I engaged the healthcare power of    in the advance care planning process.  The patient and I reviewed the role for advance directives and their purpose in directing future healthcare if the patient's unable to speak for him/herself.  At this point in time, the patient does have full decision-making capacity.  We discussed different extreme health states that he could experience, and reviewed what kind of medical care he would want in those situations.  The healthcare power of   communicated that if he were comatose and had little chance of a meaningful recovery, he would not want machines/life-sustaining treatments used. In addition to the above preference, other important " end-of-life issues for the patient include DNR and limited care as documented in completed LAPOST and Living will. .  I spent a total of 20 minutes engaging the patient in this advance care planning discussion.                     Patient Active Problem List   Diagnosis    Benign prostatic hyperplasia with urinary obstruction    Mixed hyperlipidemia    Generalized anxiety disorder    Essential hypertension    CME (cystoid macular edema), left    Normal pressure hydrocephalus    Ventriculo-peritoneal shunt status    Epiretinal membrane, left    Atherosclerosis of aorta    Iron deficiency anemia    Vitamin D deficiency    Dementia associated with other underlying disease without behavioral disturbance    Recurrent major depressive disorder, in full remission    Seizure    Dementia associated with normal pressure hydrocephalus        Past Surgical History:   Procedure Laterality Date    TRANSURETHRAL RESECTION OF PROSTATE  08/31/2016       Past Medical History:   Diagnosis Date    JONATAN (acute kidney injury) 06/2016    Bacteremia due to Gram-positive bacteria 3/24/2017    Antibiotics per ID recommendations; vancomycin x 14 days.     BPH (benign prostatic hypertrophy) with urinary obstruction 6/16/2016    Cystoid macular edema, left eye 8/4/2016    Essential hypertension 6/16/2016    Essential hypertension     Generalized anxiety disorder 6/16/2016    Glaucoma     Irregular heartbeat     Macular degeneration     Microcytic anemia 4/10/2017    Mixed hyperlipidemia 6/16/2016    Nonexudative age-related macular degeneration, bilateral, intermediate dry stage 8/4/2016    Normal pressure hydrocephalus     NPH (normal pressure hydrocephalus) 2/6/2017    Shunt placed 2/6/17    Urinary retention 06/2016    Ventriculo-peritoneal shunt status 2/6/2017       Social History     Socioeconomic History    Marital status:      Spouse name: Not on file    Number of children: 7    Years of  education: Not on file    Highest education level: Not on file   Occupational History    Occupation:    Social Needs    Financial resource strain: Not hard at all    Food insecurity:     Worry: Never true     Inability: Never true    Transportation needs:     Medical: No     Non-medical: No   Tobacco Use    Smoking status: Former Smoker     Packs/day: 1.00     Years: 18.00     Pack years: 18.00     Last attempt to quit: 1950     Years since quittin.1    Smokeless tobacco: Never Used   Substance and Sexual Activity    Alcohol use: No     Frequency: Never     Binge frequency: Never    Drug use: No    Sexual activity: Not Currently   Lifestyle    Physical activity:     Days per week: 0 days     Minutes per session: 0 min    Stress: To some extent   Relationships    Social connections:     Talks on phone: Once a week     Gets together: Once a week     Attends [employer] service: Not on file     Active member of club or organization: Yes     Attends meetings of clubs or organizations: Never     Relationship status:    Other Topics Concern    Not on file   Social History Narrative    Not on file       Review of Systems    No flowsheet data found.     Checklist of Daily Activities:  Bathing: Needs Help  Dressing: Needs Help  Grooming: Needs Help  Oral Care: Does not do  Toileting: Independent  Transferring: Independent  Walking: Independent  Climbing Stairs: Needs Help  Eating: Independent  Shopping: Dependent  Cooking: Dependent  Managing Medications: Dependent  Using the Phone: Dependent  Housework: Dependent  Laundry: Dependent  Driving: Dependent  Managing Finances: Dependent     Objective:   /76   Pulse 71   SpO2 98%     Physical Exam   Constitutional: He appears well-developed and well-nourished.   Pleasant/confused   HENT:   Head: Normocephalic and atraumatic.   Cardiovascular: Normal rate, regular rhythm and normal heart sounds.   Pulmonary/Chest: Effort normal and breath  sounds normal.   Musculoskeletal: He exhibits no edema.   Psychiatric:   Confused.  Oriented to person/city and state//president.  Unable to give address/date/season.             Lab Results   Component Value Date    WBC 11.52 2019    HGB 11.5 (L) 2019    HCT 39.0 (L) 2019     2019    CHOL 182 2019    TRIG 86 2019    HDL 58 2019    ALT 17 2019    AST 21 2019     2019    K 4.3 2019     2019    CREATININE 1.1 2019    BUN 12 2019    CO2 28 2019    TSH 0.858 2019    INR 0.9 2017       Current Outpatient Medications on File Prior to Visit   Medication Sig Dispense Refill    atorvastatin (LIPITOR) 20 MG tablet TAKE 1 TABLET(20 MG) BY MOUTH EVERY DAY 90 tablet 1    citalopram (CELEXA) 10 MG tablet TAKE 1 TABLET(10 MG) BY MOUTH EVERY DAY 90 tablet 1    ferrous sulfate 324 mg (65 mg iron) TbEC Take 1 tablet (324 mg total) by mouth once daily. 30 tablet 3    levETIRAcetam (KEPPRA) 1000 MG tablet TAKE 1 TABLET(1000 MG) BY MOUTH TWICE DAILY 60 tablet 3    thiamine (VITAMIN B-1) 100 MG tablet Take 100 mg by mouth once daily.      [DISCONTINUED] ergocalciferol (ERGOCALCIFEROL) 50,000 unit Cap TAKE ONE CAPSULE BY MOUTH ONCE A MONTH 6 capsule 0    [DISCONTINUED] tamsulosin (FLOMAX) 0.4 mg Cap Take 0.4 mg by mouth once daily.       No current facility-administered medications on file prior to visit.          Assessment:   89 y.o. male with multiple co-morbid illnesses here for continued follow up of medical problems.      Plan:     Problem List Items Addressed This Visit        Neuro    Normal pressure hydrocephalus     S/p  shunt 2017.  No recent eval with NS or neuro and pt now homebound.    · Will follow for chg in symptoms.  No routine f/u indicated.           Ventriculo-peritoneal shunt status     Placed 2017 for NPH.  No recent imaging.    · No routine f/u indicated.           Dementia  "associated with other underlying disease without behavioral disturbance     Pt with NPH with shunt and microvascular chges on MRI.   Followed by neuro in the past.  Positive RPR with low titer on labs 1:1 2017.    · Supportive care.         Seizure     Since childhood on long-term antiepileptic medications.   · Continue daily keppra.              Psychiatric    Recurrent major depressive disorder, in full remission     Pt on long term celexa.  Pt with dementia.    · Attempted PHQ9 today.              Cardiac/Vascular    Mixed hyperlipidemia     Pt on atorvo with microvascular chges on MRI.  · currently tolerating statin but will d/c with any issues with age/disability.           Essential hypertension     BP excellent.  No meds currently           Atherosclerosis of aorta     Noted on CT abd 6/2016: "There is atherosclerotic calcification of the aorta and its branches."  · Asymptomatic, will follow            Oncology    Iron deficiency anemia     Noted on w/u and improved with oral iron.    · No w/u indicated with age and comobidity.              Endocrine    Vitamin D deficiency     Mild vitamin D def on recent labs  · Daily vitamin D 3 2000 a day            Other    Dementia associated with normal pressure hydrocephalus     S/p  shunt 2/2017.  No recent eval with NS or neuro and pt now homebound.    · Will follow for chg in symptoms.  No routine f/u indicated.               Health Maintenance       Date Due Completion Date    Shingles Vaccine (1 of 2) 09/28/1978 ---    Pneumococcal Vaccine (65+ Low/Medium Risk) (2 of 2 - PPSV23) 04/10/2018 4/10/2017    Influenza Vaccine (1) 09/01/2019 8/27/2015    Lipid Panel 11/22/2024 11/22/2019    TETANUS VACCINE 04/10/2027 4/10/2017        Medications Reconciliation:   I have reconciled the patient's home medications with the patient/family. I have updated all changes.  Refer to After-Visit Medication List.      Medication List           Accurate as of February 4, 2020  " 6:02 PM. If you have any questions, ask your nurse or doctor.               CONTINUE taking these medications    atorvastatin 20 MG tablet  Commonly known as:  LIPITOR  TAKE 1 TABLET(20 MG) BY MOUTH EVERY DAY     citalopram 10 MG tablet  Commonly known as:  CELEXA  TAKE 1 TABLET(10 MG) BY MOUTH EVERY DAY     ferrous sulfate 324 mg (65 mg iron) Tbec  Take 1 tablet (324 mg total) by mouth once daily.     levETIRAcetam 1000 MG tablet  Commonly known as:  KEPPRA  TAKE 1 TABLET(1000 MG) BY MOUTH TWICE DAILY     Vitamin B-1 100 MG tablet  Generic drug:  thiamine        STOP taking these medications    ergocalciferol 50,000 unit Cap  Commonly known as:  ERGOCALCIFEROL  Stopped by:  Andreea Funes MD     tamsulosin 0.4 mg Cap  Commonly known as:  FLOMAX  Stopped by:  Andreea Funes MD             Next/future visit:  eval mentation    Follow up in about 3 months (around 5/4/2020). Total face-to-face time was 75 min, >50% of this was spent on counseling and coordination of care and of this time, 20 minutes spent on ACP. The following issues were discussed: living will and LAPOST, ADL, reviewed medication and problem list.       Andreea Funes MD  Internal Medicine  [facility]  [phone]

## 2020-02-04 NOTE — ASSESSMENT & PLAN NOTE
Pt on atorvo with microvascular chges on MRI.  · currently tolerating statin but will d/c with any issues with age/disability.

## 2020-02-05 ENCOUNTER — TELEPHONE (OUTPATIENT)
Dept: PRIMARY CARE CLINIC | Facility: CLINIC | Age: 85
End: 2020-02-05

## 2020-02-05 NOTE — TELEPHONE ENCOUNTER
----- Message from Andreea Funes MD sent at 2/5/2020 11:40 AM CST -----  Would you be able to folow this pt.  Saw him yesterday and he lives in [facility].  He would not need f/u for 4 to 6 months andis followed by home NP

## 2020-02-11 ENCOUNTER — TELEPHONE (OUTPATIENT)
Dept: PRIMARY CARE CLINIC | Facility: CLINIC | Age: 85
End: 2020-02-11

## 2020-02-11 DIAGNOSIS — D50.9 MICROCYTIC ANEMIA: ICD-10-CM

## 2020-02-11 DIAGNOSIS — D50.9 IRON DEFICIENCY ANEMIA, UNSPECIFIED IRON DEFICIENCY ANEMIA TYPE: ICD-10-CM

## 2020-02-11 NOTE — PROGRESS NOTES
Refill Routing Note     Medication(s) are not appropriate for processing by [facility]:    Medication Outside of Protocol    Appointments  past 12m or future 3m with PCP    Date Provider   Last Visit   11/22/2019 Atif Christian MD   Next Visit   Visit date not found Atif Christian MD           Automatic Epic Protocol Generated Data:    Requested Prescriptions   Pending Prescriptions Disp Refills    ferrous sulfate 324 mg (65 mg iron) TbEC [Pharmacy Med Name: FERROUS SULFATE 324MG ECTABS RED] 30 tablet 3     Sig: TAKE 1 TABLET(324 MG) BY MOUTH EVERY DAY       There is no refill protocol information for this order           Note composed:8:20 AM 02/11/2020

## 2020-02-11 NOTE — TELEPHONE ENCOUNTER
Please see if patient is still taking the iron supplement, does he want it refilled to [facility].    Thanks,  KJ

## 2020-02-13 DIAGNOSIS — D50.9 MICROCYTIC ANEMIA: ICD-10-CM

## 2020-02-13 DIAGNOSIS — D50.9 IRON DEFICIENCY ANEMIA, UNSPECIFIED IRON DEFICIENCY ANEMIA TYPE: ICD-10-CM

## 2020-02-13 RX ORDER — FERROUS SULFATE 324(65)MG
325 TABLET, DELAYED RELEASE (ENTERIC COATED) ORAL DAILY
Qty: 30 TABLET | Refills: 11 | OUTPATIENT
Start: 2020-02-13

## 2020-02-13 NOTE — TELEPHONE ENCOUNTER
Refill Routing Note     Medication(s) are not appropriate for processing by [facility]:    Medication Outside of Protocol    Appointments  past 12m or future 3m with PCP    Date Provider   Last Visit   11/22/2019 Atif Christian MD   Next Visit   Visit date not found Atif Christian MD           Automatic Epic Protocol Generated Data:    Requested Prescriptions   Pending Prescriptions Disp Refills    ferrous sulfate 324 mg (65 mg iron) TbEC 30 tablet 11     Sig: Take 1 tablet (324 mg total) by mouth once daily.       There is no refill protocol information for this order           Note composed:1:24 PM 02/13/2020

## 2020-02-21 RX ORDER — FERROUS SULFATE 324(65)MG
TABLET, DELAYED RELEASE (ENTERIC COATED) ORAL
Qty: 30 TABLET | Refills: 3 | OUTPATIENT
Start: 2020-02-21

## 2020-02-21 NOTE — TELEPHONE ENCOUNTER
Call Documentation    Person Called: Patient Call Time: 1:57pm   Spoke with: Mr. Neves 02/21/2020        Reason for call: Called to confirm if patient is still taking iron .      Patient stated: No answer/ left message      Clarification details and Actions Taken: defer to PCP        Current Medication Requested:   Requested Prescriptions     Refused Prescriptions Disp Refills    ferrous sulfate 324 mg (65 mg iron) TbEC [Pharmacy Med Name: FERROUS SULFATE 324MG ECTABS RED] 30 tablet 3     Sig: TAKE 1 TABLET(324 MG) BY MOUTH EVERY DAY     Refused By: ISSAC SANTA     Reason for Refusal: Request already responded to by other means (e.g. phone or fax)        Note composed: 02/21/2020 3:03 PM

## 2020-03-03 ENCOUNTER — TELEPHONE (OUTPATIENT)
Dept: PRIMARY CARE CLINIC | Facility: CLINIC | Age: 85
End: 2020-03-03

## 2020-03-03 NOTE — TELEPHONE ENCOUNTER
----- Message from Ashley Richards MD sent at 3/3/2020  9:29 AM CST -----  Patient is on both Dr Funes's and my schedules. I think he should just be on mine? Can you discuss and cancel the [facility] appt if appropriate.    Thanks,  KJ  ----- Message -----  From: Nivia Guerrier MA  Sent: 11/18/2019   3:58 PM CST  To: Ashley Richards MD, #    Dr. Richards,  Can you assist with doing home visits with this pt. Please advise.  ----- Message -----  From: Atif Christian MD  Sent: 11/16/2019   4:57 PM CST  To: Gino FALCON Staff    Please contact [facility] and see if we have anyone that does home visits. If yes, how can this patient be seen.

## 2020-03-23 RX ORDER — LEVETIRACETAM 1000 MG/1
TABLET ORAL
Qty: 60 TABLET | Refills: 3 | Status: SHIPPED | OUTPATIENT
Start: 2020-03-23 | End: 2020-04-08 | Stop reason: SDUPTHER

## 2020-03-28 DIAGNOSIS — D50.9 MICROCYTIC ANEMIA: ICD-10-CM

## 2020-03-28 DIAGNOSIS — D50.9 IRON DEFICIENCY ANEMIA, UNSPECIFIED IRON DEFICIENCY ANEMIA TYPE: ICD-10-CM

## 2020-03-28 NOTE — TELEPHONE ENCOUNTER
----- Message from Sharmin Wesley sent at 3/27/2020  4:24 PM CDT -----  Talked to family member of patient on phone. He lives with his daughter, Colleen. Would like a call back next Monday 4:30pm to set up Telemed with margarita.     Pill packs/medications: Pt needs MEDICATION REFILL OF FERROUS SULFATE 324mg (65mg iron).   Upcoming appointments: Pt would like to SCHEDULE VIRTUAL VISIT  Adv Dir status: Advance Directive on file  Talisha status: Pt has active [facility] account  Telemedicine: Patient unable to setup telemedicine and Patient caregiver/family able to operate. WILL SET UP WITH MARGARITA NEXT WEEK.    Pt is coping well with isolation precautions.  Pt has has access to food and housing; pt is homebound.     -------------------------------------------------------------------------------------------------------------------------  Assessed for symptoms of covid.  Discussed symptoms to look out for and to call clinic FIRST with any concerns or if pt develops symptoms: [facility] [phone] or [phone], or COVID HOTLINE [phone].    Advised to NOT go to ED for testing.  Counseled on staying at home, washing hands frequently, avoiding touching face and covering cough or sneeze.     Resources for patients: https://ready.[place].gov/incident/coronavirus/resources/    Talisha help: [phone]

## 2020-03-30 RX ORDER — FERROUS SULFATE 324(65)MG
325 TABLET, DELAYED RELEASE (ENTERIC COATED) ORAL DAILY
Qty: 90 TABLET | Refills: 3 | Status: SHIPPED | OUTPATIENT
Start: 2020-03-30 | End: 2020-04-08 | Stop reason: SDUPTHER

## 2020-04-08 ENCOUNTER — OFFICE VISIT (OUTPATIENT)
Dept: PRIMARY CARE CLINIC | Facility: CLINIC | Age: 85
End: 2020-04-08
Payer: MEDICARE

## 2020-04-08 ENCOUNTER — TELEPHONE (OUTPATIENT)
Dept: PRIMARY CARE CLINIC | Facility: CLINIC | Age: 85
End: 2020-04-08

## 2020-04-08 DIAGNOSIS — G91.2 NORMAL PRESSURE HYDROCEPHALUS: ICD-10-CM

## 2020-04-08 DIAGNOSIS — D50.9 MICROCYTIC ANEMIA: ICD-10-CM

## 2020-04-08 DIAGNOSIS — Z66 DNR (DO NOT RESUSCITATE): ICD-10-CM

## 2020-04-08 DIAGNOSIS — F33.42 RECURRENT MAJOR DEPRESSIVE DISORDER, IN FULL REMISSION: ICD-10-CM

## 2020-04-08 DIAGNOSIS — I70.0 ATHEROSCLEROSIS OF AORTA: ICD-10-CM

## 2020-04-08 DIAGNOSIS — F41.1 GENERALIZED ANXIETY DISORDER: ICD-10-CM

## 2020-04-08 DIAGNOSIS — E55.9 VITAMIN D DEFICIENCY: ICD-10-CM

## 2020-04-08 DIAGNOSIS — F02.80 DEMENTIA ASSOCIATED WITH OTHER UNDERLYING DISEASE WITHOUT BEHAVIORAL DISTURBANCE: Primary | ICD-10-CM

## 2020-04-08 DIAGNOSIS — D50.9 IRON DEFICIENCY ANEMIA, UNSPECIFIED IRON DEFICIENCY ANEMIA TYPE: ICD-10-CM

## 2020-04-08 DIAGNOSIS — R56.9 SEIZURE: ICD-10-CM

## 2020-04-08 PROCEDURE — 1101F PT FALLS ASSESS-DOCD LE1/YR: CPT | Mod: HCNC,CPTII,95, | Performed by: INTERNAL MEDICINE

## 2020-04-08 PROCEDURE — 99215 OFFICE O/P EST HI 40 MIN: CPT | Mod: HCNC,95,, | Performed by: INTERNAL MEDICINE

## 2020-04-08 PROCEDURE — 1101F PR PT FALLS ASSESS DOC 0-1 FALLS W/OUT INJ PAST YR: ICD-10-PCS | Mod: HCNC,CPTII,95, | Performed by: INTERNAL MEDICINE

## 2020-04-08 PROCEDURE — 1159F MED LIST DOCD IN RCRD: CPT | Mod: HCNC,95,, | Performed by: INTERNAL MEDICINE

## 2020-04-08 PROCEDURE — 1159F PR MEDICATION LIST DOCUMENTED IN MEDICAL RECORD: ICD-10-PCS | Mod: HCNC,95,, | Performed by: INTERNAL MEDICINE

## 2020-04-08 PROCEDURE — 99215 PR OFFICE/OUTPT VISIT, EST, LEVL V, 40-54 MIN: ICD-10-PCS | Mod: HCNC,95,, | Performed by: INTERNAL MEDICINE

## 2020-04-08 RX ORDER — FERROUS SULFATE 324(65)MG
325 TABLET, DELAYED RELEASE (ENTERIC COATED) ORAL DAILY
Qty: 90 TABLET | Refills: 3 | Status: SHIPPED | OUTPATIENT
Start: 2020-04-08 | End: 2020-07-22 | Stop reason: SDUPTHER

## 2020-04-08 RX ORDER — LANOLIN ALCOHOL/MO/W.PET/CERES
100 CREAM (GRAM) TOPICAL DAILY
Qty: 90 TABLET | Refills: 3 | Status: SHIPPED | OUTPATIENT
Start: 2020-04-08 | End: 2021-06-22 | Stop reason: SDUPTHER

## 2020-04-08 RX ORDER — ATORVASTATIN CALCIUM 20 MG/1
TABLET, FILM COATED ORAL
Qty: 90 TABLET | Refills: 3 | Status: SHIPPED | OUTPATIENT
Start: 2020-04-08 | End: 2020-09-09 | Stop reason: SDUPTHER

## 2020-04-08 RX ORDER — CITALOPRAM 10 MG/1
TABLET ORAL
Qty: 90 TABLET | Refills: 3 | Status: SHIPPED | OUTPATIENT
Start: 2020-04-08 | End: 2020-09-09 | Stop reason: SDUPTHER

## 2020-04-08 RX ORDER — LEVETIRACETAM 1000 MG/1
TABLET ORAL
Qty: 180 TABLET | Refills: 3 | Status: SHIPPED | OUTPATIENT
Start: 2020-04-08 | End: 2020-09-01 | Stop reason: SDUPTHER

## 2020-04-08 RX ORDER — VIT C/E/ZN/COPPR/LUTEIN/ZEAXAN 250MG-90MG
1000 CAPSULE ORAL DAILY
Qty: 90 CAPSULE | Refills: 3 | Status: SHIPPED | OUTPATIENT
Start: 2020-04-08 | End: 2021-06-22 | Stop reason: SDUPTHER

## 2020-04-08 NOTE — TELEPHONE ENCOUNTER
Patient is homebound. Will need his follow-up on [facility]    Thanks,  KJ

## 2020-04-08 NOTE — ASSESSMENT & PLAN NOTE
DNR/DNI per Kim, living will, ACP conversation on 4/8/20. Daughter Colleen Lopez is PoA.  · Advised to call office if any concern for COVID  · Aggressive care will likely not change outcome for this patient  · Family willing to enroll in hospice

## 2020-04-08 NOTE — PROGRESS NOTES
Primary Care Provider Telemedicine Appointment      The patient location is:  Patient Home   The chief complaint leading to consultation is: dementia, NPH, HTN, change of provider  Total time spent with patient: 60m    Visit type: Virtual visit with synchronous audio only and video  Each patient to whom he or she provides medical services by telemedicine is:  (1) informed of the relationship between the physician and patient and the respective role of any other health care provider with respect to management of the patient; and (2) notified that he or she may decline to receive medical services by telemedicine and may withdraw from such care at any time.      Subjective:      Patient ID: Atif Neves is a 89 y.o. male with  dementia, NPH, HTN, change of provider    Chief Complaint: Dementia and Seizures    New patient to this clinic, was previously seen by Dr Funes. Was transferred to Dr Richards's practice because of geograhic are of his home. He is homebound, and requires home visits.    Has not had a seizure in recent years. Has a  shunt that is functional. Has no complications with  shunt. Seen by Neuro in 2017, Neurosurgery in 2018 with Dr Sam. Had CT at that time.    Was started on iron in 8/2019 for severe anemia. Hg improving on that therapy.    Was given COVID precautions to stay at home. Family is following.     Advance Care Planning     Family continues to request DNR/DNI status, no hospital admits. Daughter Colleen Lopez is PoA.    Past Surgical History:   Procedure Laterality Date    TRANSURETHRAL RESECTION OF PROSTATE  08/31/2016       Past Medical History:   Diagnosis Date    JONATAN (acute kidney injury) 06/2016    Bacteremia due to Gram-positive bacteria 3/24/2017    Antibiotics per ID recommendations; vancomycin x 14 days.     BPH (benign prostatic hypertrophy) with urinary obstruction 6/16/2016    Cystoid macular edema, left eye 8/4/2016    Essential hypertension 6/16/2016    Essential  hypertension     Generalized anxiety disorder 6/16/2016    Glaucoma     Irregular heartbeat     Macular degeneration     Microcytic anemia 4/10/2017    Mixed hyperlipidemia 6/16/2016    Nonexudative age-related macular degeneration, bilateral, intermediate dry stage 8/4/2016    Normal pressure hydrocephalus     NPH (normal pressure hydrocephalus) 2/6/2017    Shunt placed 2/6/17    Urinary retention 06/2016    Ventriculo-peritoneal shunt status 2/6/2017       Review of Systems   Unable to perform ROS: Age       Objective:   There were no vitals taken for this visit.    Physical Exam  NOT PERFORMED    Lab Results   Component Value Date    WBC 11.52 11/22/2019    HGB 11.5 (L) 11/22/2019    HCT 39.0 (L) 11/22/2019     11/22/2019    CHOL 182 11/22/2019    TRIG 86 11/22/2019    HDL 58 11/22/2019    ALT 17 11/22/2019    AST 21 11/22/2019     11/22/2019    K 4.3 11/22/2019     11/22/2019    CREATININE 1.1 11/22/2019    BUN 12 11/22/2019    CO2 28 11/22/2019    TSH 0.858 07/31/2019    INR 0.9 02/06/2017         Assessment:   89 y.o. male with multiple co-morbid illnesses here to continue work-up of chronic issues notably dementia, NPH, HTN, change of provider.     Plan:     Problem List Items Addressed This Visit        Neuro    Normal pressure hydrocephalus     S/p  shunt 2/2017.  No recent eval with NS or neuro and pt now homebound.    · Will follow for chg in symptoms.  No routine f/u indicated.           Dementia associated with other underlying disease without behavioral disturbance - Primary     S/p  shunt 2/2017.  No recent eval with NS or neuro and pt now homebound.    · Will follow for chg in symptoms.  No routine f/u indicated.         Seizure     Since childhood on long-term antiepileptic medications.   · Continue keppra BID         Relevant Medications    levETIRAcetam (KEPPRA) 1000 MG tablet    thiamine (VITAMIN B-1) 100 MG tablet       Psychiatric    Generalized anxiety  "disorder    Relevant Medications    citalopram (CELEXA) 10 MG tablet    Recurrent major depressive disorder, in full remission     Controlled on celexa  · Continue celexa            Cardiac/Vascular    Atherosclerosis of aorta     Noted on CT abd 6/2016: "There is atherosclerotic calcification of the aorta and its branches."  · Asymptomatic, will follow         Relevant Medications    atorvastatin (LIPITOR) 20 MG tablet       Oncology    Iron deficiency anemia     Unclear etiology but is on chronic AE therapy  · Continue iron supplements         Relevant Medications    ferrous sulfate 324 mg (65 mg iron) TbEC       Endocrine    Vitamin D deficiency     Low Vit D in 2019  · Start supplementation         Relevant Medications    cholecalciferol, vitamin D3, (VITAMIN D3) 25 mcg (1,000 unit) capsule       Palliative Care    DNR (do not resuscitate)     DNR/DNI per [place], living will, ACP conversation on 4/8/20. Daughter Colleen Lopez is PoA.  · Advised to call office if any concern for COVID  · Aggressive care will likely not change outcome for this patient  · Family willing to enroll in hospice           Other Visit Diagnoses     Microcytic anemia        Relevant Medications    ferrous sulfate 324 mg (65 mg iron) [facility]          Health Maintenance       Date Due Completion Date    Shingles Vaccine (1 of 2) 09/28/1978 ---    Pneumococcal Vaccine (65+ Low/Medium Risk) (2 of 2 - PPSV23) 04/10/2018 4/10/2017    Influenza Vaccine (1) 09/01/2019 8/27/2015    Lipid Panel 11/22/2024 11/22/2019    TETANUS VACCINE 04/10/2027 4/10/2017          Follow up in about 6 months (around 10/8/2020). One hour spent with this patient today, more than half of that in counseling on the following issues:  dementia, NPH, HTN, change of provider    Ashley Richards MD/MPH  Internal Medicine  [facility]  [phone]  "

## 2020-05-26 ENCOUNTER — PES CALL (OUTPATIENT)
Dept: ADMINISTRATIVE | Facility: CLINIC | Age: 85
End: 2020-05-26

## 2020-06-03 ENCOUNTER — CARE AT HOME (OUTPATIENT)
Dept: HOME HEALTH SERVICES | Facility: CLINIC | Age: 85
End: 2020-06-03
Payer: MEDICARE

## 2020-06-03 DIAGNOSIS — E78.2 MIXED HYPERLIPIDEMIA: ICD-10-CM

## 2020-06-03 DIAGNOSIS — I10 ESSENTIAL HYPERTENSION: ICD-10-CM

## 2020-06-03 DIAGNOSIS — G91.2 NORMAL PRESSURE HYDROCEPHALUS: ICD-10-CM

## 2020-06-03 DIAGNOSIS — F41.1 GENERALIZED ANXIETY DISORDER: ICD-10-CM

## 2020-06-03 DIAGNOSIS — F02.80 DEMENTIA ASSOCIATED WITH OTHER UNDERLYING DISEASE WITHOUT BEHAVIORAL DISTURBANCE: ICD-10-CM

## 2020-06-03 DIAGNOSIS — Z66 DNR (DO NOT RESUSCITATE): ICD-10-CM

## 2020-06-03 DIAGNOSIS — E55.9 VITAMIN D DEFICIENCY: ICD-10-CM

## 2020-06-03 DIAGNOSIS — G91.2 NPH (NORMAL PRESSURE HYDROCEPHALUS): Primary | ICD-10-CM

## 2020-06-03 PROCEDURE — 99442 PR PHYSICIAN TELEPHONE EVALUATION 11-20 MIN: ICD-10-PCS | Mod: 95,,, | Performed by: NURSE PRACTITIONER

## 2020-06-03 PROCEDURE — 99442 PR PHYSICIAN TELEPHONE EVALUATION 11-20 MIN: CPT | Mod: 95,,, | Performed by: NURSE PRACTITIONER

## 2020-06-03 NOTE — PROGRESS NOTES
Established Patient - Audio Only Telehealth Visit     The patient location is: home  The chief complaint leading to consultation is: Routine follow up  Visit type: Virtual visit with audio only (telephone)  Total time spent with patient: 18 min       The reason for the audio only service rather than synchronous audio and video virtual visit was related to technical difficulties or patient preference/necessity.     Each patient to whom I provide medical services by telemedicine is:  (1) informed of the relationship between the physician and patient and the respective role of any other health care provider with respect to management of the patient; and (2) notified that they may decline to receive medical services by telemedicine and may withdraw from such care at any time. Patient verbally consented to receive this service via voice-only telephone call.       HPI:  Patient ID: Atif Neves is a 89 y.o. male with  dementia, NPH, HTN, change of provider     Chief Complaint: Dementia and Seizures     He is homebound, and requires home visits. He is covered by [facility] and [facility]   His daughter, Colleen is on the phone for the pt. She reports her father has been stable, no changes in his health, appetite is good, bowels moving regularly, taking all medications as prescribed. He is not having any issues at this time. She preferred a virtual visit due to COVID since father is not having any problems at this time.   Has not had a seizure in recent years. Has a  shunt that is functional. Has no complications with  shunt. Seen by Neuro in 2017, Neurosurgery in 2018 with Dr Sam. Had CT at that time.     Was started on iron in 8/2019 for severe anemia. Hg improving on that therapy.     Was given COVID precautions to stay at home. Family is following.      Advance Care Planning      Family continues to request DNR/DNI status, no hospital admits. Daughter Colleen Lopez is PoA.     Past Medical History:    Diagnosis Date    JONATAN (acute kidney injury) 06/2016    Bacteremia due to Gram-positive bacteria 3/24/2017     Antibiotics per ID recommendations; vancomycin x 14 days.     BPH (benign prostatic hypertrophy) with urinary obstruction 6/16/2016    Cystoid macular edema, left eye 8/4/2016    Essential hypertension 6/16/2016    Essential hypertension      Generalized anxiety disorder 6/16/2016    Glaucoma      Irregular heartbeat      Macular degeneration      Microcytic anemia 4/10/2017    Mixed hyperlipidemia 6/16/2016    Nonexudative age-related macular degeneration, bilateral, intermediate dry stage 8/4/2016    Normal pressure hydrocephalus      NPH (normal pressure hydrocephalus) 2/6/2017     Shunt placed 2/6/17    Urinary retention 06/2016    Ventriculo-peritoneal shunt status 2/6/2017     Review of Systems   Unable to perform ROS: Age     There were no vitals taken for this visit.     Physical Exam  NOT PERFORMED due to audio visit        Assessment and plan:       Problem List Items Addressed This Visit                 Neuro      Normal pressure hydrocephalus        S/p  shunt 2/2017.  No recent eval with NS or neuro and pt now homebound.    · Will follow for chg in symptoms.  No routine f/u indicated.              Dementia associated with other underlying disease without behavioral disturbance - Primary        S/p  shunt 2/2017.  No recent eval with NS or neuro and pt now homebound.    · Will follow for chg in symptoms.  No routine f/u indicated.            Seizure        Since childhood on long-term antiepileptic medications.   · Continue keppra BID            Relevant Medications      levETIRAcetam (KEPPRA) 1000 MG tablet      thiamine (VITAMIN B-1) 100 MG tablet            Psychiatric      Generalized anxiety disorder      Relevant Medications      citalopram (CELEXA) 10 MG tablet      Recurrent major depressive disorder, in full remission        Controlled on celexa  · Continue  "celexa                  Cardiac/Vascular      Atherosclerosis of aorta        Noted on CT abd 6/2016: "There is atherosclerotic calcification of the aorta and its branches."  · Asymptomatic, will follow            Relevant Medications      atorvastatin (LIPITOR) 20 MG tablet            Oncology      Iron deficiency anemia        Unclear etiology but is on chronic AE therapy  · Continue iron supplements            Relevant Medications      ferrous sulfate 324 mg (65 mg iron) TbEC            Endocrine      Vitamin D deficiency        Low Vit D in 2019  · Start supplementation            Relevant Medications      cholecalciferol, vitamin D3, (VITAMIN D3) 25 mcg (1,000 unit) capsule            Palliative Care      DNR (do not resuscitate)        DNR/DNI per [facility], living will, ACP conversation on 4/8/20. Daughter Colleen Lopez is PoA.  · Advised to call office if any concern for COVID  ? Aggressive care will likely not change outcome for this patient  ? Family willing to enroll in hospice           Will follow up with patient/daughter in one month                   This service was not originating from a related E/M service provided within the previous 7 days nor will  to an E/M service or procedure within the next 24 hours or my soonest available appointment.  Prevailing standard of care was able to be met in this audio-only visit.      "

## 2020-07-07 ENCOUNTER — PES CALL (OUTPATIENT)
Dept: ADMINISTRATIVE | Facility: CLINIC | Age: 85
End: 2020-07-07

## 2020-07-22 ENCOUNTER — OFFICE VISIT (OUTPATIENT)
Dept: PRIMARY CARE CLINIC | Facility: CLINIC | Age: 85
End: 2020-07-22
Payer: MEDICARE

## 2020-07-22 DIAGNOSIS — N40.1 BENIGN PROSTATIC HYPERPLASIA WITH URINARY OBSTRUCTION: ICD-10-CM

## 2020-07-22 DIAGNOSIS — D50.9 IRON DEFICIENCY ANEMIA, UNSPECIFIED IRON DEFICIENCY ANEMIA TYPE: ICD-10-CM

## 2020-07-22 DIAGNOSIS — D50.9 MICROCYTIC ANEMIA: ICD-10-CM

## 2020-07-22 DIAGNOSIS — N13.8 BENIGN PROSTATIC HYPERPLASIA WITH URINARY OBSTRUCTION: ICD-10-CM

## 2020-07-22 DIAGNOSIS — G91.2 NORMAL PRESSURE HYDROCEPHALUS: ICD-10-CM

## 2020-07-22 DIAGNOSIS — R53.2 FUNCTIONAL QUADRIPLEGIA: ICD-10-CM

## 2020-07-22 PROCEDURE — 99215 OFFICE O/P EST HI 40 MIN: CPT | Mod: HCNC,95,, | Performed by: INTERNAL MEDICINE

## 2020-07-22 PROCEDURE — 1159F MED LIST DOCD IN RCRD: CPT | Mod: HCNC,95,, | Performed by: INTERNAL MEDICINE

## 2020-07-22 PROCEDURE — 1101F PT FALLS ASSESS-DOCD LE1/YR: CPT | Mod: HCNC,CPTII,95, | Performed by: INTERNAL MEDICINE

## 2020-07-22 PROCEDURE — 1159F PR MEDICATION LIST DOCUMENTED IN MEDICAL RECORD: ICD-10-PCS | Mod: HCNC,95,, | Performed by: INTERNAL MEDICINE

## 2020-07-22 PROCEDURE — 1101F PR PT FALLS ASSESS DOC 0-1 FALLS W/OUT INJ PAST YR: ICD-10-PCS | Mod: HCNC,CPTII,95, | Performed by: INTERNAL MEDICINE

## 2020-07-22 PROCEDURE — 99215 PR OFFICE/OUTPT VISIT, EST, LEVL V, 40-54 MIN: ICD-10-PCS | Mod: HCNC,95,, | Performed by: INTERNAL MEDICINE

## 2020-07-22 RX ORDER — FERROUS SULFATE 324(65)MG
325 TABLET, DELAYED RELEASE (ENTERIC COATED) ORAL DAILY
Qty: 90 TABLET | Refills: 3 | Status: SHIPPED | OUTPATIENT
Start: 2020-07-22 | End: 2020-09-09 | Stop reason: SDUPTHER

## 2020-07-22 NOTE — PROGRESS NOTES
[facility] Telemedicine Appointment      The patient location is:  Patient Home   The chief complaint leading to consultation is: routine care  Total time spent with patient: 60min    Visit type: Virtual visit with synchronous audio only and video  Each patient to whom he or she provides medical services by telemedicine is:  (1) informed of the relationship between the physician and patient and the respective role of any other health care provider with respect to management of the patient; and (2) notified that he or she may decline to receive medical services by telemedicine and may withdraw from such care at any time.      Subjective:      Patient ID: Atif Neves is a 89 y.o. male with spinal stenosis,  shunt, polyuria    Chief Complaint: Dementia    Patient is receiving iron tablets, but he ran out a few days ago. Has not had labs since 11/2019, but blood count was improved on those labs.    His  shunt is stable, is compliant with anti-seizure meds.     His family are caretakers, and he has sufficient support in the home.    Advance Care Planning     Patient is debilitated, homebound and wheelchair bound. Does not want to go to the hospital for any conditions and is DNR with comfort care as focus. He reiterated that to PCP on 7/23/20.            Past Surgical History:   Procedure Laterality Date    TRANSURETHRAL RESECTION OF PROSTATE  08/31/2016       Past Medical History:   Diagnosis Date    JONATAN (acute kidney injury) 06/2016    Bacteremia due to Gram-positive bacteria 3/24/2017    Antibiotics per ID recommendations; vancomycin x 14 days.     BPH (benign prostatic hypertrophy) with urinary obstruction 6/16/2016    Cystoid macular edema, left eye 8/4/2016    Essential hypertension 6/16/2016    Essential hypertension     Generalized anxiety disorder 6/16/2016    Glaucoma     Irregular heartbeat     Macular degeneration     Microcytic anemia 4/10/2017    Mixed hyperlipidemia  6/16/2016    Nonexudative age-related macular degeneration, bilateral, intermediate dry stage 8/4/2016    Normal pressure hydrocephalus     NPH (normal pressure hydrocephalus) 2/6/2017    Shunt placed 2/6/17    Urinary retention 06/2016    Ventriculo-peritoneal shunt status 2/6/2017       Review of Systems   Constitutional: Negative for activity change and appetite change.   Genitourinary: Positive for frequency.       Objective:   There were no vitals taken for this visit.    Physical Exam  Constitutional:       Appearance: Normal appearance.      Comments: Seated in chair   Neurological:      Mental Status: He is alert.   Psychiatric:         Mood and Affect: Mood normal.         Thought Content: Thought content normal.         Lab Results   Component Value Date    WBC 11.52 11/22/2019    HGB 11.5 (L) 11/22/2019    HCT 39.0 (L) 11/22/2019     11/22/2019    CHOL 182 11/22/2019    TRIG 86 11/22/2019    HDL 58 11/22/2019    ALT 17 11/22/2019    AST 21 11/22/2019     11/22/2019    K 4.3 11/22/2019     11/22/2019    CREATININE 1.1 11/22/2019    BUN 12 11/22/2019    CO2 28 11/22/2019    TSH 0.858 07/31/2019    INR 0.9 02/06/2017         Assessment:   89 y.o. male with multiple co-morbid illnesses here to continue work-up of chronic issues notably with spinal stenosis,  shunt, polyuria.     Plan:     Problem List Items Addressed This Visit        Neuro    Normal pressure hydrocephalus     Placed 2/2017 for NPH.  No recent imaging.    · No routine f/u indicated.              Renal/    Benign prostatic hyperplasia with urinary obstruction       Oncology    Iron deficiency anemia     Unclear etiology but is on chronic AE therapy  · Continue iron supplements         Relevant Medications    ferrous sulfate 324 mg (65 mg iron) TbEC       Other    Functional quadriplegia     Homebound, unable to ambulate, dependent on family for all ADLs  · Advised supportive care           Other Visit Diagnoses      Microcytic anemia        Relevant Medications    ferrous sulfate 324 mg (65 mg iron) [facility]          Health Maintenance       Date Due Completion Date    Shingles Vaccine (1 of 2) 09/28/1978 ---    Pneumococcal Vaccine (65+ Low/Medium Risk) (2 of 2 - PPSV23) 05/02/2018 5/2/2017    Influenza Vaccine (1) 09/01/2020 8/27/2015    Lipid Panel 11/22/2024 11/22/2019    TETANUS VACCINE 04/10/2027 4/10/2017          Follow up in about 6 weeks (around 9/2/2020).  One hour spent with this patient today, more than half of that in counseling on the following issues: with spinal stenosis,  shunt, polyuria    Ashley Richards MD/MPH  Internal Medicine  [facility]  [phone]

## 2020-08-21 ENCOUNTER — OFFICE VISIT (OUTPATIENT)
Dept: PRIMARY CARE CLINIC | Facility: CLINIC | Age: 85
End: 2020-08-21
Payer: MEDICARE

## 2020-08-21 DIAGNOSIS — R53.2 FUNCTIONAL QUADRIPLEGIA: ICD-10-CM

## 2020-08-21 DIAGNOSIS — R56.9 SEIZURE: ICD-10-CM

## 2020-08-21 DIAGNOSIS — F02.80 DEMENTIA ASSOCIATED WITH OTHER UNDERLYING DISEASE WITHOUT BEHAVIORAL DISTURBANCE: ICD-10-CM

## 2020-08-21 PROCEDURE — 99443 PR PHYSICIAN TELEPHONE EVALUATION 21-30 MIN: ICD-10-PCS | Mod: HCNC,95,, | Performed by: INTERNAL MEDICINE

## 2020-08-21 PROCEDURE — 99443 PR PHYSICIAN TELEPHONE EVALUATION 21-30 MIN: CPT | Mod: HCNC,95,, | Performed by: INTERNAL MEDICINE

## 2020-08-21 NOTE — PROGRESS NOTES
"Established Patient - Audio Only Telehealth Visit with [facility] Provider     The patient location is: HOME  The chief complaint leading to consultation is: routine care  Visit type: Virtual visit with audio only (telephone)     The reason for the audio only service rather than synchronous audio and video virtual visit was related to technical difficulties or patient preference/necessity.     Each patient to whom I provide medical services by telemedicine is:  (1) informed of the relationship between the physician and patient and the respective role of any other health care provider with respect to management of the patient; and (2) notified that they may decline to receive medical services by telemedicine and may withdraw from such care at any time. Patient verbally consented to receive this service via voice-only telephone call.       Subjective:      Patient ID: Atif Neves is a 89 y.o. male with PMHx significant for dementia, HLD, HTN, NPH (w/ shunt), and SOLO.     Patient's risk score is 5. Patient is high risk for poor outcomes with COVID due to the following chronic conditions advanced age, dementia, , HTN, male gender    Patient's daugher, Colleen Lopez, is caregiver and manages the phone. She says her father has been doing well. He continues to take all of his medications as prescribed with no issues. He is also still taking the additional iron supplements for his SOLO.     She does report that he continues to be forgetful, but notes that this is largely stable and is unchanged compared to recent months.     She lives with him and assists him with walking, getting around the house, making food and bathing. He has not been exercising and has not left the house with him due to COVID. He does occasionally sit outside and will wear a mask.     He is not missing meals, takes all meds.    When asked if he has any complaints he states, "God is good, I don't complain."    Objective:     Lab Results   Component " Value Date    WBC 11.52 11/22/2019    HGB 11.5 (L) 11/22/2019    HCT 39.0 (L) 11/22/2019     11/22/2019    CHOL 182 11/22/2019    TRIG 86 11/22/2019    HDL 58 11/22/2019    ALT 17 11/22/2019    AST 21 11/22/2019     11/22/2019    K 4.3 11/22/2019     11/22/2019    CREATININE 1.1 11/22/2019    BUN 12 11/22/2019    CO2 28 11/22/2019    TSH 0.858 07/31/2019    INR 0.9 02/06/2017         Assessment:   89 y.o. male with multiple co-morbid illnesses here to continue work-up of chronic issues.     Plan:     Problem List Items Addressed This Visit        Neuro    Dementia associated with other underlying disease without behavioral disturbance     S/p  shunt 2/2017.  No recent eval with NS or neuro and pt now homebound.    · Will follow for chg in symptoms.  No routine f/u indicated.  · PCP advised daughter to call this office with any mental status changes         Seizure     Since childhood on long-term antiepileptic medications.   · Continue keppra BID            Other    Functional quadriplegia     Homebound, unable to ambulate, dependent on family for all ADLs  · Advised supportive care               Health Maintenance       Date Due Completion Date    Shingles Vaccine (1 of 2) 09/28/1978 ---    Pneumococcal Vaccine (65+ Low/Medium Risk) (2 of 2 - PPSV23) 05/02/2018 5/2/2017    Influenza Vaccine (1) 09/01/2020 8/27/2015    Lipid Panel 11/22/2024 11/22/2019    TETANUS VACCINE 04/10/2027 4/10/2017          Follow up in about 4 weeks (around 9/18/2020). Thirty minutes spent with this patient today, including addressing advanced care planning.    Ashley Richards MD/MPH  Internal Medicine  [facility]  Spectra [phone]    This service was not originating from a related E/M service provided within the previous 7 days nor will  to an E/M service or procedure within the next 24 hours or my soonest available appointment.  Prevailing standard of care was able  to be met in this audio-only visit.

## 2020-08-21 NOTE — ASSESSMENT & PLAN NOTE
S/p  shunt 2/2017.  No recent eval with NS or neuro and pt now homebound.    · Will follow for chg in symptoms.  No routine f/u indicated.  · PCP advised daughter to call this office with any mental status changes

## 2020-08-25 ENCOUNTER — OFFICE VISIT (OUTPATIENT)
Dept: HOME HEALTH SERVICES | Facility: CLINIC | Age: 85
End: 2020-08-25
Payer: MEDICARE

## 2020-08-25 VITALS
OXYGEN SATURATION: 99 % | WEIGHT: 164 LBS | SYSTOLIC BLOOD PRESSURE: 120 MMHG | DIASTOLIC BLOOD PRESSURE: 82 MMHG | HEART RATE: 75 BPM | TEMPERATURE: 98 F | HEIGHT: 68 IN | BODY MASS INDEX: 24.86 KG/M2

## 2020-08-25 DIAGNOSIS — Z74.09 OTHER REDUCED MOBILITY: ICD-10-CM

## 2020-08-25 DIAGNOSIS — Z99.89 DEPENDENCE ON OTHER ENABLING MACHINES AND DEVICES: ICD-10-CM

## 2020-08-25 DIAGNOSIS — I10 ESSENTIAL HYPERTENSION: ICD-10-CM

## 2020-08-25 DIAGNOSIS — R56.9 SEIZURE: ICD-10-CM

## 2020-08-25 DIAGNOSIS — G91.2 NORMAL PRESSURE HYDROCEPHALUS: ICD-10-CM

## 2020-08-25 DIAGNOSIS — G91.2 DEMENTIA ASSOCIATED WITH NORMAL PRESSURE HYDROCEPHALUS: ICD-10-CM

## 2020-08-25 DIAGNOSIS — N13.8 BENIGN PROSTATIC HYPERPLASIA WITH URINARY OBSTRUCTION: ICD-10-CM

## 2020-08-25 DIAGNOSIS — N40.1 BENIGN PROSTATIC HYPERPLASIA WITH URINARY OBSTRUCTION: ICD-10-CM

## 2020-08-25 DIAGNOSIS — I70.0 ATHEROSCLEROSIS OF AORTA: ICD-10-CM

## 2020-08-25 DIAGNOSIS — F33.42 RECURRENT MAJOR DEPRESSIVE DISORDER, IN FULL REMISSION: ICD-10-CM

## 2020-08-25 DIAGNOSIS — E78.2 MIXED HYPERLIPIDEMIA: ICD-10-CM

## 2020-08-25 DIAGNOSIS — Z00.00 ENCOUNTER FOR PREVENTIVE HEALTH EXAMINATION: Primary | ICD-10-CM

## 2020-08-25 DIAGNOSIS — F02.80 DEMENTIA ASSOCIATED WITH NORMAL PRESSURE HYDROCEPHALUS: ICD-10-CM

## 2020-08-25 DIAGNOSIS — F41.1 GENERALIZED ANXIETY DISORDER: ICD-10-CM

## 2020-08-25 PROCEDURE — 99397 PER PM REEVAL EST PAT 65+ YR: CPT | Mod: S$GLB,,, | Performed by: NURSE PRACTITIONER

## 2020-08-25 PROCEDURE — 99499 RISK ADDL DX/OHS AUDIT: ICD-10-PCS | Mod: S$GLB,,, | Performed by: NURSE PRACTITIONER

## 2020-08-25 PROCEDURE — 99397 PR PREVENTIVE VISIT,EST,65 & OVER: ICD-10-PCS | Mod: S$GLB,,, | Performed by: NURSE PRACTITIONER

## 2020-08-25 PROCEDURE — 99499 UNLISTED E&M SERVICE: CPT | Mod: S$GLB,,, | Performed by: NURSE PRACTITIONER

## 2020-08-25 NOTE — PATIENT INSTRUCTIONS
Counseling and Referral of Other Preventative  (Italic type indicates deductible and co-insurance are waived)    Patient Name: Atif Neves  Today's Date: 8/25/2020    Health Maintenance       Date Due Completion Date    Shingles Vaccine (1 of 2) 09/28/1978 ---    Pneumococcal Vaccine (65+ Low/Medium Risk) (2 of 2 - PPSV23) 05/02/2018 5/2/2017    Influenza Vaccine (1) 09/01/2020 8/27/2015    Lipid Panel 11/22/2024 11/22/2019    TETANUS VACCINE 04/10/2027 4/10/2017        No orders of the defined types were placed in this encounter.    The following information is provided to all patients.  This information is to help you find resources for any of the problems found today that may be affecting your health:                Living healthy guide: www.[facility].[place].gov      Understanding Diabetes: www.diabetes.org      Eating healthy: www.cdc.gov/healthyweight      [facility] home safety checklist: www.cdc.gov/steadi/patient.html      Agency on Aging: www.goea.[place].[facility]      Alcoholics anonymous (AA): www.aa.org      Physical Activity: www.shira.nih.gov/is8vjqv      Tobacco use: www.quitwithusla.org

## 2020-08-25 NOTE — PROGRESS NOTES
"  Atif Neves presented for a  Medicare AWV and comprehensive Health Risk Assessment today. The following components were reviewed and updated:    · Medical history  · Family History  · Social history  · Allergies and Current Medications  · Health Risk Assessment  · Health Maintenance  · Care Team     ** See Completed Assessments for Annual Wellness Visit within the encounter summary.**         The following assessments were completed:  · Living Situation  · CAGE  · Depression Screening  · Timed Get Up and Go  · Whisper Test  · Cognitive Function Screening  · Nutrition Screening  · ADL Screening  · PAQ Screening        Vitals:    08/25/20 1212   BP: 120/82   Pulse: 75   Temp: 97.8 °F (36.6 °C)   SpO2: 99%   Weight: 74.4 kg (164 lb)   Height: 5' 8" (1.727 m)     Body mass index is 24.94 kg/m².  Physical Exam  Exam conducted with a chaperone present.   Constitutional:       Appearance: Normal appearance.   HENT:      Head: Atraumatic.   Eyes:      General: No scleral icterus.  Neck:      Musculoskeletal: Neck supple.   Cardiovascular:      Rate and Rhythm: Normal rate and regular rhythm.      Heart sounds: Normal heart sounds.   Pulmonary:      Effort: Pulmonary effort is normal. No respiratory distress.      Breath sounds: Normal breath sounds.   Abdominal:      General: Bowel sounds are normal.   Musculoskeletal:      Right lower leg: No edema.      Left lower leg: No edema.   Neurological:      Mental Status: He is alert.   Psychiatric:         Mood and Affect: Mood normal.         Behavior: Behavior normal.         Cognition and Memory: He exhibits impaired recent memory.               Diagnoses and health risks identified today and associated recommendations/orders:    1. Encounter for preventive health examination  - Screenings performed, as noted above. Personal preventative testing needs reviewed.     2. Normal pressure hydrocephalus  - Stable, followed by PCP/neurosurgery    3. Seizure  - Controlled, followed " by PCP    4. Dementia associated with normal pressure hydrocephalus  - Stable, followed by PCP    5. Recurrent major depressive disorder, in full remission  - Stable, followed by PCP    6. Generalized anxiety disorder  - Stable, followed by PCP    7. Mixed hyperlipidemia  - Stable, followed by PCP    8. Essential hypertension  - Stable, followed by PCP    9. Atherosclerosis of aorta  - Stable, followed by PCP    10. Benign prostatic hyperplasia with urinary obstruction  - Stable, followed by PCP/urology    11. Other reduced mobility  - Slow shuffling gait with walker.     12. Dependence on other enabling machines and devices  - Continue to use walker      Provided Atif with a 5-10 year written screening schedule and personal prevention plan. Recommendations were developed using the USPSTF age appropriate recommendations. Education, counseling, and referrals were provided as needed. After Visit Summary printed and given to patient which includes a list of additional screenings\tests needed.    Follow up in about 1 year (around 8/25/2021) for your next annual wellness visit.    Zoe Franco NP    I offered to discuss end of life issues, including information on how to make advance directives that the patient could use to name someone who would make medical decisions on their behalf if they became too ill to make themselves.    ___Patient declined  _X_Patient has ACP docs on file and does not elect to make changes.

## 2020-09-01 DIAGNOSIS — R56.9 SEIZURE: ICD-10-CM

## 2020-09-01 RX ORDER — LEVETIRACETAM 1000 MG/1
TABLET ORAL
Qty: 180 TABLET | Refills: 3 | Status: SHIPPED | OUTPATIENT
Start: 2020-09-01 | End: 2021-06-22 | Stop reason: SDUPTHER

## 2020-09-03 DIAGNOSIS — R56.9 SEIZURE: ICD-10-CM

## 2020-09-03 DIAGNOSIS — I70.0 ATHEROSCLEROSIS OF AORTA: ICD-10-CM

## 2020-09-03 DIAGNOSIS — D50.9 MICROCYTIC ANEMIA: ICD-10-CM

## 2020-09-03 DIAGNOSIS — D50.9 IRON DEFICIENCY ANEMIA, UNSPECIFIED IRON DEFICIENCY ANEMIA TYPE: ICD-10-CM

## 2020-09-03 DIAGNOSIS — F41.1 GENERALIZED ANXIETY DISORDER: ICD-10-CM

## 2020-09-03 RX ORDER — CITALOPRAM 10 MG/1
TABLET ORAL
Qty: 90 TABLET | Refills: 3 | Status: CANCELLED | OUTPATIENT
Start: 2020-09-03

## 2020-09-03 RX ORDER — LEVETIRACETAM 1000 MG/1
TABLET ORAL
Qty: 180 TABLET | Refills: 3 | Status: CANCELLED | OUTPATIENT
Start: 2020-09-03

## 2020-09-03 RX ORDER — ATORVASTATIN CALCIUM 20 MG/1
TABLET, FILM COATED ORAL
Qty: 90 TABLET | Refills: 3 | Status: CANCELLED | OUTPATIENT
Start: 2020-09-03

## 2020-09-03 RX ORDER — FERROUS SULFATE 324(65)MG
325 TABLET, DELAYED RELEASE (ENTERIC COATED) ORAL DAILY
Qty: 90 TABLET | Refills: 3 | Status: CANCELLED | OUTPATIENT
Start: 2020-09-03

## 2020-09-09 DIAGNOSIS — F41.1 GENERALIZED ANXIETY DISORDER: ICD-10-CM

## 2020-09-09 DIAGNOSIS — D50.9 IRON DEFICIENCY ANEMIA, UNSPECIFIED IRON DEFICIENCY ANEMIA TYPE: ICD-10-CM

## 2020-09-09 DIAGNOSIS — I70.0 ATHEROSCLEROSIS OF AORTA: ICD-10-CM

## 2020-09-09 DIAGNOSIS — D50.9 MICROCYTIC ANEMIA: ICD-10-CM

## 2020-09-09 RX ORDER — ATORVASTATIN CALCIUM 20 MG/1
TABLET, FILM COATED ORAL
Qty: 90 TABLET | Refills: 3 | Status: SHIPPED | OUTPATIENT
Start: 2020-09-09 | End: 2021-06-22 | Stop reason: SDUPTHER

## 2020-09-09 RX ORDER — FERROUS SULFATE 324(65)MG
325 TABLET, DELAYED RELEASE (ENTERIC COATED) ORAL DAILY
Qty: 90 TABLET | Refills: 3 | Status: SHIPPED | OUTPATIENT
Start: 2020-09-09 | End: 2021-06-22 | Stop reason: SDUPTHER

## 2020-09-09 RX ORDER — CITALOPRAM 10 MG/1
TABLET ORAL
Qty: 90 TABLET | Refills: 3 | Status: SHIPPED | OUTPATIENT
Start: 2020-09-09 | End: 2021-06-22 | Stop reason: SDUPTHER

## 2020-09-18 ENCOUNTER — OFFICE VISIT (OUTPATIENT)
Dept: PRIMARY CARE CLINIC | Facility: CLINIC | Age: 85
End: 2020-09-18
Payer: MEDICARE

## 2020-09-18 DIAGNOSIS — F02.80 DEMENTIA ASSOCIATED WITH OTHER UNDERLYING DISEASE WITHOUT BEHAVIORAL DISTURBANCE: ICD-10-CM

## 2020-09-18 DIAGNOSIS — F33.42 RECURRENT MAJOR DEPRESSIVE DISORDER, IN FULL REMISSION: ICD-10-CM

## 2020-09-18 PROCEDURE — 99443 PR PHYSICIAN TELEPHONE EVALUATION 21-30 MIN: ICD-10-PCS | Mod: HCNC,95,, | Performed by: INTERNAL MEDICINE

## 2020-09-18 PROCEDURE — 99443 PR PHYSICIAN TELEPHONE EVALUATION 21-30 MIN: CPT | Mod: HCNC,95,, | Performed by: INTERNAL MEDICINE

## 2020-09-18 NOTE — PROGRESS NOTES
Established Patient - [facility]     The patient location is: HOME  The chief complaint leading to consultation is: routine care  Visit type: Virtual visit with audio only (telephone)     The reason for the audio only service rather than synchronous audio and video virtual visit was related to technical difficulties or patient preference/necessity.     Each patient to whom I provide medical services by telemedicine is:  (1) informed of the relationship between the physician and patient and the respective role of any other health care provider with respect to management of the patient; and (2) notified that they may decline to receive medical services by telemedicine and may withdraw from such care at any time. Patient verbally consented to receive this service via voice-only telephone call.       Subjective:      Patient ID: Atif Neves is a 89 y.o. male with PMHx significant for dementia, HLD, HTN, NPH (w/ shunt), and SOLO.     Patient's risk score is 5. Patient is high risk for poor outcomes with COVID due to the following chronic conditions advanced age, dementia, HTN, male gender.    Patient's daugher, Colleen Lopez, is caregiver and manages the phone. She says her father has been doing well. No new concerns. He continues to take all of his medications as prescribed with no issues.  Does not need any refills. Daughter says that the patient has not gotten a flu shot yet and that the only way he could get one is if someone could give it to him in his home.    Daughter has no other requests. All meds are updated and refilled. Patient requests that we pray for him.    Objective:     Lab Results   Component Value Date    WBC 11.52 11/22/2019    HGB 11.5 (L) 11/22/2019    HCT 39.0 (L) 11/22/2019     11/22/2019    CHOL 182 11/22/2019    TRIG 86 11/22/2019    HDL 58 11/22/2019    ALT 17 11/22/2019    AST 21 11/22/2019     11/22/2019    K 4.3 11/22/2019      11/22/2019    CREATININE 1.1 11/22/2019    BUN 12 11/22/2019    CO2 28 11/22/2019    TSH 0.858 07/31/2019    INR 0.9 02/06/2017         Assessment:   89 y.o. male with multiple co-morbid illnesses here to continue work-up of chronic issues.     Plan:     Problem List Items Addressed This Visit        Neuro    Dementia associated with other underlying disease without behavioral disturbance     S/p  shunt 2/2017.  No recent eval with NS or neuro and pt now homebound.    · Will follow for chg in symptoms.   · PCP advised daughter to call this office with any mental status changes            Psychiatric    Recurrent major depressive disorder, in full remission     Controlled on celexa  · Continue celexa  · Family very supportive in the home               Health Maintenance       Date Due Completion Date    Shingles Vaccine (1 of 2) 09/28/1978 ---    Pneumococcal Vaccine (65+ Low/Medium Risk) (2 of 2 - PPSV23) 05/02/2018 5/2/2017    Influenza Vaccine (1) 08/01/2020 8/27/2015- Message sent to [employer]    Lipid Panel 11/22/2024 11/22/2019    TETANUS VACCINE 04/10/2027 4/10/2017          Follow up in about 2 months (around 11/18/2020). Thirty minutes spent with this patient today, including addressing advanced care planning.    Ashley Richards MD/MPH  Internal Medicine  [facility]  Spectra [phone]    This service was not originating from a related E/M service provided within the previous 7 days nor will  to an E/M service or procedure within the next 24 hours or my soonest available appointment.  Prevailing standard of care was able to be met in this audio-only visit.

## 2020-09-18 NOTE — ASSESSMENT & PLAN NOTE
S/p  shunt 2/2017.  No recent eval with NS or neuro and pt now homebound.    · Will follow for chg in symptoms.   · PCP advised daughter to call this office with any mental status changes

## 2020-09-28 ENCOUNTER — CARE AT HOME (OUTPATIENT)
Dept: HOME HEALTH SERVICES | Facility: CLINIC | Age: 85
End: 2020-09-28
Payer: MEDICARE

## 2020-09-28 VITALS — HEART RATE: 72 BPM | RESPIRATION RATE: 18 BRPM | TEMPERATURE: 97 F | OXYGEN SATURATION: 97 %

## 2020-09-28 DIAGNOSIS — R41.3 MEMORY LOSS, SHORT TERM: ICD-10-CM

## 2020-09-28 DIAGNOSIS — Z23 FLU VACCINE NEED: ICD-10-CM

## 2020-09-28 DIAGNOSIS — G91.2 NORMAL PRESSURE HYDROCEPHALUS: Primary | ICD-10-CM

## 2020-09-28 DIAGNOSIS — D64.9 ANEMIA, UNSPECIFIED TYPE: ICD-10-CM

## 2020-09-28 PROCEDURE — 1126F AMNT PAIN NOTED NONE PRSNT: CPT | Mod: S$GLB,,, | Performed by: NURSE PRACTITIONER

## 2020-09-28 PROCEDURE — 1159F MED LIST DOCD IN RCRD: CPT | Mod: S$GLB,,, | Performed by: NURSE PRACTITIONER

## 2020-09-28 PROCEDURE — 99350 HOME/RES VST EST HIGH MDM 60: CPT | Mod: S$GLB,,, | Performed by: NURSE PRACTITIONER

## 2020-09-28 PROCEDURE — 1101F PR PT FALLS ASSESS DOC 0-1 FALLS W/OUT INJ PAST YR: ICD-10-PCS | Mod: CPTII,S$GLB,, | Performed by: NURSE PRACTITIONER

## 2020-09-28 PROCEDURE — 1159F PR MEDICATION LIST DOCUMENTED IN MEDICAL RECORD: ICD-10-PCS | Mod: S$GLB,,, | Performed by: NURSE PRACTITIONER

## 2020-09-28 PROCEDURE — 1101F PT FALLS ASSESS-DOCD LE1/YR: CPT | Mod: CPTII,S$GLB,, | Performed by: NURSE PRACTITIONER

## 2020-09-28 PROCEDURE — 99350 PR HOME VISIT,ESTAB PATIENT,LEVEL IV: ICD-10-PCS | Mod: S$GLB,,, | Performed by: NURSE PRACTITIONER

## 2020-09-28 PROCEDURE — 1126F PR PAIN SEVERITY QUANTIFIED, NO PAIN PRESENT: ICD-10-PCS | Mod: S$GLB,,, | Performed by: NURSE PRACTITIONER

## 2020-09-28 NOTE — PROGRESS NOTES
[facility]  Medical Home Visit    Visit Date: 9/28/2020  Encounter Provider: Kenzie Beckett NP  PCP:  Ashley Richards MD    Subjective:      Patient ID: Atif Neves is a 89 y.o. male.    Consult Requested By:  Kenzie Beckett  Reason for Consult:  Routine Follow Up    Atif is being seen at home due to homebound status.      Chief Complaint: Flu Vaccine    Pt is a 89 y/o AAM being seen today for routine follow up. He has a history of NPH, HTN, anemia, HLD. He is found sitting in his home watching TV. Pt is awake and alert. He is pleasant and cooperative. Daughter reports no changes or problems at this time. He is compliant with his medications.    He needs a flu vaccine    Review of Systems   Constitutional: Negative.    HENT: Negative.    Respiratory: Negative.    Cardiovascular: Negative.    Gastrointestinal: Negative.    Endocrine: Negative.    Musculoskeletal: Negative.    Neurological: Negative.    Hematological: Negative.    Psychiatric/Behavioral: Positive for confusion.       Assessments:  · Environmental: clean single story home, adequate lighting and temp  · Functional Status: requires assistance  · Safety: small dog in home  · Nutritional: adequate  · Home Health/DME/Supplies: none/walker    Objective:   Physical Exam  Vitals signs reviewed.   Constitutional:       General: He is not in acute distress.     Appearance: He is well-developed.   HENT:      Head: Normocephalic and atraumatic.   Eyes:      Pupils: Pupils are equal, round, and reactive to light.   Neck:      Musculoskeletal: Normal range of motion and neck supple.   Cardiovascular:      Rate and Rhythm: Normal rate and regular rhythm.      Heart sounds: Normal heart sounds.   Pulmonary:      Effort: Pulmonary effort is normal.      Breath sounds: Normal breath sounds.   Abdominal:      General: Bowel sounds are normal.      Palpations: Abdomen is soft.   Musculoskeletal: Normal range of motion.   Skin:     General: Skin is warm  and dry.   Neurological:      Mental Status: He is alert.      Cranial Nerves: No cranial nerve deficit.   Psychiatric:         Behavior: Behavior normal.         Vitals:    09/28/20 1632   Pulse: 72   Resp: 18   Temp: 97.4 °F (36.3 °C)   SpO2: 97%   PainSc: 0-No pain     There is no height or weight on file to calculate BMI.    Assessment:   Atif was seen today for flu vaccine.    Diagnoses and all orders for this visit:    Normal pressure hydrocephalus    Memory loss, short term    Anemia, unspecified type    Flu vaccine need    Flu vaccine given to left arm, after consent obtained, allergies reviewed.  Pt tolerated well, no adverse reaction noted, pt monitored for 20 min for allergy  See vaccine form below    Plan:          Atif was seen today for dementia, hyperlipidemia and hypertension.    Diagnoses and all orders for this visit:    NPH (normal pressure hydrocephalus)-       Memory loss, short term  Stable, pt is pleasantly confused, cannot recall what he ate for breakfast  Continue to monitor    Mixed hyperlipidemia  Stable on statin  Continue to monitor    Benign prostatic hyperplasia with urinary obstruction  -     Ambulatory Referral to [facility]  Stable on medication  Continue Flomax    Anemia  Stable  Continue iron and vitamin supplements        Were controlled substances prescribed?  No    Follow Up Appointments:   Future Appointments   Date Time Provider Department Center   11/25/2020 12:00 PM Ashley Richards MD [facility] MED CLN [facility] Steinberg PCW     Attestation: Screening criteria to assess the level of the patient's risk for infection with COVID-19 as recommended by the CDC at the time of the above documented home visit concluded appropriateness to proceed. Universal precautions were maintained at all times, including provider use of >60% alcohol gel hand  immediately prior to entry and upon departing the patient's home as well as cleaning of equipment used  in home visit with antibacterial/germicidal disposable wipes.        Signature:  Kenzie Beckett NP    Patient consent obtained prior to treatment at this visit.

## 2020-11-05 ENCOUNTER — TELEPHONE (OUTPATIENT)
Dept: INTERNAL MEDICINE | Facility: CLINIC | Age: 85
End: 2020-11-05

## 2020-11-05 ENCOUNTER — TELEPHONE (OUTPATIENT)
Dept: PRIMARY CARE CLINIC | Facility: CLINIC | Age: 85
End: 2020-11-05

## 2020-11-05 DIAGNOSIS — K59.00 CONSTIPATION, UNSPECIFIED CONSTIPATION TYPE: Primary | ICD-10-CM

## 2020-11-05 RX ORDER — LACTULOSE 10 G/15ML
20 SOLUTION ORAL 3 TIMES DAILY
Qty: 900 ML | Refills: 0 | Status: SHIPPED | OUTPATIENT
Start: 2020-11-05 | End: 2020-11-15

## 2020-11-05 NOTE — TELEPHONE ENCOUNTER
----- Message from Delmy Velazquez sent at 11/5/2020  9:20 AM CST -----  Contact: Colleen (daughter) [phone]  Patient's daughter is requesting a call back from the nurse to discuss patient's concerns. Please call and advise.

## 2020-11-05 NOTE — TELEPHONE ENCOUNTER
Please let her know that she should give him metamucil every day to keep him regular. Is she giving him any stool softeners? They can start metamucil with senna-docusate to keep him regular. See if they want you to order this from [facility].    Since he is stopped up acutely, then they should try lactulose. [facility] am [facility]. This is for use in emergency situations.    Thanks,  KJ

## 2020-11-05 NOTE — TELEPHONE ENCOUNTER
Spoke with pt care taker, she asked about how much lactulose to give to pt, she does not have a medicine cup. Explained 2 tablespoons would equal 30mls,  Also discussed drinking warm prune juice.  Care taker verbalized understanding.

## 2020-11-25 ENCOUNTER — OFFICE VISIT (OUTPATIENT)
Dept: PRIMARY CARE CLINIC | Facility: CLINIC | Age: 85
End: 2020-11-25
Payer: MEDICARE

## 2020-11-25 DIAGNOSIS — F02.80 DEMENTIA ASSOCIATED WITH OTHER UNDERLYING DISEASE WITHOUT BEHAVIORAL DISTURBANCE: ICD-10-CM

## 2020-11-25 DIAGNOSIS — Z66 DNR (DO NOT RESUSCITATE): ICD-10-CM

## 2020-11-25 DIAGNOSIS — F33.42 RECURRENT MAJOR DEPRESSIVE DISORDER, IN FULL REMISSION: ICD-10-CM

## 2020-11-25 PROCEDURE — 99443 PR PHYSICIAN TELEPHONE EVALUATION 21-30 MIN: ICD-10-PCS | Mod: HCNC,95,, | Performed by: INTERNAL MEDICINE

## 2020-11-25 PROCEDURE — 99443 PR PHYSICIAN TELEPHONE EVALUATION 21-30 MIN: CPT | Mod: HCNC,95,, | Performed by: INTERNAL MEDICINE

## 2020-11-25 NOTE — ASSESSMENT & PLAN NOTE
DNR/DNI per Kim, living will, ACP conversation on 4/8/20. Daughter Colleen Lopez is PoA.  · Advised to call office if any concern for COVID or acute illness  · Aggressive care will likely not change outcome for this patient  · Family willing to enroll in hospice should he decline

## 2020-11-25 NOTE — PROGRESS NOTES
"Established Patient - Audio Only Telehealth Visit with [facility] Provider     The patient location is: HOME  The chief complaint leading to consultation is: routine care  Visit type: Virtual visit with audio only (telephone)     The reason for the audio only service rather than synchronous audio and video virtual visit was related to technical difficulties or patient preference/necessity.     Each patient to whom I provide medical services by telemedicine is:  (1) informed of the relationship between the physician and patient and the respective role of any other health care provider with respect to management of the patient; and (2) notified that they may decline to receive medical services by telemedicine and may withdraw from such care at any time. Patient verbally consented to receive this service via voice-only telephone call.       Subjective:      Patient ID: Atif Neves is a 89 y.o. male with dementia, homebound, NPH  ( shunt)    Patient is high risk for poor outcomes with COVID due to the following chronic conditions advanced age and dementia.    Prior to this visit, patient's last encounter with PCP was 9/18/2020.    Patient and his immediate family are staying at home for Thanksgiving. Per his daughter, he continues to talk a lot. "He is happy" in his home with his daughter and grandson.     Does not need med refills at this time.    Advance Care Planning   Patient remains DNR, his daughter reiterates that today. She knows to call this office if she needs any resources in the home to care for him if he gets ill or she wants to progress to hospice.           Objective:     Lab Results   Component Value Date    WBC 11.52 11/22/2019    HGB 11.5 (L) 11/22/2019    HCT 39.0 (L) 11/22/2019     11/22/2019    CHOL 182 11/22/2019    TRIG 86 11/22/2019    HDL 58 11/22/2019    ALT 17 11/22/2019    AST 21 11/22/2019     11/22/2019    K 4.3 11/22/2019     11/22/2019    CREATININE 1.1 11/22/2019 "    BUN 12 11/22/2019    CO2 28 11/22/2019    TSH 0.858 07/31/2019    INR 0.9 02/06/2017         Assessment:   89 y.o. male with multiple co-morbid illnesses here to continue work-up of chronic issues.     Plan:     Problem List Items Addressed This Visit        Neuro    Dementia associated with other underlying disease without behavioral disturbance     S/p  shunt 2/2017.  No recent eval with NS or neuro and pt now homebound.    · Will follow for chg in symptoms.   · PCP advised daughter to call this office with any mental status changes            Psychiatric    Recurrent major depressive disorder, in full remission     Controlled on celexa  · Continue celexa  · Family very supportive in the home            Palliative Care    DNR (do not resuscitate)     DNR/DNI per Kim, living will, ACP conversation on 4/8/20. Daughter Colleen Lopez is PoA.  · Advised to call office if any concern for COVID or acute illness  · Aggressive care will likely not change outcome for this patient  · Family willing to enroll in hospice should he decline               Health Maintenance       Date Due Completion Date    Shingles Vaccine (1 of 2) 09/28/1978 ---    Pneumococcal Vaccine (65+ Low/Medium Risk) (2 of 2 - PPSV23) 04/10/2018 4/10/2017    Influenza Vaccine (1) 08/01/2020 8/27/2015    Lipid Panel 11/22/2024 11/22/2019    TETANUS VACCINE 04/10/2027 4/10/2017          Follow up in about 6 weeks (around 1/6/2021). Thirty minutes spent with this patient today, including addressing advanced care planning.    Ashley Richards MD/MPH  Internal Medicine  [facility]  Spectra [phone]    This service was not originating from a related E/M service provided within the previous 7 days nor will  to an E/M service or procedure within the next 24 hours or my soonest available appointment.  Prevailing standard of care was able to be met in this audio-only visit.

## 2021-01-20 ENCOUNTER — CARE AT HOME (OUTPATIENT)
Dept: HOME HEALTH SERVICES | Facility: CLINIC | Age: 86
End: 2021-01-20
Payer: MEDICARE

## 2021-01-20 VITALS
SYSTOLIC BLOOD PRESSURE: 138 MMHG | OXYGEN SATURATION: 97 % | TEMPERATURE: 98 F | RESPIRATION RATE: 18 BRPM | DIASTOLIC BLOOD PRESSURE: 2 MMHG | HEART RATE: 88 BPM

## 2021-01-20 DIAGNOSIS — G91.2 DEMENTIA ASSOCIATED WITH NORMAL PRESSURE HYDROCEPHALUS: ICD-10-CM

## 2021-01-20 DIAGNOSIS — R56.9 SEIZURE: ICD-10-CM

## 2021-01-20 DIAGNOSIS — F02.80 DEMENTIA ASSOCIATED WITH NORMAL PRESSURE HYDROCEPHALUS: ICD-10-CM

## 2021-01-20 DIAGNOSIS — F33.42 RECURRENT MAJOR DEPRESSIVE DISORDER, IN FULL REMISSION: ICD-10-CM

## 2021-01-20 DIAGNOSIS — I70.0 ATHEROSCLEROSIS OF AORTA: ICD-10-CM

## 2021-01-20 DIAGNOSIS — G91.2 NORMAL PRESSURE HYDROCEPHALUS: ICD-10-CM

## 2021-01-20 PROCEDURE — 1159F MED LIST DOCD IN RCRD: CPT | Mod: S$GLB,,, | Performed by: NURSE PRACTITIONER

## 2021-01-20 PROCEDURE — 1159F PR MEDICATION LIST DOCUMENTED IN MEDICAL RECORD: ICD-10-PCS | Mod: S$GLB,,, | Performed by: NURSE PRACTITIONER

## 2021-01-20 PROCEDURE — 99350 HOME/RES VST EST HIGH MDM 60: CPT | Mod: S$GLB,,, | Performed by: NURSE PRACTITIONER

## 2021-01-20 PROCEDURE — 99350 PR HOME VISIT,ESTAB PATIENT,LEVEL IV: ICD-10-PCS | Mod: S$GLB,,, | Performed by: NURSE PRACTITIONER

## 2021-01-20 RX ORDER — LACTULOSE 10 G/15ML
SOLUTION ORAL; RECTAL
COMMUNITY
Start: 2020-11-05 | End: 2021-06-22 | Stop reason: SDUPTHER

## 2021-03-22 ENCOUNTER — OFFICE VISIT (OUTPATIENT)
Dept: PRIMARY CARE CLINIC | Facility: CLINIC | Age: 86
End: 2021-03-22
Payer: MEDICARE

## 2021-03-22 DIAGNOSIS — Z66 DNR (DO NOT RESUSCITATE): ICD-10-CM

## 2021-03-22 DIAGNOSIS — F02.80 DEMENTIA ASSOCIATED WITH OTHER UNDERLYING DISEASE WITHOUT BEHAVIORAL DISTURBANCE: ICD-10-CM

## 2021-03-22 PROCEDURE — 99443 PR PHYSICIAN TELEPHONE EVALUATION 21-30 MIN: CPT | Mod: 95,,, | Performed by: INTERNAL MEDICINE

## 2021-03-22 PROCEDURE — 99443 PR PHYSICIAN TELEPHONE EVALUATION 21-30 MIN: ICD-10-PCS | Mod: 95,,, | Performed by: INTERNAL MEDICINE

## 2021-05-03 ENCOUNTER — CARE AT HOME (OUTPATIENT)
Dept: HOME HEALTH SERVICES | Facility: CLINIC | Age: 86
End: 2021-05-03
Payer: MEDICARE

## 2021-05-03 VITALS
RESPIRATION RATE: 18 BRPM | SYSTOLIC BLOOD PRESSURE: 122 MMHG | DIASTOLIC BLOOD PRESSURE: 70 MMHG | HEART RATE: 72 BPM | OXYGEN SATURATION: 97 %

## 2021-05-03 DIAGNOSIS — I70.0 ATHEROSCLEROSIS OF AORTA: ICD-10-CM

## 2021-05-03 DIAGNOSIS — F33.42 RECURRENT MAJOR DEPRESSIVE DISORDER, IN FULL REMISSION: ICD-10-CM

## 2021-05-03 DIAGNOSIS — R56.9 SEIZURE: ICD-10-CM

## 2021-05-03 DIAGNOSIS — Z74.09 OTHER REDUCED MOBILITY: ICD-10-CM

## 2021-05-03 DIAGNOSIS — G91.2 DEMENTIA ASSOCIATED WITH NORMAL PRESSURE HYDROCEPHALUS: ICD-10-CM

## 2021-05-03 DIAGNOSIS — E78.2 MIXED HYPERLIPIDEMIA: Primary | ICD-10-CM

## 2021-05-03 DIAGNOSIS — I10 ESSENTIAL HYPERTENSION: ICD-10-CM

## 2021-05-03 DIAGNOSIS — F02.80 DEMENTIA ASSOCIATED WITH NORMAL PRESSURE HYDROCEPHALUS: ICD-10-CM

## 2021-05-03 PROCEDURE — 99350 HOME/RES VST EST HIGH MDM 60: CPT | Mod: S$GLB,,, | Performed by: NURSE PRACTITIONER

## 2021-05-03 PROCEDURE — 1159F MED LIST DOCD IN RCRD: CPT | Mod: S$GLB,,, | Performed by: NURSE PRACTITIONER

## 2021-05-03 PROCEDURE — 1159F PR MEDICATION LIST DOCUMENTED IN MEDICAL RECORD: ICD-10-PCS | Mod: S$GLB,,, | Performed by: NURSE PRACTITIONER

## 2021-05-03 PROCEDURE — 99350 PR HOME VISIT,ESTAB PATIENT,LEVEL IV: ICD-10-PCS | Mod: S$GLB,,, | Performed by: NURSE PRACTITIONER

## 2021-05-21 ENCOUNTER — IMMUNIZATION (OUTPATIENT)
Dept: PRIMARY CARE CLINIC | Facility: CLINIC | Age: 86
End: 2021-05-21
Payer: MEDICARE

## 2021-05-21 DIAGNOSIS — Z23 NEED FOR VACCINATION: Primary | ICD-10-CM

## 2021-05-21 PROCEDURE — 0001A COVID-19, MRNA, LNP-S, PF, 30 MCG/0.3 ML DOSE VACCINE: ICD-10-PCS | Mod: CV19,S$GLB,, | Performed by: INTERNAL MEDICINE

## 2021-05-21 PROCEDURE — 91300 COVID-19, MRNA, LNP-S, PF, 30 MCG/0.3 ML DOSE VACCINE: ICD-10-PCS | Mod: S$GLB,,, | Performed by: INTERNAL MEDICINE

## 2021-05-21 PROCEDURE — 0001A COVID-19, MRNA, LNP-S, PF, 30 MCG/0.3 ML DOSE VACCINE: CPT | Mod: CV19,S$GLB,, | Performed by: INTERNAL MEDICINE

## 2021-05-21 PROCEDURE — 91300 COVID-19, MRNA, LNP-S, PF, 30 MCG/0.3 ML DOSE VACCINE: CPT | Mod: S$GLB,,, | Performed by: INTERNAL MEDICINE

## 2021-05-24 ENCOUNTER — PES CALL (OUTPATIENT)
Dept: ADMINISTRATIVE | Facility: CLINIC | Age: 86
End: 2021-05-24

## 2021-06-11 ENCOUNTER — IMMUNIZATION (OUTPATIENT)
Dept: PRIMARY CARE CLINIC | Facility: CLINIC | Age: 86
End: 2021-06-11
Payer: MEDICARE

## 2021-06-11 DIAGNOSIS — Z23 NEED FOR VACCINATION: Primary | ICD-10-CM

## 2021-06-11 PROCEDURE — 0002A COVID-19, MRNA, LNP-S, PF, 30 MCG/0.3 ML DOSE VACCINE: CPT | Mod: PBBFAC | Performed by: INTERNAL MEDICINE

## 2021-06-11 PROCEDURE — 91300 COVID-19, MRNA, LNP-S, PF, 30 MCG/0.3 ML DOSE VACCINE: CPT | Mod: PBBFAC | Performed by: INTERNAL MEDICINE

## 2021-06-18 ENCOUNTER — OFFICE VISIT (OUTPATIENT)
Dept: HOME HEALTH SERVICES | Facility: CLINIC | Age: 86
End: 2021-06-18
Payer: MEDICARE

## 2021-06-18 VITALS
HEIGHT: 68 IN | TEMPERATURE: 98 F | HEART RATE: 74 BPM | WEIGHT: 164 LBS | BODY MASS INDEX: 24.86 KG/M2 | DIASTOLIC BLOOD PRESSURE: 79 MMHG | SYSTOLIC BLOOD PRESSURE: 134 MMHG

## 2021-06-18 DIAGNOSIS — I10 ESSENTIAL HYPERTENSION: ICD-10-CM

## 2021-06-18 DIAGNOSIS — D50.9 IRON DEFICIENCY ANEMIA, UNSPECIFIED IRON DEFICIENCY ANEMIA TYPE: ICD-10-CM

## 2021-06-18 DIAGNOSIS — F41.1 GENERALIZED ANXIETY DISORDER: ICD-10-CM

## 2021-06-18 DIAGNOSIS — Z00.00 ENCOUNTER FOR PREVENTIVE HEALTH EXAMINATION: Primary | ICD-10-CM

## 2021-06-18 DIAGNOSIS — Z98.2 VENTRICULO-PERITONEAL SHUNT STATUS: ICD-10-CM

## 2021-06-18 DIAGNOSIS — N13.8 BENIGN PROSTATIC HYPERPLASIA WITH URINARY OBSTRUCTION: ICD-10-CM

## 2021-06-18 DIAGNOSIS — E78.2 MIXED HYPERLIPIDEMIA: ICD-10-CM

## 2021-06-18 DIAGNOSIS — G91.2 NORMAL PRESSURE HYDROCEPHALUS: ICD-10-CM

## 2021-06-18 DIAGNOSIS — R56.9 SEIZURE: ICD-10-CM

## 2021-06-18 DIAGNOSIS — N40.1 BENIGN PROSTATIC HYPERPLASIA WITH URINARY OBSTRUCTION: ICD-10-CM

## 2021-06-18 DIAGNOSIS — Z99.89 DEPENDENCE ON OTHER ENABLING MACHINES AND DEVICES: ICD-10-CM

## 2021-06-18 DIAGNOSIS — R26.9 ABNORMALITY OF GAIT AND MOBILITY: ICD-10-CM

## 2021-06-18 DIAGNOSIS — F02.80 DEMENTIA ASSOCIATED WITH OTHER UNDERLYING DISEASE WITHOUT BEHAVIORAL DISTURBANCE: ICD-10-CM

## 2021-06-18 DIAGNOSIS — Z74.09 OTHER REDUCED MOBILITY: ICD-10-CM

## 2021-06-18 DIAGNOSIS — F33.42 RECURRENT MAJOR DEPRESSIVE DISORDER, IN FULL REMISSION: ICD-10-CM

## 2021-06-18 DIAGNOSIS — I70.0 ATHEROSCLEROSIS OF AORTA: ICD-10-CM

## 2021-06-18 PROCEDURE — 1126F PR PAIN SEVERITY QUANTIFIED, NO PAIN PRESENT: ICD-10-PCS | Mod: S$GLB,,, | Performed by: NURSE PRACTITIONER

## 2021-06-18 PROCEDURE — 1158F ADVNC CARE PLAN TLK DOCD: CPT | Mod: S$GLB,,, | Performed by: NURSE PRACTITIONER

## 2021-06-18 PROCEDURE — 3288F FALL RISK ASSESSMENT DOCD: CPT | Mod: CPTII,S$GLB,, | Performed by: NURSE PRACTITIONER

## 2021-06-18 PROCEDURE — 99499 RISK ADDL DX/OHS AUDIT: ICD-10-PCS | Mod: S$GLB,,, | Performed by: NURSE PRACTITIONER

## 2021-06-18 PROCEDURE — G0439 PPPS, SUBSEQ VISIT: HCPCS | Mod: S$GLB,,, | Performed by: NURSE PRACTITIONER

## 2021-06-18 PROCEDURE — 1158F PR ADVANCE CARE PLANNING DISCUSS DOCUMENTED IN MEDICAL RECORD: ICD-10-PCS | Mod: S$GLB,,, | Performed by: NURSE PRACTITIONER

## 2021-06-18 PROCEDURE — 3288F PR FALLS RISK ASSESSMENT DOCUMENTED: ICD-10-PCS | Mod: CPTII,S$GLB,, | Performed by: NURSE PRACTITIONER

## 2021-06-18 PROCEDURE — 1126F AMNT PAIN NOTED NONE PRSNT: CPT | Mod: S$GLB,,, | Performed by: NURSE PRACTITIONER

## 2021-06-18 PROCEDURE — 1101F PT FALLS ASSESS-DOCD LE1/YR: CPT | Mod: CPTII,S$GLB,, | Performed by: NURSE PRACTITIONER

## 2021-06-18 PROCEDURE — 1101F PR PT FALLS ASSESS DOC 0-1 FALLS W/OUT INJ PAST YR: ICD-10-PCS | Mod: CPTII,S$GLB,, | Performed by: NURSE PRACTITIONER

## 2021-06-18 PROCEDURE — G0439 PR MEDICARE ANNUAL WELLNESS SUBSEQUENT VISIT: ICD-10-PCS | Mod: S$GLB,,, | Performed by: NURSE PRACTITIONER

## 2021-06-18 PROCEDURE — 99499 UNLISTED E&M SERVICE: CPT | Mod: S$GLB,,, | Performed by: NURSE PRACTITIONER

## 2021-06-22 ENCOUNTER — LAB VISIT (OUTPATIENT)
Dept: LAB | Facility: HOSPITAL | Age: 86
End: 2021-06-22
Attending: INTERNAL MEDICINE
Payer: MEDICARE

## 2021-06-22 ENCOUNTER — OFFICE VISIT (OUTPATIENT)
Dept: PRIMARY CARE CLINIC | Facility: CLINIC | Age: 86
End: 2021-06-22
Payer: MEDICARE

## 2021-06-22 VITALS
OXYGEN SATURATION: 99 % | HEART RATE: 89 BPM | TEMPERATURE: 98 F | BODY MASS INDEX: 25.51 KG/M2 | HEIGHT: 68 IN | SYSTOLIC BLOOD PRESSURE: 118 MMHG | WEIGHT: 168.31 LBS | DIASTOLIC BLOOD PRESSURE: 68 MMHG

## 2021-06-22 DIAGNOSIS — F41.1 GENERALIZED ANXIETY DISORDER: ICD-10-CM

## 2021-06-22 DIAGNOSIS — E55.9 VITAMIN D DEFICIENCY: ICD-10-CM

## 2021-06-22 DIAGNOSIS — I70.0 ATHEROSCLEROSIS OF AORTA: ICD-10-CM

## 2021-06-22 DIAGNOSIS — R56.9 SEIZURE: ICD-10-CM

## 2021-06-22 DIAGNOSIS — D50.9 MICROCYTIC ANEMIA: ICD-10-CM

## 2021-06-22 DIAGNOSIS — K59.00 CONSTIPATION, UNSPECIFIED CONSTIPATION TYPE: Primary | ICD-10-CM

## 2021-06-22 DIAGNOSIS — D50.9 IRON DEFICIENCY ANEMIA, UNSPECIFIED IRON DEFICIENCY ANEMIA TYPE: ICD-10-CM

## 2021-06-22 DIAGNOSIS — R79.9 ABNORMAL FINDING OF BLOOD CHEMISTRY, UNSPECIFIED: ICD-10-CM

## 2021-06-22 DIAGNOSIS — R53.2 FUNCTIONAL QUADRIPLEGIA: ICD-10-CM

## 2021-06-22 LAB
25(OH)D3+25(OH)D2 SERPL-MCNC: 18 NG/ML (ref 30–96)
ALBUMIN SERPL BCP-MCNC: 3.5 G/DL (ref 3.5–5.2)
ALP SERPL-CCNC: 147 U/L (ref 55–135)
ALT SERPL W/O P-5'-P-CCNC: 17 U/L (ref 10–44)
ANION GAP SERPL CALC-SCNC: 12 MMOL/L (ref 8–16)
AST SERPL-CCNC: 21 U/L (ref 10–40)
BASOPHILS # BLD AUTO: 0.05 K/UL (ref 0–0.2)
BASOPHILS NFR BLD: 0.5 % (ref 0–1.9)
BILIRUB SERPL-MCNC: 0.7 MG/DL (ref 0.1–1)
BUN SERPL-MCNC: 12 MG/DL (ref 10–30)
CALCIUM SERPL-MCNC: 9.3 MG/DL (ref 8.7–10.5)
CHLORIDE SERPL-SCNC: 108 MMOL/L (ref 95–110)
CHOLEST SERPL-MCNC: 163 MG/DL (ref 120–199)
CHOLEST/HDLC SERPL: 3 {RATIO} (ref 2–5)
CO2 SERPL-SCNC: 23 MMOL/L (ref 23–29)
CREAT SERPL-MCNC: 0.9 MG/DL (ref 0.5–1.4)
DIFFERENTIAL METHOD: ABNORMAL
EOSINOPHIL # BLD AUTO: 0.2 K/UL (ref 0–0.5)
EOSINOPHIL NFR BLD: 1.9 % (ref 0–8)
ERYTHROCYTE [DISTWIDTH] IN BLOOD BY AUTOMATED COUNT: 18.5 % (ref 11.5–14.5)
EST. GFR  (AFRICAN AMERICAN): >60 ML/MIN/1.73 M^2
EST. GFR  (NON AFRICAN AMERICAN): >60 ML/MIN/1.73 M^2
ESTIMATED AVG GLUCOSE: 103 MG/DL (ref 68–131)
GLUCOSE SERPL-MCNC: 110 MG/DL (ref 70–110)
HBA1C MFR BLD: 5.2 % (ref 4–5.6)
HCT VFR BLD AUTO: 32.9 % (ref 40–54)
HDLC SERPL-MCNC: 54 MG/DL (ref 40–75)
HDLC SERPL: 33.1 % (ref 20–50)
HGB BLD-MCNC: 9.2 G/DL (ref 14–18)
IMM GRANULOCYTES # BLD AUTO: 0.03 K/UL (ref 0–0.04)
IMM GRANULOCYTES NFR BLD AUTO: 0.3 % (ref 0–0.5)
LDLC SERPL CALC-MCNC: 93.6 MG/DL (ref 63–159)
LYMPHOCYTES # BLD AUTO: 1.3 K/UL (ref 1–4.8)
LYMPHOCYTES NFR BLD: 13.1 % (ref 18–48)
MCH RBC QN AUTO: 21.1 PG (ref 27–31)
MCHC RBC AUTO-ENTMCNC: 28 G/DL (ref 32–36)
MCV RBC AUTO: 75 FL (ref 82–98)
MONOCYTES # BLD AUTO: 0.7 K/UL (ref 0.3–1)
MONOCYTES NFR BLD: 6.5 % (ref 4–15)
NEUTROPHILS # BLD AUTO: 8 K/UL (ref 1.8–7.7)
NEUTROPHILS NFR BLD: 77.7 % (ref 38–73)
NONHDLC SERPL-MCNC: 109 MG/DL
NRBC BLD-RTO: 0 /100 WBC
PLATELET # BLD AUTO: 382 K/UL (ref 150–450)
PMV BLD AUTO: 10.4 FL (ref 9.2–12.9)
POTASSIUM SERPL-SCNC: 4.3 MMOL/L (ref 3.5–5.1)
PROT SERPL-MCNC: 7.6 G/DL (ref 6–8.4)
RBC # BLD AUTO: 4.37 M/UL (ref 4.6–6.2)
SODIUM SERPL-SCNC: 143 MMOL/L (ref 136–145)
TRIGL SERPL-MCNC: 77 MG/DL (ref 30–150)
TSH SERPL DL<=0.005 MIU/L-ACNC: 1.02 UIU/ML (ref 0.4–4)
WBC # BLD AUTO: 10.25 K/UL (ref 3.9–12.7)

## 2021-06-22 PROCEDURE — 36415 COLL VENOUS BLD VENIPUNCTURE: CPT | Performed by: INTERNAL MEDICINE

## 2021-06-22 PROCEDURE — 3288F FALL RISK ASSESSMENT DOCD: CPT | Mod: CPTII,S$GLB,, | Performed by: INTERNAL MEDICINE

## 2021-06-22 PROCEDURE — 80053 COMPREHEN METABOLIC PANEL: CPT | Performed by: INTERNAL MEDICINE

## 2021-06-22 PROCEDURE — 1159F PR MEDICATION LIST DOCUMENTED IN MEDICAL RECORD: ICD-10-PCS | Mod: S$GLB,,, | Performed by: INTERNAL MEDICINE

## 2021-06-22 PROCEDURE — G0009 PNEUMOCOCCAL POLYSACCHARIDE VACCINE 23-VALENT =>2YO SQ IM: ICD-10-PCS | Mod: S$GLB,,, | Performed by: INTERNAL MEDICINE

## 2021-06-22 PROCEDURE — 1126F PR PAIN SEVERITY QUANTIFIED, NO PAIN PRESENT: ICD-10-PCS | Mod: S$GLB,,, | Performed by: INTERNAL MEDICINE

## 2021-06-22 PROCEDURE — 1126F AMNT PAIN NOTED NONE PRSNT: CPT | Mod: S$GLB,,, | Performed by: INTERNAL MEDICINE

## 2021-06-22 PROCEDURE — 80061 LIPID PANEL: CPT | Performed by: INTERNAL MEDICINE

## 2021-06-22 PROCEDURE — 99499 RISK ADDL DX/OHS AUDIT: ICD-10-PCS | Mod: S$GLB,,, | Performed by: INTERNAL MEDICINE

## 2021-06-22 PROCEDURE — 84443 ASSAY THYROID STIM HORMONE: CPT | Performed by: INTERNAL MEDICINE

## 2021-06-22 PROCEDURE — 1157F PR ADVANCE CARE PLAN OR EQUIV PRESENT IN MEDICAL RECORD: ICD-10-PCS | Mod: S$GLB,,, | Performed by: INTERNAL MEDICINE

## 2021-06-22 PROCEDURE — 83036 HEMOGLOBIN GLYCOSYLATED A1C: CPT | Performed by: INTERNAL MEDICINE

## 2021-06-22 PROCEDURE — 90732 PPSV23 VACC 2 YRS+ SUBQ/IM: CPT | Mod: S$GLB,,, | Performed by: INTERNAL MEDICINE

## 2021-06-22 PROCEDURE — 1101F PR PT FALLS ASSESS DOC 0-1 FALLS W/OUT INJ PAST YR: ICD-10-PCS | Mod: CPTII,S$GLB,, | Performed by: INTERNAL MEDICINE

## 2021-06-22 PROCEDURE — 90732 PNEUMOCOCCAL POLYSACCHARIDE VACCINE 23-VALENT =>2YO SQ IM: ICD-10-PCS | Mod: S$GLB,,, | Performed by: INTERNAL MEDICINE

## 2021-06-22 PROCEDURE — 1101F PT FALLS ASSESS-DOCD LE1/YR: CPT | Mod: CPTII,S$GLB,, | Performed by: INTERNAL MEDICINE

## 2021-06-22 PROCEDURE — 85025 COMPLETE CBC W/AUTO DIFF WBC: CPT | Performed by: INTERNAL MEDICINE

## 2021-06-22 PROCEDURE — 3288F PR FALLS RISK ASSESSMENT DOCUMENTED: ICD-10-PCS | Mod: CPTII,S$GLB,, | Performed by: INTERNAL MEDICINE

## 2021-06-22 PROCEDURE — 1157F ADVNC CARE PLAN IN RCRD: CPT | Mod: S$GLB,,, | Performed by: INTERNAL MEDICINE

## 2021-06-22 PROCEDURE — 99499 UNLISTED E&M SERVICE: CPT | Mod: S$GLB,,, | Performed by: INTERNAL MEDICINE

## 2021-06-22 PROCEDURE — 82306 VITAMIN D 25 HYDROXY: CPT | Performed by: INTERNAL MEDICINE

## 2021-06-22 PROCEDURE — 1159F MED LIST DOCD IN RCRD: CPT | Mod: S$GLB,,, | Performed by: INTERNAL MEDICINE

## 2021-06-22 PROCEDURE — 99215 OFFICE O/P EST HI 40 MIN: CPT | Mod: S$GLB,,, | Performed by: INTERNAL MEDICINE

## 2021-06-22 PROCEDURE — 99215 PR OFFICE/OUTPT VISIT, EST, LEVL V, 40-54 MIN: ICD-10-PCS | Mod: S$GLB,,, | Performed by: INTERNAL MEDICINE

## 2021-06-22 PROCEDURE — G0009 ADMIN PNEUMOCOCCAL VACCINE: HCPCS | Mod: S$GLB,,, | Performed by: INTERNAL MEDICINE

## 2021-06-22 RX ORDER — FERROUS SULFATE 324(65)MG
325 TABLET, DELAYED RELEASE (ENTERIC COATED) ORAL DAILY
Qty: 90 TABLET | Refills: 3 | Status: SHIPPED | OUTPATIENT
Start: 2021-06-22 | End: 2021-06-22 | Stop reason: SDUPTHER

## 2021-06-22 RX ORDER — ATORVASTATIN CALCIUM 20 MG/1
TABLET, FILM COATED ORAL
Qty: 90 TABLET | Refills: 3 | Status: SHIPPED | OUTPATIENT
Start: 2021-06-22 | End: 2021-06-22 | Stop reason: SDUPTHER

## 2021-06-22 RX ORDER — ATORVASTATIN CALCIUM 20 MG/1
TABLET, FILM COATED ORAL
Qty: 90 TABLET | Refills: 3 | Status: SHIPPED | OUTPATIENT
Start: 2021-06-22 | End: 2021-10-11 | Stop reason: SDUPTHER

## 2021-06-22 RX ORDER — FERROUS SULFATE 324(65)MG
325 TABLET, DELAYED RELEASE (ENTERIC COATED) ORAL DAILY
Qty: 90 TABLET | Refills: 3 | Status: SHIPPED | OUTPATIENT
Start: 2021-06-22 | End: 2021-10-11 | Stop reason: SDUPTHER

## 2021-06-22 RX ORDER — LEVETIRACETAM 1000 MG/1
TABLET ORAL
Qty: 180 TABLET | Refills: 3 | Status: SHIPPED | OUTPATIENT
Start: 2021-06-22 | End: 2021-10-11 | Stop reason: SDUPTHER

## 2021-06-22 RX ORDER — CITALOPRAM 10 MG/1
TABLET ORAL
Qty: 90 TABLET | Refills: 3 | Status: SHIPPED | OUTPATIENT
Start: 2021-06-22

## 2021-06-22 RX ORDER — VIT C/E/ZN/COPPR/LUTEIN/ZEAXAN 250MG-90MG
1000 CAPSULE ORAL DAILY
Qty: 90 CAPSULE | Refills: 3 | Status: SHIPPED | OUTPATIENT
Start: 2021-06-22 | End: 2021-06-22 | Stop reason: SDUPTHER

## 2021-06-22 RX ORDER — LANOLIN ALCOHOL/MO/W.PET/CERES
100 CREAM (GRAM) TOPICAL DAILY
Qty: 90 TABLET | Refills: 3 | Status: SHIPPED | OUTPATIENT
Start: 2021-06-22 | End: 2021-06-22 | Stop reason: SDUPTHER

## 2021-06-22 RX ORDER — VIT C/E/ZN/COPPR/LUTEIN/ZEAXAN 250MG-90MG
1000 CAPSULE ORAL DAILY
Qty: 90 CAPSULE | Refills: 3 | Status: SHIPPED | OUTPATIENT
Start: 2021-06-22 | End: 2021-10-11 | Stop reason: SDUPTHER

## 2021-06-22 RX ORDER — LACTULOSE 10 G/15ML
SOLUTION ORAL; RECTAL
Qty: 1892 ML | Refills: 3 | Status: SHIPPED | OUTPATIENT
Start: 2021-06-22 | End: 2021-11-23

## 2021-06-22 RX ORDER — CITALOPRAM 10 MG/1
TABLET ORAL
Qty: 90 TABLET | Refills: 3 | Status: SHIPPED | OUTPATIENT
Start: 2021-06-22 | End: 2021-06-22 | Stop reason: SDUPTHER

## 2021-06-22 RX ORDER — LEVETIRACETAM 1000 MG/1
TABLET ORAL
Qty: 180 TABLET | Refills: 3 | Status: SHIPPED | OUTPATIENT
Start: 2021-06-22 | End: 2021-06-22 | Stop reason: SDUPTHER

## 2021-06-22 RX ORDER — LANOLIN ALCOHOL/MO/W.PET/CERES
100 CREAM (GRAM) TOPICAL DAILY
Qty: 90 TABLET | Refills: 3 | Status: SHIPPED | OUTPATIENT
Start: 2021-06-22 | End: 2021-10-11 | Stop reason: SDUPTHER

## 2021-06-25 ENCOUNTER — TELEPHONE (OUTPATIENT)
Dept: PRIMARY CARE CLINIC | Facility: CLINIC | Age: 86
End: 2021-06-25

## 2021-06-30 ENCOUNTER — TELEPHONE (OUTPATIENT)
Dept: PRIMARY CARE CLINIC | Facility: CLINIC | Age: 86
End: 2021-06-30

## 2021-08-02 ENCOUNTER — CARE AT HOME (OUTPATIENT)
Dept: HOME HEALTH SERVICES | Facility: CLINIC | Age: 86
End: 2021-08-02
Payer: MEDICARE

## 2021-08-02 VITALS
DIASTOLIC BLOOD PRESSURE: 70 MMHG | RESPIRATION RATE: 18 BRPM | OXYGEN SATURATION: 95 % | SYSTOLIC BLOOD PRESSURE: 134 MMHG | HEART RATE: 74 BPM

## 2021-08-02 DIAGNOSIS — R53.81 DEBILITY: ICD-10-CM

## 2021-08-02 DIAGNOSIS — F02.80 DEMENTIA ASSOCIATED WITH OTHER UNDERLYING DISEASE WITHOUT BEHAVIORAL DISTURBANCE: Primary | ICD-10-CM

## 2021-08-02 DIAGNOSIS — R26.9 ABNORMALITY OF GAIT AND MOBILITY: ICD-10-CM

## 2021-08-02 PROCEDURE — 99350 PR HOME VISIT,ESTAB PATIENT,LEVEL IV: ICD-10-PCS | Mod: S$GLB,,, | Performed by: NURSE PRACTITIONER

## 2021-08-02 PROCEDURE — 99350 HOME/RES VST EST HIGH MDM 60: CPT | Mod: S$GLB,,, | Performed by: NURSE PRACTITIONER

## 2021-08-05 PROCEDURE — G0180 MD CERTIFICATION HHA PATIENT: HCPCS | Mod: ,,, | Performed by: INTERNAL MEDICINE

## 2021-08-05 PROCEDURE — G0180 PR HOME HEALTH MD CERTIFICATION: ICD-10-PCS | Mod: ,,, | Performed by: INTERNAL MEDICINE

## 2021-08-16 ENCOUNTER — EXTERNAL HOME HEALTH (OUTPATIENT)
Dept: HOME HEALTH SERVICES | Facility: HOSPITAL | Age: 86
End: 2021-08-16
Payer: MEDICARE

## 2021-09-08 ENCOUNTER — TELEPHONE (OUTPATIENT)
Dept: PRIMARY CARE CLINIC | Facility: CLINIC | Age: 86
End: 2021-09-08

## 2021-09-20 ENCOUNTER — DOCUMENT SCAN (OUTPATIENT)
Dept: HOME HEALTH SERVICES | Facility: HOSPITAL | Age: 86
End: 2021-09-20
Payer: MEDICARE

## 2021-10-01 ENCOUNTER — OFFICE VISIT (OUTPATIENT)
Dept: PRIMARY CARE CLINIC | Facility: CLINIC | Age: 86
End: 2021-10-01
Payer: MEDICARE

## 2021-10-01 DIAGNOSIS — R53.2 FUNCTIONAL QUADRIPLEGIA: ICD-10-CM

## 2021-10-01 DIAGNOSIS — F02.80 DEMENTIA ASSOCIATED WITH OTHER UNDERLYING DISEASE WITHOUT BEHAVIORAL DISTURBANCE: ICD-10-CM

## 2021-10-01 PROCEDURE — 99443 PR PHYSICIAN TELEPHONE EVALUATION 21-30 MIN: ICD-10-PCS | Mod: HCNC,95,, | Performed by: INTERNAL MEDICINE

## 2021-10-01 PROCEDURE — 1157F PR ADVANCE CARE PLAN OR EQUIV PRESENT IN MEDICAL RECORD: ICD-10-PCS | Mod: HCNC,CPTII,95, | Performed by: INTERNAL MEDICINE

## 2021-10-01 PROCEDURE — 1157F ADVNC CARE PLAN IN RCRD: CPT | Mod: HCNC,CPTII,95, | Performed by: INTERNAL MEDICINE

## 2021-10-01 PROCEDURE — 99443 PR PHYSICIAN TELEPHONE EVALUATION 21-30 MIN: CPT | Mod: HCNC,95,, | Performed by: INTERNAL MEDICINE

## 2021-10-11 DIAGNOSIS — I70.0 ATHEROSCLEROSIS OF AORTA: ICD-10-CM

## 2021-10-11 DIAGNOSIS — D50.9 MICROCYTIC ANEMIA: ICD-10-CM

## 2021-10-11 DIAGNOSIS — D50.9 IRON DEFICIENCY ANEMIA, UNSPECIFIED IRON DEFICIENCY ANEMIA TYPE: ICD-10-CM

## 2021-10-11 DIAGNOSIS — R56.9 SEIZURE: ICD-10-CM

## 2021-10-11 DIAGNOSIS — E55.9 VITAMIN D DEFICIENCY: ICD-10-CM

## 2021-10-11 RX ORDER — LEVETIRACETAM 1000 MG/1
TABLET ORAL
Qty: 180 TABLET | Refills: 3 | Status: SHIPPED | OUTPATIENT
Start: 2021-10-11

## 2021-10-11 RX ORDER — ATORVASTATIN CALCIUM 20 MG/1
TABLET, FILM COATED ORAL
Qty: 90 TABLET | Refills: 3 | Status: SHIPPED | OUTPATIENT
Start: 2021-10-11 | End: 2023-01-21

## 2021-10-11 RX ORDER — LANOLIN ALCOHOL/MO/W.PET/CERES
100 CREAM (GRAM) TOPICAL DAILY
Qty: 90 TABLET | Refills: 3 | Status: SHIPPED | OUTPATIENT
Start: 2021-10-11 | End: 2023-01-21

## 2021-10-11 RX ORDER — FERROUS SULFATE 324(65)MG
325 TABLET, DELAYED RELEASE (ENTERIC COATED) ORAL DAILY
Qty: 90 TABLET | Refills: 3 | Status: SHIPPED | OUTPATIENT
Start: 2021-10-11 | End: 2023-01-21

## 2021-10-11 RX ORDER — VIT C/E/ZN/COPPR/LUTEIN/ZEAXAN 250MG-90MG
1000 CAPSULE ORAL DAILY
Qty: 90 CAPSULE | Refills: 3 | Status: SHIPPED | OUTPATIENT
Start: 2021-10-11 | End: 2023-01-21

## 2021-11-23 ENCOUNTER — CARE AT HOME (OUTPATIENT)
Dept: HOME HEALTH SERVICES | Facility: CLINIC | Age: 86
End: 2021-11-23
Payer: MEDICARE

## 2021-11-23 DIAGNOSIS — E55.9 VITAMIN D DEFICIENCY: ICD-10-CM

## 2021-11-23 DIAGNOSIS — Z66 DNR (DO NOT RESUSCITATE): ICD-10-CM

## 2021-11-23 DIAGNOSIS — D50.9 IRON DEFICIENCY ANEMIA, UNSPECIFIED IRON DEFICIENCY ANEMIA TYPE: ICD-10-CM

## 2021-11-23 DIAGNOSIS — R56.9 SEIZURE: ICD-10-CM

## 2021-11-23 DIAGNOSIS — I10 ESSENTIAL HYPERTENSION: ICD-10-CM

## 2021-11-23 DIAGNOSIS — R19.8 STRAINING DURING BOWEL MOVEMENTS: ICD-10-CM

## 2021-11-23 DIAGNOSIS — E78.2 MIXED HYPERLIPIDEMIA: ICD-10-CM

## 2021-11-23 DIAGNOSIS — F02.80 DEMENTIA ASSOCIATED WITH OTHER UNDERLYING DISEASE WITHOUT BEHAVIORAL DISTURBANCE: ICD-10-CM

## 2021-11-23 DIAGNOSIS — G91.2 NORMAL PRESSURE HYDROCEPHALUS: ICD-10-CM

## 2021-11-23 PROCEDURE — 99350 PR HOME VISIT,ESTAB PATIENT,LEVEL IV: ICD-10-PCS | Mod: S$GLB,,, | Performed by: NURSE PRACTITIONER

## 2021-11-23 PROCEDURE — 99497 PR ADVNCD CARE PLAN 30 MIN: ICD-10-PCS | Mod: S$GLB,,, | Performed by: NURSE PRACTITIONER

## 2021-11-23 PROCEDURE — 99497 ADVNCD CARE PLAN 30 MIN: CPT | Mod: S$GLB,,, | Performed by: NURSE PRACTITIONER

## 2021-11-23 PROCEDURE — 99350 HOME/RES VST EST HIGH MDM 60: CPT | Mod: S$GLB,,, | Performed by: NURSE PRACTITIONER

## 2021-11-23 RX ORDER — DOCUSATE SODIUM 100 MG/1
100 CAPSULE, LIQUID FILLED ORAL DAILY
Qty: 90 CAPSULE | Refills: 0 | Status: SHIPPED | OUTPATIENT
Start: 2021-11-23 | End: 2023-01-21

## 2021-11-27 VITALS
OXYGEN SATURATION: 99 % | TEMPERATURE: 98 F | HEART RATE: 66 BPM | SYSTOLIC BLOOD PRESSURE: 120 MMHG | RESPIRATION RATE: 18 BRPM | DIASTOLIC BLOOD PRESSURE: 70 MMHG

## 2021-11-27 PROBLEM — R19.8 STRAINING DURING BOWEL MOVEMENTS: Status: ACTIVE | Noted: 2021-11-27

## 2021-11-29 ENCOUNTER — TELEPHONE (OUTPATIENT)
Dept: PRIMARY CARE CLINIC | Facility: CLINIC | Age: 86
End: 2021-11-29
Payer: MEDICARE

## 2021-11-29 ENCOUNTER — CARE AT HOME (OUTPATIENT)
Dept: HOME HEALTH SERVICES | Facility: CLINIC | Age: 86
End: 2021-11-29
Payer: MEDICARE

## 2021-11-29 VITALS
HEART RATE: 80 BPM | SYSTOLIC BLOOD PRESSURE: 120 MMHG | RESPIRATION RATE: 20 BRPM | OXYGEN SATURATION: 98 % | DIASTOLIC BLOOD PRESSURE: 70 MMHG | TEMPERATURE: 98 F

## 2021-11-29 DIAGNOSIS — R56.9 SEIZURE: ICD-10-CM

## 2021-11-29 DIAGNOSIS — E78.2 MIXED HYPERLIPIDEMIA: ICD-10-CM

## 2021-11-29 DIAGNOSIS — G91.2 NORMAL PRESSURE HYDROCEPHALUS: ICD-10-CM

## 2021-11-29 DIAGNOSIS — Z66 DNR (DO NOT RESUSCITATE): ICD-10-CM

## 2021-11-29 DIAGNOSIS — R19.8 STRAINING DURING BOWEL MOVEMENTS: ICD-10-CM

## 2021-11-29 DIAGNOSIS — I10 ESSENTIAL HYPERTENSION: ICD-10-CM

## 2021-11-29 DIAGNOSIS — D50.9 IRON DEFICIENCY ANEMIA, UNSPECIFIED IRON DEFICIENCY ANEMIA TYPE: ICD-10-CM

## 2021-11-29 DIAGNOSIS — R53.81 DECLINING FUNCTIONAL STATUS: ICD-10-CM

## 2021-11-29 DIAGNOSIS — F02.80 DEMENTIA ASSOCIATED WITH OTHER UNDERLYING DISEASE WITHOUT BEHAVIORAL DISTURBANCE: ICD-10-CM

## 2021-11-29 DIAGNOSIS — E55.9 VITAMIN D DEFICIENCY: ICD-10-CM

## 2021-11-29 PROCEDURE — 99350 HOME/RES VST EST HIGH MDM 60: CPT | Mod: S$GLB,,, | Performed by: NURSE PRACTITIONER

## 2021-11-29 PROCEDURE — 99350 PR HOME VISIT,ESTAB PATIENT,LEVEL IV: ICD-10-PCS | Mod: S$GLB,,, | Performed by: NURSE PRACTITIONER

## 2022-01-07 ENCOUNTER — OFFICE VISIT (OUTPATIENT)
Dept: PRIMARY CARE CLINIC | Facility: CLINIC | Age: 87
End: 2022-01-07
Payer: MEDICARE

## 2022-01-07 ENCOUNTER — TELEPHONE (OUTPATIENT)
Dept: PRIMARY CARE CLINIC | Facility: CLINIC | Age: 87
End: 2022-01-07

## 2022-01-07 DIAGNOSIS — F02.80 DEMENTIA ASSOCIATED WITH OTHER UNDERLYING DISEASE WITHOUT BEHAVIORAL DISTURBANCE: ICD-10-CM

## 2022-01-07 DIAGNOSIS — F02.80 DEMENTIA ASSOCIATED WITH NORMAL PRESSURE HYDROCEPHALUS: ICD-10-CM

## 2022-01-07 DIAGNOSIS — Z98.2 VENTRICULO-PERITONEAL SHUNT STATUS: ICD-10-CM

## 2022-01-07 DIAGNOSIS — R53.2 FUNCTIONAL QUADRIPLEGIA: Primary | ICD-10-CM

## 2022-01-07 DIAGNOSIS — G91.2 DEMENTIA ASSOCIATED WITH NORMAL PRESSURE HYDROCEPHALUS: ICD-10-CM

## 2022-01-07 PROCEDURE — 99443 PR PHYSICIAN TELEPHONE EVALUATION 21-30 MIN: CPT | Mod: HCNC,95,, | Performed by: INTERNAL MEDICINE

## 2022-01-07 PROCEDURE — 1157F ADVNC CARE PLAN IN RCRD: CPT | Mod: HCNC,CPTII,95, | Performed by: INTERNAL MEDICINE

## 2022-01-07 PROCEDURE — 99443 PR PHYSICIAN TELEPHONE EVALUATION 21-30 MIN: ICD-10-PCS | Mod: HCNC,95,, | Performed by: INTERNAL MEDICINE

## 2022-01-07 PROCEDURE — 1157F PR ADVANCE CARE PLAN OR EQUIV PRESENT IN MEDICAL RECORD: ICD-10-PCS | Mod: HCNC,CPTII,95, | Performed by: INTERNAL MEDICINE

## 2022-01-07 NOTE — ASSESSMENT & PLAN NOTE
S/p  shunt 2/2017.  No recent eval with NS or neuro and pt now homebound.    · Will follow for chg in symptoms.   · PCP advised daughter to call this office with any mental status changes  · Advised hospice will likely provide more check-ins

## 2022-01-07 NOTE — PROGRESS NOTES
"Established Patient - [facility]     The patient location is: HOME  The chief complaint leading to consultation is: routine care  Visit type: Virtual visit with audio only (telephone)     The reason for the audio only service rather than synchronous audio and video virtual visit was related to technical difficulties or patient preference/necessity.     Each patient to whom I provide medical services by telemedicine is:  (1) informed of the relationship between the physician and patient and the respective role of any other health care provider with respect to management of the patient; and (2) notified that they may decline to receive medical services by telemedicine and may withdraw from such care at any time. Patient verbally consented to receive this service via voice-only telephone call.     This virtual care is being rendered during [place]'s state of emergency and the COVID-19 public health crisis. My patient is temporarily unable to be seen in [place] due to [place].       Subjective:      Patient ID: Atif Neves is a 89 y.o. male with bedbound status, dementia, urinary incontinence    Prior to this visit, patient's last encounter with PCP was 10/1/2021.    Patient's daughter states that he has a "problem with urination, he doesn't realize that he's wetting himself." He eats full meals each day. "He loves his peppermint." Sometimes he gets aggravated when family tries to get him out of the bed. He prefers to stay in the bed, than get onto the chair.     He has NPH with a  shunt placed. No complications at this time. Progressing with dementia and continues to decline. Although he is eating, he does not want to get out of bed and follow instructions.    He has not had a seizure recently. Takes keppra regularly.    Per NP Dev's last home visit, she recommended hospice, but family declined.      Family is open to having an informational interview for " hospice.    Objective:     Lab Results   Component Value Date    WBC 10.25 06/22/2021    HGB 9.2 (L) 06/22/2021    HCT 32.9 (L) 06/22/2021     06/22/2021    CHOL 163 06/22/2021    TRIG 77 06/22/2021    HDL 54 06/22/2021    ALT 17 06/22/2021    AST 21 06/22/2021     06/22/2021    K 4.3 06/22/2021     06/22/2021    CREATININE 0.9 06/22/2021    BUN 12 06/22/2021    CO2 23 06/22/2021    TSH 1.020 06/22/2021    INR 0.9 02/06/2017    HGBA1C 5.2 06/22/2021         Assessment:   89 y.o. male with multiple co-morbid illnesses here to continue work-up of chronic issues.     Plan:     Problem List Items Addressed This Visit        Neuro    Ventriculo-peritoneal shunt status     Placed 2/2017 for NPH.  No recent imaging.    · No routine f/u indicated.           Relevant Orders    Ambulatory referral/consult to Hospice    Dementia associated with other underlying disease without behavioral disturbance     S/p  shunt 2/2017.  No recent eval with NS or neuro and pt now homebound.    · Will follow for chg in symptoms.   · PCP advised daughter to call this office with any mental status changes  · Advised hospice will likely provide more check-ins            Other    Dementia associated with normal pressure hydrocephalus     NPH causing worsening dementia. Now bedbound  · Advised family to consider dialysis         Relevant Orders    Ambulatory referral/consult to Hospice    Functional quadriplegia - Primary    Relevant Orders    Ambulatory referral/consult to Hospice          Health Maintenance       Date Due Completion Date    Shingles Vaccine (1 of 2) Never done ---    Influenza Vaccine (1) 09/01/2021 8/27/2015    COVID-19 Vaccine (3 - Booster for Pfizer series) 12/11/2021 6/11/2021    Lipid Panel 06/22/2026 6/22/2021    TETANUS VACCINE 04/10/2027 4/10/2017          Follow up in about 4 weeks (around 2/4/2022). . Thirty minutes spent with this patient today.    Ashley Richards MD/MPH  Internal  Medicine [facility]  Spectra [phone]    This service was not originating from a related E/M service provided within the previous 7 days nor will  to an E/M service or procedure within the next 24 hours or my soonest available appointment.  Prevailing standard of care was able to be met in this audio-only visit.

## 2022-01-10 ENCOUNTER — CARE AT HOME (OUTPATIENT)
Dept: HOME HEALTH SERVICES | Facility: CLINIC | Age: 87
End: 2022-01-10
Payer: MEDICARE

## 2022-01-10 VITALS
TEMPERATURE: 98 F | HEART RATE: 80 BPM | SYSTOLIC BLOOD PRESSURE: 122 MMHG | DIASTOLIC BLOOD PRESSURE: 78 MMHG | OXYGEN SATURATION: 98 % | RESPIRATION RATE: 20 BRPM

## 2022-01-10 DIAGNOSIS — F02.80 DEMENTIA ASSOCIATED WITH OTHER UNDERLYING DISEASE WITHOUT BEHAVIORAL DISTURBANCE: Primary | ICD-10-CM

## 2022-01-10 DIAGNOSIS — R53.1 WEAKNESS: ICD-10-CM

## 2022-01-10 DIAGNOSIS — E78.2 MIXED HYPERLIPIDEMIA: ICD-10-CM

## 2022-01-10 DIAGNOSIS — R56.9 SEIZURE: ICD-10-CM

## 2022-01-10 PROBLEM — R19.8 STRAINING DURING BOWEL MOVEMENTS: Status: RESOLVED | Noted: 2021-11-27 | Resolved: 2022-01-10

## 2022-01-10 PROCEDURE — 99350 HOME/RES VST EST HIGH MDM 60: CPT | Mod: S$GLB,,, | Performed by: NURSE PRACTITIONER

## 2022-01-10 PROCEDURE — 99350 PR HOME VISIT,ESTAB PATIENT,LEVEL IV: ICD-10-PCS | Mod: S$GLB,,, | Performed by: NURSE PRACTITIONER

## 2022-01-11 NOTE — PROGRESS NOTES
"[facility]  Medical Home Visit    Visit Date: 1/10/2022  Encounter Provider: Choco Méndez NP  PCP:  Ashley Richards MD    Subjective:      Patient ID: Atif Neves is a 89 y.o. male.    Consult Requested By:  No ref. provider found  Reason for Consult: Medical Visit by Home Care Provider/ management of chronic medical issues     The patient is being seen at home due to a physical debility that presents a taxing effort to leave the home, to mitigate high risk of hospital readmission or due to the limited availability of reliable or safe options for transportation to the point of access to the provider. The visit meets the criteria for medical necessity as defined by CMS as "health-care services needed to prevent, diagnose, or treat an illness, injury, condition, disease, or its symptoms and that meet accepted standards of medicine." Prior to treatment on this visit the chart was reviewed and patient consent was obtained.    Chief Complaint: Follow-up for chronic medical conditions and medication review/declining functional ability    HPI: Atif Neves is a 89 y.o. male with Hx of HTN, anemia, HLD, and NPH s/p VPL in 2017. He follows with PCP Dr. Richards.       Today:  . Atif Neves is a 89 y.o. male.  He presents with weakness.  Caregiver reports he is in bed most of day.  [facility].  Daughter/Caregiver reports he was recently evaluated by hospice and deemed not appropriate at this time.  He requires moderate assistance with ADLs.  His daughter and grandson live in home and assist when needed.  He currently denies chest pain, SOB, fever, chills,cough, congestion, change in bladder habits, change in bowel habits.  Reports good appetite, bm pattern, sleep pattern. Discussed with daughter keeping patient OOB during day to help increase strength.  Risk of environmental exposure to coronavirus discussed including: social distancing, hand hygiene, and limiting " departures from the home for necessities only.  Reports understanding and willingness to comply.      Attestation: Screening criteria to assess the level of the patient's risk for infection with COVID-19 as recommended by the CDC at the time of the above documented home visit concluded appropriateness to proceed. Universal precautions were maintained at all times, including provider use of >60% alcohol gel hand  immediately prior to entry and upon departing the patient's home as well as cleaning of equipment used in home visit with antibacterial/germicidal disposable wipes.     Review of Systems   Constitutional: Negative for chills and fever.   HENT: Negative for congestion, postnasal drip and rhinorrhea.    Eyes: Negative for visual disturbance.   Respiratory: Negative for chest tightness and shortness of breath.    Cardiovascular: Negative for chest pain and palpitations.   Genitourinary: Negative for difficulty urinating.   Musculoskeletal: Positive for gait problem. Negative for arthralgias and myalgias.   Skin: Negative for color change and wound.   Neurological: Negative for dizziness and weakness.   Hematological: Does not bruise/bleed easily.   Psychiatric/Behavioral: Negative for agitation.           Objective:     Vitals:    01/10/22 2201   BP: 122/78   Pulse: 80   Resp: 20   Temp: 98 °F (36.7 °C)   SpO2: 98%   PainSc: 0-No pain     There is no height or weight on file to calculate BMI.    Physical Exam  HENT:      Head: Normocephalic and atraumatic.      Mouth/Throat:      Mouth: Mucous membranes are moist.   Eyes:      Extraocular Movements: Extraocular movements intact.   Cardiovascular:      Rate and Rhythm: Normal rate.   Pulmonary:      Effort: No respiratory distress.      Breath sounds: Normal breath sounds.   Abdominal:      General: Bowel sounds are normal.      Palpations: Abdomen is soft.   Musculoskeletal:      Cervical back: Normal range of motion and neck supple.      Right lower  leg: No edema.      Left lower leg: No edema.   Skin:     General: Skin is warm and dry.   Neurological:      General: No focal deficit present.      Mental Status: He is alert.   Psychiatric:         Mood and Affect: Mood normal.         Assessment:     1. Dementia associated with other underlying disease without behavioral disturbance    2. Seizure    3. Mixed hyperlipidemia    4. Weakness      Plan:        Atif was seen today for establish care.    Diagnoses and all orders for this visit:        Problem List Items Addressed This Visit        Neuro    Dementia associated with other underlying disease without behavioral disturbance - Primary     At baseline  Continue caregiver support          Seizure     Denies recent seizure activity  Continue keppra            Cardiac/Vascular    Mixed hyperlipidemia     Continue statin            Other    Weakness     Continue caregiver  OOB during day  Nutritional support               Encounter for Medical Follow-Up and Medication Review   - [facility] Nurse Practitioner to schedule home visit with patient in one month or PRN.    Were controlled substances prescribed? No    Instructions:  - [facility] Practitioner to schedule home follow-up visit with patient in 4-6 weeks or as needed.  - Continue all medications, treatments and therapies as ordered.   - Follow all instructions, recommendations as discussed.  - Maintain Safety Precautions at all times.  - Attend all medical appointments as scheduled.  - For worsening symptoms: call Primary Care Physician or Nurse Practitioner.  - For emergencies, call 911 or immediately report to the nearest emergency room.  - Limit Risks of environmental exposure to coronavirus/COVID-19 as discussed including: social distancing, hand hygiene, and limiting departures from the home for necessities only.        Follow Up Appointments:   No future appointments.    Patient consent was obtained prior to treatment on this  visit.    Signature:       Choco Méndez, MSN, APRN, FNP-C  [facility]    Total face-to-face time was 60 min, >50% of this was spent on counseling and coordination of care. The following issues were discussed: primary and secondary diagnoses, co-morbidities, prescribed medications, treatment modalities, importance of compliance with medical advice and directives for follow-up care

## 2022-01-11 NOTE — PATIENT INSTRUCTIONS
Instructions:  - [facility] to schedule home follow-up visit with patient in 4-6 weeks or as needed.  - Continue all medications, treatments and therapies as ordered.   - Follow all instructions, recommendations as discussed.  - Maintain Safety Precautions at all times.  - Attend all medical appointments as scheduled.  - For worsening symptoms: call Primary Care Physician or Nurse Practitioner.  - For emergencies, call 911 or immediately report to the nearest emergency room.  - Limit Risks of environmental exposure to coronavirus/COVID-19 as discussed including: social distancing, hand hygiene, and limiting departures from the home for necessities only.

## 2022-01-25 ENCOUNTER — TELEPHONE (OUTPATIENT)
Dept: PRIMARY CARE CLINIC | Facility: CLINIC | Age: 87
End: 2022-01-25
Payer: MEDICARE

## 2022-01-31 ENCOUNTER — HOSPITAL ENCOUNTER (INPATIENT)
Facility: OTHER | Age: 87
LOS: 1 days | Discharge: HOME HEALTH | DRG: 378 | End: 2022-02-04
Attending: EMERGENCY MEDICINE | Admitting: HOSPITALIST
Payer: MEDICARE

## 2022-01-31 DIAGNOSIS — K92.2 GI BLEED: ICD-10-CM

## 2022-01-31 DIAGNOSIS — R55 SYNCOPE: ICD-10-CM

## 2022-01-31 DIAGNOSIS — K92.2 LOWER GI BLEED: ICD-10-CM

## 2022-01-31 DIAGNOSIS — K92.2 GASTROINTESTINAL HEMORRHAGE, UNSPECIFIED GASTROINTESTINAL HEMORRHAGE TYPE: Primary | ICD-10-CM

## 2022-01-31 DIAGNOSIS — R55 SYNCOPE, UNSPECIFIED SYNCOPE TYPE: ICD-10-CM

## 2022-01-31 DIAGNOSIS — D62 ACUTE ON CHRONIC BLOOD LOSS ANEMIA: ICD-10-CM

## 2022-01-31 LAB
ABO + RH BLD: NORMAL
ALBUMIN SERPL BCP-MCNC: 2.7 G/DL (ref 3.5–5.2)
ALP SERPL-CCNC: 108 U/L (ref 55–135)
ALT SERPL W/O P-5'-P-CCNC: 11 U/L (ref 10–44)
ANION GAP SERPL CALC-SCNC: 8 MMOL/L (ref 8–16)
AST SERPL-CCNC: 21 U/L (ref 10–40)
BASOPHILS # BLD AUTO: 0.04 K/UL (ref 0–0.2)
BASOPHILS NFR BLD: 0.3 % (ref 0–1.9)
BILIRUB SERPL-MCNC: 0.4 MG/DL (ref 0.1–1)
BLD GP AB SCN CELLS X3 SERPL QL: NORMAL
BUN SERPL-MCNC: 9 MG/DL (ref 10–30)
CALCIUM SERPL-MCNC: 7.1 MG/DL (ref 8.7–10.5)
CHLORIDE SERPL-SCNC: 117 MMOL/L (ref 95–110)
CO2 SERPL-SCNC: 18 MMOL/L (ref 23–29)
CREAT SERPL-MCNC: 0.7 MG/DL (ref 0.5–1.4)
CTP QC/QA: YES
DIFFERENTIAL METHOD: ABNORMAL
EOSINOPHIL # BLD AUTO: 0 K/UL (ref 0–0.5)
EOSINOPHIL NFR BLD: 0.2 % (ref 0–8)
ERYTHROCYTE [DISTWIDTH] IN BLOOD BY AUTOMATED COUNT: 19.1 % (ref 11.5–14.5)
EST. GFR  (AFRICAN AMERICAN): >60 ML/MIN/1.73 M^2
EST. GFR  (NON AFRICAN AMERICAN): >60 ML/MIN/1.73 M^2
GLUCOSE SERPL-MCNC: 87 MG/DL (ref 70–110)
HCT VFR BLD AUTO: 27.4 % (ref 40–54)
HGB BLD-MCNC: 7.2 G/DL (ref 14–18)
IMM GRANULOCYTES # BLD AUTO: 0.06 K/UL (ref 0–0.04)
IMM GRANULOCYTES NFR BLD AUTO: 0.5 % (ref 0–0.5)
LYMPHOCYTES # BLD AUTO: 0.7 K/UL (ref 1–4.8)
LYMPHOCYTES NFR BLD: 5.3 % (ref 18–48)
MCH RBC QN AUTO: 18.5 PG (ref 27–31)
MCHC RBC AUTO-ENTMCNC: 26.3 G/DL (ref 32–36)
MCV RBC AUTO: 70 FL (ref 82–98)
MONOCYTES # BLD AUTO: 0.6 K/UL (ref 0.3–1)
MONOCYTES NFR BLD: 4.5 % (ref 4–15)
NEUTROPHILS # BLD AUTO: 11.6 K/UL (ref 1.8–7.7)
NEUTROPHILS NFR BLD: 89.2 % (ref 38–73)
NRBC BLD-RTO: 0 /100 WBC
PLATELET # BLD AUTO: 259 K/UL (ref 150–450)
PMV BLD AUTO: 11.3 FL (ref 9.2–12.9)
POTASSIUM SERPL-SCNC: 3.7 MMOL/L (ref 3.5–5.1)
PROT SERPL-MCNC: 5.4 G/DL (ref 6–8.4)
RBC # BLD AUTO: 3.89 M/UL (ref 4.6–6.2)
SARS-COV-2 RDRP RESP QL NAA+PROBE: NEGATIVE
SODIUM SERPL-SCNC: 143 MMOL/L (ref 136–145)
WBC # BLD AUTO: 12.97 K/UL (ref 3.9–12.7)

## 2022-01-31 PROCEDURE — 25000003 PHARM REV CODE 250: Mod: HCNC | Performed by: NURSE PRACTITIONER

## 2022-01-31 PROCEDURE — 93005 ELECTROCARDIOGRAM TRACING: CPT | Mod: HCNC

## 2022-01-31 PROCEDURE — 86920 COMPATIBILITY TEST SPIN: CPT | Mod: HCNC | Performed by: EMERGENCY MEDICINE

## 2022-01-31 PROCEDURE — G0378 HOSPITAL OBSERVATION PER HR: HCPCS | Mod: HCNC

## 2022-01-31 PROCEDURE — 99220 PR INITIAL OBSERVATION CARE,LEVL III: ICD-10-PCS | Mod: HCNC,,, | Performed by: INTERNAL MEDICINE

## 2022-01-31 PROCEDURE — C9113 INJ PANTOPRAZOLE SODIUM, VIA: HCPCS | Mod: HCNC | Performed by: NURSE PRACTITIONER

## 2022-01-31 PROCEDURE — 96361 HYDRATE IV INFUSION ADD-ON: CPT | Mod: HCNC

## 2022-01-31 PROCEDURE — 93010 ELECTROCARDIOGRAM REPORT: CPT | Mod: HCNC,,, | Performed by: INTERNAL MEDICINE

## 2022-01-31 PROCEDURE — 96375 TX/PRO/DX INJ NEW DRUG ADDON: CPT | Mod: HCNC

## 2022-01-31 PROCEDURE — 25000003 PHARM REV CODE 250: Mod: HCNC | Performed by: EMERGENCY MEDICINE

## 2022-01-31 PROCEDURE — 99220 PR INITIAL OBSERVATION CARE,LEVL III: CPT | Mod: HCNC,,, | Performed by: INTERNAL MEDICINE

## 2022-01-31 PROCEDURE — 86901 BLOOD TYPING SEROLOGIC RH(D): CPT | Mod: HCNC | Performed by: EMERGENCY MEDICINE

## 2022-01-31 PROCEDURE — 99285 EMERGENCY DEPT VISIT HI MDM: CPT | Mod: 25,HCNC

## 2022-01-31 PROCEDURE — 93010 EKG 12-LEAD: ICD-10-PCS | Mod: HCNC,,, | Performed by: INTERNAL MEDICINE

## 2022-01-31 PROCEDURE — 80053 COMPREHEN METABOLIC PANEL: CPT | Mod: HCNC | Performed by: EMERGENCY MEDICINE

## 2022-01-31 PROCEDURE — 85025 COMPLETE CBC W/AUTO DIFF WBC: CPT | Mod: HCNC | Performed by: EMERGENCY MEDICINE

## 2022-01-31 PROCEDURE — 63600175 PHARM REV CODE 636 W HCPCS: Mod: HCNC | Performed by: NURSE PRACTITIONER

## 2022-01-31 PROCEDURE — 96374 THER/PROPH/DIAG INJ IV PUSH: CPT | Mod: HCNC

## 2022-01-31 PROCEDURE — U0002 COVID-19 LAB TEST NON-CDC: HCPCS | Mod: HCNC | Performed by: NURSE PRACTITIONER

## 2022-01-31 RX ORDER — SODIUM CHLORIDE 9 MG/ML
INJECTION, SOLUTION INTRAVENOUS CONTINUOUS
Status: DISCONTINUED | OUTPATIENT
Start: 2022-01-31 | End: 2022-02-03

## 2022-01-31 RX ORDER — LEVETIRACETAM 500 MG/5ML
1000 INJECTION, SOLUTION, CONCENTRATE INTRAVENOUS EVERY 12 HOURS
Status: DISCONTINUED | OUTPATIENT
Start: 2022-01-31 | End: 2022-02-03

## 2022-01-31 RX ORDER — PANTOPRAZOLE SODIUM 40 MG/10ML
40 INJECTION, POWDER, LYOPHILIZED, FOR SOLUTION INTRAVENOUS 2 TIMES DAILY
Status: DISCONTINUED | OUTPATIENT
Start: 2022-01-31 | End: 2022-02-03

## 2022-01-31 RX ADMIN — PANTOPRAZOLE SODIUM 40 MG: 40 INJECTION, POWDER, FOR SOLUTION INTRAVENOUS at 10:01

## 2022-01-31 RX ADMIN — LEVETIRACETAM 1000 MG: 100 INJECTION, SOLUTION, CONCENTRATE INTRAVENOUS at 10:01

## 2022-01-31 RX ADMIN — SODIUM CHLORIDE 1000 ML: 0.9 INJECTION, SOLUTION INTRAVENOUS at 07:01

## 2022-01-31 RX ADMIN — SODIUM CHLORIDE: 0.9 INJECTION, SOLUTION INTRAVENOUS at 10:01

## 2022-01-31 NOTE — ED PROVIDER NOTES
"Encounter Date: 1/31/2022    SCRIBE #1 NOTE: I, Anjana Tirado, am scribing for, and in the presence of, Joshua Anderson II, MD.       History     Chief Complaint   Patient presents with    Loss of Consciousness     Vasovagal syncopal episode while having a bowel movement on the toilet at home. Per wife, pt remained in seated position on toilet; no trauma from LOC. Per wife, no blood thinners. AMS since incident occurred: confusion. Per wife, this is not normal.       Time seen by provider: 5:46 PM    This is a 89 y.o. male with PMHx of BPH, HTN, NPH, JONATAN who presents s/p syncopal episode. Per the patient's wife, the patient lost consciousness while on the toilet having a bowel movement. He remained in a seated position on the toilet. He did not fall and there was no trauma during the episode. Wife states that he has been confused since the incident. The patient denies any pain at this time. He is not on any blood thinners. This is the extent of the patient's complaints at this time.    The history is provided by the patient and the spouse.     Review of patient's allergies indicates:   Allergen Reactions    Aspirin Swelling     States, "makes me sick." pt does not elaborate.      Past Medical History:   Diagnosis Date    JONATAN (acute kidney injury) 06/2016    Bacteremia due to Gram-positive bacteria 3/24/2017    Antibiotics per ID recommendations; vancomycin x 14 days.     BPH (benign prostatic hypertrophy) with urinary obstruction 6/16/2016    Cystoid macular edema, left eye 8/4/2016    Essential hypertension 6/16/2016    Essential hypertension     Generalized anxiety disorder 6/16/2016    Glaucoma     Irregular heartbeat     Macular degeneration     Microcytic anemia 4/10/2017    Mixed hyperlipidemia 6/16/2016    Nonexudative age-related macular degeneration, bilateral, intermediate dry stage 8/4/2016    Normal pressure hydrocephalus     NPH (normal pressure hydrocephalus) 2/6/2017    Shunt placed " 17    Urinary retention 2016    Ventriculo-peritoneal shunt status 2017     Past Surgical History:   Procedure Laterality Date    TRANSURETHRAL RESECTION OF PROSTATE  2016     Family History   Problem Relation Age of Onset    Heart disease Brother     Cancer Daughter 55        Breast    Cancer Daughter 54        Breast    No Known Problems Son      Social History     Tobacco Use    Smoking status: Former Smoker     Packs/day: 1.00     Years: 18.00     Pack years: 18.00     Quit date: 1950     Years since quittin.1    Smokeless tobacco: Never Used   Substance Use Topics    Alcohol use: No    Drug use: No     Review of Systems   Constitutional: Negative for fever.   HENT: Negative for sore throat.    Respiratory: Negative for shortness of breath.    Cardiovascular: Negative for chest pain.   Gastrointestinal: Negative for nausea.   Genitourinary: Negative for dysuria.   Musculoskeletal: Negative for back pain.   Skin: Negative for rash.   Neurological: Positive for syncope. Negative for weakness.   Hematological: Does not bruise/bleed easily.   Psychiatric/Behavioral: Positive for confusion.       Physical Exam     Initial Vitals [22 1713]   BP Pulse Resp Temp SpO2   117/83 61 18 98 °F (36.7 °C) 97 %      MAP       --         Physical Exam    Nursing note and vitals reviewed.  Constitutional: He is not diaphoretic. No distress.   Frail appearing.   HENT:   Head: Normocephalic and atraumatic.   Dry mucous membranes.   Eyes: EOM are normal.   Mild pallor. No icterus.   Neck: Neck supple.   Normal range of motion.  Cardiovascular: Normal rate, regular rhythm and normal heart sounds. Exam reveals no gallop and no friction rub.    No murmur heard.  Pulmonary/Chest: Breath sounds normal. No respiratory distress. He has no wheezes. He has no rhonchi. He has no rales.   Abdominal: Abdomen is soft. Bowel sounds are normal. He exhibits no distension. There is no abdominal tenderness. There  is no rebound.   Genitourinary:    Genitourinary Comments: Brown stool, but strongly guaiac positive     Musculoskeletal:         General: No tenderness or edema. Normal range of motion.      Cervical back: Normal range of motion and neck supple.     Neurological:   Alert and oriented to person and place. Answers simple questions appropriately but poor short term memory due to dementia.   Skin: Skin is warm and dry.         ED Course   Procedures  Labs Reviewed   CBC W/ AUTO DIFFERENTIAL - Abnormal; Notable for the following components:       Result Value    WBC 12.97 (*)     RBC 3.89 (*)     Hemoglobin 7.2 (*)     Hematocrit 27.4 (*)     MCV 70 (*)     MCH 18.5 (*)     MCHC 26.3 (*)     RDW 19.1 (*)     Gran # (ANC) 11.6 (*)     Immature Grans (Abs) 0.06 (*)     Lymph # 0.7 (*)     Gran % 89.2 (*)     Lymph % 5.3 (*)     All other components within normal limits   COMPREHENSIVE METABOLIC PANEL - Abnormal; Notable for the following components:    Chloride 117 (*)     CO2 18 (*)     BUN 9 (*)     Calcium 7.1 (*)     Total Protein 5.4 (*)     Albumin 2.7 (*)     All other components within normal limits   SARS-COV-2 RDRP GENE   TYPE & SCREEN   PREPARE RBC SOFT     EKG Readings: (Independently Interpreted)   Rhythm: Normal Sinus Rhythm. Heart Rate: 66.   Limited by motion artifact. No apparent ischemia or ST changes. No arrhythmia.        Imaging Results          CT Head Without Contrast (Final result)  Result time 01/31/22 19:04:15    Final result by Milady Rutherford MD (01/31/22 19:04:15)                 Impression:      No acute intracranial abnormalities identified.  Stable dilatation of the ventricular system and stable positioning of right-sided  shunt catheter.  No significant detrimental change compared to previous CT head from April 2018.      Electronically signed by: Milady Rutherford MD  Date:    01/31/2022  Time:    19:04             Narrative:    EXAMINATION:  CT HEAD WITHOUT CONTRAST    CLINICAL  HISTORY:  Mental status change, unknown cause;    TECHNIQUE:  Low dose axial images were obtained through the head.  Coronal and sagittal reformations were also performed. Contrast was not administered.    COMPARISON:  CT head from April 2018.    FINDINGS:  Right-sided  shunt catheter enters via right parietal approach with distal tip terminating just to the left of midline within the anterior horn of the left lateral ventricle, in similar position to prior CT exam.  Ventricular system is stable in size with diffuse prominence seen.  There is generalized cerebral volume loss with chronic microvascular ischemic disease.  No evidence of acute/recent major vascular distribution cerebral infarction, intraparenchymal hemorrhage, or intra-axial space occupying lesion. No effacement of the skull-base cisterns. No abnormal extra-axial fluid collections or blood products.  There is mild left sphenoid sinus disease.  Remaining visualized paranasal sinuses and mastoid air cells are clear. The calvarium shows no significant abnormality.                                 Medications   sodium chloride 0.9% bolus 1,000 mL (1,000 mLs Intravenous New Bag 1/31/22 1949)     Medical Decision Making:   History:   Old Medical Records: I decided to obtain old medical records.  Independently Interpreted Test(s):   I have ordered and independently interpreted EKG Reading(s) - see prior notes  Clinical Tests:   Lab Tests: Ordered and Reviewed  Radiological Study: Reviewed and Ordered  Medical Tests: Reviewed and Ordered  Patient presents after apparent syncopal episode while straining on the toilet.  Reported transient hypotension in the field but normotensive here.  Appears to be at baseline, stable vital signs.  Laboratory studies show worsening anemia, now 7.2.  He is guaiac positive, no melena, suggestive of lower GI bleed.  Therefore will be typed and screened, cross-matched in case of need for transfusion.  Updated daughter who is at  bedside with these findings and plan of care.  Will admit the observation service for observation and serial H and H        Scribe Attestation:   Scribe #1: I performed the above scribed service and the documentation accurately describes the services I performed. I attest to the accuracy of the note.              Physician Attestation for Scribe: I, TL, reviewed documentation as scribed in my presence, which is both accurate and complete.    Clinical Impression:   Final diagnoses:  [R55] Syncope  [R55] Syncope, unspecified syncope type (Primary)  [D62] Acute on chronic blood loss anemia  [K92.2] Lower GI bleed          ED Disposition Condition    Observation               Joshua Anderson II, MD  01/31/22 2130       Joshua Anderson II, MD  01/31/22 2136

## 2022-02-01 PROCEDURE — 97535 SELF CARE MNGMENT TRAINING: CPT | Mod: HCNC

## 2022-02-01 PROCEDURE — 97162 PT EVAL MOD COMPLEX 30 MIN: CPT | Mod: HCNC

## 2022-02-01 PROCEDURE — 76937 US GUIDE VASCULAR ACCESS: CPT | Mod: HCNC

## 2022-02-01 PROCEDURE — G0378 HOSPITAL OBSERVATION PER HR: HCPCS | Mod: HCNC

## 2022-02-01 PROCEDURE — 99226 PR SUBSEQUENT OBSERVATION CARE,LEVEL III: CPT | Mod: HCNC,,, | Performed by: INTERNAL MEDICINE

## 2022-02-01 PROCEDURE — 36569 INSJ PICC 5 YR+ W/O IMAGING: CPT | Mod: HCNC

## 2022-02-01 PROCEDURE — C1751 CATH, INF, PER/CENT/MIDLINE: HCPCS | Mod: HCNC

## 2022-02-01 PROCEDURE — 96376 TX/PRO/DX INJ SAME DRUG ADON: CPT | Performed by: EMERGENCY MEDICINE

## 2022-02-01 PROCEDURE — 25000003 PHARM REV CODE 250: Mod: HCNC | Performed by: NURSE PRACTITIONER

## 2022-02-01 PROCEDURE — 99226 PR SUBSEQUENT OBSERVATION CARE,LEVEL III: ICD-10-PCS | Mod: HCNC,,, | Performed by: INTERNAL MEDICINE

## 2022-02-01 PROCEDURE — 63600175 PHARM REV CODE 636 W HCPCS: Mod: HCNC | Performed by: NURSE PRACTITIONER

## 2022-02-01 PROCEDURE — 97165 OT EVAL LOW COMPLEX 30 MIN: CPT | Mod: HCNC

## 2022-02-01 PROCEDURE — 96361 HYDRATE IV INFUSION ADD-ON: CPT | Performed by: EMERGENCY MEDICINE

## 2022-02-01 PROCEDURE — C9113 INJ PANTOPRAZOLE SODIUM, VIA: HCPCS | Mod: HCNC | Performed by: NURSE PRACTITIONER

## 2022-02-01 PROCEDURE — 97530 THERAPEUTIC ACTIVITIES: CPT | Mod: HCNC

## 2022-02-01 RX ORDER — HYDRALAZINE HYDROCHLORIDE 20 MG/ML
10 INJECTION INTRAMUSCULAR; INTRAVENOUS EVERY 8 HOURS PRN
Status: DISCONTINUED | OUTPATIENT
Start: 2022-02-01 | End: 2022-02-04

## 2022-02-01 RX ORDER — SODIUM CHLORIDE 0.9 % (FLUSH) 0.9 %
10 SYRINGE (ML) INJECTION EVERY 6 HOURS
Status: DISCONTINUED | OUTPATIENT
Start: 2022-02-02 | End: 2022-02-04 | Stop reason: HOSPADM

## 2022-02-01 RX ORDER — SODIUM CHLORIDE 0.9 % (FLUSH) 0.9 %
10 SYRINGE (ML) INJECTION
Status: DISCONTINUED | OUTPATIENT
Start: 2022-02-01 | End: 2022-02-04 | Stop reason: HOSPADM

## 2022-02-01 RX ADMIN — PANTOPRAZOLE SODIUM 40 MG: 40 INJECTION, POWDER, FOR SOLUTION INTRAVENOUS at 08:02

## 2022-02-01 RX ADMIN — SODIUM CHLORIDE: 0.9 INJECTION, SOLUTION INTRAVENOUS at 07:02

## 2022-02-01 RX ADMIN — LEVETIRACETAM 1000 MG: 100 INJECTION, SOLUTION, CONCENTRATE INTRAVENOUS at 08:02

## 2022-02-01 NOTE — PT/OT/SLP EVAL
"Physical Therapy Evaluation, Treatment, and Discharge Note    Patient Name:  Atif Neves   MRN:  [id number]    Recommendations:     Discharge Recommendations:  home health PT   Discharge Equipment Recommendations: walker, rolling (gait belt, transport WC if doesn't own)   Barriers to discharge: None    Assessment:     Atif Neves is a 89 y.o. male admitted with a medical diagnosis of GI bleed - admitted after syncopal episode on toilet. Pmhx significant for NPH with  shunt, HTN, dementia, macular degeneration, and glaucoma.     Patient evaluated and no acute care PT goals identified after therapeutic activity addressing use of RW. Patient eager for suggested mobility, ambulated with RW and CGA progressing to Ronny as pt fatigued after 15 ft. Family at bedside providing collaborating info.     During this hospitalization, patient does not require further acute PT services.  Please re-consult if situation changes.  Recommendation for d/c to home with continued family assistance and HH PT to maximize his independence with functional mobility.    Recent Surgery: Procedure(s) (LRB):  EGD (ESOPHAGOGASTRODUODENOSCOPY) (N/A)      Plan:     During this hospitalization, patient does not require further acute PT services.  Please re-consult if situation changes.      Subjective     Chief Complaint: Denies complaints "I don't worry, it's all up to [points skyward]"  Patient/Family Comments/goals: Family with goal for increased mobility at home; Patient agreeable to evaluation.  Pain/Comfort:  · Pain Rating 1: 0/10  · Pain Rating Post-Intervention 1: 0/10    Patients cultural, spiritual, Latter-day conflicts given the current situation: no    Living Environment:  · Pt lives with his daughter.   · Upon discharge, patient will have assistance from his family.  Prior level of function:  · Ambulation:   · Minimal to no ambulation recently - with HH PT was ambulating limited distances with rollator  · Minimal t/f during day, pt " enjoys remaining in bed (HOB and FOB elevating)  ·  Equipment used at home: rollator (adjustable bed).      Objective:     Communicated with CARLYLE Mcghee prior to session.  Patient found HOB elevated with peripheral IV (judy sys) upon PT entry to room.    General Precautions: Standard, fall (dementia)   Orthopedic Precautions:N/A   Braces: N/A   Respiratory Status: Room air    Patient donned yellow socks and gait belt for OOB mobility.     Exams:  · Cognition:   · Patient is oriented to person and  only.  · Pt follows approximately 75% of one step commands.    · Mood: Pleasant and cooperative, talkative.  · Safety Awareness: Impaired  · Musculoskeletal:  · BMI: 25.85  · Posture:  Forward head, rounded shoulder, posterior pelvic tilt, kyphotic posture  · LE ROM/Strength:   · R ROM: WFL  · L ROM: WFL  · R Strength:   · Knee extension: 5/5  · Dorsiflexion: 5/5   · L Strength:   · Knee extension: 5/5  · Dorsiflexion: 5/5   · Neuromuscular:  · Sensation: NIMA d/t cognition  · Tone/Reflexes: No impairments identified with functional mobility. No formal testing performed.   · Coordination: Slowed performance  · Balance:   · Static sitting: Good  · Static standing: Fair-  · Dynamic standing: Poor  · Visual-vestibular: No impairments identified with functional mobility. Note h/o macular degeneration and glaucoma  · Integument: Visible skin intact  · Cardiopulmonary:  · O2 Requirements: RA  · Edema: No pitting edema    Functional Mobility:  · Bed Mobility:     · Bridging: maximal assistance  · Supine to Sit: minimum assistance  · Sit to Supine: minimum assistance  · Transfers:     · Sit to Stand:  contact guard assistance with rolling walker  · 2x from EOB  · Pt maintains hands on RW  · Gait: 1x2 ft, 1x14 ft with RW and CGA progressing to Ronny d/t increased gait instability.   · Pt with poor RW management, continually pushing RW anterior to JIMBO (family at bedside reports his norm and becomes frustrated with verbal  corrections)  · Slow, shuffling gait with increased B stance time  · No overt LOB    AM-PAC 6 CLICK MOBILITY  Total Score:18       Therapeutic Activities and Exercises:  ·  verbal and manual cueing for RW management during OOb mobility, pt most responsive to manual cueing/assistance with RW  PT educated patient and family re:   PT plan of care/role of PT  Safety with OOB mobility  Use of gait belt, RW vs rollator  Discharge disposition    Pt without verbalized understanding , family verbalized understanding      AM-PAC 6 CLICK MOBILITY  Total Score:18     Patient left HOB elevated with all lines intact, call button in reach, bed alarm on, RN notified, family and judy sys present and white board updated.    GOALS:   Multidisciplinary Problems     Physical Therapy Goals     Not on file          Multidisciplinary Problems (Resolved)        Problem: Physical Therapy Goal    Goal Priority Disciplines Outcome Goal Variances Interventions   Physical Therapy Goal   (Resolved)     PT, PT/OT Met     Description: Goals to be met by: 22    Patient will increase functional independence with mobility by performin. Stand step transfer with CGA with RW.                   History:     Past Medical History:   Diagnosis Date    JONATAN (acute kidney injury) 2016    Bacteremia due to Gram-positive bacteria 3/24/2017    Antibiotics per ID recommendations; vancomycin x 14 days.     BPH (benign prostatic hypertrophy) with urinary obstruction 2016    Cystoid macular edema, left eye 2016    Essential hypertension 2016    Essential hypertension     Generalized anxiety disorder 2016    Glaucoma     Irregular heartbeat     Macular degeneration     Microcytic anemia 4/10/2017    Mixed hyperlipidemia 2016    Nonexudative age-related macular degeneration, bilateral, intermediate dry stage 2016    Normal pressure hydrocephalus     NPH (normal pressure hydrocephalus) 2017    Shunt placed  2/6/17    Urinary retention 06/2016    Ventriculo-peritoneal shunt status 2/6/2017       Past Surgical History:   Procedure Laterality Date    TRANSURETHRAL RESECTION OF PROSTATE  08/31/2016       Time Tracking:     PT Received On: 02/01/22  PT Start Time: 1549     PT Stop Time: 1613  PT Total Time (min): 24 min     Billable Minutes: Evaluation 15 and Therapeutic Activity 9      02/01/2022

## 2022-02-01 NOTE — NURSING
Oriented x 1. Patient is a poor historian. No family present upon admission to the floor. Vitals stable. Patient denies pain or discomfort at this time. Cardiac monitoring continued. NPO except medication. External catheter placed. Sacral foam dressing placed. Purposeful rounding completed. Will continue to monitor.

## 2022-02-01 NOTE — PLAN OF CARE
OT eval completed, pt performing ADLS & mobility with Min-Mod A with baseline cognitive deficits. Pt at functional baseline with family present to confirm. No further indications for OT services in acute setting. Recommend HH PT/OT to provide caregiver training with t/fs and ADLs in familiar environment.

## 2022-02-01 NOTE — ED NOTES
HPI:   EMS stated that pt had syncopal episode while having a bowel movement on the toilet at home. Per wife, pt did not sustain any trauma during incident. Per EMS, wife concerned that pt seemed confused after incident. Pt initially hypotensive on scene. EMS stated pt's BP returned to normal during transport, without medical intervention. Pt oriented to person, place, and event. Unable to state the current year. Pt denied any to head/neck/back/spine. Pt denied N/V/Dizziness/cp/sob/abdominal pain.

## 2022-02-01 NOTE — CONSULTS
Gastroenterology Consult    2/1/2022 8:15 AM    Patient Name: Atif Neves  MRN: [id number]  Admission Date: 1/31/2022  Hospital Length of Stay: 0 days  Code Status: Full Code   Primary Care Physician: Ashley Richards MD  Principal Problem:GI bleed  Consulting Physician: Inpatient consult to Gastroenterology  Consult performed by: Amber Xavier MD  Consult ordered by: Herbert Saldana NP        Reason for consultation: anemia    HPI:  Atif Neves is a 89 y.o. male with a history of dementia, HTN, HLD, anxiety, NPH requiring  shunt, macular degeneration, glaucoma, and BPH and who presented with a syncopal episode while sitting on the toilet.  No fall/injury.  Following the episode, his wife reported he was confused and this is not his normal baseline.  H/H was low on admission with FOBT positive stool.  No overt bleeding.  He is incontinent of stool and wears diapers.  He has no h/o GI bleed.  He has had colonoscopies in the past - can't remember when but was at [facility].  He is unsure if he's had polyps.  He denies any reflux/use of PPIs.  Per family, the ER doctor did a rectal exam - stool was brown but FOBT+.  Patient is confused, trying to get out of bed and wanting to go home.  He is a good eater.  No N/V, indigestion or abdominal pain.  He denies chest pain.  He walks with a walker and has gotten progressively weaker - has to stop and rest due to SOB.  He also had a passing out episode about a month ago.  EMS evaluated but did not bring him in so no labs were checked.    Past Medical History:   Diagnosis Date    JONATAN (acute kidney injury) 06/2016    Bacteremia due to Gram-positive bacteria 3/24/2017    Antibiotics per ID recommendations; vancomycin x 14 days.     BPH (benign prostatic hypertrophy) with urinary obstruction 6/16/2016    Cystoid macular edema, left eye 8/4/2016    Essential hypertension 6/16/2016    Essential hypertension     Generalized anxiety disorder 6/16/2016    Glaucoma      "Irregular heartbeat     Macular degeneration     Microcytic anemia 4/10/2017    Mixed hyperlipidemia 2016    Nonexudative age-related macular degeneration, bilateral, intermediate dry stage 2016    Normal pressure hydrocephalus     NPH (normal pressure hydrocephalus) 2017    Shunt placed 17    Urinary retention 2016    Ventriculo-peritoneal shunt status 2017       Past Surgical History:   Procedure Laterality Date    TRANSURETHRAL RESECTION OF PROSTATE  2016       Social History     Tobacco Use    Smoking status: Former Smoker     Packs/day: 1.00     Years: 18.00     Pack years: 18.00     Quit date: 1950     Years since quittin.1    Smokeless tobacco: Never Used   Substance Use Topics    Alcohol use: No    Drug use: No        Family History   Problem Relation Age of Onset    Heart disease Brother     Cancer Daughter 55        Breast    Cancer Daughter 54        Breast    No Known Problems Son          Review of patient's allergies indicates:   Allergen Reactions    Aspirin Swelling     States, "makes me sick." pt does not elaborate.          Current Facility-Administered Medications:     0.9%  NaCl infusion, , Intravenous, Continuous, Herbert Saldana NP, Last Rate: 50 mL/hr at 22, Rate Verify at 22    hydrALAZINE injection 10 mg, 10 mg, Intravenous, Q8H PRN, Herbert Saldana NP    levETIRAcetam injection 1,000 mg, 1,000 mg, Intravenous, Q12H, Herbert Saldana NP, 1,000 mg at 22    pantoprazole injection 40 mg, 40 mg, Intravenous, BID, Herbert Saldana NP, 40 mg at 22    Review of Systems   Constitutional: Positive for malaise/fatigue.   Respiratory: Positive for shortness of breath.    Gastrointestinal: Positive for blood in stool (occult).   Neurological: Positive for loss of consciousness and weakness (walks w/ walker, has to stop due to SOB).   Psychiatric/Behavioral: Positive for memory loss.   All " "other systems reviewed and are negative.      /63 (BP Location: Left arm, Patient Position: Lying)   Pulse 70   Temp 97.9 °F (36.6 °C) (Oral)   Resp 16   Ht 5' 8" (1.727 m)   Wt 77.1 kg (170 lb)   SpO2 97%   BMI 25.85 kg/m²   Physical Exam  Vitals reviewed.   Constitutional:       General: He is not in acute distress.     Appearance: Normal appearance. He is well-developed and normal weight.   HENT:      Head: Normocephalic and atraumatic.      Right Ear: External ear normal.      Left Ear: External ear normal.      Nose: Nose normal. No congestion.      Mouth/Throat:      Mouth: Mucous membranes are moist.      Pharynx: Oropharynx is clear.   Eyes:      General: No scleral icterus.     Conjunctiva/sclera: Conjunctivae normal.      Pupils: Pupils are equal, round, and reactive to light.   Cardiovascular:      Rate and Rhythm: Normal rate and regular rhythm.      Heart sounds: Normal heart sounds. No murmur heard.      Pulmonary:      Effort: Pulmonary effort is normal. No respiratory distress.      Breath sounds: Normal breath sounds.   Abdominal:      General: Bowel sounds are normal. There is no distension.      Palpations: Abdomen is soft.      Tenderness: There is no abdominal tenderness. There is no guarding or rebound.   Musculoskeletal:         General: Normal range of motion.   Skin:     General: Skin is warm and dry.      Findings: No rash.   Neurological:      General: No focal deficit present.      Mental Status: He is alert. Mental status is at baseline.      Comments: Oriented to self, family; asking to go home, not oriented to situation   Psychiatric:         Mood and Affect: Mood normal.      Comments: Disoriented, slightly agitated wanting to get up and leave         Labs:  Lab Results   Component Value Date/Time    WBC 12.97 (H) 01/31/2022 07:49 PM    HGB 7.2 (L) 01/31/2022 07:49 PM    HCT 27.4 (L) 01/31/2022 07:49 PM     01/31/2022 07:49 PM    MCV 70 (L) 01/31/2022 07:49 PM    "  01/31/2022 07:49 PM    K 3.7 01/31/2022 07:49 PM     (H) 01/31/2022 07:49 PM    CO2 18 (L) 01/31/2022 07:49 PM    BUN 9 (L) 01/31/2022 07:49 PM    CREATININE 0.7 01/31/2022 07:49 PM    GLU 87 01/31/2022 07:49 PM    CALCIUM 7.1 (L) 01/31/2022 07:49 PM    MG 1.7 04/03/2017 04:43 AM    PHOS 2.1 (L) 02/07/2017 04:36 AM    INR 0.9 02/06/2017 09:46 AM    APTT 23.1 02/06/2017 09:46 AM   ]  Lab Results   Component Value Date/Time    PROT 5.4 (L) 01/31/2022 07:49 PM    ALBUMIN 2.7 (L) 01/31/2022 07:49 PM    BILITOT 0.4 01/31/2022 07:49 PM    BILIDIR 0.4 (H) 03/24/2017 01:09 AM    AST 21 01/31/2022 07:49 PM    ALT 11 01/31/2022 07:49 PM    ALKPHOS 108 01/31/2022 07:49 PM   ]    Imaging and Procedures:  I personally reviewed the imaging/procedures below.  Head CT 1/31/22:  No acute intracranial abnormalities identified.  Stable dilatation of the ventricular system and stable positioning of right-sided  shunt catheter.  No significant detrimental change compared to previous CT head from April 2018.    Assessment:  Atif Neves is a 89 y.o. male with a history of HTN, HLD, anxiety, NPH requiring  shunt, macular degeneration, glaucoma, and BPH and who presented with a syncopal episode while sitting on the toilet. GI consulted for anemia with FOBT+ stool.  No overt bleeding.     Plan:  - empiric protonix 40 mg IV BID  - serial H/H and transfuse if < 7  - monitor for overt bleeding  - NPO after MN for EGD tomorrow  - ok w/ diet today  - brought up colonoscopy with patient and family - concerned that it would be quite difficult to prep him w/ dementia/agitation and him not drinking all of the prep; can re-discuss after EGD (may not need if EGD shows source of bleed)  - will try to check [facility] records for a prior colon report which may help guide decision making about a colonoscopy this admission    Amber Xavier MD

## 2022-02-01 NOTE — ED NOTES
Assisted Monica MATHEW at bedside w/hemoccult stool, pt tolerated well, positive results noted by Monica MATHEW, pt's daughter still at bedside, call light within reach, safety precautions maintained, will continue to monitor.

## 2022-02-01 NOTE — NURSING
Pt very confused doesn't understand why he is here. Messaged Dr. Melvin regarding plan. No new orders.

## 2022-02-01 NOTE — PLAN OF CARE
Problem: Adult Inpatient Plan of Care  Goal: Plan of Care Review  Outcome: Ongoing, Progressing  Goal: Patient-Specific Goal (Individualized)  Outcome: Ongoing, Progressing  Goal: Absence of Hospital-Acquired Illness or Injury  Outcome: Ongoing, Progressing  Goal: Optimal Comfort and Wellbeing  Outcome: Ongoing, Progressing     Problem: Adjustment to Illness (Gastrointestinal Bleeding)  Goal: Optimal Coping with Acute Illness  Outcome: Ongoing, Progressing     Problem: Skin Injury Risk Increased  Goal: Skin Health and Integrity  Outcome: Ongoing, Progressing     Problem: Fall Injury Risk  Goal: Absence of Fall and Fall-Related Injury  Outcome: Ongoing, Progressing

## 2022-02-01 NOTE — SUBJECTIVE & OBJECTIVE
"Past Medical History:   Diagnosis Date    JONATAN (acute kidney injury) 06/2016    Bacteremia due to Gram-positive bacteria 3/24/2017    Antibiotics per ID recommendations; vancomycin x 14 days.     BPH (benign prostatic hypertrophy) with urinary obstruction 6/16/2016    Cystoid macular edema, left eye 8/4/2016    Essential hypertension 6/16/2016    Essential hypertension     Generalized anxiety disorder 6/16/2016    Glaucoma     Irregular heartbeat     Macular degeneration     Microcytic anemia 4/10/2017    Mixed hyperlipidemia 6/16/2016    Nonexudative age-related macular degeneration, bilateral, intermediate dry stage 8/4/2016    Normal pressure hydrocephalus     NPH (normal pressure hydrocephalus) 2/6/2017    Shunt placed 2/6/17    Urinary retention 06/2016    Ventriculo-peritoneal shunt status 2/6/2017       Past Surgical History:   Procedure Laterality Date    TRANSURETHRAL RESECTION OF PROSTATE  08/31/2016       Review of patient's allergies indicates:   Allergen Reactions    Aspirin Swelling     States, "makes me sick." pt does not elaborate.        No current facility-administered medications on file prior to encounter.     Current Outpatient Medications on File Prior to Encounter   Medication Sig    atorvastatin (LIPITOR) 20 MG tablet TAKE 1 TABLET(20 MG) BY MOUTH EVERY DAY    cholecalciferol, vitamin D3, (VITAMIN D3) 25 mcg (1,000 unit) capsule Take 1 capsule (1,000 Units total) by mouth once daily.    citalopram (CELEXA) 10 MG tablet TAKE 1 TABLET(10 MG) BY MOUTH EVERY DAY    docusate sodium (COLACE) 100 MG capsule Take 1 capsule (100 mg total) by mouth once daily.    ferrous sulfate 324 mg (65 mg iron) TbEC Take 1 tablet (324 mg total) by mouth once daily.    levETIRAcetam (KEPPRA) 1000 MG tablet TAKE 1 TABLET(1000 MG) BY MOUTH TWICE DAILY    thiamine (VITAMIN B-1) 100 MG tablet Take 1 tablet (100 mg total) by mouth once daily.     Family History     Problem Relation (Age of Onset) "    Cancer Daughter (55), Daughter (54)    Heart disease Brother    No Known Problems Son        Tobacco Use    Smoking status: Former Smoker     Packs/day: 1.00     Years: 18.00     Pack years: 18.00     Quit date:      Years since quittin.1    Smokeless tobacco: Never Used   Substance and Sexual Activity    Alcohol use: No    Drug use: No    Sexual activity: Not Currently     Review of Systems   Unable to perform ROS: Dementia     Objective:     Vital Signs (Most Recent):  Temp: 98 °F (36.7 °C) (22)  Pulse: 66 (22)  Resp: 17 (22)  BP: (!) 146/67 (22)  SpO2: 100 % (22) Vital Signs (24h Range):  Temp:  [98 °F (36.7 °C)] 98 °F (36.7 °C)  Pulse:  [61-66] 66  Resp:  [17-18] 17  SpO2:  [97 %-100 %] 100 %  BP: (117-146)/(67-83) 146/67     Weight: 77.1 kg (170 lb)  Body mass index is 25.85 kg/m².    Physical Exam  Constitutional:       Appearance: Normal appearance. He is well-developed and well-nourished.   HENT:      Head: Normocephalic.   Eyes:      Conjunctiva/sclera: Conjunctivae normal.   Cardiovascular:      Rate and Rhythm: Normal rate and regular rhythm.      Pulses: Intact distal pulses.           Radial pulses are 1+ on the right side and 1+ on the left side.      Heart sounds: Normal heart sounds.   Pulmonary:      Effort: Pulmonary effort is normal.      Breath sounds: Normal breath sounds.   Abdominal:      General: Bowel sounds are increased. There is no distension.      Palpations: Abdomen is soft.      Tenderness: There is no abdominal tenderness.   Musculoskeletal:         General: Normal range of motion.      Cervical back: Normal range of motion and neck supple.   Skin:     General: Skin is warm and dry.   Neurological:      Mental Status: He is alert. He is confused.      GCS: GCS eye subscore is 4. GCS verbal subscore is 4. GCS motor subscore is 6.      Motor: Motor function is intact.   Psychiatric:         Mood and Affect: Mood and  affect and mood normal.         Speech: Speech normal.         Behavior: Behavior normal.             Significant Labs:   All pertinent labs within the past 24 hours have been reviewed.  CBC:   Recent Labs   Lab 01/31/22 1949   WBC 12.97*   HGB 7.2*   HCT 27.4*        CMP:   Recent Labs   Lab 01/31/22 1949      K 3.7   *   CO2 18*   GLU 87   BUN 9*   CREATININE 0.7   CALCIUM 7.1*   PROT 5.4*   ALBUMIN 2.7*   BILITOT 0.4   ALKPHOS 108   AST 21   ALT 11   ANIONGAP 8   EGFRNONAA >60       Significant Imaging: I have reviewed all pertinent imaging results/findings within the past 24 hours.

## 2022-02-01 NOTE — H&P
[facility] Medicine  History & Physical    Patient Name: Atif Neves  MRN: [id number]  Patient Class: OP- Observation  Admission Date: 1/31/2022  Attending Physician: Chalino Melvin MD   Primary Care Provider: Ashley Richards MD         Patient information was obtained from past medical records and ER records.     Subjective:     Principal Problem:GI bleed    Chief Complaint:   Chief Complaint   Patient presents with    Loss of Consciousness     Vasovagal syncopal episode while having a bowel movement on the toilet at home. Per wife, pt remained in seated position on toilet; no trauma from LOC. Per wife, no blood thinners. AMS since incident occurred: confusion. Per wife, this is not normal.          HPI: The patient is a 89 y.o. male with a past medical history of HTN, and JONATAN who presents s/p syncopal episode. Per the patient's wife, the patient lost consciousness while on the toilet having a bowel movement. He remained in a seated position on the toilet. He did not fall and there was no trauma during the episode. Wife states that he has been confused since the incident. The patient denies any pain at this time. He is not on any blood thinners.  On initial workup, the patient was found to be acutely anemic.  FOBT was done and was positive.  The patient will be admitted for further management of his acute GI bleed      Past Medical History:   Diagnosis Date    JONATAN (acute kidney injury) 06/2016    Bacteremia due to Gram-positive bacteria 3/24/2017    Antibiotics per ID recommendations; vancomycin x 14 days.     BPH (benign prostatic hypertrophy) with urinary obstruction 6/16/2016    Cystoid macular edema, left eye 8/4/2016    Essential hypertension 6/16/2016    Essential hypertension     Generalized anxiety disorder 6/16/2016    Glaucoma     Irregular heartbeat     Macular degeneration     Microcytic anemia 4/10/2017    Mixed hyperlipidemia 6/16/2016    Nonexudative  "age-related macular degeneration, bilateral, intermediate dry stage 2016    Normal pressure hydrocephalus     NPH (normal pressure hydrocephalus) 2017    Shunt placed 17    Urinary retention 2016    Ventriculo-peritoneal shunt status 2017       Past Surgical History:   Procedure Laterality Date    TRANSURETHRAL RESECTION OF PROSTATE  2016       Review of patient's allergies indicates:   Allergen Reactions    Aspirin Swelling     States, "makes me sick." pt does not elaborate.        No current facility-administered medications on file prior to encounter.     Current Outpatient Medications on File Prior to Encounter   Medication Sig    atorvastatin (LIPITOR) 20 MG tablet TAKE 1 TABLET(20 MG) BY MOUTH EVERY DAY    cholecalciferol, vitamin D3, (VITAMIN D3) 25 mcg (1,000 unit) capsule Take 1 capsule (1,000 Units total) by mouth once daily.    citalopram (CELEXA) 10 MG tablet TAKE 1 TABLET(10 MG) BY MOUTH EVERY DAY    docusate sodium (COLACE) 100 MG capsule Take 1 capsule (100 mg total) by mouth once daily.    ferrous sulfate 324 mg (65 mg iron) TbEC Take 1 tablet (324 mg total) by mouth once daily.    levETIRAcetam (KEPPRA) 1000 MG tablet TAKE 1 TABLET(1000 MG) BY MOUTH TWICE DAILY    thiamine (VITAMIN B-1) 100 MG tablet Take 1 tablet (100 mg total) by mouth once daily.     Family History     Problem Relation (Age of Onset)    Cancer Daughter (55), Daughter (54)    Heart disease Brother    No Known Problems Son        Tobacco Use    Smoking status: Former Smoker     Packs/day: 1.00     Years: 18.00     Pack years: 18.00     Quit date:      Years since quittin.1    Smokeless tobacco: Never Used   Substance and Sexual Activity    Alcohol use: No    Drug use: No    Sexual activity: Not Currently     Review of Systems   Unable to perform ROS: Dementia     Objective:     Vital Signs (Most Recent):  Temp: 98 °F (36.7 °C) (22)  Pulse: 66 (22)  Resp: 17 " (01/31/22 1950)  BP: (!) 146/67 (01/31/22 1950)  SpO2: 100 % (01/31/22 1950) Vital Signs (24h Range):  Temp:  [98 °F (36.7 °C)] 98 °F (36.7 °C)  Pulse:  [61-66] 66  Resp:  [17-18] 17  SpO2:  [97 %-100 %] 100 %  BP: (117-146)/(67-83) 146/67     Weight: 77.1 kg (170 lb)  Body mass index is 25.85 kg/m².    Physical Exam  Constitutional:       Appearance: Normal appearance. He is well-developed and well-nourished.   HENT:      Head: Normocephalic.   Eyes:      Conjunctiva/sclera: Conjunctivae normal.   Cardiovascular:      Rate and Rhythm: Normal rate and regular rhythm.      Pulses: Intact distal pulses.           Radial pulses are 1+ on the right side and 1+ on the left side.      Heart sounds: Normal heart sounds.   Pulmonary:      Effort: Pulmonary effort is normal.      Breath sounds: Normal breath sounds.   Abdominal:      General: Bowel sounds are increased. There is no distension.      Palpations: Abdomen is soft.      Tenderness: There is no abdominal tenderness.   Musculoskeletal:         General: Normal range of motion.      Cervical back: Normal range of motion and neck supple.   Skin:     General: Skin is warm and dry.   Neurological:      Mental Status: He is alert. He is confused.      GCS: GCS eye subscore is 4. GCS verbal subscore is 4. GCS motor subscore is 6.      Motor: Motor function is intact.   Psychiatric:         Mood and Affect: Mood and affect and mood normal.         Speech: Speech normal.         Behavior: Behavior normal.             Significant Labs:   All pertinent labs within the past 24 hours have been reviewed.  CBC:   Recent Labs   Lab 01/31/22 1949   WBC 12.97*   HGB 7.2*   HCT 27.4*        CMP:   Recent Labs   Lab 01/31/22 1949      K 3.7   *   CO2 18*   GLU 87   BUN 9*   CREATININE 0.7   CALCIUM 7.1*   PROT 5.4*   ALBUMIN 2.7*   BILITOT 0.4   ALKPHOS 108   AST 21   ALT 11   ANIONGAP 8   EGFRNONAA >60       Significant Imaging: I have reviewed all pertinent  imaging results/findings within the past 24 hours.    Assessment/Plan:     * GI bleed  Hemoglobin 7.2, baseline-9.2; positive guaiac    IVF  Type and screen  Consult GI      Seizure  At baseline currently    Continue Keppra IV BID      Dementia associated with other underlying disease without behavioral disturbance  At reported baseline    Monitor for acute decompensation      Essential hypertension  Normotensive currently    Hold antihypertensives for now with GI Bleed      Mixed hyperlipidemia  Hold statins while GI Bleed        VTE Risk Mitigation (From admission, onward)         Ordered     Reason for No Pharmacological VTE Prophylaxis  Once        Question:  Reasons:  Answer:  Active Bleeding    01/31/22 2229     IP VTE HIGH RISK PATIENT  Once         01/31/22 2229     Place sequential compression device  Until discontinued         01/31/22 2229                   Herbert Saldana NP  Department of Hospital Medicine   [facility] - Emergency Dept

## 2022-02-01 NOTE — PLAN OF CARE
Problem: Physical Therapy Goal  Goal: Physical Therapy Goal  Description: Goals to be met by: 22    Patient will increase functional independence with mobility by performin. Stand step transfer with CGA with RW.  2022 1624 by Hemalatha Quintanilla, PT  Outcome: Met     Patient evaluated and no acute care PT goals identified after therapeutic activity addressing use of RW. Patient eager for suggested mobility, ambulated with RW and CGA progressing to Ronny as pt fatigued after 15 ft. Family at bedside providing collaborating info.    During this hospitalization, patient does not require further acute PT services.  Please re-consult if situation changes.  Recommendation for d/c to home with continued family assistance and HH PT to maximize his independence with functional mobility.

## 2022-02-01 NOTE — HPI
The patient is a 89 y.o. male with a past medical history of HTN, and JONATAN who presents s/p syncopal episode. Per the patient's wife, the patient lost consciousness while on the toilet having a bowel movement. He remained in a seated position on the toilet. He did not fall and there was no trauma during the episode. Wife states that he has been confused since the incident. The patient denies any pain at this time. He is not on any blood thinners.  On initial workup, the patient was found to be acutely anemic.  FOBT was done and was positive.  The patient will be admitted for further management of his acute GI bleed

## 2022-02-01 NOTE — PT/OT/SLP EVAL
Occupational Therapy   Evaluation and Discharge Note    Name: Atif Neves  MRN: [id number]  Admitting Diagnosis:  GI bleed   Recent Surgery: Procedure(s) (LRB):  EGD (ESOPHAGOGASTRODUODENOSCOPY) (N/A)      Recommendations:     Discharge Recommendations: home health OT,home health PT  Discharge Equipment Recommendations:  bedside commode,walker, rolling  Barriers to discharge:  None    Assessment:     Atif Neves is a 89 y.o. male with a medical diagnosis of GI bleed.     OT eval completed, pt performing ADLS & mobility with Min-Mod A with baseline cognitive deficits. Pt at functional baseline with family present to confirm. No further indications for OT services in acute setting. Recommend HH PT/OT to provide caregiver training with t/fs and ADLs in familiar environment.      Plan:     During this hospitalization, patient does not require further acute OT services.  Please re-consult if situation changes.    · Plan of Care Reviewed with: patient,daughter,son    Subjective     Chief Complaint: none stated   Patient/Family Comments/goals:  None stated     Occupational Profile:  Living Environment:  Pt lives in single level home with children; walk in sit down shower   Previous level of function: assist for all ADLS & mobility with minimal ambulation only to w/c but mainly spends day in bed per family  Roles and Routines: cares for self as able  Equipment Used at home:  rollator,hospital bed  Assistance upon Discharge: children able to provide assist upon d/c home     Pain/Comfort:  · Pain Rating 1: 0/10  · Pain Rating Post-Intervention 1: 0/10    Patients cultural, spiritual, Islam conflicts given the current situation: no    Objective:     Communicated with: CARLYLE Mcghee  prior to session.  Patient found HOB elevated with peripheral IV (avasys) upon OT entry to room.    General Precautions: Standard, fall   Orthopedic Precautions:N/A   Braces: N/A  Respiratory Status: Room air     Occupational Performance:    Bed  Mobility:    · Patient completed Scooting/Bridging with supervision  · Patient completed Supine to Sit with stand by assistance  · Patient completed Sit to Supine with stand by assistance    Functional Mobility/Transfers:  · Patient completed Sit <> Stand Transfer with minimum assistance  with  hand-held assist   · Functional Mobility: Min A HHA forward/backward & side steps along EOB ~6' total  · Posterior lean, forward trunk   · Family reports fear of falling with all ambulation     Activities of Daily Living:  · Feeding:  independence with HOB elevated  · Grooming: supervision to wash face following setup  · Upper Body Dressing: minimum assistance change gown EOB  · Lower Body Dressing: total assistance don socks, family reports baseline    Cognitive/Visual Perceptual:  Cognitive/Psychosocial Skills:     -       Oriented to: Person, Place and Situation   -       Follows Commands/attention:Easily distracted and Follows one-step commands  -       Safety awareness/insight to disability: impaired     Physical Exam:   Upper Extremity Range of Motion:     -       Right Upper Extremity: WFL  -       Left Upper Extremity: WFL  Upper Extremity Strength:    -       Right Upper Extremity: WFL  -       Left Upper Extremity: WFL   Strength:    -       Right Upper Extremity: WFL  -       Left Upper Extremity: WFL     AMPAC 6 Click ADL:  AMPAC Total Score: 16    Treatment & Education:  Pt participated in OT tx session with ADLS & functional mobility performed as documented above.  Education provided on role of OT including safe performance of self-care tasks, mobility techniques, safe use of DME, and encouragement of mobilization during hospitalization to prevent/limit deconditioning as well as discharge recommendations   Education:    Patient left HOB elevated with all lines intact, call button in reach, RN notified and family present    GOALS:   Multidisciplinary Problems     Occupational Therapy Goals     Not on file                 History:     Past Medical History:   Diagnosis Date    JONATAN (acute kidney injury) 06/2016    Bacteremia due to Gram-positive bacteria 3/24/2017    Antibiotics per ID recommendations; vancomycin x 14 days.     BPH (benign prostatic hypertrophy) with urinary obstruction 6/16/2016    Cystoid macular edema, left eye 8/4/2016    Essential hypertension 6/16/2016    Essential hypertension     Generalized anxiety disorder 6/16/2016    Glaucoma     Irregular heartbeat     Macular degeneration     Microcytic anemia 4/10/2017    Mixed hyperlipidemia 6/16/2016    Nonexudative age-related macular degeneration, bilateral, intermediate dry stage 8/4/2016    Normal pressure hydrocephalus     NPH (normal pressure hydrocephalus) 2/6/2017    Shunt placed 2/6/17    Urinary retention 06/2016    Ventriculo-peritoneal shunt status 2/6/2017       Past Surgical History:   Procedure Laterality Date    TRANSURETHRAL RESECTION OF PROSTATE  08/31/2016       Time Tracking:     OT Date of Treatment: 02/01/22  OT Start Time: 1455  OT Stop Time: 1519  OT Total Time (min): 24 min    Billable Minutes:Evaluation 15  Self Care/Home Management 9    2/1/2022

## 2022-02-02 ENCOUNTER — ANESTHESIA (OUTPATIENT)
Dept: ENDOSCOPY | Facility: OTHER | Age: 87
DRG: 378 | End: 2022-02-02
Payer: MEDICARE

## 2022-02-02 ENCOUNTER — ANESTHESIA EVENT (OUTPATIENT)
Dept: ENDOSCOPY | Facility: OTHER | Age: 87
DRG: 378 | End: 2022-02-02
Payer: MEDICARE

## 2022-02-02 LAB
ANISOCYTOSIS BLD QL SMEAR: SLIGHT
BASOPHILS # BLD AUTO: 0.02 K/UL (ref 0–0.2)
BASOPHILS # BLD AUTO: 0.03 K/UL (ref 0–0.2)
BASOPHILS # BLD AUTO: 0.03 K/UL (ref 0–0.2)
BASOPHILS # BLD AUTO: 0.04 K/UL (ref 0–0.2)
BASOPHILS NFR BLD: 0.3 % (ref 0–1.9)
BASOPHILS NFR BLD: 0.3 % (ref 0–1.9)
BASOPHILS NFR BLD: 0.4 % (ref 0–1.9)
BASOPHILS NFR BLD: 0.5 % (ref 0–1.9)
BLD PROD TYP BPU: NORMAL
BLD PROD TYP BPU: NORMAL
BLOOD UNIT EXPIRATION DATE: NORMAL
BLOOD UNIT EXPIRATION DATE: NORMAL
BLOOD UNIT TYPE CODE: 5100
BLOOD UNIT TYPE CODE: 5100
BLOOD UNIT TYPE: NORMAL
BLOOD UNIT TYPE: NORMAL
BURR CELLS BLD QL SMEAR: ABNORMAL
CODING SYSTEM: NORMAL
CODING SYSTEM: NORMAL
DACRYOCYTES BLD QL SMEAR: ABNORMAL
DIFFERENTIAL METHOD: ABNORMAL
DISPENSE STATUS: NORMAL
DISPENSE STATUS: NORMAL
EOSINOPHIL # BLD AUTO: 0.2 K/UL (ref 0–0.5)
EOSINOPHIL NFR BLD: 1.6 % (ref 0–8)
EOSINOPHIL NFR BLD: 2 % (ref 0–8)
EOSINOPHIL NFR BLD: 2.3 % (ref 0–8)
EOSINOPHIL NFR BLD: 2.8 % (ref 0–8)
ERYTHROCYTE [DISTWIDTH] IN BLOOD BY AUTOMATED COUNT: 18.8 % (ref 11.5–14.5)
ERYTHROCYTE [DISTWIDTH] IN BLOOD BY AUTOMATED COUNT: 18.9 % (ref 11.5–14.5)
ERYTHROCYTE [DISTWIDTH] IN BLOOD BY AUTOMATED COUNT: 19.6 % (ref 11.5–14.5)
ERYTHROCYTE [DISTWIDTH] IN BLOOD BY AUTOMATED COUNT: 20.4 % (ref 11.5–14.5)
GIANT PLATELETS BLD QL SMEAR: PRESENT
HCT VFR BLD AUTO: 22.1 % (ref 40–54)
HCT VFR BLD AUTO: 23.6 % (ref 40–54)
HCT VFR BLD AUTO: 27.6 % (ref 40–54)
HCT VFR BLD AUTO: 32.6 % (ref 40–54)
HGB BLD-MCNC: 6 G/DL (ref 14–18)
HGB BLD-MCNC: 6.4 G/DL (ref 14–18)
HGB BLD-MCNC: 7.3 G/DL (ref 14–18)
HGB BLD-MCNC: 9.7 G/DL (ref 14–18)
HYPOCHROMIA BLD QL SMEAR: ABNORMAL
IMM GRANULOCYTES # BLD AUTO: 0.02 K/UL (ref 0–0.04)
IMM GRANULOCYTES # BLD AUTO: 0.02 K/UL (ref 0–0.04)
IMM GRANULOCYTES # BLD AUTO: 0.03 K/UL (ref 0–0.04)
IMM GRANULOCYTES # BLD AUTO: 0.05 K/UL (ref 0–0.04)
IMM GRANULOCYTES NFR BLD AUTO: 0.2 % (ref 0–0.5)
IMM GRANULOCYTES NFR BLD AUTO: 0.3 % (ref 0–0.5)
IMM GRANULOCYTES NFR BLD AUTO: 0.3 % (ref 0–0.5)
IMM GRANULOCYTES NFR BLD AUTO: 0.5 % (ref 0–0.5)
LYMPHOCYTES # BLD AUTO: 0.8 K/UL (ref 1–4.8)
LYMPHOCYTES # BLD AUTO: 0.9 K/UL (ref 1–4.8)
LYMPHOCYTES # BLD AUTO: 1.1 K/UL (ref 1–4.8)
LYMPHOCYTES # BLD AUTO: 1.7 K/UL (ref 1–4.8)
LYMPHOCYTES NFR BLD: 10.6 % (ref 18–48)
LYMPHOCYTES NFR BLD: 12.7 % (ref 18–48)
LYMPHOCYTES NFR BLD: 18.8 % (ref 18–48)
LYMPHOCYTES NFR BLD: 8.1 % (ref 18–48)
MCH RBC QN AUTO: 18.6 PG (ref 27–31)
MCH RBC QN AUTO: 18.7 PG (ref 27–31)
MCH RBC QN AUTO: 19 PG (ref 27–31)
MCH RBC QN AUTO: 21.3 PG (ref 27–31)
MCHC RBC AUTO-ENTMCNC: 26.4 G/DL (ref 32–36)
MCHC RBC AUTO-ENTMCNC: 27.1 G/DL (ref 32–36)
MCHC RBC AUTO-ENTMCNC: 27.1 G/DL (ref 32–36)
MCHC RBC AUTO-ENTMCNC: 29.8 G/DL (ref 32–36)
MCV RBC AUTO: 69 FL (ref 82–98)
MCV RBC AUTO: 69 FL (ref 82–98)
MCV RBC AUTO: 72 FL (ref 82–98)
MCV RBC AUTO: 72 FL (ref 82–98)
MONOCYTES # BLD AUTO: 0.4 K/UL (ref 0.3–1)
MONOCYTES # BLD AUTO: 0.6 K/UL (ref 0.3–1)
MONOCYTES NFR BLD: 4.6 % (ref 4–15)
MONOCYTES NFR BLD: 5.2 % (ref 4–15)
MONOCYTES NFR BLD: 6.7 % (ref 4–15)
MONOCYTES NFR BLD: 7.1 % (ref 4–15)
NEUTROPHILS # BLD AUTO: 6.2 K/UL (ref 1.8–7.7)
NEUTROPHILS # BLD AUTO: 6.3 K/UL (ref 1.8–7.7)
NEUTROPHILS # BLD AUTO: 6.6 K/UL (ref 1.8–7.7)
NEUTROPHILS # BLD AUTO: 9.2 K/UL (ref 1.8–7.7)
NEUTROPHILS NFR BLD: 71 % (ref 38–73)
NEUTROPHILS NFR BLD: 77.2 % (ref 38–73)
NEUTROPHILS NFR BLD: 82.2 % (ref 38–73)
NEUTROPHILS NFR BLD: 84.3 % (ref 38–73)
NRBC BLD-RTO: 0 /100 WBC
OVALOCYTES BLD QL SMEAR: ABNORMAL
PLATELET # BLD AUTO: 210 K/UL (ref 150–450)
PLATELET # BLD AUTO: 226 K/UL (ref 150–450)
PLATELET # BLD AUTO: 234 K/UL (ref 150–450)
PLATELET # BLD AUTO: 249 K/UL (ref 150–450)
PLATELET BLD QL SMEAR: ABNORMAL
PMV BLD AUTO: 10.2 FL (ref 9.2–12.9)
PMV BLD AUTO: 10.4 FL (ref 9.2–12.9)
PMV BLD AUTO: 11.1 FL (ref 9.2–12.9)
PMV BLD AUTO: ABNORMAL FL (ref 9.2–12.9)
POIKILOCYTOSIS BLD QL SMEAR: SLIGHT
RBC # BLD AUTO: 3.21 M/UL (ref 4.6–6.2)
RBC # BLD AUTO: 3.44 M/UL (ref 4.6–6.2)
RBC # BLD AUTO: 3.85 M/UL (ref 4.6–6.2)
RBC # BLD AUTO: 4.55 M/UL (ref 4.6–6.2)
SCHISTOCYTES BLD QL SMEAR: ABNORMAL
TARGETS BLD QL SMEAR: ABNORMAL
TRANS ERYTHROCYTES VOL PATIENT: NORMAL ML
TRANS ERYTHROCYTES VOL PATIENT: NORMAL ML
WBC # BLD AUTO: 10.93 K/UL (ref 3.9–12.7)
WBC # BLD AUTO: 7.65 K/UL (ref 3.9–12.7)
WBC # BLD AUTO: 8.5 K/UL (ref 3.9–12.7)
WBC # BLD AUTO: 8.77 K/UL (ref 3.9–12.7)

## 2022-02-02 PROCEDURE — 96361 HYDRATE IV INFUSION ADD-ON: CPT | Performed by: EMERGENCY MEDICINE

## 2022-02-02 PROCEDURE — 63600175 PHARM REV CODE 636 W HCPCS: Mod: HCNC | Performed by: NURSE PRACTITIONER

## 2022-02-02 PROCEDURE — 96374 THER/PROPH/DIAG INJ IV PUSH: CPT | Mod: 59 | Performed by: EMERGENCY MEDICINE

## 2022-02-02 PROCEDURE — 37000009 HC ANESTHESIA EA ADD 15 MINS: Mod: HCNC | Performed by: INTERNAL MEDICINE

## 2022-02-02 PROCEDURE — A4216 STERILE WATER/SALINE, 10 ML: HCPCS | Mod: HCNC | Performed by: PHYSICIAN ASSISTANT

## 2022-02-02 PROCEDURE — 25000003 PHARM REV CODE 250: Mod: HCNC | Performed by: PHYSICIAN ASSISTANT

## 2022-02-02 PROCEDURE — 43255 EGD CONTROL BLEEDING ANY: CPT | Mod: HCNC | Performed by: INTERNAL MEDICINE

## 2022-02-02 PROCEDURE — C9113 INJ PANTOPRAZOLE SODIUM, VIA: HCPCS | Mod: HCNC | Performed by: NURSE PRACTITIONER

## 2022-02-02 PROCEDURE — 36415 COLL VENOUS BLD VENIPUNCTURE: CPT | Mod: HCNC | Performed by: INTERNAL MEDICINE

## 2022-02-02 PROCEDURE — 63600175 PHARM REV CODE 636 W HCPCS: Mod: HCNC | Performed by: NURSE ANESTHETIST, CERTIFIED REGISTERED

## 2022-02-02 PROCEDURE — C1751 CATH, INF, PER/CENT/MIDLINE: HCPCS | Mod: HCNC

## 2022-02-02 PROCEDURE — 85025 COMPLETE CBC W/AUTO DIFF WBC: CPT | Mod: HCNC | Performed by: INTERNAL MEDICINE

## 2022-02-02 PROCEDURE — 99226 PR SUBSEQUENT OBSERVATION CARE,LEVEL III: ICD-10-PCS | Mod: HCNC,,, | Performed by: INTERNAL MEDICINE

## 2022-02-02 PROCEDURE — 96376 TX/PRO/DX INJ SAME DRUG ADON: CPT | Performed by: EMERGENCY MEDICINE

## 2022-02-02 PROCEDURE — 25000003 PHARM REV CODE 250: Mod: HCNC | Performed by: NURSE ANESTHETIST, CERTIFIED REGISTERED

## 2022-02-02 PROCEDURE — 37000008 HC ANESTHESIA 1ST 15 MINUTES: Mod: HCNC | Performed by: INTERNAL MEDICINE

## 2022-02-02 PROCEDURE — 88305 TISSUE EXAM BY PATHOLOGIST: CPT | Mod: 26,HCNC,, | Performed by: PATHOLOGY

## 2022-02-02 PROCEDURE — 43239 EGD BIOPSY SINGLE/MULTIPLE: CPT | Mod: HCNC | Performed by: INTERNAL MEDICINE

## 2022-02-02 PROCEDURE — 99226 PR SUBSEQUENT OBSERVATION CARE,LEVEL III: CPT | Mod: HCNC,,, | Performed by: INTERNAL MEDICINE

## 2022-02-02 PROCEDURE — 88305 TISSUE EXAM BY PATHOLOGIST: CPT | Mod: HCNC | Performed by: PATHOLOGY

## 2022-02-02 PROCEDURE — P9021 RED BLOOD CELLS UNIT: HCPCS | Mod: HCNC | Performed by: EMERGENCY MEDICINE

## 2022-02-02 PROCEDURE — G0378 HOSPITAL OBSERVATION PER HR: HCPCS | Mod: HCNC

## 2022-02-02 PROCEDURE — 85025 COMPLETE CBC W/AUTO DIFF WBC: CPT | Mod: 91,HCNC | Performed by: NURSE PRACTITIONER

## 2022-02-02 PROCEDURE — 88305 TISSUE EXAM BY PATHOLOGIST: ICD-10-PCS | Mod: 26,HCNC,, | Performed by: PATHOLOGY

## 2022-02-02 PROCEDURE — 63600175 PHARM REV CODE 636 W HCPCS: Mod: HCNC | Performed by: PHYSICIAN ASSISTANT

## 2022-02-02 PROCEDURE — 94761 N-INVAS EAR/PLS OXIMETRY MLT: CPT | Mod: HCNC

## 2022-02-02 RX ORDER — ACETAMINOPHEN 650 MG/1
650 SUPPOSITORY RECTAL EVERY 6 HOURS PRN
Status: DISCONTINUED | OUTPATIENT
Start: 2022-02-02 | End: 2022-02-04 | Stop reason: HOSPADM

## 2022-02-02 RX ORDER — MEPERIDINE HYDROCHLORIDE 25 MG/ML
12.5 INJECTION INTRAMUSCULAR; INTRAVENOUS; SUBCUTANEOUS ONCE AS NEEDED
Status: CANCELLED | OUTPATIENT
Start: 2022-02-02 | End: 2022-02-03

## 2022-02-02 RX ORDER — DIPHENHYDRAMINE HYDROCHLORIDE 50 MG/ML
25 INJECTION INTRAMUSCULAR; INTRAVENOUS ONCE
Status: COMPLETED | OUTPATIENT
Start: 2022-02-02 | End: 2022-02-02

## 2022-02-02 RX ORDER — PROPOFOL 10 MG/ML
VIAL (ML) INTRAVENOUS
Status: DISCONTINUED | OUTPATIENT
Start: 2022-02-02 | End: 2022-02-02

## 2022-02-02 RX ORDER — LIDOCAINE HCL/PF 100 MG/5ML
SYRINGE (ML) INTRAVENOUS
Status: DISCONTINUED | OUTPATIENT
Start: 2022-02-02 | End: 2022-02-02

## 2022-02-02 RX ORDER — HYDROCODONE BITARTRATE AND ACETAMINOPHEN 500; 5 MG/1; MG/1
TABLET ORAL
Status: DISCONTINUED | OUTPATIENT
Start: 2022-02-02 | End: 2022-02-04 | Stop reason: HOSPADM

## 2022-02-02 RX ORDER — HYDROMORPHONE HYDROCHLORIDE 2 MG/ML
0.4 INJECTION, SOLUTION INTRAMUSCULAR; INTRAVENOUS; SUBCUTANEOUS EVERY 5 MIN PRN
Status: CANCELLED | OUTPATIENT
Start: 2022-02-02

## 2022-02-02 RX ORDER — ETOMIDATE 2 MG/ML
INJECTION INTRAVENOUS
Status: DISCONTINUED | OUTPATIENT
Start: 2022-02-02 | End: 2022-02-02

## 2022-02-02 RX ORDER — SODIUM CHLORIDE 0.9 % (FLUSH) 0.9 %
3 SYRINGE (ML) INJECTION
Status: DISCONTINUED | OUTPATIENT
Start: 2022-02-02 | End: 2022-02-04 | Stop reason: HOSPADM

## 2022-02-02 RX ORDER — DIPHENHYDRAMINE HCL 25 MG
25 CAPSULE ORAL ONCE
Status: DISCONTINUED | OUTPATIENT
Start: 2022-02-02 | End: 2022-02-02

## 2022-02-02 RX ORDER — OXYCODONE HYDROCHLORIDE 5 MG/1
5 TABLET ORAL
Status: CANCELLED | OUTPATIENT
Start: 2022-02-02

## 2022-02-02 RX ORDER — DIPHENHYDRAMINE HYDROCHLORIDE 50 MG/ML
12.5 INJECTION INTRAMUSCULAR; INTRAVENOUS EVERY 30 MIN PRN
Status: CANCELLED | OUTPATIENT
Start: 2022-02-02

## 2022-02-02 RX ORDER — PROCHLORPERAZINE EDISYLATE 5 MG/ML
5 INJECTION INTRAMUSCULAR; INTRAVENOUS EVERY 30 MIN PRN
Status: CANCELLED | OUTPATIENT
Start: 2022-02-02

## 2022-02-02 RX ADMIN — SODIUM CHLORIDE, PRESERVATIVE FREE 10 ML: 5 INJECTION INTRAVENOUS at 06:02

## 2022-02-02 RX ADMIN — SODIUM CHLORIDE, PRESERVATIVE FREE 10 ML: 5 INJECTION INTRAVENOUS at 12:02

## 2022-02-02 RX ADMIN — PROPOFOL 20 MG: 10 INJECTION, EMULSION INTRAVENOUS at 10:02

## 2022-02-02 RX ADMIN — LEVETIRACETAM 1000 MG: 100 INJECTION, SOLUTION, CONCENTRATE INTRAVENOUS at 09:02

## 2022-02-02 RX ADMIN — ETOMIDATE 12 MG: 2 INJECTION, SOLUTION INTRAVENOUS at 10:02

## 2022-02-02 RX ADMIN — DIPHENHYDRAMINE HYDROCHLORIDE 25 MG: 50 INJECTION INTRAMUSCULAR; INTRAVENOUS at 01:02

## 2022-02-02 RX ADMIN — PANTOPRAZOLE SODIUM 40 MG: 40 INJECTION, POWDER, FOR SOLUTION INTRAVENOUS at 09:02

## 2022-02-02 RX ADMIN — LIDOCAINE HYDROCHLORIDE 100 MG: 20 INJECTION, SOLUTION INTRAVENOUS at 10:02

## 2022-02-02 RX ADMIN — HYDRALAZINE HYDROCHLORIDE 10 MG: 20 INJECTION, SOLUTION INTRAMUSCULAR; INTRAVENOUS at 12:02

## 2022-02-02 RX ADMIN — ACETAMINOPHEN 650 MG: 650 SUPPOSITORY RECTAL at 09:02

## 2022-02-02 NOTE — ANESTHESIA POSTPROCEDURE EVALUATION
Anesthesia Post Evaluation    Patient: Atif Neves    Procedure(s) Performed: Procedure(s) (LRB):  EGD (ESOPHAGOGASTRODUODENOSCOPY) (N/A)    Final Anesthesia Type: general      Patient location during evaluation: PACU  Patient participation: Yes- Able to Participate  Level of consciousness: awake and alert  Post-procedure vital signs: reviewed and stable  Pain management: adequate  Airway patency: patent    PONV status at discharge: No PONV  Anesthetic complications: no      Cardiovascular status: blood pressure returned to baseline  Respiratory status: spontaneous ventilation  Hydration status: euvolemic  Follow-up not needed.          Vitals Value Taken Time   /75 02/02/22 1057   Temp 36.9 °C (98.5 °F) 02/02/22 1055   Pulse 67 02/02/22 1058   Resp 16 02/02/22 1055   SpO2 95 % 02/02/22 1058   Vitals shown include unvalidated device data.      No case tracking events are documented in the log.      Pain/Reynaldo Score: Reynaldo Score: 9 (2/2/2022 10:45 AM)

## 2022-02-02 NOTE — PLAN OF CARE
ROMEO contacted Pt daughter Tete Soria . Pt is on home health does home visits from [facility]. Will return home with family and lives with  his daughter Colleen Lopez. [phone].  Romeo  req restart of HH upon DC

## 2022-02-02 NOTE — PROGRESS NOTES
[facility] Med Surg ([place])  Wound Care    Patient Name:  Atif Neves   MRN:  [id number]  Date: 2022  Diagnosis: GI bleed    History:     Past Medical History:   Diagnosis Date    JONATAN (acute kidney injury) 2016    Bacteremia due to Gram-positive bacteria 3/24/2017    Antibiotics per ID recommendations; vancomycin x 14 days.     BPH (benign prostatic hypertrophy) with urinary obstruction 2016    Cystoid macular edema, left eye 2016    Essential hypertension 2016    Essential hypertension     Generalized anxiety disorder 2016    Glaucoma     Irregular heartbeat     Macular degeneration     Microcytic anemia 4/10/2017    Mixed hyperlipidemia 2016    Nonexudative age-related macular degeneration, bilateral, intermediate dry stage 2016    Normal pressure hydrocephalus     NPH (normal pressure hydrocephalus) 2017    Shunt placed 17    Urinary retention 2016    Ventriculo-peritoneal shunt status 2017       Social History     Socioeconomic History    Marital status:     Number of children: 7   Occupational History    Occupation:    Tobacco Use    Smoking status: Former Smoker     Packs/day: 1.00     Years: 18.00     Pack years: 18.00     Quit date: 1950     Years since quittin.1    Smokeless tobacco: Never Used   Substance and Sexual Activity    Alcohol use: No    Drug use: No    Sexual activity: Not Currently     Social Determinants of Health     Financial Resource Strain: Low Risk     Difficulty of Paying Living Expenses: Not hard at all   Food Insecurity: No Food Insecurity    Worried About Running Out of Food in the Last Year: Never true    Ran Out of Food in the Last Year: Never true   Transportation Needs: No Transportation Needs    Lack of Transportation (Medical): No    Lack of Transportation (Non-Medical): No   Physical Activity: Inactive    Days of Exercise per Week: 0 days    Minutes of Exercise per Session: 0 min   Stress: No Stress Concern  Present    Feeling of Stress : Not at all   Social Connections: Socially Isolated    Frequency of Communication with Friends and Family: Once a week    Frequency of Social Gatherings with Friends and Family: Once a week    Attends Nondenominational Services: More than 4 times per year    Active Member of Clubs or Organizations: No    Attends Club or Organization Meetings: Never    Marital Status:    Housing Stability: Low Risk     Unable to Pay for Housing in the Last Year: No    Number of Places Lived in the Last Year: 1    Unstable Housing in the Last Year: No       Precautions:     Allergies as of 01/31/2022 - Reviewed 01/31/2022   Allergen Reaction Noted    Aspirin Swelling 06/15/2016       [facility] Assessment Details/Treatment     2/2**HAPI prevention, Ricardo <18 PIP orders placed       02/01/22 2028   Ricardo Risk Assessment   Sensory Perception 3-->slightly limited   Moisture 3-->occasionally moist   Activity 3-->walks occasionally   Mobility 3-->slightly limited   Nutrition 3-->adequate   Friction and Shear 1-->problem   Ricardo Score 16       Recommendations made to primary team for HAPI prevention . Orders placed.     02/02/2022

## 2022-02-02 NOTE — SUBJECTIVE & OBJECTIVE
Interval History:   H&H continues to drop but no kisha bleeding reported.  Transfusion ordered last night but delayed because team was unable to contact daughter for consent. I spoke with her this Am and she understands the need for transfusion and is in agreement.    Remains confused.    Hopefully will have EGD this AM    Review of Systems   Unable to perform ROS: Dementia     Objective:     Vital Signs (Most Recent):  Temp: 97.9 °F (36.6 °C) (02/02/22 0831)  Pulse: 68 (02/02/22 0831)  Resp: 14 (02/02/22 0831)  BP: (!) 162/74 (02/02/22 0831)  SpO2: 98 % (02/02/22 0831) Vital Signs (24h Range):  Temp:  [97.7 °F (36.5 °C)-98.3 °F (36.8 °C)] 97.9 °F (36.6 °C)  Pulse:  [62-73] 68  Resp:  [14-18] 14  SpO2:  [96 %-99 %] 98 %  BP: (131-162)/(61-81) 162/74     Weight: 77.1 kg (170 lb)  Body mass index is 25.85 kg/m².    Intake/Output Summary (Last 24 hours) at 2/2/2022 1036  Last data filed at 2/2/2022 1028  Gross per 24 hour   Intake 200 ml   Output --   Net 200 ml      Physical Exam  Constitutional:       General: He is not in acute distress.  HENT:      Head: Normocephalic and atraumatic.   Eyes:      Conjunctiva/sclera: Conjunctivae normal.   Cardiovascular:      Rate and Rhythm: Regular rhythm.      Heart sounds: Normal heart sounds. No murmur heard.      Pulmonary:      Effort: Pulmonary effort is normal.      Breath sounds: Normal breath sounds.   Abdominal:      General: Bowel sounds are normal. There is no distension.      Palpations: Abdomen is soft.   Skin:     General: Skin is warm and dry.      Findings: No rash.   Psychiatric:         Mood and Affect: Mood and affect normal.         Significant Labs:   All pertinent labs within the past 24 hours have been reviewed.  CBC:   Recent Labs   Lab 02/02/22  0017 02/02/22  0504 02/02/22  0605   WBC 8.77 7.65 8.50   HGB 6.4* 7.3* 6.0*   HCT 23.6* 27.6* 22.1*    226 234       Significant Imaging: I have reviewed all pertinent imaging results/findings within the  past 24 hours.

## 2022-02-02 NOTE — NURSING
Order to infuse one unit of PRBC placed. Patient doesn't currently have blood consents. TIANNA Love aware. Awaiting a call back from FANNIE Lopez.

## 2022-02-02 NOTE — PROCEDURES
"Atif Neves is a 89 y.o. male patient.    Temp: 98.3 °F (36.8 °C) (02/01/22 2028)  Pulse: 68 (02/01/22 2028)  Resp: 18 (02/01/22 2028)  BP: (!) 159/71 (02/01/22 2028)  SpO2: 99 % (02/01/22 2028)  Weight: 77.1 kg (170 lb) (01/31/22 1713)  Height: 5' 8" (172.7 cm) (01/31/22 1713)    PICC  Date/Time: 2/1/2022 10:35 PM  Performed by: Mike Rodriguez RN  Consent Done: Yes  Time out: Immediately prior to procedure a time out was called to verify the correct patient, procedure, equipment, support staff and site/side marked as required  Indications: med administration  Anesthesia: local infiltration  Local anesthetic: lidocaine 1% without epinephrine  Anesthetic Total (mL): 1  Preparation: skin prepped with ChloraPrep  Skin prep agent dried: skin prep agent completely dried prior to procedure  Sterile barriers: all five maximum sterile barriers used - cap, mask, sterile gown, sterile gloves, and large sterile sheet  Hand hygiene: hand hygiene performed prior to central venous catheter insertion  Location details: right basilic  Catheter type: double lumen  Catheter size: 5 Fr  Catheter Length: 33cm    Ultrasound guidance: yes  Vessel Caliber: medium and large and patent, compressibility normal  Needle advanced into vessel with real time Ultrasound guidance.  Guidewire confirmed in vessel.  Sterile sheath used.  Number of attempts: 1  Post-procedure: blood return through all ports, chlorhexidine patch and sterile dressing applied  Estimated blood loss (mL): 0            Name Mike Rodriguez   2/1/2022  "

## 2022-02-02 NOTE — TRANSFER OF CARE
"Anesthesia Transfer of Care Note    Patient: Atif Neves    Procedure(s) Performed: Procedure(s) (LRB):  EGD (ESOPHAGOGASTRODUODENOSCOPY) (N/A)    Patient location: PACU    Anesthesia Type: general    Transport from OR: Transported from OR on 6-10 L/min O2 by face mask with adequate spontaneous ventilation    Post pain: adequate analgesia    Post assessment: no apparent anesthetic complications    Post vital signs: stable    Level of consciousness: awake and alert    Nausea/Vomiting: no nausea/vomiting    Complications: none    Transfer of care protocol was followed      Last vitals:   Visit Vitals  BP (!) 162/74 (BP Location: Left arm, Patient Position: Lying)   Pulse 68   Temp 36.6 °C (97.9 °F) (Oral)   Resp 14   Ht 5' 8" (1.727 m)   Wt 77.1 kg (170 lb)   SpO2 98%   BMI 25.85 kg/m²     "

## 2022-02-02 NOTE — NURSING
Pt continually trying to get out of bed. Placed order for telesitter. Lab tried to draw CBC 3 times. Nurse attempted twice. Unable to draw blood.

## 2022-02-02 NOTE — NURSING
Spoke with patient POA on file Colleen Lopez regarding STAT order for PICC line placement. She is aware that we are unable to obtain labs after multiple tries. She agrees with placement and will be awaiting a call from PICC team personnel to give verbal consent.

## 2022-02-02 NOTE — PLAN OF CARE
[facility] ([place])  Discharge Assessment    Primary Care Provider: MD ROMEO Davis met with patient at bedside patient was unable to provide informationt. ROMEO will contact family members in the AM

## 2022-02-02 NOTE — NURSING
Oriented to self. Patient is extremely combative.   Incontinence care done. Linen changed multiple times.

## 2022-02-02 NOTE — ANESTHESIA PREPROCEDURE EVALUATION
02/02/2022  Atif Neves is a 89 y.o., male.    Anesthesia Evaluation    I have reviewed the Patient Summary Reports.    I have reviewed the Nursing Notes. I have reviewed the NPO Status.   I have reviewed the Medications.     Review of Systems  Social:  Former Smoker    Hematology/Oncology:         -- Anemia: Hematology Comments: Receiving PRBC transfusion currently   Cardiovascular:   Hypertension hyperlipidemia    Neurological:   Seizures Normal Pressure Hydrocephalus  Functional Quad Dementia    Psych:   anxiety depression          Physical Exam  General:  Well nourished      Dental:  Dental Findings: In tact        Mental Status:  Mental Status Findings:         Anesthesia Plan  Type of Anesthesia, risks & benefits discussed:  Anesthesia Type:  general    Patient's Preference:   Plan Factors:          Intra-op Monitoring Plan: standard ASA monitors  Intra-op Monitoring Plan Comments:   Post Op Pain Control Plan: multimodal analgesia and per primary service following discharge from PACU  Post Op Pain Control Plan Comments:     Induction:   IV  Beta Blocker:         Informed Consent: Patient understands risks and agrees with Anesthesia plan.  Questions answered. Anesthesia consent signed with patient.  ASA Score: 4  emergent   Day of Surgery Review of History & Physical:    H&P update referred to the surgeon.         Ready For Surgery From Anesthesia Perspective.

## 2022-02-02 NOTE — [employer] PATIENT INSTRUCTIONS
Discharge Summary/Instructions after an Endoscopic Procedure  Patient Name: Atif Neves  Patient MRN: [id number]  Patient YOB: 1928 Wednesday, February 2, 2022  Atif Farrell MD  RESTRICTIONS:  During your procedure today, you received medications for sedation.  These   medications may affect your judgment, balance and coordination.  Therefore,   for 24 hours, you have the following restrictions:   - DO NOT drive a car, operate machinery, make legal/financial decisions,   sign important papers or drink alcohol.    ACTIVITY:  Today: no heavy lifting, straining or running due to procedural   sedation/anesthesia.  The following day: return to full activity including work.  DIET:  Eat and drink normally unless instructed otherwise.     TREATMENT FOR COMMON SIDE EFFECTS:  - Mild abdominal pain, nausea, belching, bloating or excessive gas:  rest,   eat lightly and use a heating pad.  - Sore Throat: treat with throat lozenges and/or gargle with warm salt   water.  - Because air was used during the procedure, expelling large amounts of air   from your rectum or belching is normal.  - If a bowel prep was taken, you may not have a bowel movement for 1-3 days.    This is normal.  SYMPTOMS TO WATCH FOR AND REPORT TO YOUR PHYSICIAN:  1. Abdominal pain or bloating, other than gas cramps.  2. Chest pain.  3. Back pain.  4. Signs of infection such as: chills or fever occurring within 24 hours   after the procedure.  5. Rectal bleeding, which would show as bright red, maroon, or black stools.   (A tablespoon of blood from the rectum is not serious, especially if   hemorrhoids are present.)  6. Vomiting.  7. Weakness or dizziness.  GO DIRECTLY TO THE NEAREST EMERGENCY ROOM IF YOU HAVE ANY OF THE FOLLOWING:      Difficulty breathing              Chills and/or fever over 101 F   Persistent vomiting and/or vomiting blood   Severe abdominal pain   Severe chest pain   Black, tarry stools   Bleeding- more than one  tablespoon   Any other symptom or condition that you feel may need urgent attention  Your doctor recommends these additional instructions:  If any biopsies were taken, your doctors clinic will contact you in 1 to 2   weeks with any results.  - [facility].   - Resume previous diet.   - Continue present medications.   - Await pathology results.   - Iron supplementation  - Monitor Hb periodically.  For questions, problems or results please call your physician - Atif Farrell MD at Work:  [phone].  [facility], EMERGENCY ROOM PHONE NUMBER: [phone], [facility]   [phone].  IF A COMPLICATION OR EMERGENCY SITUATION ARISES AND YOU ARE UNABLE TO REACH   YOUR PHYSICIAN - GO DIRECTLY TO THE EMERGENCY ROOM.  Atif Farrell MD  2/2/2022 10:34:41 AM  This report has been verified and signed electronically.  Dear patient,  As a result of recent federal legislation (The Federal Cures Act), you may   receive lab or pathology results from your procedure in your [facility]   account before your physician is able to contact you. Your physician or   their representative will relay the results to you with their   recommendations at their soonest availability.  Thank you,  [employer]

## 2022-02-02 NOTE — PROGRESS NOTES
[facility] Medicine  Progress Note    Patient Name: Atif Neves  MRN: [id number]  Patient Class: OP- Observation   Admission Date: 1/31/2022  Length of Stay: 0 days  Attending Physician: Kisha Melvin MD  Primary Care Provider: Ahsley Richards MD        Subjective:     Principal Problem:GI bleed        HPI:  The patient is a 89 y.o. male with a past medical history of HTN, and JONATAN who presents s/p syncopal episode. Per the patient's wife, the patient lost consciousness while on the toilet having a bowel movement. He remained in a seated position on the toilet. He did not fall and there was no trauma during the episode. Wife states that he has been confused since the incident. The patient denies any pain at this time. He is not on any blood thinners.  On initial workup, the patient was found to be acutely anemic.  FOBT was done and was positive.  The patient will be admitted for further management of his acute GI bleed      Overview/Hospital Course:  No notes on file    Interval History:   H&H continues to drop but no kisha bleeding reported.  Transfusion ordered last night but delayed because team was unable to contact daughter for consent. I spoke with her this Am and she understands the need for transfusion and is in agreement.    Remains confused.    Hopefully will have EGD this AM    Review of Systems   Unable to perform ROS: Dementia     Objective:     Vital Signs (Most Recent):  Temp: 97.9 °F (36.6 °C) (02/02/22 0831)  Pulse: 68 (02/02/22 0831)  Resp: 14 (02/02/22 0831)  BP: (!) 162/74 (02/02/22 0831)  SpO2: 98 % (02/02/22 0831) Vital Signs (24h Range):  Temp:  [97.7 °F (36.5 °C)-98.3 °F (36.8 °C)] 97.9 °F (36.6 °C)  Pulse:  [62-73] 68  Resp:  [14-18] 14  SpO2:  [96 %-99 %] 98 %  BP: (131-162)/(61-81) 162/74     Weight: 77.1 kg (170 lb)  Body mass index is 25.85 kg/m².    Intake/Output Summary (Last 24 hours) at 2/2/2022 1036  Last data filed at 2/2/2022 1028  Gross per 24  hour   Intake 200 ml   Output --   Net 200 ml      Physical Exam  Constitutional:       General: He is not in acute distress.  HENT:      Head: Normocephalic and atraumatic.   Eyes:      Conjunctiva/sclera: Conjunctivae normal.   Cardiovascular:      Rate and Rhythm: Regular rhythm.      Heart sounds: Normal heart sounds. No murmur heard.      Pulmonary:      Effort: Pulmonary effort is normal.      Breath sounds: Normal breath sounds.   Abdominal:      General: Bowel sounds are normal. There is no distension.      Palpations: Abdomen is soft.   Skin:     General: Skin is warm and dry.      Findings: No rash.   Psychiatric:         Mood and Affect: Mood and affect normal.         Significant Labs:   All pertinent labs within the past 24 hours have been reviewed.  CBC:   Recent Labs   Lab 02/02/22  0017 02/02/22  0504 02/02/22  0605   WBC 8.77 7.65 8.50   HGB 6.4* 7.3* 6.0*   HCT 23.6* 27.6* 22.1*    226 234       Significant Imaging: I have reviewed all pertinent imaging results/findings within the past 24 hours.      Assessment/Plan:      * GI bleed    transfuse blood today  Cont PPI  EGD scheduled      Seizure  At baseline currently    Continue Keppra IV BID      Dementia associated with other underlying disease without behavioral disturbance  At reported baseline    Monitor for acute decompensation / delirium      Essential hypertension  Normotensive currently    Hold antihypertensives for now with GI Bleed      Mixed hyperlipidemia  Hold statins while GI Bleed        VTE Risk Mitigation (From admission, onward)         Ordered     Reason for No Pharmacological VTE Prophylaxis  Once        Question:  Reasons:  Answer:  Active Bleeding    01/31/22 2229     IP VTE HIGH RISK PATIENT  Once         01/31/22 2229     Place sequential compression device  Until discontinued         01/31/22 2229                Discharge Planning   ROMULO:      Code Status: Full Code   Is the patient medically ready for discharge?:      Reason for patient still in hospital (select all that apply): Treatment  Discharge Plan A: Home Health                  Chalino Melvin MD  Department of Hospital Medicine   [facility] ([place])

## 2022-02-03 ENCOUNTER — PATIENT OUTREACH (OUTPATIENT)
Dept: ADMINISTRATIVE | Facility: OTHER | Age: 87
End: 2022-02-03
Payer: MEDICARE

## 2022-02-03 LAB
BASOPHILS # BLD AUTO: 0.04 K/UL (ref 0–0.2)
BASOPHILS NFR BLD: 0.4 % (ref 0–1.9)
DIFFERENTIAL METHOD: ABNORMAL
EOSINOPHIL # BLD AUTO: 0.4 K/UL (ref 0–0.5)
EOSINOPHIL NFR BLD: 4 % (ref 0–8)
ERYTHROCYTE [DISTWIDTH] IN BLOOD BY AUTOMATED COUNT: 20.3 % (ref 11.5–14.5)
HCT VFR BLD AUTO: 32.2 % (ref 40–54)
HGB BLD-MCNC: 9.4 G/DL (ref 14–18)
IMM GRANULOCYTES # BLD AUTO: 0.03 K/UL (ref 0–0.04)
IMM GRANULOCYTES NFR BLD AUTO: 0.3 % (ref 0–0.5)
LYMPHOCYTES # BLD AUTO: 1.1 K/UL (ref 1–4.8)
LYMPHOCYTES NFR BLD: 11.6 % (ref 18–48)
MCH RBC QN AUTO: 21.2 PG (ref 27–31)
MCHC RBC AUTO-ENTMCNC: 29.2 G/DL (ref 32–36)
MCV RBC AUTO: 73 FL (ref 82–98)
MONOCYTES # BLD AUTO: 0.6 K/UL (ref 0.3–1)
MONOCYTES NFR BLD: 7 % (ref 4–15)
NEUTROPHILS # BLD AUTO: 7 K/UL (ref 1.8–7.7)
NEUTROPHILS NFR BLD: 76.7 % (ref 38–73)
NRBC BLD-RTO: 0 /100 WBC
PLATELET # BLD AUTO: 188 K/UL (ref 150–450)
PMV BLD AUTO: 10.3 FL (ref 9.2–12.9)
RBC # BLD AUTO: 4.44 M/UL (ref 4.6–6.2)
WBC # BLD AUTO: 9.17 K/UL (ref 3.9–12.7)

## 2022-02-03 PROCEDURE — 25000003 PHARM REV CODE 250: Mod: HCNC | Performed by: INTERNAL MEDICINE

## 2022-02-03 PROCEDURE — A4216 STERILE WATER/SALINE, 10 ML: HCPCS | Mod: HCNC | Performed by: PHYSICIAN ASSISTANT

## 2022-02-03 PROCEDURE — 63600175 PHARM REV CODE 636 W HCPCS: Mod: HCNC | Performed by: NURSE PRACTITIONER

## 2022-02-03 PROCEDURE — 96361 HYDRATE IV INFUSION ADD-ON: CPT | Performed by: EMERGENCY MEDICINE

## 2022-02-03 PROCEDURE — 85025 COMPLETE CBC W/AUTO DIFF WBC: CPT | Mod: HCNC | Performed by: NURSE PRACTITIONER

## 2022-02-03 PROCEDURE — 99232 SBSQ HOSP IP/OBS MODERATE 35: CPT | Mod: HCNC,,, | Performed by: INTERNAL MEDICINE

## 2022-02-03 PROCEDURE — 99232 PR SUBSEQUENT HOSPITAL CARE,LEVL II: ICD-10-PCS | Mod: HCNC,,, | Performed by: INTERNAL MEDICINE

## 2022-02-03 PROCEDURE — 94761 N-INVAS EAR/PLS OXIMETRY MLT: CPT | Mod: HCNC

## 2022-02-03 PROCEDURE — C1751 CATH, INF, PER/CENT/MIDLINE: HCPCS | Mod: HCNC

## 2022-02-03 PROCEDURE — 11000001 HC ACUTE MED/SURG PRIVATE ROOM: Mod: HCNC

## 2022-02-03 PROCEDURE — 25000003 PHARM REV CODE 250: Mod: HCNC | Performed by: PHYSICIAN ASSISTANT

## 2022-02-03 PROCEDURE — 25000003 PHARM REV CODE 250: Mod: HCNC | Performed by: NURSE PRACTITIONER

## 2022-02-03 RX ORDER — LEVETIRACETAM 500 MG/1
1000 TABLET ORAL 2 TIMES DAILY
Status: DISCONTINUED | OUTPATIENT
Start: 2022-02-03 | End: 2022-02-04 | Stop reason: HOSPADM

## 2022-02-03 RX ORDER — PANTOPRAZOLE SODIUM 40 MG/1
40 TABLET, DELAYED RELEASE ORAL DAILY
Status: DISCONTINUED | OUTPATIENT
Start: 2022-02-03 | End: 2022-02-04 | Stop reason: HOSPADM

## 2022-02-03 RX ORDER — CITALOPRAM 10 MG/1
10 TABLET ORAL DAILY
Status: DISCONTINUED | OUTPATIENT
Start: 2022-02-03 | End: 2022-02-04 | Stop reason: HOSPADM

## 2022-02-03 RX ADMIN — HYDRALAZINE HYDROCHLORIDE 10 MG: 20 INJECTION, SOLUTION INTRAMUSCULAR; INTRAVENOUS at 08:02

## 2022-02-03 RX ADMIN — SODIUM CHLORIDE, PRESERVATIVE FREE 10 ML: 5 INJECTION INTRAVENOUS at 11:02

## 2022-02-03 RX ADMIN — LEVETIRACETAM 1000 MG: 500 TABLET, FILM COATED ORAL at 08:02

## 2022-02-03 RX ADMIN — PANTOPRAZOLE SODIUM 40 MG: 40 TABLET, DELAYED RELEASE ORAL at 11:02

## 2022-02-03 RX ADMIN — LEVETIRACETAM 1000 MG: 500 TABLET, FILM COATED ORAL at 11:02

## 2022-02-03 RX ADMIN — CITALOPRAM HYDROBROMIDE 10 MG: 10 TABLET ORAL at 11:02

## 2022-02-03 RX ADMIN — SODIUM CHLORIDE, PRESERVATIVE FREE 10 ML: 5 INJECTION INTRAVENOUS at 06:02

## 2022-02-03 RX ADMIN — SODIUM CHLORIDE, PRESERVATIVE FREE 10 ML: 5 INJECTION INTRAVENOUS at 02:02

## 2022-02-03 RX ADMIN — SODIUM CHLORIDE: 0.9 INJECTION, SOLUTION INTRAVENOUS at 06:02

## 2022-02-03 NOTE — PROGRESS NOTES
"Gastroenterology Progress Note    Reason for Consult: anemia    Subjective:  Had EGD yesterday w/ no source of bleeding.  No overt blood loss.  Had a fever last night.  Still confused.  Denies any abdominal pain to me    ROS:  Gen: no F/C  CV: no CP/palpitations  Resp: no SOB/wheezing    Physical Exam  BP (!) 168/74   Pulse 71   Temp 97.8 °F (36.6 °C)   Resp 16   Ht 5' 8" (1.727 m)   Wt 77.1 kg (170 lb)   SpO2 100%   BMI 25.85 kg/m²   General: Awake, alert male in no apparent distress  HEENT: NC/AT, PERRL, EOMI, MMM  CV: RRR, without murmur, gallop, or rubs  Pulm: CTAB, no wheezing, rales, or rhonchi  GI: soft, NT/ND, +BS  MSK: no edema and normal ROM  Neuro: A&O to self, confused/dementia, moves all extremities equally, sensation grossly intact  Skin: no rashes or lesions  Psych: normal mood and affect    Medications/Infusions:  Current Facility-Administered Medications   Medication Dose Route Frequency Provider Last Rate Last Admin    0.9%  NaCl infusion (for blood administration)   Intravenous Q24H PRN Darcie Love PA-C        0.9%  NaCl infusion   Intravenous Continuous Herbert Saldana NP 50 mL/hr at 02/03/22 0613 New Bag at 02/03/22 0613    acetaminophen suppository 650 mg  650 mg Rectal Q6H PRN Darcie Love PA-C   650 mg at 02/02/22 2145    citalopram tablet 10 mg  10 mg Oral Daily Chalino Melvin MD        hydrALAZINE injection 10 mg  10 mg Intravenous Q8H PRN Herbert Saldana NP   10 mg at 02/02/22 1217    levETIRAcetam tablet 1,000 mg  1,000 mg Oral BID Chalino Melvin MD        pantoprazole EC tablet 40 mg  40 mg Oral Daily Chalino Melvin MD        sodium chloride 0.9% flush 10 mL  10 mL Intravenous Q6H Darcie Love PA-C   10 mL at 02/03/22 0614    And    sodium chloride 0.9% flush 10 mL  10 mL Intravenous PRN MATEUS BeasleyC        sodium chloride 0.9% flush 3 mL  3 mL Intravenous PRN Derek Conrad MD           Intake and " Output:    Intake/Output Summary (Last 24 hours) at 2/3/2022 0914  Last data filed at 2/2/2022 1300  Gross per 24 hour   Intake 792.5 ml   Output --   Net 792.5 ml       Labs:  Lab Results   Component Value Date/Time    WBC 9.17 02/03/2022 06:18 AM    HGB 9.4 (L) 02/03/2022 06:18 AM    HCT 32.2 (L) 02/03/2022 06:18 AM     02/03/2022 06:18 AM    MCV 73 (L) 02/03/2022 06:18 AM     01/31/2022 07:49 PM    K 3.7 01/31/2022 07:49 PM     (H) 01/31/2022 07:49 PM    CO2 18 (L) 01/31/2022 07:49 PM    BUN 9 (L) 01/31/2022 07:49 PM    CREATININE 0.7 01/31/2022 07:49 PM    GLU 87 01/31/2022 07:49 PM    CALCIUM 7.1 (L) 01/31/2022 07:49 PM    MG 1.7 04/03/2017 04:43 AM    PHOS 2.1 (L) 02/07/2017 04:36 AM    INR 0.9 02/06/2017 09:46 AM    APTT 23.1 02/06/2017 09:46 AM   ]  Lab Results   Component Value Date/Time    PROT 5.4 (L) 01/31/2022 07:49 PM    ALBUMIN 2.7 (L) 01/31/2022 07:49 PM    BILITOT 0.4 01/31/2022 07:49 PM    BILIDIR 0.4 (H) 03/24/2017 01:09 AM    AST 21 01/31/2022 07:49 PM    ALT 11 01/31/2022 07:49 PM    ALKPHOS 108 01/31/2022 07:49 PM   ]    Imaging and Procedures:  Labs and imaging results were reviewed.  EGD 2/2/22:  - Small hiatal hernia.   - Normal stomach.   - Submucosal nodule found in the duodenum. Biopsied.   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 Treated with argon beam coagulation. These are the likely etiology of his anemia.     Assessment:  Atif Neves is a 89 y.o. male with a history of HTN, HLD, anxiety, NPH requiring  shunt, macular degeneration, glaucoma, and BPH and who presented with a syncopal episode while sitting on the toilet. GI consulted for anemia with FOBT+ stool.  No overt bleeding.     Plan:  - EGD w/ AVMs - likely source of bleeding  - serial H/H and transfuse if < 7  - continue PPI x 1 month  - iron supplementation  - I looked in [facility] EMR, but I could not find any record of his chart or a colonoscopy report  - hold off on colonoscopy for now  - ok  w/ discharge from GI standpoint  - can f/u in GI clinic in 1-2 weeks    Amber Xavier MD

## 2022-02-03 NOTE — SUBJECTIVE & OBJECTIVE
Interval History:   S/p 2 units prbc  H&H this AM 9.4/32  EGD yesterday 2/2/22 -   - Small hiatal hernia.    - Normal stomach.     - Submucosal nodule found in the duodenum.                          Biopsied.    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 Treated with argon beam coagulation.     The angioectasias are the likely etiology of his anemia.      Review of Systems   Unable to perform ROS: Dementia     Objective:     Vital Signs (Most Recent):  Temp: 97.8 °F (36.6 °C) (02/03/22 0744)  Pulse: 71 (02/03/22 0744)  Resp: 16 (02/03/22 0744)  BP: (!) 168/74 (02/03/22 0744)  SpO2: 100 % (02/03/22 0744) Vital Signs (24h Range):  Temp:  [97.7 °F (36.5 °C)-100.7 °F (38.2 °C)] 97.8 °F (36.6 °C)  Pulse:  [61-78] 71  Resp:  [12-20] 16  SpO2:  [94 %-100 %] 100 %  BP: (142-186)/(62-77) 168/74     Weight: 77.1 kg (170 lb)  Body mass index is 25.85 kg/m².    Intake/Output Summary (Last 24 hours) at 2/3/2022 0910  Last data filed at 2/2/2022 1300  Gross per 24 hour   Intake 792.5 ml   Output --   Net 792.5 ml      Physical Exam  Constitutional:       General: He is not in acute distress.  HENT:      Head: Normocephalic and atraumatic.   Eyes:      Conjunctiva/sclera: Conjunctivae normal.   Cardiovascular:      Rate and Rhythm: Regular rhythm.      Heart sounds: Normal heart sounds. No murmur heard.      Pulmonary:      Effort: Pulmonary effort is normal.      Breath sounds: Normal breath sounds.   Abdominal:      General: Bowel sounds are normal. There is no distension.      Palpations: Abdomen is soft.   Skin:     General: Skin is warm and dry.      Findings: No rash.   Psychiatric:         Mood and Affect: Mood and affect normal.         Significant Labs: All pertinent labs within the past 24 hours have been reviewed.    Significant Imaging: I have reviewed all pertinent imaging results/findings within the past 24 hours.

## 2022-02-03 NOTE — ASSESSMENT & PLAN NOTE
S/p 2 units prbc 2/2/22    EGD  2/2/22 -   - Small hiatal hernia.    - Normal stomach.     - Submucosal nodule found in the duodenum.                          Biopsied.    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 Treated with argon beam coagulation.     The angioectasias are the likely etiology of his anemia.      Cont ppi

## 2022-02-03 NOTE — PLAN OF CARE
POC reviewed with patient. Disoriented to time, situation, place.Temp elevated, rectal suppository given according to MAR. VS stable on room air.  No complaints of pain or other complaints verbalized during shift. IV fluids infusing. Positions self independently. Turn Q2/frequent weight shift encouraged by RN. Instructed to call for help ambulating. No injuries, falls, or trauma occurred during shift. Purposeful rounding completed. Bed low and locked with side rails up x 3 and call light within reach.

## 2022-02-03 NOTE — PROGRESS NOTES
[facility] Medicine  Progress Note    Patient Name: Atif Neves  MRN: [id number]  Patient Class: IP- Inpatient   Admission Date: 1/31/2022  Length of Stay: 0 days  Attending Physician: Chalino Melvin MD  Primary Care Provider: Ashley Richards MD        Subjective:     Principal Problem:GI bleed        HPI:  The patient is a 89 y.o. male with a past medical history of HTN, and JONATAN who presents s/p syncopal episode. Per the patient's wife, the patient lost consciousness while on the toilet having a bowel movement. He remained in a seated position on the toilet. He did not fall and there was no trauma during the episode. Wife states that he has been confused since the incident. The patient denies any pain at this time. He is not on any blood thinners.  On initial workup, the patient was found to be acutely anemic.  FOBT was done and was positive.  The patient will be admitted for further management of his acute GI bleed      Overview/Hospital Course:  No notes on file    Interval History:   S/p 2 units prbc  H&H this AM 9.4/32  EGD yesterday 2/2/22 -   - Small hiatal hernia.    - Normal stomach.     - Submucosal nodule found in the duodenum.                          Biopsied.    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 Treated with argon beam coagulation.     The angioectasias are the likely etiology of his anemia.      Review of Systems   Unable to perform ROS: Dementia     Objective:     Vital Signs (Most Recent):  Temp: 97.8 °F (36.6 °C) (02/03/22 0744)  Pulse: 71 (02/03/22 0744)  Resp: 16 (02/03/22 0744)  BP: (!) 168/74 (02/03/22 0744)  SpO2: 100 % (02/03/22 0744) Vital Signs (24h Range):  Temp:  [97.7 °F (36.5 °C)-100.7 °F (38.2 °C)] 97.8 °F (36.6 °C)  Pulse:  [61-78] 71  Resp:  [12-20] 16  SpO2:  [94 %-100 %] 100 %  BP: (142-186)/(62-77) 168/74     Weight: 77.1 kg (170 lb)  Body mass index is 25.85 kg/m².    Intake/Output Summary (Last 24 hours) at 2/3/2022 0966  Last  data filed at 2/2/2022 1300  Gross per 24 hour   Intake 792.5 ml   Output --   Net 792.5 ml      Physical Exam  Constitutional:       General: He is not in acute distress.  HENT:      Head: Normocephalic and atraumatic.   Eyes:      Conjunctiva/sclera: Conjunctivae normal.   Cardiovascular:      Rate and Rhythm: Regular rhythm.      Heart sounds: Normal heart sounds. No murmur heard.      Pulmonary:      Effort: Pulmonary effort is normal.      Breath sounds: Normal breath sounds.   Abdominal:      General: Bowel sounds are normal. There is no distension.      Palpations: Abdomen is soft.   Skin:     General: Skin is warm and dry.      Findings: No rash.   Psychiatric:         Mood and Affect: Mood and affect normal.         Significant Labs: All pertinent labs within the past 24 hours have been reviewed.    Significant Imaging: I have reviewed all pertinent imaging results/findings within the past 24 hours.      Assessment/Plan:      * GI bleed  S/p 2 units prbc 2/2/22    EGD  2/2/22 -   - Small hiatal hernia.    - Normal stomach.     - Submucosal nodule found in the duodenum.                          Biopsied.    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 Treated with argon beam coagulation.     The angioectasias are the likely etiology of his anemia.      Cont ppi        Seizure  At baseline currently    Continue Keppra       Dementia associated with other underlying disease without behavioral disturbance  At reported baseline    Monitor for acute decompensation / delirium      Essential hypertension  Monitor.  No home bp meds noted      Mixed hyperlipidemia  Hold statins while GI Bleed        VTE Risk Mitigation (From admission, onward)         Ordered     Reason for No Pharmacological VTE Prophylaxis  Once        Question:  Reasons:  Answer:  Active Bleeding    01/31/22 2229     IP VTE HIGH RISK PATIENT  Once         01/31/22 2229     Place sequential compression device  Until discontinued          01/31/22 2229                Discharge Planning   ROMULO:      Code Status: Full Code               Possible discharge if tolerated diet and ok with GI        Chalino Melvin MD  Department of Hospital Medicine   [facility] Surg ([place])

## 2022-02-03 NOTE — PLAN OF CARE
Pt received in bed. A&O*1. Name and  only. Confused. H&H low. MD notified to obtain Consent for Blood. Consent Obtained. 1st Unit of Blood Hanged on the unit and then left unit for Surgery escorted by the surgeon for planned EGD. In PACU, got another unit of Blood. H&H is up 9.7. EGD done. Found hiatal hernia and no active GI bleed. Gets agitated and needs reassurance that he is in the hospital and needs to stay in the bed. NS held because of HBP.

## 2022-02-04 VITALS
OXYGEN SATURATION: 99 % | HEIGHT: 68 IN | HEART RATE: 73 BPM | TEMPERATURE: 98 F | BODY MASS INDEX: 25.76 KG/M2 | SYSTOLIC BLOOD PRESSURE: 183 MMHG | WEIGHT: 170 LBS | RESPIRATION RATE: 16 BRPM | DIASTOLIC BLOOD PRESSURE: 83 MMHG

## 2022-02-04 LAB
BASOPHILS # BLD AUTO: 0.03 K/UL (ref 0–0.2)
BASOPHILS NFR BLD: 0.3 % (ref 0–1.9)
DIFFERENTIAL METHOD: ABNORMAL
EOSINOPHIL # BLD AUTO: 0.2 K/UL (ref 0–0.5)
EOSINOPHIL NFR BLD: 1.8 % (ref 0–8)
ERYTHROCYTE [DISTWIDTH] IN BLOOD BY AUTOMATED COUNT: 20.6 % (ref 11.5–14.5)
HCT VFR BLD AUTO: 34.6 % (ref 40–54)
HGB BLD-MCNC: 10.4 G/DL (ref 14–18)
IMM GRANULOCYTES # BLD AUTO: 0.04 K/UL (ref 0–0.04)
IMM GRANULOCYTES NFR BLD AUTO: 0.4 % (ref 0–0.5)
LYMPHOCYTES # BLD AUTO: 0.9 K/UL (ref 1–4.8)
LYMPHOCYTES NFR BLD: 8.3 % (ref 18–48)
MCH RBC QN AUTO: 21.3 PG (ref 27–31)
MCHC RBC AUTO-ENTMCNC: 30.1 G/DL (ref 32–36)
MCV RBC AUTO: 71 FL (ref 82–98)
MONOCYTES # BLD AUTO: 0.8 K/UL (ref 0.3–1)
MONOCYTES NFR BLD: 7.1 % (ref 4–15)
NEUTROPHILS # BLD AUTO: 9 K/UL (ref 1.8–7.7)
NEUTROPHILS NFR BLD: 82.1 % (ref 38–73)
NRBC BLD-RTO: 0 /100 WBC
PLATELET # BLD AUTO: 198 K/UL (ref 150–450)
PMV BLD AUTO: 10.9 FL (ref 9.2–12.9)
RBC # BLD AUTO: 4.88 M/UL (ref 4.6–6.2)
WBC # BLD AUTO: 10.92 K/UL (ref 3.9–12.7)

## 2022-02-04 PROCEDURE — C1751 CATH, INF, PER/CENT/MIDLINE: HCPCS | Mod: HCNC

## 2022-02-04 PROCEDURE — A4216 STERILE WATER/SALINE, 10 ML: HCPCS | Mod: HCNC | Performed by: PHYSICIAN ASSISTANT

## 2022-02-04 PROCEDURE — 99238 PR HOSPITAL DISCHARGE DAY,<30 MIN: ICD-10-PCS | Mod: HCNC,,, | Performed by: INTERNAL MEDICINE

## 2022-02-04 PROCEDURE — 94761 N-INVAS EAR/PLS OXIMETRY MLT: CPT | Mod: HCNC

## 2022-02-04 PROCEDURE — 25000003 PHARM REV CODE 250: Mod: HCNC | Performed by: PHYSICIAN ASSISTANT

## 2022-02-04 PROCEDURE — 99238 HOSP IP/OBS DSCHRG MGMT 30/<: CPT | Mod: HCNC,,, | Performed by: INTERNAL MEDICINE

## 2022-02-04 PROCEDURE — 63600175 PHARM REV CODE 636 W HCPCS: Mod: HCNC | Performed by: NURSE PRACTITIONER

## 2022-02-04 PROCEDURE — 25000003 PHARM REV CODE 250: Mod: HCNC | Performed by: INTERNAL MEDICINE

## 2022-02-04 PROCEDURE — 85025 COMPLETE CBC W/AUTO DIFF WBC: CPT | Mod: HCNC | Performed by: NURSE PRACTITIONER

## 2022-02-04 RX ORDER — PANTOPRAZOLE SODIUM 40 MG/1
40 TABLET, DELAYED RELEASE ORAL DAILY
Qty: 30 TABLET | Refills: 2 | Status: SHIPPED | OUTPATIENT
Start: 2022-02-05 | End: 2023-01-21

## 2022-02-04 RX ORDER — HYDRALAZINE HYDROCHLORIDE 25 MG/1
25 TABLET, FILM COATED ORAL EVERY 8 HOURS
Status: DISCONTINUED | OUTPATIENT
Start: 2022-02-04 | End: 2022-02-04 | Stop reason: HOSPADM

## 2022-02-04 RX ADMIN — SODIUM CHLORIDE, PRESERVATIVE FREE 10 ML: 5 INJECTION INTRAVENOUS at 06:02

## 2022-02-04 RX ADMIN — PANTOPRAZOLE SODIUM 40 MG: 40 TABLET, DELAYED RELEASE ORAL at 08:02

## 2022-02-04 RX ADMIN — HYDRALAZINE HYDROCHLORIDE 25 MG: 25 TABLET, FILM COATED ORAL at 11:02

## 2022-02-04 RX ADMIN — CITALOPRAM HYDROBROMIDE 10 MG: 10 TABLET ORAL at 08:02

## 2022-02-04 RX ADMIN — SODIUM CHLORIDE, PRESERVATIVE FREE 10 ML: 5 INJECTION INTRAVENOUS at 02:02

## 2022-02-04 RX ADMIN — LEVETIRACETAM 1000 MG: 500 TABLET, FILM COATED ORAL at 08:02

## 2022-02-04 RX ADMIN — HYDRALAZINE HYDROCHLORIDE 10 MG: 20 INJECTION, SOLUTION INTRAMUSCULAR; INTRAVENOUS at 06:02

## 2022-02-04 NOTE — PROGRESS NOTES
Patient discharged from hospital before WESLEYW was able to complete initial visit.    LISA De Guzman

## 2022-02-04 NOTE — DISCHARGE SUMMARY
[facility] Medicine  Discharge Summary      Patient Name: Atif Neves  MRN: [id number]  Patient Class: IP- Inpatient  Admission Date: 1/31/2022  Hospital Length of Stay: 1 days  Discharge Date and Time:  02/04/2022 9:59 AM  Attending Physician: Chalino Melvin MD   Discharging Provider: Chalino Melvin MD  Primary Care Provider: Ashley Richards MD      HPI:   The patient is a 89 y.o. male with a past medical history of HTN, and JONATAN who presents s/p syncopal episode. Per the patient's wife, the patient lost consciousness while on the toilet having a bowel movement. He remained in a seated position on the toilet. He did not fall and there was no trauma during the episode. Wife states that he has been confused since the incident. The patient denies any pain at this time. He is not on any blood thinners.  On initial workup, the patient was found to be acutely anemic.  FOBT was done and was positive.  The patient will be admitted for further management of his acute GI bleed      Procedure(s) (LRB):  EGD (ESOPHAGOGASTRODUODENOSCOPY) (N/A)      Hospital Course:   S/p 2 units prbc 2/2/22 for H&H of 6 & 22    EGD  2/2/22 -   - Small hiatal hernia.    - Normal stomach.     - Submucosal nodule found in the duodenum.                          Biopsied.    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 Treated with argon beam coagulation.     The angioectasias are the likely etiology of his anemia.      Cont ppi.    H&H has been stable post transfusion        Goals of Care Treatment Preferences:  Code Status: Full Code    Living Will: Yes  LaPOST: Yes           Consults:   Consults (From admission, onward)        Status Ordering Provider     Inpatient consult to PICC team (NIAS)  Once        Provider:  (Not yet assigned)    Acknowledged TENZIN BILLS     Inpatient consult to Gastroenterology  Once        Provider:  Amber Xavier MD    Completed JODI WALLACE  Assessment & Plan notes have been filed under this hospital service since the last note was generated.  Service: Hospital Medicine    Final Active Diagnoses:    Diagnosis Date Noted POA    PRINCIPAL PROBLEM:  GI bleed [K92.2] 01/31/2022 Yes    Seizure [R56.9] 02/04/2020 Yes    Dementia associated with other underlying disease without behavioral disturbance [F02.80] 02/04/2020 Yes    Essential hypertension [I10] 06/16/2016 Yes      Problems Resolved During this Admission:       Discharged Condition: good    Disposition: Home or Self Care    Follow Up:   Follow-up Information     Ashley Richards MD In 1 week.    Specialty: Internal Medicine  Contact information:  [address]  [facility] [address]  [phone]                       Patient Instructions:      Diet Adult Regular     Activity as tolerated       Significant Diagnostic Studies: Labs:   CBC   Recent Labs   Lab 02/02/22  1547 02/02/22  1547 02/03/22  0618 02/03/22  0618 02/04/22  0619   WBC 10.93  --  9.17  --  10.92   HGB 9.7*  --  9.4*  --  10.4*   HCT 32.6*   < > 32.2*   < > 34.6*     --  188  --  198    < > = values in this interval not displayed.       Pending Diagnostic Studies:     Procedure Component Value Units Date/Time    Specimen to Pathology, Surgery Gastrointestinal tract [[id number]] Collected: 02/02/22 1032    Order Status: Sent Lab Status: In process Updated: 02/02/22 1520    X-Ray Chest 1 View [[id number]]     Order Status: Sent Lab Status: No result          Medications:  Reconciled Home Medications:      Medication List      START taking these medications    pantoprazole 40 MG tablet  Commonly known as: PROTONIX  Take 1 tablet (40 mg total) by mouth once daily.  Start taking on: February 5, 2022        CONTINUE taking these medications    atorvastatin 20 MG tablet  Commonly known as: LIPITOR  TAKE 1 TABLET(20 MG) BY MOUTH EVERY DAY     cholecalciferol (vitamin D3) 25 mcg (1,000 unit) capsule  Commonly known as:  VITAMIN D3  Take 1 capsule (1,000 Units total) by mouth once daily.     citalopram 10 MG tablet  Commonly known as: CeleXA  TAKE 1 TABLET(10 MG) BY MOUTH EVERY DAY     docusate sodium 100 MG capsule  Commonly known as: COLACE  Take 1 capsule (100 mg total) by mouth once daily.     ferrous sulfate 324 mg (65 mg iron) Tbec  Take 1 tablet (324 mg total) by mouth once daily.     levETIRAcetam 1000 MG tablet  Commonly known as: KEPPRA  TAKE 1 TABLET(1000 MG) BY MOUTH TWICE DAILY     thiamine 100 MG tablet  Commonly known as: VITAMIN B-1  Take 1 tablet (100 mg total) by mouth once daily.            Indwelling Lines/Drains at time of discharge:   Lines/Drains/Airways     Peripherally Inserted Central Catheter Line            PICC Double Lumen 02/01/22 2235 right basilic 2 days          Drain            Male External Urinary Catheter 02/04/22 0830 Medium <1 day                         Chalino Melvin MD  Department of Hospital Medicine  [facility] Surg ([place])

## 2022-02-04 NOTE — SUBJECTIVE & OBJECTIVE
Interval History:   H&H stable  .basline confused    Review of Systems   Unable to perform ROS: Dementia     Objective:     Vital Signs (Most Recent):  Temp: 97.8 °F (36.6 °C) (02/04/22 0656)  Pulse: 77 (02/04/22 0656)  Resp: 14 (02/04/22 0656)  BP: (!) 170/76 (02/04/22 0656)  SpO2: 98 % (02/04/22 0700) Vital Signs (24h Range):  Temp:  [97.5 °F (36.4 °C)-99 °F (37.2 °C)] 97.8 °F (36.6 °C)  Pulse:  [67-77] 77  Resp:  [14-18] 14  SpO2:  [97 %-98 %] 98 %  BP: (166-186)/(76-92) 170/76     Weight: 77.1 kg (170 lb)  Body mass index is 25.85 kg/m².    Intake/Output Summary (Last 24 hours) at 2/4/2022 0931  Last data filed at 2/3/2022 2000  Gross per 24 hour   Intake 180 ml   Output --   Net 180 ml      Physical Exam  Constitutional:       General: He is not in acute distress.  HENT:      Head: Normocephalic and atraumatic.   Eyes:      Conjunctiva/sclera: Conjunctivae normal.   Cardiovascular:      Rate and Rhythm: Regular rhythm.      Heart sounds: Normal heart sounds. No murmur heard.      Pulmonary:      Effort: Pulmonary effort is normal.      Breath sounds: Normal breath sounds.   Abdominal:      General: Bowel sounds are normal. There is no distension.      Palpations: Abdomen is soft.   Skin:     General: Skin is warm and dry.      Findings: No rash.         Significant Labs: All pertinent labs within the past 24 hours have been reviewed.    Significant Imaging: I have reviewed all pertinent imaging results/findings within the past 24 hours.

## 2022-02-04 NOTE — PROGRESS NOTES
[]Samver for details    POC reviewed with patient. Disoriented to time, situation, place. BP elevated, PRN given according to MAR.  VS stable on room air.  No complaints of pain or other complaints verbalized during shift.  Positions self independently. Turn Q2/frequent weight shift encouraged by RN. Instructed to call for help ambulating. No injuries, falls, or trauma occurred during shift. Purposeful rounding completed. Bed low and locked with side rails up x 3 and call light within reach. AVASYS at bedside.

## 2022-02-04 NOTE — PLAN OF CARE
Pt is A&O*2-3. Slurred speech. However can make needs known. Dtr at bedside. Want to take him home with HHA. No other needs verbalized agrees with the POC. Pt is happy to go homewith family. Will discharge as soon as the ambulance arrives.       Left the floor with 2 EMS to home with hospital gown. Left at 1.15PM.

## 2022-02-04 NOTE — PLAN OF CARE
02/04/22 1023   Final Note   Anticipated Discharge Disposition Home   What phone number can be called within the next 1-3 days to see how you are doing after discharge? [phone]   Post-Acute Status   Discharge Delays (!) Ambulance Transport/Facility Transport    SW arranged ground transportation for PT via Ambulance.  POC for PT is Colleen Lopez [phone]   [facility] contacted to resume service, request sent via care [facility]

## 2022-02-04 NOTE — PLAN OF CARE
AAO to person. POC reviewed. No significant events this shift. Pt is incontinent and shifts in bed with assist. Bed low and locked, side rails up x3, call light within reach.     Problem: Adult Inpatient Plan of Care  Goal: Plan of Care Review  Outcome: Ongoing, Not Progressing  Goal: Patient-Specific Goal (Individualized)  Outcome: Ongoing, Not Progressing  Goal: Absence of Hospital-Acquired Illness or Injury  Outcome: Ongoing, Not Progressing  Goal: Optimal Comfort and Wellbeing  Outcome: Ongoing, Not Progressing  Goal: Readiness for Transition of Care  Outcome: Ongoing, Not Progressing     Problem: Adjustment to Illness (Gastrointestinal Bleeding)  Goal: Optimal Coping with Acute Illness  Outcome: Ongoing, Not Progressing     Problem: Bleeding (Gastrointestinal Bleeding)  Goal: Hemostasis  Outcome: Ongoing, Not Progressing     Problem: Infection  Goal: Absence of Infection Signs and Symptoms  Outcome: Ongoing, Not Progressing     Problem: Skin Injury Risk Increased  Goal: Skin Health and Integrity  Outcome: Ongoing, Not Progressing     Problem: Fall Injury Risk  Goal: Absence of Fall and Fall-Related Injury  Outcome: Ongoing, Not Progressing

## 2022-02-04 NOTE — PROGRESS NOTES
[facility] [place] Medicine  Progress Note    Patient Name: Atif Neves  MRN: [id number]  Patient Class: IP- Inpatient   Admission Date: 1/31/2022  Length of Stay: 1 days  Attending Physician: Chalino Melvin MD  Primary Care Provider: Ashley Richards MD        Subjective:     Principal Problem:GI bleed        HPI:  The patient is a 89 y.o. male with a past medical history of HTN, and JONATAN who presents s/p syncopal episode. Per the patient's wife, the patient lost consciousness while on the toilet having a bowel movement. He remained in a seated position on the toilet. He did not fall and there was no trauma during the episode. Wife states that he has been confused since the incident. The patient denies any pain at this time. He is not on any blood thinners.  On initial workup, the patient was found to be acutely anemic.  FOBT was done and was positive.  The patient will be admitted for further management of his acute GI bleed      Overview/Hospital Course:  No notes on file    Interval History:   H&H stable  .basline confused    Review of Systems   Unable to perform ROS: Dementia     Objective:     Vital Signs (Most Recent):  Temp: 97.8 °F (36.6 °C) (02/04/22 0656)  Pulse: 77 (02/04/22 0656)  Resp: 14 (02/04/22 0656)  BP: (!) 170/76 (02/04/22 0656)  SpO2: 98 % (02/04/22 0700) Vital Signs (24h Range):  Temp:  [97.5 °F (36.4 °C)-99 °F (37.2 °C)] 97.8 °F (36.6 °C)  Pulse:  [67-77] 77  Resp:  [14-18] 14  SpO2:  [97 %-98 %] 98 %  BP: (166-186)/(76-92) 170/76     Weight: 77.1 kg (170 lb)  Body mass index is 25.85 kg/m².    Intake/Output Summary (Last 24 hours) at 2/4/2022 0931  Last data filed at 2/3/2022 2000  Gross per 24 hour   Intake 180 ml   Output --   Net 180 ml      Physical Exam  Constitutional:       General: He is not in acute distress.  HENT:      Head: Normocephalic and atraumatic.   Eyes:      Conjunctiva/sclera: Conjunctivae normal.   Cardiovascular:      Rate and Rhythm: Regular  rhythm.      Heart sounds: Normal heart sounds. No murmur heard.      Pulmonary:      Effort: Pulmonary effort is normal.      Breath sounds: Normal breath sounds.   Abdominal:      General: Bowel sounds are normal. There is no distension.      Palpations: Abdomen is soft.   Skin:     General: Skin is warm and dry.      Findings: No rash.         Significant Labs: All pertinent labs within the past 24 hours have been reviewed.    Significant Imaging: I have reviewed all pertinent imaging results/findings within the past 24 hours.      Assessment/Plan:      * GI bleed  S/p 2 units prbc 2/2/22    EGD  2/2/22 -   - Small hiatal hernia.    - Normal stomach.     - Submucosal nodule found in the duodenum.                          Biopsied.    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 Treated with argon beam coagulation.     The angioectasias are the likely etiology of his anemia.      Cont ppi        Seizure  At baseline currently    Continue Keppra       Dementia associated with other underlying disease without behavioral disturbance  At reported baseline    Monitor for acute decompensation / delirium      Essential hypertension  Monitor.  No home bp meds noted      Mixed hyperlipidemia  Hold statins while GI Bleed        VTE Risk Mitigation (From admission, onward)         Ordered     Reason for No Pharmacological VTE Prophylaxis  Once        Question:  Reasons:  Answer:  Active Bleeding    01/31/22 2229     IP VTE HIGH RISK PATIENT  Once         01/31/22 2229     Place sequential compression device  Until discontinued         01/31/22 2229                Discharge Planning   ROMULO:      Code Status: Full Code   Is the patient medically ready for discharge?:     Reason for patient still in hospital (select all that apply):     Plan d/c home                   Chalino Melvin MD  Department of Hospital Medicine   [facility] ([place])

## 2022-02-07 LAB
FINAL PATHOLOGIC DIAGNOSIS: NORMAL
GROSS: NORMAL
Lab: NORMAL

## 2022-02-09 ENCOUNTER — DOCUMENT SCAN (OUTPATIENT)
Dept: HOME HEALTH SERVICES | Facility: HOSPITAL | Age: 87
End: 2022-02-09
Payer: MEDICARE

## 2022-02-10 DIAGNOSIS — R53.81 DECLINING FUNCTIONAL STATUS: Primary | ICD-10-CM

## 2022-02-11 ENCOUNTER — CARE AT HOME (OUTPATIENT)
Dept: HOME HEALTH SERVICES | Facility: CLINIC | Age: 87
End: 2022-02-11
Payer: MEDICARE

## 2022-02-11 VITALS
DIASTOLIC BLOOD PRESSURE: 65 MMHG | HEART RATE: 81 BPM | SYSTOLIC BLOOD PRESSURE: 155 MMHG | RESPIRATION RATE: 18 BRPM | TEMPERATURE: 98 F | OXYGEN SATURATION: 98 %

## 2022-02-11 DIAGNOSIS — R56.9 SEIZURE: ICD-10-CM

## 2022-02-11 DIAGNOSIS — K92.2 GASTROINTESTINAL HEMORRHAGE, UNSPECIFIED GASTROINTESTINAL HEMORRHAGE TYPE: ICD-10-CM

## 2022-02-11 DIAGNOSIS — R53.81 DECLINING FUNCTIONAL STATUS: ICD-10-CM

## 2022-02-11 DIAGNOSIS — F02.80 DEMENTIA ASSOCIATED WITH OTHER UNDERLYING DISEASE WITHOUT BEHAVIORAL DISTURBANCE: ICD-10-CM

## 2022-02-11 DIAGNOSIS — E78.2 MIXED HYPERLIPIDEMIA: ICD-10-CM

## 2022-02-11 DIAGNOSIS — I70.0 ATHEROSCLEROSIS OF AORTA: Primary | ICD-10-CM

## 2022-02-11 DIAGNOSIS — I10 ESSENTIAL HYPERTENSION: ICD-10-CM

## 2022-02-11 PROCEDURE — 99350 PR HOME VISIT,ESTAB PATIENT,LEVEL IV: ICD-10-PCS | Mod: S$GLB,,, | Performed by: NURSE PRACTITIONER

## 2022-02-11 PROCEDURE — 99350 HOME/RES VST EST HIGH MDM 60: CPT | Mod: S$GLB,,, | Performed by: NURSE PRACTITIONER

## 2022-02-12 NOTE — PATIENT INSTRUCTIONS
Instructions:  - [facility] to schedule home follow-up visit with patient in 2 weeks or as needed.  - Continue all medications, treatments and therapies as ordered.   - Follow all instructions, recommendations as discussed.  - Maintain Safety Precautions at all times.  - Attend all medical appointments as scheduled.  - For worsening symptoms: call Primary Care Physician or Nurse Practitioner.  - For emergencies, call 911 or immediately report to the nearest emergency room.  - Limit Risks of environmental exposure to coronavirus/COVID-19 as discussed including: social distancing, hand hygiene, and limiting departures from the home for necessities only.

## 2022-02-12 NOTE — PROGRESS NOTES
"[facility]  Transition of Care Home Visit    Visit Date: 2/11/2022  Encounter Provider: Choco Méndez NP  PCP:  Ashley Richards MD    PRESENTING HISTORY      Patient ID: Atif Neves 89 y.o. male.    Consult Requested By:  No ref. provider found  Reason for Consult:  Transitional Care Coordination    Chief Complaint: Transitional Care     The patient is being seen at home due to a physical debility that presents a taxing effort to leave the home, to mitigate high risk of hospital readmission or due to the limited availability of reliable or safe options for transportation to the point of access to the provider. The visit meets the criteria for medical necessity as defined by CMS as "health-care services needed to prevent, diagnose, or treat an illness, injury, condition, disease, or its symptoms and that meet accepted standards of medicine." Prior to treatment on this visit the chart was reviewed and patient consent was obtained.      HPI:   The patient is a 89 y.o. male with a past medical history of HTN, and JONATAN who presents s/p syncopal episode. Per the patient's wife, the patient lost consciousness while on the toilet having a bowel movement. He remained in a seated position on the toilet. He did not fall and there was no trauma during the episode. Wife states that he has been confused since the incident. The patient denies any pain at this time. He is not on any blood thinners.  On initial workup, the patient was found to be acutely anemic.  FOBT was done and was positive.  The patient will be admitted for further management of his acute GI bleed        Hospital Course:   S/p 2 units prbc 2/2/22 for H&H of 6 & 22   EGD  2/2/22 -   - Small hiatal hernia.    - Normal stomach.     - Submucosal nodule found in the duodenum.                          Biopsied.    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 Treated with argon beam coagulation.    The angioectasias are the likely etiology of his " anemia.    Cont ppi.   H&H has been stable post transfusion    Today:  Mr. Atif Neves is a 89 y.o. male is being seen and examined at home today for transitional care visit to the home environment post-discharge from inpatient hospitalization encounter described above. Atif presents at baseline state of health as reported by patient and caregiver. BP elevated on exam.  Denies chest pain, SOB, fever, chills, cough, congestion, blood in stool. Caregiver/Daughter reports good po intake, sleep pattern, eating pattern.  Caregiver/daughter provides assistance with ADLs.  Daughter is concerned that patient is sliding down in and at risk for falling.  She would like hospital bed for safety. Reports taking all medications as prescribed. No other needs identified at this time. Risks of environmental exposure to coronavirus discussed including: social distancing, hand hygiene, and limiting departures from the home for necessities only.  Reports understanding and willingness to comply.     Attestation: Screening criteria to assess the level of the patient's risk for infection with COVID-19 as recommended by the CDC at the time of the above documented home visit concluded appropriateness to proceed. Universal precautions were maintained at all times, including provider use of >60% alcohol gel hand  immediately prior to entry and upon departing the patient's home as well as cleaning of equipment used in home visit with antibacterial/germicidal disposable wipes.    Admission Date: 1/31/2022  Hospital Length of Stay: 1 days  Discharge Date and Time:  02/04/2022 9:59 AM  _________________________________________________________________    Review of Systems   Constitutional: Negative for chills, fatigue and fever.   HENT: Negative for congestion, postnasal drip and rhinorrhea.    Eyes: Negative for visual disturbance.   Respiratory: Negative for shortness of breath.    Gastrointestinal: Negative for abdominal pain, anal  bleeding, blood in stool, constipation, diarrhea, nausea and vomiting.   Genitourinary: Negative for difficulty urinating.   Musculoskeletal: Positive for gait problem. Negative for arthralgias and myalgias.   Neurological: Negative for weakness.   Hematological: Does not bruise/bleed easily.   Psychiatric/Behavioral: Negative for agitation.       Assessments:  · Environmental: single story home, no steps to enter, adequate lighting and temperature control  · Functional Status: Assistance with ADL's/IADL's, ambulates with assistance of a cane/walker, incontinent of bowel and bladder  · Safety: Fall Precautions, COVID Precautions/Social Distancing/Mask Use  · Nutritional: Adequate  · Home Health: [facility]   · DME/Supplies: rollator      PAST HISTORY:     Past Medical History:   Diagnosis Date    JONATAN (acute kidney injury) 06/2016    Bacteremia due to Gram-positive bacteria 3/24/2017    Antibiotics per ID recommendations; vancomycin x 14 days.     BPH (benign prostatic hypertrophy) with urinary obstruction 6/16/2016    Cystoid macular edema, left eye 8/4/2016    Essential hypertension 6/16/2016    Essential hypertension     Generalized anxiety disorder 6/16/2016    Glaucoma     Irregular heartbeat     Macular degeneration     Microcytic anemia 4/10/2017    Mixed hyperlipidemia 6/16/2016    Nonexudative age-related macular degeneration, bilateral, intermediate dry stage 8/4/2016    Normal pressure hydrocephalus     NPH (normal pressure hydrocephalus) 2/6/2017    Shunt placed 2/6/17    Urinary retention 06/2016    Ventriculo-peritoneal shunt status 2/6/2017       Past Surgical History:   Procedure Laterality Date    ESOPHAGOGASTRODUODENOSCOPY N/A 2/2/2022    Procedure: EGD (ESOPHAGOGASTRODUODENOSCOPY);  Surgeon: Atif Farrell MD;  [facility];  Service: Endoscopy;  Laterality: N/A;    TRANSURETHRAL RESECTION OF PROSTATE  08/31/2016       Family History   Problem Relation Age of Onset     Heart disease Brother     Cancer Daughter 55        Breast    Cancer Daughter 54        Breast    No Known Problems Son        Social History     Socioeconomic History    Marital status:     Number of children: 7   Occupational History    Occupation:    Tobacco Use    Smoking status: Former Smoker     Packs/day: 1.00     Years: 18.00     Pack years: 18.00     Quit date:      Years since quittin.1    Smokeless tobacco: Never Used   Substance and Sexual Activity    Alcohol use: No    Drug use: No    Sexual activity: Not Currently     Social Determinants of Health     Financial Resource Strain: Low Risk     Difficulty of Paying Living Expenses: Not hard at all   Food Insecurity: No Food Insecurity    Worried About Running Out of Food in the Last Year: Never true    Ran Out of Food in the Last Year: Never true   Transportation Needs: No Transportation Needs    Lack of Transportation (Medical): No    Lack of Transportation (Non-Medical): No   Physical Activity: Inactive    Days of Exercise per Week: 0 days    Minutes of Exercise per Session: 0 min   Stress: No Stress Concern Present    Feeling of Stress : Not at all   Social Connections: Socially Isolated    Frequency of Communication with Friends and Family: Once a week    Frequency of Social Gatherings with Friends and Family: Once a week    Attends [employer] Services: More than 4 times per year    Active Member of Clubs or Organizations: No    Attends Club or Organization Meetings: Never    Marital Status:    Housing Stability: Low Risk     Unable to Pay for Housing in the Last Year: No    Number of Places Lived in the Last Year: 1    Unstable Housing in the Last Year: No       MEDICATIONS & ALLERGIES:     Current Outpatient Medications on File Prior to Visit   Medication Sig Dispense Refill    atorvastatin (LIPITOR) 20 MG tablet TAKE 1 TABLET(20 MG) BY MOUTH EVERY DAY 90 tablet 3    cholecalciferol, vitamin  "D3, (VITAMIN D3) 25 mcg (1,000 unit) capsule Take 1 capsule (1,000 Units total) by mouth once daily. 90 capsule 3    citalopram (CELEXA) 10 MG tablet TAKE 1 TABLET(10 MG) BY MOUTH EVERY DAY 90 tablet 3    docusate sodium (COLACE) 100 MG capsule Take 1 capsule (100 mg total) by mouth once daily. 90 capsule 0    ferrous sulfate 324 mg (65 mg iron) TbEC Take 1 tablet (324 mg total) by mouth once daily. 90 tablet 3    levETIRAcetam (KEPPRA) 1000 MG tablet TAKE 1 TABLET(1000 MG) BY MOUTH TWICE DAILY 180 tablet 3    pantoprazole (PROTONIX) 40 MG tablet Take 1 tablet (40 mg total) by mouth once daily. 30 tablet 2    thiamine (VITAMIN B-1) 100 MG tablet Take 1 tablet (100 mg total) by mouth once daily. 90 tablet 3     No current facility-administered medications on file prior to visit.        Review of patient's allergies indicates:   Allergen Reactions    Aspirin Swelling     States, "makes me sick." pt does not elaborate.        OBJECTIVE:     Vital Signs:  Vitals:    02/11/22 1000   BP: (!) 155/65   Pulse: 81   Resp: 18   Temp: 98 °F (36.7 °C)     There is no height or weight on file to calculate BMI.       Physical Exam  Constitutional:       Appearance: Normal appearance.   HENT:      Head: Normocephalic and atraumatic.      Nose: No congestion or rhinorrhea.      Mouth/Throat:      Mouth: Mucous membranes are moist.   Cardiovascular:      Rate and Rhythm: Normal rate.   Pulmonary:      Effort: No respiratory distress.      Breath sounds: Normal breath sounds.   Abdominal:      General: Bowel sounds are normal.      Palpations: Abdomen is soft.   Musculoskeletal:      Cervical back: Neck supple.      Right lower leg: No edema.      Left lower leg: No edema.   Skin:     General: Skin is warm and dry.   Neurological:      Mental Status: He is alert. Mental status is at baseline.   Psychiatric:         Mood and Affect: Mood normal.       Laboratory  Lab Results   Component Value Date    WBC 10.92 02/04/2022    HGB " 10.4 (L) 02/04/2022    HCT 34.6 (L) 02/04/2022    MCV 71 (L) 02/04/2022     02/04/2022     Lab Results   Component Value Date    INR 0.9 02/06/2017     Lab Results   Component Value Date    HGBA1C 5.2 06/22/2021     No results for input(s): POCTGLUCOSE in the last 72 hours.    TRANSITION OF CARE:     [facility] Contact Note: 2/3/22    Family and/or Caretaker present at visit?  Yes.  Diagnostic tests reviewed/disposition: No diagnosic tests pending after this hospitalization.  Disease/illness education: Importance of Compliance with all prescribed medications and treatments, COVID Precautions/Social Distancing/Mask Use  Home health/community services discussion/referrals: Patient has home health established at [facility].   Establishment or re-establishment of referral orders for community resources: No other necessary community resources.   Discussion with other health care providers: No discussion with other health care providers necessary.     Transition of Care Visit:  I have reviewed and updated the history and problem list. I have reconciled the medication list. I have discussed the hospitalization and current medical issues, prognosis and plans with the patient/family.     Medications Reconciliation:   I have reconciled the patient's home medications and discharge medications with the patient/family. I have updated all changes. Refer to After-Visit Medication List.    Discharge plans, follow-up instructions, future appointments, provider contact information, indicators to seek emergency treatment and encouragement to call for any questions, concerns or clarification of the patient's plan of care explained to patient and/or caregiver(s), whom confirm understanding of provided information and endorse willingness to comply.     ASSESSMENT & PLAN:     HIGH RISK CONDITION(S):  Patient has a condition that poses threat to life and bodily function: GI bleed    Atif was seen today for transitional  care.    Diagnoses and all orders for this visit      Problem List Items Addressed This Visit        Neuro    Dementia associated with other underlying disease without behavioral disturbance     At baseline  Continue caregiver support  Nutritional support  Continue meds as prescribed          Relevant Orders    HOSPITAL BED FOR HOME USE    Seizure     Reports no recent seizure activity  Continue keppra            Cardiac/Vascular    Mixed hyperlipidemia     Continue statin          Relevant Orders    HOSPITAL BED FOR HOME USE    Essential hypertension     BP elevated, not currently on meds  Instructed on Low Na diet  Will set up close follow up         Atherosclerosis of aorta - Primary     Denies chest pain/SOB  Continue statin          Relevant Orders    HOSPITAL BED FOR HOME USE       GI    GI bleed     Reports no signs of bleeding  Continue to monitor   Continue pantoprazole             Other    Declining functional status     Continue caregiver support  Continue home health  Safety precautions  Will order hospital bed          Relevant Orders    HOSPITAL BED FOR HOME USE            Were controlled substances prescribed?  No    Instructions:  - [facility] Nurse Practitioner to schedule home follow-up visit with patient in 2 weeks or as needed.  - Continue all medications, treatments and therapies as ordered.   - Follow all instructions, recommendations as discussed.  - Maintain Safety Precautions at all times.  - Attend all medical appointments as scheduled.  - For worsening symptoms: call Primary Care Physician or Nurse Practitioner.  - For emergencies, call 911 or immediately report to the nearest emergency room.  - Limit Risks of environmental exposure to coronavirus/COVID-19 as discussed including: social distancing, hand hygiene, and limiting departures from the home for necessities only.        Scheduled Follow-up :  No future appointments.    After Visit Medication List :     Medication List           Accurate as of February 11, 2022 11:59 PM. If you have any questions, ask your nurse or doctor.            CONTINUE taking these medications    atorvastatin 20 MG tablet  Commonly known as: LIPITOR  TAKE 1 TABLET(20 MG) BY MOUTH EVERY DAY     cholecalciferol (vitamin D3) 25 mcg (1,000 unit) capsule  Commonly known as: VITAMIN D3  Take 1 capsule (1,000 Units total) by mouth once daily.     citalopram 10 MG tablet  Commonly known as: CeleXA  TAKE 1 TABLET(10 MG) BY MOUTH EVERY DAY     docusate sodium 100 MG capsule  Commonly known as: COLACE  Take 1 capsule (100 mg total) by mouth once daily.     ferrous sulfate 324 mg (65 mg iron) Tbec  Take 1 tablet (324 mg total) by mouth once daily.     levETIRAcetam 1000 MG tablet  Commonly known as: KEPPRA  TAKE 1 TABLET(1000 MG) BY MOUTH TWICE DAILY     pantoprazole 40 MG tablet  Commonly known as: PROTONIX  Take 1 tablet (40 mg total) by mouth once daily.     thiamine 100 MG tablet  Commonly known as: VITAMIN B-1  Take 1 tablet (100 mg total) by mouth once daily.            Patient consent was obtained prior to treatment on this visit.    Attestation: Screening criteria to assess the level of the patient's risk for infection with COVID-19 as recommended by the CDC at the time of the above documented home visit concluded appropriateness to proceed. Universal precautions were maintained at all times, including provider use of >60% alcohol gel hand  immediately prior to entry and upon departing the patient's home as well as cleaning of equipment used in home visit with antibacterial/germicidal disposable wipes.     Signature:       ANNEMARIE Lemus-C   [facility]    Total Face-to-Face Encounter: 60 minutes with >50% of time spent discussing the care with the patient/family.

## 2022-02-14 ENCOUNTER — TELEPHONE (OUTPATIENT)
Dept: PRIMARY CARE CLINIC | Facility: CLINIC | Age: 87
End: 2022-02-14
Payer: MEDICARE

## 2022-02-14 DIAGNOSIS — G93.41 ACUTE METABOLIC ENCEPHALOPATHY: ICD-10-CM

## 2022-02-14 DIAGNOSIS — K92.2 ACUTE UPPER GI BLEED: Primary | ICD-10-CM

## 2022-02-14 NOTE — TELEPHONE ENCOUNTER
Patient's daughter called regarding her dad's prognosis and his declinie. He was recently admitted to the hospital for a GI bleed. Was discharged and is receiving home health, and they have offered to increase their hours but patient does not want PT, he states that he wants to die.    His daughter was explained that PT can not restore his functional status. After a lengthy hospital stay She was also explained the services of hospice, and agreed to this service        Referral sent to [facility], as patient is declining due to GI bleed and normal pressure hydrocephaly leading to acute metabolic encephalopathy.    Thanks,  Ashley Richards MD/MPH  [facility]  [phone] [place]

## 2022-02-14 NOTE — TELEPHONE ENCOUNTER
Called [facility].  Spoke with Aria in intake.  Styated that patient had not yet been admitted, but was scheduled to be admitted,  Informed her that patient was being admitted to hospice.

## 2022-02-14 NOTE — TELEPHONE ENCOUNTER
Referral for hospice faxed to [facility].      Confirmation of fax received .  Spoke with Jess.    Patient to be admitted tonight at 7PM.

## 2022-03-02 ENCOUNTER — TELEPHONE (OUTPATIENT)
Dept: PRIMARY CARE CLINIC | Facility: CLINIC | Age: 87
End: 2022-03-02
Payer: MEDICARE

## 2022-03-02 DIAGNOSIS — D50.9 IRON DEFICIENCY ANEMIA, UNSPECIFIED IRON DEFICIENCY ANEMIA TYPE: ICD-10-CM

## 2022-03-02 DIAGNOSIS — R56.9 SEIZURE: ICD-10-CM

## 2022-03-02 DIAGNOSIS — F41.1 GENERALIZED ANXIETY DISORDER: ICD-10-CM

## 2022-03-02 DIAGNOSIS — I70.0 ATHEROSCLEROSIS OF AORTA: ICD-10-CM

## 2022-03-02 DIAGNOSIS — D50.9 MICROCYTIC ANEMIA: ICD-10-CM

## 2022-03-02 DIAGNOSIS — E55.9 VITAMIN D DEFICIENCY: ICD-10-CM

## 2022-04-19 ENCOUNTER — EXTERNAL HOME HEALTH (OUTPATIENT)
Dept: HOME HEALTH SERVICES | Facility: HOSPITAL | Age: 87
End: 2022-04-19
Payer: MEDICARE

## 2022-04-29 ENCOUNTER — DOCUMENT SCAN (OUTPATIENT)
Dept: HOME HEALTH SERVICES | Facility: HOSPITAL | Age: 87
End: 2022-04-29
Payer: MEDICARE

## 2022-05-16 ENCOUNTER — PES CALL (OUTPATIENT)
Dept: ADMINISTRATIVE | Facility: CLINIC | Age: 87
End: 2022-05-16
Payer: MEDICARE

## 2022-08-08 ENCOUNTER — TELEPHONE (OUTPATIENT)
Dept: PRIMARY CARE CLINIC | Facility: CLINIC | Age: 87
End: 2022-08-08
Payer: MEDICARE

## 2022-08-08 NOTE — TELEPHONE ENCOUNTER
----- Message from Sade Guerrier sent at 8/5/2022 11:58 AM CDT -----  Contact: Daughter (Tete) [phone]  Pt daughter is calling in regards to getting patient set up for home physical therapy ! Please f/u

## 2022-08-08 NOTE — TELEPHONE ENCOUNTER
Spoke with pt dtr, Tete, she is requesting home health for pt to receive physical therapy.   Pt is on hospice.    Explained that pt cannot have physical therapy while on hospice.   Ms. Epstein does not want him to be discharged from hospice.   Explained he would only have physical therapy for 4-6 weeks through home health.   Ms. Epstein verbalized understanding.     She wants pt to remain on hospice.

## 2022-09-22 ENCOUNTER — TELEPHONE (OUTPATIENT)
Dept: PRIMARY CARE CLINIC | Facility: CLINIC | Age: 87
End: 2022-09-22
Payer: MEDICARE

## 2022-09-22 NOTE — TELEPHONE ENCOUNTER
Call received from patient's daughter, Tete.  Stated that she wanted Dr. Richards to know  that after patient completes physical therapy, it appears that patient has seizure    [phone]    Please advise

## 2022-09-23 NOTE — TELEPHONE ENCOUNTER
"Called and spoke with patient's daughter, Marjorie Soria. When asked to decribe what she meant by patient having seizures after PT, daughter stated that patient sometimes loses consciousness or has 2-3 minute episodes where  he is "just  staring".  Stated that she has not noticed any muscle twitching.    Instructed to give patient snack prior to having PT to ensure that his blood sugar does not drop.  Patient to have PT  tomorrow.  Daughter states that she will make sure that patient eats prior to PT.  Daughter stated that as a child, patient had epilepsy.  "

## 2022-09-27 NOTE — TELEPHONE ENCOUNTER
Called and spoke with patient's daughter, Tete Soria.  Called to see how patient was doing with PT.  Stated that they had fed patient prior to PT on both Saturday and yesterday and given him juice both before and after PT.  Stated that patient had no problems and no more episodes as he had been having.  Verbalized appreciation for phone call to check on patient.

## 2022-09-29 ENCOUNTER — TELEPHONE (OUTPATIENT)
Dept: PRIMARY CARE CLINIC | Facility: CLINIC | Age: 87
End: 2022-09-29
Payer: MEDICARE

## 2022-09-29 NOTE — TELEPHONE ENCOUNTER
ROMEO took the phone call from the pt's daughter Tete and she stated that her father is in Hospice with [facility]. She reported that her father is having more seizures and they are starting to last longer. She is really concerned and she would like for you to reach out to her.    She also mentioned that the Hospice company have not been doing what they are suppose to do.  ROMEO informed the pt's daughter that she can always go with another company if she is unhappy with them    KJ can you reach or Sherrill reach out to the family

## 2022-09-30 NOTE — TELEPHONE ENCOUNTER
Please get patient a phone call appt this week.    Also ask daughter if he is still getting his keppra 1g twice daily.     He also may be seizing if he is towards end of life.     I will talk to them and get more information.    Thanks,  KJ

## 2022-10-04 ENCOUNTER — TELEPHONE (OUTPATIENT)
Dept: PRIMARY CARE CLINIC | Facility: CLINIC | Age: 87
End: 2022-10-04
Payer: MEDICARE

## 2022-10-04 DIAGNOSIS — R56.9 SEIZURE: Primary | ICD-10-CM

## 2022-10-04 NOTE — TELEPHONE ENCOUNTER
Patient may be having seizure activity during PT sessions. Family and therapist reported that.    Please get him into the seizure clinic and also get an appt with me or TALITA Arellano within the week.    Referral placed for Neuro/seizure.    Thanks,  KJ

## 2022-10-11 ENCOUNTER — OFFICE VISIT (OUTPATIENT)
Dept: PRIMARY CARE CLINIC | Facility: CLINIC | Age: 87
End: 2022-10-11
Payer: MEDICARE

## 2022-10-11 VITALS
OXYGEN SATURATION: 98 % | HEART RATE: 78 BPM | SYSTOLIC BLOOD PRESSURE: 128 MMHG | DIASTOLIC BLOOD PRESSURE: 65 MMHG | BODY MASS INDEX: 25.14 KG/M2 | TEMPERATURE: 98 F | RESPIRATION RATE: 20 BRPM | WEIGHT: 165.38 LBS

## 2022-10-11 DIAGNOSIS — F33.42 RECURRENT MAJOR DEPRESSIVE DISORDER, IN FULL REMISSION: ICD-10-CM

## 2022-10-11 DIAGNOSIS — I70.0 ATHEROSCLEROSIS OF AORTA: ICD-10-CM

## 2022-10-11 DIAGNOSIS — G91.2 DEMENTIA ASSOCIATED WITH NORMAL PRESSURE HYDROCEPHALUS: ICD-10-CM

## 2022-10-11 DIAGNOSIS — R53.2 FUNCTIONAL QUADRIPLEGIA: ICD-10-CM

## 2022-10-11 DIAGNOSIS — G91.2 NORMAL PRESSURE HYDROCEPHALUS: ICD-10-CM

## 2022-10-11 DIAGNOSIS — R56.9 SEIZURE: Primary | ICD-10-CM

## 2022-10-11 DIAGNOSIS — I10 ESSENTIAL HYPERTENSION: ICD-10-CM

## 2022-10-11 DIAGNOSIS — E78.2 MIXED HYPERLIPIDEMIA: ICD-10-CM

## 2022-10-11 DIAGNOSIS — F02.80 DEMENTIA ASSOCIATED WITH OTHER UNDERLYING DISEASE WITHOUT BEHAVIORAL DISTURBANCE: ICD-10-CM

## 2022-10-11 DIAGNOSIS — Z98.2 VENTRICULO-PERITONEAL SHUNT STATUS: ICD-10-CM

## 2022-10-11 DIAGNOSIS — F02.80 DEMENTIA ASSOCIATED WITH NORMAL PRESSURE HYDROCEPHALUS: ICD-10-CM

## 2022-10-11 PROCEDURE — 1101F PR PT FALLS ASSESS DOC 0-1 FALLS W/OUT INJ PAST YR: ICD-10-PCS | Mod: CPTII,S$GLB,, | Performed by: NURSE PRACTITIONER

## 2022-10-11 PROCEDURE — 1159F MED LIST DOCD IN RCRD: CPT | Mod: CPTII,S$GLB,, | Performed by: NURSE PRACTITIONER

## 2022-10-11 PROCEDURE — 1157F PR ADVANCE CARE PLAN OR EQUIV PRESENT IN MEDICAL RECORD: ICD-10-PCS | Mod: CPTII,S$GLB,, | Performed by: NURSE PRACTITIONER

## 2022-10-11 PROCEDURE — 1160F PR REVIEW ALL MEDS BY PRESCRIBER/CLIN PHARMACIST DOCUMENTED: ICD-10-PCS | Mod: CPTII,S$GLB,, | Performed by: NURSE PRACTITIONER

## 2022-10-11 PROCEDURE — 99213 PR OFFICE/OUTPT VISIT, EST, LEVL III, 20-29 MIN: ICD-10-PCS | Mod: GV,S$GLB,, | Performed by: NURSE PRACTITIONER

## 2022-10-11 PROCEDURE — 1126F AMNT PAIN NOTED NONE PRSNT: CPT | Mod: CPTII,S$GLB,, | Performed by: NURSE PRACTITIONER

## 2022-10-11 PROCEDURE — 3288F FALL RISK ASSESSMENT DOCD: CPT | Mod: CPTII,S$GLB,, | Performed by: NURSE PRACTITIONER

## 2022-10-11 PROCEDURE — 1101F PT FALLS ASSESS-DOCD LE1/YR: CPT | Mod: CPTII,S$GLB,, | Performed by: NURSE PRACTITIONER

## 2022-10-11 PROCEDURE — 1160F RVW MEDS BY RX/DR IN RCRD: CPT | Mod: CPTII,S$GLB,, | Performed by: NURSE PRACTITIONER

## 2022-10-11 PROCEDURE — 1126F PR PAIN SEVERITY QUANTIFIED, NO PAIN PRESENT: ICD-10-PCS | Mod: CPTII,S$GLB,, | Performed by: NURSE PRACTITIONER

## 2022-10-11 PROCEDURE — 3288F PR FALLS RISK ASSESSMENT DOCUMENTED: ICD-10-PCS | Mod: CPTII,S$GLB,, | Performed by: NURSE PRACTITIONER

## 2022-10-11 PROCEDURE — 99213 OFFICE O/P EST LOW 20 MIN: CPT | Mod: GV,S$GLB,, | Performed by: NURSE PRACTITIONER

## 2022-10-11 PROCEDURE — 1157F ADVNC CARE PLAN IN RCRD: CPT | Mod: CPTII,S$GLB,, | Performed by: NURSE PRACTITIONER

## 2022-10-11 PROCEDURE — 1159F PR MEDICATION LIST DOCUMENTED IN MEDICAL RECORD: ICD-10-PCS | Mod: CPTII,S$GLB,, | Performed by: NURSE PRACTITIONER

## 2022-10-11 NOTE — PROGRESS NOTES
Primary Care Provider Appointment- ANTONETTE Lala      Patient ID: Atif Neves is a 89 y.o. male.    Chief Complaint: Seizures      Prior to this visit, patient's last encounter with PCP was Visit date not found.    HPI:  This is a 89 y/o male who has a h/o seizure disorder from child garza requiring a ventriculo-peritoneal shunt secondary to normal pressure hydrocephalus.  He has has increasing number of seizures over the past few weeks which are occurring more frequently and lasting longer.  After a thorough assessment of medications and diagnosis, it was noted that the patients caregiver was only administering the Keppra once per day.      The patient and family are requesting the patient transition to hospice.  It was thought he was already in hospice but he is not.  The patient nor the family wants aggressive treatment at this time.  The patient is totally dependent on family for all care including transfers.  He has memory challenges and is aphasic as well.  He was receiving PT in which the family was paying for but it stopped with the increase in seizure activity.      Past Medical History:   Diagnosis Date    JONATAN (acute kidney injury) 06/2016    Bacteremia due to Gram-positive bacteria 3/24/2017    Antibiotics per ID recommendations; vancomycin x 14 days.     BPH (benign prostatic hypertrophy) with urinary obstruction 6/16/2016    Cystoid macular edema, left eye 8/4/2016    Essential hypertension 6/16/2016    Essential hypertension     Generalized anxiety disorder 6/16/2016    Glaucoma     Irregular heartbeat     Macular degeneration     Microcytic anemia 4/10/2017    Mixed hyperlipidemia 6/16/2016    Nonexudative age-related macular degeneration, bilateral, intermediate dry stage 8/4/2016    Normal pressure hydrocephalus     NPH (normal pressure hydrocephalus) 2/6/2017    Shunt placed 2/6/17    Urinary retention 06/2016    Ventriculo-peritoneal shunt status 2/6/2017       Past Surgical History:    Procedure Laterality Date    ESOPHAGOGASTRODUODENOSCOPY N/A 2022    Procedure: EGD (ESOPHAGOGASTRODUODENOSCOPY);  Surgeon: Atif Farrell MD;  Location: [facility];  Service: Endoscopy;  Laterality: N/A;    TRANSURETHRAL RESECTION OF PROSTATE  2016       Family History   Problem Relation Age of Onset    Heart disease Brother     Cancer Daughter 55        Breast    Cancer Daughter 54        Breast    No Known Problems Son        Social History     Socioeconomic History    Marital status:     Number of children: 7   Occupational History    Occupation:    Tobacco Use    Smoking status: Former     Packs/day: 1.00     Years: 18.00     Pack years: 18.00     Types: Cigarettes     Quit date: 1950     Years since quittin.8    Smokeless tobacco: Never   Substance and Sexual Activity    Alcohol use: No    Drug use: No    Sexual activity: Not Currently       Current Outpatient Medications   Medication Sig Dispense Refill    atorvastatin (LIPITOR) 20 MG tablet TAKE 1 TABLET(20 MG) BY MOUTH EVERY DAY 90 tablet 3    cholecalciferol, vitamin D3, (VITAMIN D3) 25 mcg (1,000 unit) capsule Take 1 capsule (1,000 Units total) by mouth once daily. 90 capsule 3    citalopram (CELEXA) 10 MG tablet TAKE 1 TABLET(10 MG) BY MOUTH EVERY DAY 90 tablet 3    docusate sodium (COLACE) 100 MG capsule Take 1 capsule (100 mg total) by mouth once daily. 90 capsule 0    ferrous sulfate 324 mg (65 mg iron) TbEC Take 1 tablet (324 mg total) by mouth once daily. 90 tablet 3    levETIRAcetam (KEPPRA) 1000 MG tablet TAKE 1 TABLET(1000 MG) BY MOUTH TWICE DAILY 180 tablet 3    pantoprazole (PROTONIX) 40 MG tablet Take 1 tablet (40 mg total) by mouth once daily. 30 tablet 2    thiamine (VITAMIN B-1) 100 MG tablet Take 1 tablet (100 mg total) by mouth once daily. 90 tablet 3     No current facility-administered medications for this visit.       Review of patient's allergies indicates:   Allergen Reactions    Aspirin Swelling      "States, "makes me sick." pt does not elaborate.             Objective:   /65 (BP Location: Left arm, Patient Position: Sitting, BP Method: Medium (Automatic))   Pulse 78   Temp 98.2 °F (36.8 °C) (Oral)   Resp 20   Wt 75 kg (165 lb 5.5 oz)   SpO2 98%   BMI 25.14 kg/m²   Review of Systems   Constitutional: Negative.    HENT: Negative.     Eyes: Negative.    Respiratory: Negative.     Cardiovascular: Negative.    Genitourinary: Negative.         Incontinent of stool and urine.   Musculoskeletal:         Requires full assistance.   Skin: Negative.    Neurological:  Positive for speech change, seizures and weakness.   Endo/Heme/Allergies: Negative.    Psychiatric/Behavioral:  Positive for memory loss.     Physical Exam  Constitutional:       Appearance: He is normal weight.   HENT:      Head: Normocephalic and atraumatic.      Nose: Nose normal.      Mouth/Throat:      Mouth: Mucous membranes are moist.      Pharynx: Oropharynx is clear.   Eyes:      Extraocular Movements: Extraocular movements intact.      Conjunctiva/sclera: Conjunctivae normal.      Pupils: Pupils are equal, round, and reactive to light.   Cardiovascular:      Rate and Rhythm: Normal rate and regular rhythm.      Pulses: Normal pulses.      Heart sounds: Normal heart sounds.   Pulmonary:      Effort: Pulmonary effort is normal.      Breath sounds: Normal breath sounds.   Abdominal:      General: Bowel sounds are normal.      Palpations: Abdomen is soft.   Musculoskeletal:         General: Deformity present.      Cervical back: Normal range of motion and neck supple.      Comments: Unable to ambulate requires full assist for transfers.   Skin:     General: Skin is warm and dry.      Capillary Refill: Capillary refill takes 2 to 3 seconds.   Neurological:      General: No focal deficit present.      Mental Status: He is alert and oriented to person, place, and time. Mental status is at baseline.   Psychiatric:         Mood and Affect: Mood " normal.         Behavior: Behavior normal.         Thought Content: Thought content normal.         Judgment: Judgment normal.            Lab Results   Component Value Date    WBC 10.92 02/04/2022    HGB 10.4 (L) 02/04/2022    HCT 34.6 (L) 02/04/2022     02/04/2022    CHOL 163 06/22/2021    TRIG 77 06/22/2021    HDL 54 06/22/2021    ALT 11 01/31/2022    AST 21 01/31/2022     01/31/2022    K 3.7 01/31/2022     (H) 01/31/2022    CREATININE 0.7 01/31/2022    BUN 9 (L) 01/31/2022    CO2 18 (L) 01/31/2022    TSH 1.020 06/22/2021    INR 0.9 02/06/2017    HGBA1C 5.2 06/22/2021       Medication List with Changes/Refills   Current Medications    ATORVASTATIN (LIPITOR) 20 MG TABLET    TAKE 1 TABLET(20 MG) BY MOUTH EVERY DAY    CHOLECALCIFEROL, VITAMIN D3, (VITAMIN D3) 25 MCG (1,000 UNIT) CAPSULE    Take 1 capsule (1,000 Units total) by mouth once daily.    CITALOPRAM (CELEXA) 10 MG TABLET    TAKE 1 TABLET(10 MG) BY MOUTH EVERY DAY    DOCUSATE SODIUM (COLACE) 100 MG CAPSULE    Take 1 capsule (100 mg total) by mouth once daily.    FERROUS SULFATE 324 MG (65 MG IRON) TBEC    Take 1 tablet (324 mg total) by mouth once daily.    LEVETIRACETAM (KEPPRA) 1000 MG TABLET    TAKE 1 TABLET(1000 MG) BY MOUTH TWICE DAILY    PANTOPRAZOLE (PROTONIX) 40 MG TABLET    Take 1 tablet (40 mg total) by mouth once daily.    THIAMINE (VITAMIN B-1) 100 MG TABLET    Take 1 tablet (100 mg total) by mouth once daily.          Assessment:   89 y.o. male with multiple co-morbid illnesses here to follow up with PCP and continue work-up of chronic issues notably.     1. Seizure  Ambulatory referral/consult to Hospice      2. Normal pressure hydrocephalus  Ambulatory referral/consult to Hospice      3. Dementia associated with other underlying disease without behavioral disturbance        4. Ventriculo-peritoneal shunt status        5. Essential hypertension        6. Mixed hyperlipidemia        7. Functional quadriplegia        8.  "Recurrent major depressive disorder, in full remission        9. Atherosclerosis of aorta             Plan:     1. Seizure  Overview:  Since childhood on long-term antiepileptic medications.      Assessment & Plan:  Increased seizure activity occurring more frequently and longer.  The patients sister was only giving him 1000 mg po daily, instead of BID.  Will increase Keppra back up to bid.    Orders:  -     Ambulatory referral/consult to Hospice; Future; Expected date: 10/18/2022    2. Normal pressure hydrocephalus  Overview:  Shunt placed 2/6/17    Assessment & Plan:  Shunt in place and intact.    Orders:  -     Ambulatory referral/consult to Hospice; Future; Expected date: 10/18/2022    3. Dementia associated with other underlying disease without behavioral disturbance  Overview:  Pt with NPH with shunt and microvascular chges on MRI.      Assessment & Plan:  The patient has a NPH with ventriculo-peritoneal shunt.  He also has active seizures on Keppra.      4. Ventriculo-peritoneal shunt status  Assessment & Plan:  Stable shunt, no changes in MS.        5. Essential hypertension  Assessment & Plan:  Well controlled on current regimen.      6. Mixed hyperlipidemia  Assessment & Plan:  Continue atorvastatin 20 mg po q day, Chol 163 and LDL 93.      7. Functional quadriplegia  Overview:  Homebound, unable to ambulate, dependent on family for all ADLs    Assessment & Plan:  The patient is wheelchair bound requiring to be strapped to the chair for safety.  He relies on his family for all ADLs'.      8. Recurrent major depressive disorder, in full remission  Overview:  Controlled on celexa      9. Atherosclerosis of aorta  Overview:  Noted on CT abd 6/2016: "There is atherosclerotic calcification of the aorta and its branches."    Assessment & Plan:  B/P controlled and continue atorvastatin.       Health Maintenance         Date Due Completion Date    Shingles Vaccine (1 of 2) Never done ---    COVID-19 Vaccine (3 - " Booster for Pfizer series) 08/06/2021 6/11/2021    Influenza Vaccine (1) 09/01/2022 8/27/2015    Lipid Panel 06/22/2026 6/22/2021    TETANUS VACCINE 04/10/2027 4/10/2017                Follow up in about 3 months (around 1/11/2023), or if symptoms worsen or fail to improve. 30 min spent with patient in face to face encounter. The following issues were addressed diagnosis, medications, labs as noted above, recent diagnostic test and health maintenance.      Dr. Mamie Arellano, DNP, MSN, CHFN, ACNP, APRN, B.C.  [facility]  [facility]  [facility] [phone]

## 2022-10-11 NOTE — ASSESSMENT & PLAN NOTE
Increased seizure activity occurring more frequently and longer.  The patients sister was only giving him 1000 mg po daily, instead of BID.  Will increase Keppra back up to bid.

## 2022-10-11 NOTE — ASSESSMENT & PLAN NOTE
The patient is wheelchair bound requiring to be strapped to the chair for safety.  He relies on his family for all ADLs'.

## 2023-01-20 ENCOUNTER — HOSPITAL ENCOUNTER (INPATIENT)
Facility: HOSPITAL | Age: 88
LOS: 6 days | Discharge: HOSPICE | DRG: 871 | End: 2023-01-26
Attending: EMERGENCY MEDICINE | Admitting: STUDENT IN AN ORGANIZED HEALTH CARE EDUCATION/TRAINING PROGRAM
Payer: MEDICARE

## 2023-01-20 DIAGNOSIS — R41.82 ALTERED MENTAL STATUS: ICD-10-CM

## 2023-01-20 DIAGNOSIS — E87.20 LACTIC ACIDOSIS: ICD-10-CM

## 2023-01-20 DIAGNOSIS — Z98.2 VENTRICULO-PERITONEAL SHUNT STATUS: ICD-10-CM

## 2023-01-20 DIAGNOSIS — R07.9 CHEST PAIN: ICD-10-CM

## 2023-01-20 DIAGNOSIS — D64.9 ACUTE ON CHRONIC ANEMIA: ICD-10-CM

## 2023-01-20 DIAGNOSIS — I10 ESSENTIAL HYPERTENSION: ICD-10-CM

## 2023-01-20 DIAGNOSIS — A41.9 SEPSIS, DUE TO UNSPECIFIED ORGANISM, UNSPECIFIED WHETHER ACUTE ORGAN DYSFUNCTION PRESENT: ICD-10-CM

## 2023-01-20 DIAGNOSIS — R65.20 SEVERE SEPSIS: ICD-10-CM

## 2023-01-20 DIAGNOSIS — Z71.89 ADVANCED CARE PLANNING/COUNSELING DISCUSSION: ICD-10-CM

## 2023-01-20 DIAGNOSIS — A41.9 SEVERE SEPSIS: ICD-10-CM

## 2023-01-20 DIAGNOSIS — Z51.5 PALLIATIVE CARE ENCOUNTER: ICD-10-CM

## 2023-01-20 DIAGNOSIS — F02.80 DEMENTIA ASSOCIATED WITH OTHER UNDERLYING DISEASE WITHOUT BEHAVIORAL DISTURBANCE: ICD-10-CM

## 2023-01-20 DIAGNOSIS — Z71.89 GOALS OF CARE, COUNSELING/DISCUSSION: ICD-10-CM

## 2023-01-20 DIAGNOSIS — R56.9 SEIZURE: ICD-10-CM

## 2023-01-20 DIAGNOSIS — G40.919 BREAKTHROUGH SEIZURE: Primary | ICD-10-CM

## 2023-01-20 DIAGNOSIS — R47.01 APHASIA: ICD-10-CM

## 2023-01-20 DIAGNOSIS — R53.2 FUNCTIONAL QUADRIPLEGIA: ICD-10-CM

## 2023-01-20 LAB
ABO + RH BLD: NORMAL
ALBUMIN SERPL BCP-MCNC: 3.3 G/DL (ref 3.5–5.2)
ALP SERPL-CCNC: 138 U/L (ref 55–135)
ALT SERPL W/O P-5'-P-CCNC: 6 U/L (ref 10–44)
ANION GAP SERPL CALC-SCNC: 18 MMOL/L (ref 8–16)
AST SERPL-CCNC: 26 U/L (ref 10–40)
BASOPHILS # BLD AUTO: 0.03 K/UL (ref 0–0.2)
BASOPHILS NFR BLD: 0.2 % (ref 0–1.9)
BILIRUB SERPL-MCNC: 0.5 MG/DL (ref 0.1–1)
BLD GP AB SCN CELLS X3 SERPL QL: NORMAL
BUN SERPL-MCNC: 16 MG/DL (ref 10–30)
CALCIUM SERPL-MCNC: 9.3 MG/DL (ref 8.7–10.5)
CHLORIDE SERPL-SCNC: 113 MMOL/L (ref 95–110)
CO2 SERPL-SCNC: 14 MMOL/L (ref 23–29)
CREAT SERPL-MCNC: 1.2 MG/DL (ref 0.5–1.4)
DIFFERENTIAL METHOD: ABNORMAL
EOSINOPHIL # BLD AUTO: 0 K/UL (ref 0–0.5)
EOSINOPHIL NFR BLD: 0 % (ref 0–8)
ERYTHROCYTE [DISTWIDTH] IN BLOOD BY AUTOMATED COUNT: 21.3 % (ref 11.5–14.5)
EST. GFR  (NO RACE VARIABLE): 56 ML/MIN/1.73 M^2
GLUCOSE SERPL-MCNC: 140 MG/DL (ref 70–110)
HCT VFR BLD AUTO: 26.5 % (ref 40–54)
HGB BLD-MCNC: 6.7 G/DL (ref 14–18)
IMM GRANULOCYTES # BLD AUTO: 0.07 K/UL (ref 0–0.04)
IMM GRANULOCYTES NFR BLD AUTO: 0.4 % (ref 0–0.5)
INFLUENZA A, MOLECULAR: NOT DETECTED
INFLUENZA B, MOLECULAR: NOT DETECTED
LACTATE SERPL-SCNC: 8.1 MMOL/L (ref 0.5–2.2)
LACTATE SERPL-SCNC: >12 MMOL/L (ref 0.5–2.2)
LYMPHOCYTES # BLD AUTO: 1.2 K/UL (ref 1–4.8)
LYMPHOCYTES NFR BLD: 7.1 % (ref 18–48)
MAGNESIUM SERPL-MCNC: 2 MG/DL (ref 1.6–2.6)
MCH RBC QN AUTO: 16.9 PG (ref 27–31)
MCHC RBC AUTO-ENTMCNC: 25.3 G/DL (ref 32–36)
MCV RBC AUTO: 67 FL (ref 82–98)
MONOCYTES # BLD AUTO: 0.4 K/UL (ref 0.3–1)
MONOCYTES NFR BLD: 2.7 % (ref 4–15)
NEUTROPHILS # BLD AUTO: 14.8 K/UL (ref 1.8–7.7)
NEUTROPHILS NFR BLD: 89.6 % (ref 38–73)
NRBC BLD-RTO: 0 /100 WBC
PHOSPHATE SERPL-MCNC: 3 MG/DL (ref 2.7–4.5)
PLATELET # BLD AUTO: 423 K/UL (ref 150–450)
PMV BLD AUTO: 10.8 FL (ref 9.2–12.9)
POTASSIUM SERPL-SCNC: 4.2 MMOL/L (ref 3.5–5.1)
PROT SERPL-MCNC: 7.6 G/DL (ref 6–8.4)
RBC # BLD AUTO: 3.97 M/UL (ref 4.6–6.2)
RSV AG BY MOLECULAR METHOD: NOT DETECTED
SARS-COV-2 RNA RESP QL NAA+PROBE: NOT DETECTED
SODIUM SERPL-SCNC: 145 MMOL/L (ref 136–145)
WBC # BLD AUTO: 16.53 K/UL (ref 3.9–12.7)

## 2023-01-20 PROCEDURE — 80177 DRUG SCRN QUAN LEVETIRACETAM: CPT | Mod: HCNC | Performed by: EMERGENCY MEDICINE

## 2023-01-20 PROCEDURE — C9113 INJ PANTOPRAZOLE SODIUM, VIA: HCPCS | Mod: HCNC | Performed by: HOSPITALIST

## 2023-01-20 PROCEDURE — 86900 BLOOD TYPING SEROLOGIC ABO: CPT | Mod: HCNC | Performed by: EMERGENCY MEDICINE

## 2023-01-20 PROCEDURE — 99291 CRITICAL CARE FIRST HOUR: CPT | Mod: GW,CS,, | Performed by: EMERGENCY MEDICINE

## 2023-01-20 PROCEDURE — 87040 BLOOD CULTURE FOR BACTERIA: CPT | Mod: 59,HCNC | Performed by: EMERGENCY MEDICINE

## 2023-01-20 PROCEDURE — 80053 COMPREHEN METABOLIC PANEL: CPT | Mod: HCNC | Performed by: EMERGENCY MEDICINE

## 2023-01-20 PROCEDURE — 0241U SARS-COV2 (COVID) WITH FLU/RSV BY PCR: CPT | Mod: HCNC | Performed by: EMERGENCY MEDICINE

## 2023-01-20 PROCEDURE — 86920 COMPATIBILITY TEST SPIN: CPT | Mod: HCNC | Performed by: EMERGENCY MEDICINE

## 2023-01-20 PROCEDURE — 93010 EKG 12-LEAD: ICD-10-PCS | Mod: HCNC,,, | Performed by: INTERNAL MEDICINE

## 2023-01-20 PROCEDURE — 12000002 HC ACUTE/MED SURGE SEMI-PRIVATE ROOM: Mod: HCNC

## 2023-01-20 PROCEDURE — 96361 HYDRATE IV INFUSION ADD-ON: CPT | Mod: HCNC

## 2023-01-20 PROCEDURE — 93010 ELECTROCARDIOGRAM REPORT: CPT | Mod: HCNC,,, | Performed by: INTERNAL MEDICINE

## 2023-01-20 PROCEDURE — 63600175 PHARM REV CODE 636 W HCPCS: Mod: HCNC | Performed by: HOSPITALIST

## 2023-01-20 PROCEDURE — 63600175 PHARM REV CODE 636 W HCPCS: Mod: HCNC | Performed by: EMERGENCY MEDICINE

## 2023-01-20 PROCEDURE — 83605 ASSAY OF LACTIC ACID: CPT | Mod: HCNC | Performed by: EMERGENCY MEDICINE

## 2023-01-20 PROCEDURE — 36430 TRANSFUSION BLD/BLD COMPNT: CPT | Mod: HCNC

## 2023-01-20 PROCEDURE — 99285 EMERGENCY DEPT VISIT HI MDM: CPT | Mod: 25,HCNC

## 2023-01-20 PROCEDURE — 99223 1ST HOSP IP/OBS HIGH 75: CPT | Mod: HCNC,,, | Performed by: HOSPITALIST

## 2023-01-20 PROCEDURE — 85025 COMPLETE CBC W/AUTO DIFF WBC: CPT | Mod: HCNC | Performed by: EMERGENCY MEDICINE

## 2023-01-20 PROCEDURE — 99223 PR INITIAL HOSPITAL CARE,LEVL III: ICD-10-PCS | Mod: HCNC,,, | Performed by: HOSPITALIST

## 2023-01-20 PROCEDURE — 83735 ASSAY OF MAGNESIUM: CPT | Mod: HCNC | Performed by: EMERGENCY MEDICINE

## 2023-01-20 PROCEDURE — 25000003 PHARM REV CODE 250: Mod: HCNC | Performed by: EMERGENCY MEDICINE

## 2023-01-20 PROCEDURE — 93005 ELECTROCARDIOGRAM TRACING: CPT | Mod: HCNC

## 2023-01-20 PROCEDURE — 99291 PR CRITICAL CARE, E/M 30-74 MINUTES: ICD-10-PCS | Mod: GW,CS,, | Performed by: EMERGENCY MEDICINE

## 2023-01-20 PROCEDURE — 96365 THER/PROPH/DIAG IV INF INIT: CPT | Mod: HCNC

## 2023-01-20 PROCEDURE — 84100 ASSAY OF PHOSPHORUS: CPT | Mod: HCNC | Performed by: EMERGENCY MEDICINE

## 2023-01-20 RX ORDER — ONDANSETRON 2 MG/ML
4 INJECTION INTRAMUSCULAR; INTRAVENOUS
Status: COMPLETED | OUTPATIENT
Start: 2023-01-20 | End: 2023-01-20

## 2023-01-20 RX ORDER — LEVETIRACETAM 500 MG/5ML
INJECTION, SOLUTION, CONCENTRATE INTRAVENOUS
Status: DISPENSED
Start: 2023-01-20 | End: 2023-01-21

## 2023-01-20 RX ORDER — TALC
6 POWDER (GRAM) TOPICAL NIGHTLY PRN
Status: CANCELLED | OUTPATIENT
Start: 2023-01-20

## 2023-01-20 RX ORDER — LEVETIRACETAM 500 MG/5ML
1000 INJECTION, SOLUTION, CONCENTRATE INTRAVENOUS
Status: COMPLETED | OUTPATIENT
Start: 2023-01-20 | End: 2023-01-20

## 2023-01-20 RX ORDER — PANTOPRAZOLE SODIUM 40 MG/10ML
80 INJECTION, POWDER, LYOPHILIZED, FOR SOLUTION INTRAVENOUS ONCE
Status: COMPLETED | OUTPATIENT
Start: 2023-01-20 | End: 2023-01-20

## 2023-01-20 RX ORDER — HYDROCODONE BITARTRATE AND ACETAMINOPHEN 500; 5 MG/1; MG/1
TABLET ORAL
Status: DISCONTINUED | OUTPATIENT
Start: 2023-01-20 | End: 2023-01-26 | Stop reason: HOSPADM

## 2023-01-20 RX ADMIN — PANTOPRAZOLE SODIUM 80 MG: 40 INJECTION, POWDER, FOR SOLUTION INTRAVENOUS at 09:01

## 2023-01-20 RX ADMIN — LEVETIRACETAM 1000 MG: 100 INJECTION, SOLUTION INTRAVENOUS at 05:01

## 2023-01-20 RX ADMIN — PIPERACILLIN SODIUM AND TAZOBACTAM SODIUM 4.5 G: 4; .5 INJECTION, POWDER, LYOPHILIZED, FOR SOLUTION INTRAVENOUS at 05:01

## 2023-01-20 RX ADMIN — ONDANSETRON 4 MG: 2 INJECTION INTRAMUSCULAR; INTRAVENOUS at 08:01

## 2023-01-20 RX ADMIN — SODIUM CHLORIDE, POTASSIUM CHLORIDE, SODIUM LACTATE AND CALCIUM CHLORIDE 2586 ML: 600; 310; 30; 20 INJECTION, SOLUTION INTRAVENOUS at 05:01

## 2023-01-20 RX ADMIN — VANCOMYCIN HYDROCHLORIDE 1750 MG: 500 INJECTION, POWDER, LYOPHILIZED, FOR SOLUTION INTRAVENOUS at 08:01

## 2023-01-20 NOTE — ED PROVIDER NOTES
"Chief Complaint   Seizures (Arrives via EMS with c/o seizure like activity, 2 witnessed seizures, hx of epilepsy, BP in the 80s)      History Of Present Illness   Atif Neves is a 89 y.o. male presenting with 2 seizures today.  The patient has a history of seizures for which he takes Keppra.  The family present in the room were not sure if the patient is taking it correctly as he lives with a different family member.  He vomited after each seizure.  The son states that is not unusual for him.  The patient states that he does not feel well.  Otherwise he does not get terribly specific.  He does have a history of normal pressure hydrocephalus with  shunt in place.  No significant environmental exposure to cold today.  He does have high blood pressure as well.    History obtained from:  Multiple family members    Review of patient's allergies indicates:   Allergen Reactions    Aspirin Swelling     States, "makes me sick." pt does not elaborate.        No current facility-administered medications on file prior to encounter.     Current Outpatient Medications on File Prior to Encounter   Medication Sig Dispense Refill    atorvastatin (LIPITOR) 20 MG tablet TAKE 1 TABLET(20 MG) BY MOUTH EVERY DAY 90 tablet 3    cholecalciferol, vitamin D3, (VITAMIN D3) 25 mcg (1,000 unit) capsule Take 1 capsule (1,000 Units total) by mouth once daily. 90 capsule 3    citalopram (CELEXA) 10 MG tablet TAKE 1 TABLET(10 MG) BY MOUTH EVERY DAY 90 tablet 3    docusate sodium (COLACE) 100 MG capsule Take 1 capsule (100 mg total) by mouth once daily. 90 capsule 0    ferrous sulfate 324 mg (65 mg iron) TbEC Take 1 tablet (324 mg total) by mouth once daily. 90 tablet 3    levETIRAcetam (KEPPRA) 1000 MG tablet TAKE 1 TABLET(1000 MG) BY MOUTH TWICE DAILY 180 tablet 3    pantoprazole (PROTONIX) 40 MG tablet Take 1 tablet (40 mg total) by mouth once daily. 30 tablet 2    thiamine (VITAMIN B-1) 100 MG tablet Take 1 tablet (100 mg total) by mouth " once daily. 90 tablet 3       Past History   As per HPI and below:  Past Medical History:   Diagnosis Date    JONATAN (acute kidney injury) 2016    Bacteremia due to Gram-positive bacteria 3/24/2017    Antibiotics per ID recommendations; vancomycin x 14 days.     BPH (benign prostatic hypertrophy) with urinary obstruction 2016    Cystoid macular edema, left eye 2016    Essential hypertension 2016    Essential hypertension     Generalized anxiety disorder 2016    Glaucoma     Irregular heartbeat     Macular degeneration     Microcytic anemia 4/10/2017    Mixed hyperlipidemia 2016    Nonexudative age-related macular degeneration, bilateral, intermediate dry stage 2016    Normal pressure hydrocephalus     NPH (normal pressure hydrocephalus) 2017    Shunt placed 17    Urinary retention 2016    Ventriculo-peritoneal shunt status 2017     Past Surgical History:   Procedure Laterality Date    ESOPHAGOGASTRODUODENOSCOPY N/A 2022    Procedure: EGD (ESOPHAGOGASTRODUODENOSCOPY);  Surgeon: Atif Farrell MD;  Location: [facility];  Service: Endoscopy;  Laterality: N/A;    TRANSURETHRAL RESECTION OF PROSTATE  2016       Social History     Socioeconomic History    Marital status:     Number of children: 7   Occupational History    Occupation:    Tobacco Use    Smoking status: Former     Packs/day: 1.00     Years: 18.00     Pack years: 18.00     Types: Cigarettes     Quit date: 1950     Years since quittin.1    Smokeless tobacco: Never   Substance and Sexual Activity    Alcohol use: No    Drug use: No    Sexual activity: Not Currently       Family History   Problem Relation Age of Onset    Heart disease Brother     Cancer Daughter 55        Breast    Cancer Daughter 54        Breast    No Known Problems Son        Physical Exam     Vitals:    23 1717 23 1718 23 1723 23 1803   BP:  (!) 79/49 (!) 81/51 (!) 94/50   Pulse:  89 83 95   Resp:  18      Temp:       TempSrc:       SpO2:  100% 100% 100%   Weight:         Appearance: No acute distress.  Skin: No rashes seen.  Good turgor.  No abrasions.  No ecchymoses.  Eyes: No conjunctival injection.  ENT: Oropharynx clear.    Chest: Clear to auscultation bilaterally.  Good air movement.  No wheezes.  No rhonchi.  Cardiovascular: Regular rate and rhythm.  No murmurs. No gallops. No rubs.  Abdomen: Soft.  Not distended.  Nontender.  No guarding.  No rebound.  Musculoskeletal: Good range of motion all joints.  No deformities.  Neck supple.  No meningismus.  Neurologic: Motor intact.  Sensation intact. Cranial nerves intact.  Mental Status:  Alert, answers some questions.     Initial MDM   Seizures x2 today, possible non adherence with Keppra.  Also with hypotension and hypothermia.  Concern for sepsis is paramount, sepsis protocol followed.  Will also evaluate  shunt with CT and x-ray.  He is more awake now is neck is stiff; I doubt meningitis.  I anticipate hospitalization given the hypotension and hypothermia.  Sepsis antibiotics ordered immediately after evaluation.    Medications Given     Medications   vancomycin 1.75 g in 5 % dextrose 500 mL IVPB (has no administration in time range)   levETIRAcetam (KEPPRA) 500 mg/5 mL injection (  Not Given 1/20/23 1815)   lactated ringers bolus 2,586 mL (2,586 mLs Intravenous New Bag 1/20/23 1757)   piperacillin-tazobactam (ZOSYN) 4.5 g in dextrose 5 % in water (D5W) 5 % 100 mL IVPB (MB+) (0 g Intravenous Stopped 1/20/23 1902)   levETIRAcetam injection 1,000 mg (1,000 mg Intravenous Given 1/20/23 1757)       Results and Course     Labs Reviewed   CBC W/ AUTO DIFFERENTIAL - Abnormal; Notable for the following components:       Result Value    WBC 16.53 (*)     RBC 3.97 (*)     Hemoglobin 6.7 (*)     Hematocrit 26.5 (*)     MCV 67 (*)     MCH 16.9 (*)     MCHC 25.3 (*)     RDW 21.3 (*)     Gran # (ANC) 14.8 (*)     Immature Grans (Abs) 0.07 (*)     Gran % 89.6 (*)      Lymph % 7.1 (*)     Mono % 2.7 (*)     All other components within normal limits   COMPREHENSIVE METABOLIC PANEL - Abnormal; Notable for the following components:    Chloride 113 (*)     CO2 14 (*)     Glucose 140 (*)     Albumin 3.3 (*)     Alkaline Phosphatase 138 (*)     ALT 6 (*)     Anion Gap 18 (*)     eGFR 56.0 (*)     All other components within normal limits   LACTIC ACID, PLASMA - Abnormal; Notable for the following components:    Lactate (Lactic Acid) 8.1 (*)     All other components within normal limits   CULTURE, BLOOD   CULTURE, BLOOD   MAGNESIUM   PHOSPHORUS   LACTIC ACID, PLASMA   URINALYSIS, REFLEX TO URINE CULTURE   SARS-COV2 (COVID) WITH FLU/RSV BY PCR   LEVETIRACETAM  (KEPPRA) LEVEL       Imaging Results              CT Head Without Contrast (Final result)  Result time 01/20/23 19:27:20      Final result by Jose Thomas MD (01/20/23 19:27:20)                   Impression:      Stable right parietal ventriculostomy shunt catheter with prominence of the ventricular system, unchanged from prior.  Additional evaluation, as clinically warranted.    Generalized cerebral volume loss and chronic microvascular ischemic disease.    No acute territorial infarct or hemorrhage.    Electronically signed by resident: Dejon Willett  Date:    01/20/2023  Time:    19:16    Electronically signed by: Jose Thomas MD  Date:    01/20/2023  Time:    19:27               Narrative:    EXAMINATION:  CT HEAD WITHOUT CONTRAST    CLINICAL HISTORY:  Mental status change, unknown cause;    TECHNIQUE:  Low dose axial CT images obtained throughout the head without the use of intravenous contrast.  Axial, sagittal and coronal reconstructions were performed.    COMPARISON:  CT 01/31/2022, 04/10/2018    FINDINGS:  Right parietal ventricular shunt catheter in stable position.  Ventricular system appears enlarged similar to the prior exam.  Third ventricle measures 17 mm (previously 17 mm).    Periventricular white matter  hypoattenuation likely representing chronic microvascular ischemic disease.  No evidence of acute territorial infarct or hemorrhage.  No mass effect or midline shift.  No edema.    No extra-axial blood or fluid collections Scattered atherosclerotic calcification about the skull base.    No acute displaced calvarial fracture.  Layering fluid within the sphenoid sinuses.  Otherwise paranasal sinuses and mastoid air cells are clear.                                       X-Ray Shunt Series (Final result)  Result time 01/20/23 18:32:39      Final result by Kenrick Santoro MD (01/20/23 18:32:39)                   Impression:      Stable  shunt tube apparently set to 120 mm of water.    Left lung base infiltrate versus atelectasis.    Mild constipation and rectal fecal impaction.      Electronically signed by: Kenrick Santoro  Date:    01/20/2023  Time:    18:32               Narrative:    EXAMINATION:  XR SHUNT SERIES    CLINICAL HISTORY:  shunt evaluation;    TECHNIQUE:  PA and lateral views of the skull neck chest and abdomen were performed to evaluate  shunt tube    COMPARISON:  Of 03/23/2017    FINDINGS:  Programmable shunt catheter with valve set to approximately 120 mm of water.  No discontinuity is evident.  Prominent waste material with rectal fecal impaction.  Atelectasis and/or infiltrate in the left lung base.                                       X-Ray Chest AP Portable (Final result)  Result time 01/20/23 17:00:58      Final result by Jed Jamil MD (01/20/23 17:00:58)                   Impression:      1. Bilateral basilar subsegmental atelectasis or scarring, left greater than right.  No large focal consolidation.      Electronically signed by: Jed Jamil MD  Date:    01/20/2023  Time:    17:00               Narrative:    EXAMINATION:  XR CHEST AP PORTABLE    CLINICAL HISTORY:  Sepsis;    TECHNIQUE:  Single frontal view of the chest was  performed.    COMPARISON:  02/01/2022    FINDINGS:  Descending shunt catheter tubing appears continuous.  The cardiomediastinal silhouette is not enlarged noting calcification of the aorta..  There is no pleural effusion.  The trachea is midline.  The lungs are symmetrically expanded bilaterally with bilateral basilar subsegmental atelectasis or scarring, left greater than right.  No large focal consolidation seen.  There is no pneumothorax.  The osseous structures are remarkable for degenerative changes..                                      ED Course as of 01/20/23 1944 Fri Jan 20, 2023   1733 X-Ray Chest AP Portable  Bibasilar atelectasis per my independent interpretation.     [DC]   1915 Hemoglobin(!): 6.7 [DC]   1915 Hematocrit(!): 26.5 [DC]   1915 WBC(!): 16.53 [DC]   1915 Platelets: 423 [DC]   1915 Creatinine: 1.2 [DC]   1916 Phosphorus: 3.0 [DC]   1916 Magnesium: 2.0 [DC]   1923 Lactate, [place](!!): 8.1 [DC]      ED Course User Index  [DC] Kenrick Trujillo MD       Critical Care   Date: 01/20/2023  Performed by: Kenrick Trujillo MD   Authorized by: Kenrick Trujillo MD    Total critical care time (exclusive of procedural time) : 60 minutes  Critical care was necessary to treat or prevent imminent or life-threatening deterioration of the following conditions:  sepsis, seizures    [facility]  6 Hours Sepsis Perfusion Exam   Provider Attestation    I attest, a sepsis perfusion exam was performed within 6 hours of Septic Shock presentation, following fluid resuscitation.      MDM, Impression and Plan   89 y.o. male with increasing frequency of seizures, generalized weakness, hypothermia and hypotension worrisome for sepsis.  No obvious source on exam, sepsis protocol initiated.  Family revoked hospice to come here to have the patient receive more workup for seizure frequency duration.  However, they do not want CPR, intubation, pressure support or artificial feeding.  Discussed with hospital medicine for  admission given the patient will not need the ICU due to these care parameters given by the family.         Final diagnoses:  [R41.82] Altered mental status  [G40.919] Breakthrough seizure (Primary)  [A41.9] Sepsis, due to unspecified organism, unspecified whether acute organ dysfunction present        ED Disposition Condition    Admit Stable                  Kenrick Trujillo MD  01/20/23 1944

## 2023-01-21 PROBLEM — Z71.89 GOALS OF CARE, COUNSELING/DISCUSSION: Status: ACTIVE | Noted: 2023-01-21

## 2023-01-21 LAB
ALBUMIN SERPL BCP-MCNC: 3.2 G/DL (ref 3.5–5.2)
ALLENS TEST: ABNORMAL
ALLENS TEST: ABNORMAL
ALP SERPL-CCNC: 110 U/L (ref 55–135)
ALT SERPL W/O P-5'-P-CCNC: 6 U/L (ref 10–44)
ANION GAP SERPL CALC-SCNC: 11 MMOL/L (ref 8–16)
ANISOCYTOSIS BLD QL SMEAR: SLIGHT
APTT BLDCRRT: <21 SEC (ref 21–32)
AST SERPL-CCNC: 15 U/L (ref 10–40)
BASOPHILS # BLD AUTO: 0.05 K/UL (ref 0–0.2)
BASOPHILS NFR BLD: 0.2 % (ref 0–1.9)
BILIRUB SERPL-MCNC: 1.3 MG/DL (ref 0.1–1)
BILIRUB UR QL STRIP: NEGATIVE
BLD PROD TYP BPU: NORMAL
BLOOD UNIT EXPIRATION DATE: NORMAL
BLOOD UNIT TYPE CODE: 5100
BLOOD UNIT TYPE: NORMAL
BUN SERPL-MCNC: 19 MG/DL (ref 10–30)
CALCIUM SERPL-MCNC: 9.4 MG/DL (ref 8.7–10.5)
CHLORIDE SERPL-SCNC: 110 MMOL/L (ref 95–110)
CLARITY UR REFRACT.AUTO: CLEAR
CO2 SERPL-SCNC: 20 MMOL/L (ref 23–29)
CODING SYSTEM: NORMAL
COLOR UR AUTO: YELLOW
CREAT SERPL-MCNC: 1.4 MG/DL (ref 0.5–1.4)
DIFFERENTIAL METHOD: ABNORMAL
DISPENSE STATUS: NORMAL
EOSINOPHIL # BLD AUTO: 0 K/UL (ref 0–0.5)
EOSINOPHIL NFR BLD: 0 % (ref 0–8)
ERYTHROCYTE [DISTWIDTH] IN BLOOD BY AUTOMATED COUNT: 25 % (ref 11.5–14.5)
EST. GFR  (NO RACE VARIABLE): 46.6 ML/MIN/1.73 M^2
GLUCOSE SERPL-MCNC: 113 MG/DL (ref 70–110)
GLUCOSE UR QL STRIP: NEGATIVE
HCO3 UR-SCNC: 17.5 MMOL/L (ref 24–28)
HCO3 UR-SCNC: 17.7 MMOL/L (ref 24–28)
HCT VFR BLD AUTO: 29.4 % (ref 40–54)
HGB BLD-MCNC: 8.4 G/DL (ref 14–18)
HGB UR QL STRIP: ABNORMAL
HYPOCHROMIA BLD QL SMEAR: ABNORMAL
IMM GRANULOCYTES # BLD AUTO: 0.05 K/UL (ref 0–0.04)
IMM GRANULOCYTES NFR BLD AUTO: 0.2 % (ref 0–0.5)
INR PPP: 1.1 (ref 0.8–1.2)
KETONES UR QL STRIP: ABNORMAL
LACTATE SERPL-SCNC: 2.4 MMOL/L (ref 0.5–2.2)
LACTATE SERPL-SCNC: 3.5 MMOL/L (ref 0.5–2.2)
LACTATE SERPL-SCNC: 4.1 MMOL/L (ref 0.5–2.2)
LEUKOCYTE ESTERASE UR QL STRIP: ABNORMAL
LYMPHOCYTES # BLD AUTO: 0.9 K/UL (ref 1–4.8)
LYMPHOCYTES NFR BLD: 4.5 % (ref 18–48)
MAGNESIUM SERPL-MCNC: 1.7 MG/DL (ref 1.6–2.6)
MCH RBC QN AUTO: 19.6 PG (ref 27–31)
MCHC RBC AUTO-ENTMCNC: 28.6 G/DL (ref 32–36)
MCV RBC AUTO: 69 FL (ref 82–98)
MICROSCOPIC COMMENT: ABNORMAL
MONOCYTES # BLD AUTO: 0.6 K/UL (ref 0.3–1)
MONOCYTES NFR BLD: 2.9 % (ref 4–15)
NEUTROPHILS # BLD AUTO: 18.6 K/UL (ref 1.8–7.7)
NEUTROPHILS NFR BLD: 92.2 % (ref 38–73)
NITRITE UR QL STRIP: NEGATIVE
NRBC BLD-RTO: 0 /100 WBC
NUM UNITS TRANS PACKED RBC: NORMAL
OVALOCYTES BLD QL SMEAR: ABNORMAL
PCO2 BLDA: 28.3 MMHG (ref 35–45)
PCO2 BLDA: 28.9 MMHG (ref 35–45)
PH SMN: 7.39 [PH] (ref 7.35–7.45)
PH SMN: 7.4 [PH] (ref 7.35–7.45)
PH UR STRIP: 5 [PH] (ref 5–8)
PHOSPHATE SERPL-MCNC: 2 MG/DL (ref 2.7–4.5)
PLATELET # BLD AUTO: 278 K/UL (ref 150–450)
PLATELET BLD QL SMEAR: ABNORMAL
PMV BLD AUTO: 9.8 FL (ref 9.2–12.9)
PO2 BLDA: 64 MMHG (ref 80–100)
PO2 BLDA: 67 MMHG (ref 80–100)
POC BE: -7 MMOL/L
POC BE: -7 MMOL/L
POC SATURATED O2: 93 % (ref 95–100)
POC SATURATED O2: 93 % (ref 95–100)
POC TCO2: 18 MMOL/L (ref 23–27)
POC TCO2: 19 MMOL/L (ref 23–27)
POIKILOCYTOSIS BLD QL SMEAR: SLIGHT
POLYCHROMASIA BLD QL SMEAR: ABNORMAL
POTASSIUM SERPL-SCNC: 3.6 MMOL/L (ref 3.5–5.1)
PROT SERPL-MCNC: 6.8 G/DL (ref 6–8.4)
PROT UR QL STRIP: ABNORMAL
PROTHROMBIN TIME: 11.9 SEC (ref 9–12.5)
RBC # BLD AUTO: 4.29 M/UL (ref 4.6–6.2)
RBC #/AREA URNS AUTO: 10 /HPF (ref 0–4)
SAMPLE: ABNORMAL
SAMPLE: ABNORMAL
SCHISTOCYTES BLD QL SMEAR: ABNORMAL
SCHISTOCYTES BLD QL SMEAR: PRESENT
SITE: ABNORMAL
SITE: ABNORMAL
SODIUM SERPL-SCNC: 141 MMOL/L (ref 136–145)
SP GR UR STRIP: 1.02 (ref 1–1.03)
SQUAMOUS #/AREA URNS AUTO: 0 /HPF
URN SPEC COLLECT METH UR: ABNORMAL
WBC # BLD AUTO: 20.16 K/UL (ref 3.9–12.7)
WBC #/AREA URNS AUTO: 22 /HPF (ref 0–5)

## 2023-01-21 PROCEDURE — 25000003 PHARM REV CODE 250: Mod: HCNC | Performed by: STUDENT IN AN ORGANIZED HEALTH CARE EDUCATION/TRAINING PROGRAM

## 2023-01-21 PROCEDURE — 99497 ADVNCD CARE PLAN 30 MIN: CPT | Mod: HCNC,,, | Performed by: HOSPITALIST

## 2023-01-21 PROCEDURE — P9016 RBC LEUKOCYTES REDUCED: HCPCS | Mod: HCNC | Performed by: EMERGENCY MEDICINE

## 2023-01-21 PROCEDURE — 80053 COMPREHEN METABOLIC PANEL: CPT | Mod: HCNC | Performed by: STUDENT IN AN ORGANIZED HEALTH CARE EDUCATION/TRAINING PROGRAM

## 2023-01-21 PROCEDURE — 83605 ASSAY OF LACTIC ACID: CPT | Mod: 91,HCNC | Performed by: STUDENT IN AN ORGANIZED HEALTH CARE EDUCATION/TRAINING PROGRAM

## 2023-01-21 PROCEDURE — 25000003 PHARM REV CODE 250: Mod: HCNC | Performed by: HOSPITALIST

## 2023-01-21 PROCEDURE — 84100 ASSAY OF PHOSPHORUS: CPT | Mod: HCNC | Performed by: STUDENT IN AN ORGANIZED HEALTH CARE EDUCATION/TRAINING PROGRAM

## 2023-01-21 PROCEDURE — 85610 PROTHROMBIN TIME: CPT | Mod: HCNC | Performed by: HOSPITALIST

## 2023-01-21 PROCEDURE — 99497 PR ADVNCD CARE PLAN 30 MIN: ICD-10-PCS | Mod: HCNC,,, | Performed by: HOSPITALIST

## 2023-01-21 PROCEDURE — 83735 ASSAY OF MAGNESIUM: CPT | Mod: HCNC | Performed by: STUDENT IN AN ORGANIZED HEALTH CARE EDUCATION/TRAINING PROGRAM

## 2023-01-21 PROCEDURE — 63600175 PHARM REV CODE 636 W HCPCS: Mod: HCNC | Performed by: HOSPITALIST

## 2023-01-21 PROCEDURE — 11000001 HC ACUTE MED/SURG PRIVATE ROOM: Mod: HCNC

## 2023-01-21 PROCEDURE — 94761 N-INVAS EAR/PLS OXIMETRY MLT: CPT | Mod: HCNC

## 2023-01-21 PROCEDURE — 85025 COMPLETE CBC W/AUTO DIFF WBC: CPT | Mod: HCNC | Performed by: STUDENT IN AN ORGANIZED HEALTH CARE EDUCATION/TRAINING PROGRAM

## 2023-01-21 PROCEDURE — 85730 THROMBOPLASTIN TIME PARTIAL: CPT | Mod: HCNC | Performed by: HOSPITALIST

## 2023-01-21 PROCEDURE — 63600175 PHARM REV CODE 636 W HCPCS: Mod: HCNC | Performed by: STUDENT IN AN ORGANIZED HEALTH CARE EDUCATION/TRAINING PROGRAM

## 2023-01-21 PROCEDURE — 83605 ASSAY OF LACTIC ACID: CPT | Mod: HCNC | Performed by: HOSPITALIST

## 2023-01-21 PROCEDURE — 87086 URINE CULTURE/COLONY COUNT: CPT | Mod: HCNC | Performed by: EMERGENCY MEDICINE

## 2023-01-21 PROCEDURE — 81001 URINALYSIS AUTO W/SCOPE: CPT | Mod: HCNC | Performed by: EMERGENCY MEDICINE

## 2023-01-21 PROCEDURE — 36600 WITHDRAWAL OF ARTERIAL BLOOD: CPT | Mod: HCNC

## 2023-01-21 PROCEDURE — 82803 BLOOD GASES ANY COMBINATION: CPT | Mod: HCNC

## 2023-01-21 PROCEDURE — 36415 COLL VENOUS BLD VENIPUNCTURE: CPT | Mod: HCNC | Performed by: STUDENT IN AN ORGANIZED HEALTH CARE EDUCATION/TRAINING PROGRAM

## 2023-01-21 PROCEDURE — 51798 US URINE CAPACITY MEASURE: CPT | Mod: HCNC

## 2023-01-21 PROCEDURE — 99900035 HC TECH TIME PER 15 MIN (STAT): Mod: HCNC

## 2023-01-21 RX ORDER — TAMSULOSIN HYDROCHLORIDE 0.4 MG/1
0.4 CAPSULE ORAL DAILY
COMMUNITY
End: 2023-01-21

## 2023-01-21 RX ORDER — LISINOPRIL 10 MG/1
10 TABLET ORAL DAILY
COMMUNITY
End: 2023-01-21

## 2023-01-21 RX ORDER — ONDANSETRON 2 MG/ML
4 INJECTION INTRAMUSCULAR; INTRAVENOUS EVERY 8 HOURS PRN
Status: DISCONTINUED | OUTPATIENT
Start: 2023-01-21 | End: 2023-01-21

## 2023-01-21 RX ORDER — AMOXICILLIN 250 MG
1 CAPSULE ORAL 2 TIMES DAILY
Status: DISCONTINUED | OUTPATIENT
Start: 2023-01-21 | End: 2023-01-26 | Stop reason: HOSPADM

## 2023-01-21 RX ORDER — MORPHINE SULFATE 20 MG/ML
SOLUTION ORAL
COMMUNITY

## 2023-01-21 RX ORDER — BISACODYL 10 MG
10 SUPPOSITORY, RECTAL RECTAL DAILY PRN
COMMUNITY

## 2023-01-21 RX ORDER — OMEPRAZOLE 20 MG/1
20 CAPSULE, DELAYED RELEASE ORAL DAILY
COMMUNITY

## 2023-01-21 RX ORDER — POLYETHYLENE GLYCOL 3350 17 G/17G
17 POWDER, FOR SOLUTION ORAL DAILY
Status: DISCONTINUED | OUTPATIENT
Start: 2023-01-21 | End: 2023-01-26 | Stop reason: HOSPADM

## 2023-01-21 RX ORDER — ACETAMINOPHEN 325 MG/1
650 TABLET ORAL EVERY 4 HOURS PRN
Status: DISCONTINUED | OUTPATIENT
Start: 2023-01-21 | End: 2023-01-26 | Stop reason: HOSPADM

## 2023-01-21 RX ORDER — ONDANSETRON 4 MG/1
4 TABLET, ORALLY DISINTEGRATING ORAL EVERY 6 HOURS PRN
COMMUNITY

## 2023-01-21 RX ORDER — IPRATROPIUM BROMIDE AND ALBUTEROL SULFATE 2.5; .5 MG/3ML; MG/3ML
3 SOLUTION RESPIRATORY (INHALATION) EVERY 6 HOURS PRN
COMMUNITY

## 2023-01-21 RX ORDER — ACETAMINOPHEN 650 MG/1
650 SUPPOSITORY RECTAL EVERY 6 HOURS PRN
COMMUNITY

## 2023-01-21 RX ORDER — HYOSCYAMINE SULFATE 0.12 MG/1
0.12 TABLET SUBLINGUAL EVERY 6 HOURS PRN
COMMUNITY

## 2023-01-21 RX ORDER — NALOXONE HCL 0.4 MG/ML
0.02 VIAL (ML) INJECTION
Status: DISCONTINUED | OUTPATIENT
Start: 2023-01-21 | End: 2023-01-26 | Stop reason: HOSPADM

## 2023-01-21 RX ORDER — LOTEPREDNOL ETABONATE 5 MG/ML
SUSPENSION/ DROPS OPHTHALMIC
COMMUNITY
End: 2023-01-21

## 2023-01-21 RX ORDER — PROCHLORPERAZINE EDISYLATE 5 MG/ML
5 INJECTION INTRAMUSCULAR; INTRAVENOUS EVERY 6 HOURS PRN
Status: DISCONTINUED | OUTPATIENT
Start: 2023-01-21 | End: 2023-01-26 | Stop reason: HOSPADM

## 2023-01-21 RX ORDER — SODIUM CHLORIDE 0.9 % (FLUSH) 0.9 %
10 SYRINGE (ML) INJECTION EVERY 6 HOURS PRN
Status: DISCONTINUED | OUTPATIENT
Start: 2023-01-21 | End: 2023-01-26 | Stop reason: HOSPADM

## 2023-01-21 RX ORDER — TALC
6 POWDER (GRAM) TOPICAL NIGHTLY PRN
Status: DISCONTINUED | OUTPATIENT
Start: 2023-01-21 | End: 2023-01-26 | Stop reason: HOSPADM

## 2023-01-21 RX ORDER — LORAZEPAM 2 MG/ML
CONCENTRATE ORAL
COMMUNITY

## 2023-01-21 RX ORDER — DOCUSATE SODIUM 100 MG/1
100 CAPSULE, LIQUID FILLED ORAL DAILY PRN
COMMUNITY

## 2023-01-21 RX ORDER — MOXIFLOXACIN 5 MG/ML
SOLUTION/ DROPS OPHTHALMIC
COMMUNITY
End: 2023-01-21

## 2023-01-21 RX ORDER — SODIUM CHLORIDE 0.9 % (FLUSH) 0.9 %
10 SYRINGE (ML) INJECTION
Status: DISCONTINUED | OUTPATIENT
Start: 2023-01-21 | End: 2023-01-26 | Stop reason: HOSPADM

## 2023-01-21 RX ORDER — BISACODYL 10 MG
10 SUPPOSITORY, RECTAL RECTAL DAILY PRN
Status: DISCONTINUED | OUTPATIENT
Start: 2023-01-21 | End: 2023-01-26 | Stop reason: HOSPADM

## 2023-01-21 RX ORDER — LEVETIRACETAM 500 MG/5ML
1000 INJECTION, SOLUTION, CONCENTRATE INTRAVENOUS EVERY 12 HOURS
Status: COMPLETED | OUTPATIENT
Start: 2023-01-21 | End: 2023-01-23

## 2023-01-21 RX ORDER — BRIMONIDINE TARTRATE 1 MG/ML
SOLUTION/ DROPS OPHTHALMIC
COMMUNITY
End: 2023-01-21

## 2023-01-21 RX ORDER — SODIUM CHLORIDE, SODIUM LACTATE, POTASSIUM CHLORIDE, CALCIUM CHLORIDE 600; 310; 30; 20 MG/100ML; MG/100ML; MG/100ML; MG/100ML
INJECTION, SOLUTION INTRAVENOUS CONTINUOUS
Status: DISCONTINUED | OUTPATIENT
Start: 2023-01-21 | End: 2023-01-21

## 2023-01-21 RX ADMIN — PIPERACILLIN SODIUM AND TAZOBACTAM SODIUM 4.5 G: 4; .5 INJECTION, POWDER, LYOPHILIZED, FOR SOLUTION INTRAVENOUS at 11:01

## 2023-01-21 RX ADMIN — Medication 6 MG: at 11:01

## 2023-01-21 RX ADMIN — SODIUM CHLORIDE 250 ML: 9 INJECTION, SOLUTION INTRAVENOUS at 10:01

## 2023-01-21 RX ADMIN — SENNOSIDES AND DOCUSATE SODIUM 1 TABLET: 50; 8.6 TABLET ORAL at 07:01

## 2023-01-21 RX ADMIN — SODIUM CHLORIDE, POTASSIUM CHLORIDE, SODIUM LACTATE AND CALCIUM CHLORIDE: 600; 310; 30; 20 INJECTION, SOLUTION INTRAVENOUS at 05:01

## 2023-01-21 RX ADMIN — LEVETIRACETAM 1000 MG: 100 INJECTION, SOLUTION INTRAVENOUS at 07:01

## 2023-01-21 RX ADMIN — POLYETHYLENE GLYCOL 3350 17 G: 17 POWDER, FOR SOLUTION ORAL at 10:01

## 2023-01-21 RX ADMIN — LEVETIRACETAM 1000 MG: 100 INJECTION, SOLUTION INTRAVENOUS at 10:01

## 2023-01-21 RX ADMIN — PIPERACILLIN SODIUM AND TAZOBACTAM SODIUM 4.5 G: 4; .5 INJECTION, POWDER, LYOPHILIZED, FOR SOLUTION INTRAVENOUS at 10:01

## 2023-01-21 RX ADMIN — SENNOSIDES AND DOCUSATE SODIUM 1 TABLET: 50; 8.6 TABLET ORAL at 10:01

## 2023-01-21 RX ADMIN — PIPERACILLIN SODIUM AND TAZOBACTAM SODIUM 4.5 G: 4; .5 INJECTION, POWDER, LYOPHILIZED, FOR SOLUTION INTRAVENOUS at 05:01

## 2023-01-21 NOTE — ASSESSMENT & PLAN NOTE
Advance Care Planning     Today a meeting took place: bedside    Patient Participation: Patient is unable to participate     Attendees (Name and  Relationship to patient): Health care power of : JANES LEE, jesus ROWEES    Staff attendees (Name and  Role): Jose Kaye MD - admitting physician\  ACP Conversation (General): Understanding of current condition patient with critical lactic acidosis, concerns for sepsis and acute anemia, concern that patient may not survive  such a critical illness. see HPI for details     Code Status: DNR; status confirmed/order placed in chart    ACP Documents: None    Goals of care: The family and healthcare power of   endorses that what is most important right now is to focus on symptom/pain control and improvement in condition but with limits to invasive therapies    Accordingly, we have decided that the best plan to meet the patient's goals includes continuing with treatment      Recommendations/  Follow-up tasks: Other (specify below) f/u on palliative care consultation - trend of labs and clinical condition - if patient with deterioration would advocate that transition to comfort focused care happen sooner given patient's age-comorbid conditions and existing hospice enrollment prior to admission      Length of ACP   conversation in minutes: 32 minutes

## 2023-01-21 NOTE — PHARMACY MED REC
"Admission Medication History     The home medication history was taken by Yenifer Horton.    You may go to "Admission" then "Reconcile Home Medications" tabs to review and/or act upon these items.     The home medication list has been updated by the Pharmacy department.   Please read ALL comments highlighted in yellow.   Please address this information as you see fit.    Feel free to contact us if you have any questions or require assistance.      The medications listed below were removed from the home medication list. Please reorder if appropriate:  Patient reports no longer taking the following medication(s):  BRIMONIDINE 0.1% DROP  LISINOPRIL 10 MG  LOTEPREDNOL 0.5% OPTH SOL  MOXIFLOXACIN 0.5% OPTH SOL  TAMSULOSIN 0.4 MG    Medications listed below were obtained from: Patient/family and Medical records      Current Outpatient Medications on File Prior to Encounter   Medication Sig    acetaminophen (TYLENOL) 650 MG Supp   Place 650 mg rectally every 6 (six) hours as needed (fever).    albuterol-ipratropium (DUO-NEB) 2.5 mg-0.5 mg/3 mL nebulizer solution   Take 3 mLs by nebulization every 6 (six) hours as needed for Wheezing or Shortness of Breath. Rescue    bisacodyL (DULCOLAX) 10 mg Supp   Place 10 mg rectally daily as needed (constipation).    citalopram (CELEXA) 10 MG tablet   TAKE 1 TABLET(10 MG) BY MOUTH EVERY DAY    docusate sodium (COLACE) 100 MG capsule   Take 100 mg by mouth daily as needed for Constipation.    hyoscyamine (LEVSIN/SL) 0.125 mg Subl   Place 0.125 mg under the tongue every 6 (six) hours as needed (excessive secretions).    levETIRAcetam (KEPPRA) 1000 MG tablet   TAKE 1 TABLET(1000 MG) BY MOUTH TWICE DAILY    LORazepam (ATIVAN) 2 mg/mL Conc   Take 0.25-0.5 ml by mouth every 4 hours as needed for anxiety/ agitation    morphine 100 mg/5 mL (20 mg/mL) concentrated solution   Take 0.25-0.5 ml by mouth every 2 hours as needed for severe pain or shortness of breath    omeprazole (PRILOSEC) 20 MG " capsule   Take 20 mg by mouth once daily.    ondansetron (ZOFRAN-ODT) 4 MG TbDL Take 4 mg by mouth every 6 (six) hours as needed (nausea/ vomiting).             Potential issues to be addressed PRIOR TO DISCHARGE  The listed medications were obtained from another facility ([facility] ). The patient may not have been able to fill these prescriptions prior to this admission and may require new scripts upon discharge.     Yenifer Horton  EXT [id number]                  .

## 2023-01-21 NOTE — AI DETERIORATION ALERT
RAPID RESPONSE NURSE AI ALERT       AI alert received.    Chart Reviewed: 01/20/2023, 9:17 PM    MRN: [id number]  Bed: ED 29/29    Dx: Seizure    Atif Neves has a past medical history of JONATAN (acute kidney injury), Bacteremia due to Gram-positive bacteria, BPH (benign prostatic hypertrophy) with urinary obstruction, Cystoid macular edema, left eye, Essential hypertension, Essential hypertension, Generalized anxiety disorder, Glaucoma, Irregular heartbeat, Macular degeneration, Microcytic anemia, Mixed hyperlipidemia, Nonexudative age-related macular degeneration, bilateral, intermediate dry stage, Normal pressure hydrocephalus, NPH (normal pressure hydrocephalus), Urinary retention, and Ventriculo-peritoneal shunt status.    Last VS: BP (!) 94/50   Pulse 95   Temp (S) 96.8 °F (36 °C) (Rectal)   Resp 18   Wt 86.2 kg (190 lb)   SpO2 100%   BMI 28.89 kg/m²     24H Vital Sign Range:  Temp:  [94.5 °F (34.7 °C)-96.8 °F (36 °C)]   Pulse:  [78-95]   Resp:  [18]   BP: (79-94)/(45-55)   SpO2:  [100 %]     Level of Consciousness (AVPU): alert    Recent Labs     01/20/23  1740   WBC 16.53*   HGB 6.7*   HCT 26.5*          Recent Labs     01/20/23  1740      K 4.2   *   CO2 14*   CREATININE 1.2   *   PHOS 3.0   MG 2.0        No results for input(s): PH, PCO2, PO2, HCO3, POCSATURATED, BE in the last 72 hours.     OXYGEN:  Flow (L/min): 3          MEWS score: 4    charge RNSyl  contacted. Code status changed to DNr at family's request No concerns verbalized at this time. Instructed to call [phone] for further concerns or assistance.    Jed Mccoy RN

## 2023-01-21 NOTE — PLAN OF CARE
Patient seen and evaluated this morning in the emergency department.  Patient city up up and eating breakfast with family at bedside.  Family updated on plan of care.  Palliative care consult pending to follow-up goals of care conversations had on admission.    Lactic acid downtrending from >12 to 4.1 to 2.4 during midday check.  Continuing to trend lactic acid.  Zosyn continued for antibiotic coverage for possible aspiration pneumonia for seizure events at home with concern for emesis.    Keppra level pending.  Unable to obtain EEG in the emergency department but reordered once patient arrived to the floor.

## 2023-01-21 NOTE — ED TRIAGE NOTES
"Atif Neves, a 89 y.o. male presents to the ED w/ complaint of seizures. Pt arrives from home via EMS; family states the witnessed two seizures earlier today. Seizures described by family as pt "will momentarily stare of into space" denies tonic, clonic activity. Accompanied w/ emesis, lethargy. Per family, pt has "fluid on the brain" that has resulted in a change in mental status. On arrival pt hypothermic, hypotensive, lethargic, responding to voice. Intermittently verbal, family endorses this is similar to baseline. No known recent illness, skin breakdown, changes in bowel/bladder habits. Hx of seizures, family is unsure about compliance to keppra.     Triage note:  Chief Complaint   Patient presents with    Seizures     Arrives via EMS with c/o seizure like activity, 2 witnessed seizures, hx of epilepsy, BP in the 80s     Review of patient's allergies indicates:   Allergen Reactions    Aspirin Swelling     States, "makes me sick." pt does not elaborate.      Past Medical History:   Diagnosis Date    JONATAN (acute kidney injury) 06/2016    Bacteremia due to Gram-positive bacteria 3/24/2017    Antibiotics per ID recommendations; vancomycin x 14 days.     BPH (benign prostatic hypertrophy) with urinary obstruction 6/16/2016    Cystoid macular edema, left eye 8/4/2016    Essential hypertension 6/16/2016    Essential hypertension     Generalized anxiety disorder 6/16/2016    Glaucoma     Irregular heartbeat     Macular degeneration     Microcytic anemia 4/10/2017    Mixed hyperlipidemia 6/16/2016    Nonexudative age-related macular degeneration, bilateral, intermediate dry stage 8/4/2016    Normal pressure hydrocephalus     NPH (normal pressure hydrocephalus) 2/6/2017    Shunt placed 2/6/17    Urinary retention 06/2016    Ventriculo-peritoneal shunt status 2/6/2017       "

## 2023-01-21 NOTE — SUBJECTIVE & OBJECTIVE
"Past Medical History:   Diagnosis Date    JONATAN (acute kidney injury) 06/2016    Bacteremia due to Gram-positive bacteria 3/24/2017    Antibiotics per ID recommendations; vancomycin x 14 days.     BPH (benign prostatic hypertrophy) with urinary obstruction 6/16/2016    Cystoid macular edema, left eye 8/4/2016    Essential hypertension 6/16/2016    Essential hypertension     Generalized anxiety disorder 6/16/2016    Glaucoma     Irregular heartbeat     Macular degeneration     Microcytic anemia 4/10/2017    Mixed hyperlipidemia 6/16/2016    Nonexudative age-related macular degeneration, bilateral, intermediate dry stage 8/4/2016    Normal pressure hydrocephalus     NPH (normal pressure hydrocephalus) 2/6/2017    Shunt placed 2/6/17    Urinary retention 06/2016    Ventriculo-peritoneal shunt status 2/6/2017       Past Surgical History:   Procedure Laterality Date    ESOPHAGOGASTRODUODENOSCOPY N/A 2/2/2022    Procedure: EGD (ESOPHAGOGASTRODUODENOSCOPY);  Surgeon: Atif Farrell MD;  Location: [facility];  Service: Endoscopy;  Laterality: N/A;    TRANSURETHRAL RESECTION OF PROSTATE  08/31/2016       Review of patient's allergies indicates:   Allergen Reactions    Aspirin Swelling     States, "makes me sick." pt does not elaborate.        No current facility-administered medications on file prior to encounter.     Current Outpatient Medications on File Prior to Encounter   Medication Sig    citalopram (CELEXA) 10 MG tablet TAKE 1 TABLET(10 MG) BY MOUTH EVERY DAY    levETIRAcetam (KEPPRA) 1000 MG tablet TAKE 1 TABLET(1000 MG) BY MOUTH TWICE DAILY    lisinopriL 10 MG tablet Take 10 mg by mouth once daily.    tamsulosin (FLOMAX) 0.4 mg Cap Take 0.4 mg by mouth once daily.    brimonidine 0.1% (ALPHAGAN P) 0.1 % Drop     loteprednol (LOTEMAX) 0.5 % ophthalmic suspension     moxifloxacin (VIGAMOX) 0.5 % ophthalmic solution     [DISCONTINUED] atorvastatin (LIPITOR) 20 MG tablet TAKE 1 TABLET(20 MG) BY MOUTH EVERY DAY    " [DISCONTINUED] cholecalciferol, vitamin D3, (VITAMIN D3) 25 mcg (1,000 unit) capsule Take 1 capsule (1,000 Units total) by mouth once daily.    [DISCONTINUED] docusate sodium (COLACE) 100 MG capsule Take 1 capsule (100 mg total) by mouth once daily.    [DISCONTINUED] ferrous sulfate 324 mg (65 mg iron) TbEC Take 1 tablet (324 mg total) by mouth once daily.    [DISCONTINUED] pantoprazole (PROTONIX) 40 MG tablet Take 1 tablet (40 mg total) by mouth once daily.    [DISCONTINUED] thiamine (VITAMIN B-1) 100 MG tablet Take 1 tablet (100 mg total) by mouth once daily.     Family History       Problem Relation (Age of Onset)    Cancer Daughter (55), Daughter (54)    Heart disease Brother    No Known Problems Son          Tobacco Use    Smoking status: Former     Packs/day: 1.00     Years: 18.00     Pack years: 18.00     Types: Cigarettes     Quit date: 1950     Years since quittin.1    Smokeless tobacco: Never   Substance and Sexual Activity    Alcohol use: No    Drug use: No    Sexual activity: Not Currently     Review of Systems   Unable to perform ROS: Dementia   Objective:     Vital Signs (Most Recent):  Temp: 98.2 °F (36.8 °C) (23 0156)  Pulse: 92 (23 0202)  Resp: 19 (23 020)  BP: (!) 114/57 (23 020)  SpO2: 100 % (23)   Vital Signs (24h Range):  Temp:  [94.5 °F (34.7 °C)-98.2 °F (36.8 °C)] 98.2 °F (36.8 °C)  Pulse:  [78-99] 92  Resp:  [14-23] 19  SpO2:  [93 %-100 %] 100 %  BP: ()/(45-64) 114/57     Weight: 86.2 kg (190 lb)  Body mass index is 28.89 kg/m².    Physical Exam  Constitutional:       General: He is not in acute distress.     Appearance: He is ill-appearing.   HENT:      Head: Normocephalic and atraumatic.   Eyes:      General: No scleral icterus.     Comments: Constricted pupils reactive to light   Cardiovascular:      Rate and Rhythm: Normal rate and regular rhythm.   Pulmonary:      Effort: Pulmonary effort is normal. No respiratory distress.      Breath  sounds: No wheezing.   Abdominal:      General: Bowel sounds are normal.      Palpations: Abdomen is soft.      Tenderness: There is no guarding or rebound.      Comments: Firm abdomen   Musculoskeletal:      Cervical back: No rigidity.      Right lower leg: No edema.      Left lower leg: No edema.   Lymphadenopathy:      Cervical: No cervical adenopathy.   Skin:     General: Skin is warm.   Neurological:      Mental Status: He is alert. He is disoriented.   Psychiatric:         Mood and Affect: Affect is flat.           Significant Labs: All pertinent labs within the past 24 hours have been reviewed.  ABGs:   Recent Labs   Lab 01/20/23  2309 01/21/23  0137   PH 7.399 7.394   PCO2 28.3* 28.9*   HCO3 17.5* 17.7*   POCSATURATED 93* 93*   BE -7 -7   PO2 64* 67*     Blood Culture:   Recent Labs   Lab 01/20/23  1742   LABBLOO No Growth to date  No Growth to date     CBC:   Recent Labs   Lab 01/20/23  1740   WBC 16.53*   HGB 6.7*   HCT 26.5*        CMP:   Recent Labs   Lab 01/20/23  1740      K 4.2   *   CO2 14*   *   BUN 16   CREATININE 1.2   CALCIUM 9.3   PROT 7.6   ALBUMIN 3.3*   BILITOT 0.5   ALKPHOS 138*   AST 26   ALT 6*   ANIONGAP 18*     Cardiac Markers: No results for input(s): CKMB, MYOGLOBIN, BNP, TROPISTAT in the last 48 hours.  Coagulation: No results for input(s): PT, INR, APTT in the last 48 hours.  Lactic Acid:   Recent Labs   Lab 01/20/23  1740 01/20/23  2041   LACTATE 8.1* >12.0*     Magnesium:   Recent Labs   Lab 01/20/23  1740   MG 2.0     Troponin: No results for input(s): TROPONINI, TROPONINIHS in the last 48 hours.  Urine Studies: No results for input(s): COLORU, APPEARANCEUA, PHUR, SPECGRAV, PROTEINUA, GLUCUA, KETONESU, BILIRUBINUA, OCCULTUA, NITRITE, UROBILINOGEN, LEUKOCYTESUR, RBCUA, WBCUA, BACTERIA, SQUAMEPITHEL, HYALINECASTS in the last 48 hours.    Invalid input(s): GALEN    Significant Imaging: I have reviewed all pertinent imaging results/findings within the  past 24 hours.  XR CHEST AP PORTABLE     CLINICAL HISTORY:  Sepsis;     TECHNIQUE:  Single frontal view of the chest was performed.     COMPARISON:  02/01/2022     FINDINGS:  Descending shunt catheter tubing appears continuous.  The cardiomediastinal silhouette is not enlarged noting calcification of the aorta..  There is no pleural effusion.  The trachea is midline.  The lungs are symmetrically expanded bilaterally with bilateral basilar subsegmental atelectasis or scarring, left greater than right.  No large focal consolidation seen.  There is no pneumothorax.  The osseous structures are remarkable for degenerative changes..     Impression:     1. Bilateral basilar subsegmental atelectasis or scarring, left greater than right.  No large focal consolidation.        Electronically signed by: Jed Jamil MD  Date:                                            01/20/2023  Time:                                           17:00    EXAMINATION:  XR SHUNT SERIES     CLINICAL HISTORY:  shunt evaluation;     TECHNIQUE:  PA and lateral views of the skull neck chest and abdomen were performed to evaluate  shunt tube     COMPARISON:  Of 03/23/2017     FINDINGS:  Programmable shunt catheter with valve set to approximately 120 mm of water.  No discontinuity is evident.  Prominent waste material with rectal fecal impaction.  Atelectasis and/or infiltrate in the left lung base.     Impression:     Stable  shunt tube apparently set to 120 mm of water.     Left lung base infiltrate versus atelectasis.     Mild constipation and rectal fecal impaction.        Electronically signed by: Kenrick Santoro  Date:                                            01/20/2023  Time:                                           18:32           Exam Ended: 01/20/23 18:21             CT HEAD WITHOUT CONTRAST     CLINICAL HISTORY:  Mental status change, unknown cause;     TECHNIQUE:  Low dose axial CT images obtained throughout the head without the use  of intravenous contrast.  Axial, sagittal and coronal reconstructions were performed.     COMPARISON:  CT 01/31/2022, 04/10/2018     FINDINGS:  Right parietal ventricular shunt catheter in stable position.  Ventricular system appears enlarged similar to the prior exam.  Third ventricle measures 17 mm (previously 17 mm).     Periventricular white matter hypoattenuation likely representing chronic microvascular ischemic disease.  No evidence of acute territorial infarct or hemorrhage.  No mass effect or midline shift.  No edema.     No extra-axial blood or fluid collections Scattered atherosclerotic calcification about the skull base.     No acute displaced calvarial fracture.  Layering fluid within the sphenoid sinuses.  Otherwise paranasal sinuses and mastoid air cells are clear.     Impression:     Stable right parietal ventriculostomy shunt catheter with prominence of the ventricular system, unchanged from prior.  Additional evaluation, as clinically warranted.     Generalized cerebral volume loss and chronic microvascular ischemic disease.     No acute territorial infarct or hemorrhage.     Electronically signed by resident: Dejon Willett  Date:                                            01/20/2023  Time:                                           19:16     Electronically signed by: Jose Thomas MD  Date:                                            01/20/2023  Time:                                           19:27

## 2023-01-21 NOTE — SIGNIFICANT EVENT
Full h&p to follow    Family conversation held this evening at bedside with patients daughter and Healthcare POA - Tete Soria.  Patients son Virgil Neves also present    Patient was on home hospice care, presented to ED today with increased frequency of seizures, ED w/u concern for Sepsis, patient found with acute on chronic anemia, initial lactic acid 8.1  Received IV fluids, Antibiotics, anti-seizure medications.  Lactic acid is rising.     Discussed that given undetectable high lactic acid - high concern for critical illness occurring - possibly sepsis, GI bleed, organ ischemia, currently patient is hemodynamically stable and no respiratory distress, not requiring oxygen.     Discussed that full medical care likely would require ICU admission - CT chest abd/pelvis with contrast - if hypotension developed vasopressors would be required and in setting of acute anemia for resucitative efforts central line placement likely would be needed.     Both Tete and Virgil state that patient has made it clear if he is at end of his life he would not want to go through invasive medical procedures.  I discussed focusing on his comfort and trying less invasive measures such as seizure meds, fluids, antibiotics to attempt to reverse current condition but given his age, chronic comorbidities - critical lactic acid is a poor prognostic indicator    I discussed a situation of No-escalation of care - where IV fluids, Blood transfusion - medicines would continue but will refrain for escalation to ICU level of care.  They clearly state they would not want resuscitative measures for patient and to ED MD also would not want artificial feeding given.     Plan  -continue DNR  -will admit to hosp [facility] floor   -palliative care consult in AM  -rest per pending H&P               Extended Emergency Contact Information  Primary Emergency Contact: Tete Soria   [place]  Mobile Phone: [phone]  Relation: Daughter  Secondary  Emergency Contact: Colleen Lopez   [place]  Mobile Phone: [phone]  Relation: Daughter               Latest Reference Range & Units 01/20/23 17:40 01/20/23 20:41   Lactate, [place] 0.5 - 2.2 mmol/L 8.1 (HH) >12.0 (HH)   (HH): Data is critically high

## 2023-01-21 NOTE — ASSESSMENT & PLAN NOTE
This patient does not have evidence of infective focus  My overall impression is severe sepsis. Vital signs were reviewed and noted in progress note.  Antibiotics given-   Antibiotics (From admission, onward)    None        Cultures were taken-   Microbiology Results (last 7 days)     Procedure Component Value Units Date/Time    Blood culture x two cultures. Draw prior to antibiotics. [[id number]] Collected: 01/20/23 1742    Order Status: Completed Specimen: Blood from Peripheral, Lower Arm, Left Updated: 01/21/23 0115     Blood Culture, Routine No Growth to date    Narrative:      Aerobic and anaerobic    Blood culture x two cultures. Draw prior to antibiotics. [[id number]] Collected: 01/20/23 1742    Order Status: Completed Specimen: Blood from Peripheral, Lower Arm, Left Updated: 01/21/23 0115     Blood Culture, Routine No Growth to date    Narrative:      Aerobic and anaerobic        Latest lactate reviewed, they are-  Recent Labs   Lab 01/20/23 1740 01/20/23  2041   LACTATE 8.1* >12.0*       Organ dysfunction indicated by Encephalopathy   Source- Unknown source  Source control Achieved by- Empiric Antibiotics    -Urine sample attempted to be obtained via straight cath via regular catheter and coude by ED nursing was unsuccessful, bladder scan shows 58cc approx midnight.

## 2023-01-21 NOTE — CLINICAL REVIEW
RAPID RESPONSE NURSE CHART REVIEW       Chart Reviewed: 01/21/2023, 2:24 AM    MRN: [id number]  Bed: ED 29/29    Dx: Seizure    Atif Neves has a past medical history of JONATAN (acute kidney injury), Bacteremia due to Gram-positive bacteria, BPH (benign prostatic hypertrophy) with urinary obstruction, Cystoid macular edema, left eye, Essential hypertension, Essential hypertension, Generalized anxiety disorder, Glaucoma, Irregular heartbeat, Macular degeneration, Microcytic anemia, Mixed hyperlipidemia, Nonexudative age-related macular degeneration, bilateral, intermediate dry stage, Normal pressure hydrocephalus, NPH (normal pressure hydrocephalus), Urinary retention, and Ventriculo-peritoneal shunt status.    Last VS: BP (!) 114/57   Pulse 92   Temp 98.2 °F (36.8 °C)   Resp 19   Wt 86.2 kg (190 lb)   SpO2 100%   BMI 28.89 kg/m²     24H Vital Sign Range:  Temp:  [94.5 °F (34.7 °C)-98.2 °F (36.8 °C)]   Pulse:  [78-99]   Resp:  [14-23]   BP: ()/(45-64)   SpO2:  [93 %-100 %]     Level of Consciousness (AVPU): alert    Recent Labs     01/20/23  1740   WBC 16.53*   HGB 6.7*   HCT 26.5*          Recent Labs     01/20/23  1740      K 4.2   *   CO2 14*   CREATININE 1.2   *   PHOS 3.0   MG 2.0        Recent Labs     01/20/23  2309 01/21/23  0137   PH 7.399 7.394   PCO2 28.3* 28.9*   PO2 64* 67*   HCO3 17.5* 17.7*   POCSATURATED 93* 93*   BE -7 -7        OXYGEN:  Flow (L/min): 3          MEWS score: 2    Rounding completed with charge CARLYLE Streeter. contacted. No concerns verbalized at this time. Instructed to call [phone] for further concerns or assistance.    Jed Mccoy RN

## 2023-01-21 NOTE — ASSESSMENT & PLAN NOTE
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  -trend CBC

## 2023-01-21 NOTE — HPI
89-year-old man with a history of NPH status post  shunt, dementia, seizure disorder, functional quadriplegia with severe debility who was on home hospice presented to the emergency room today with family for concerns of breakthrough seizure.    Patients daughter(ZEYNEP)  Tete and son Virgil provide HPI.  Patient with Aphasia, can follow commands with repeated prompting cannot give detailed answers to questions.  The they report they report that patient has been having more frequent seizures in recent days, typically the features are staring spells followed by period of unresponsiveness or postictal.  Today he had 2 seizures followed by bouts of vomiting.  The emesis was described as yellow, nonbloody does not appear as coffee-grounds.    Patient last bowel movement is unknown.  Her his home medication list anti seizure medicine Keppra was not on it a previously on 1 g b.i.d. here in the [facility] record prior to hospice enrollment.      On presentation ER workup is notable for initial lactic acid of 8.1 and orion to greater than 12 on repeat check, he was hypothermic and hypotensive today started on sepsis protocol with 30 cc/kg fluid bolus empiric antibiotics as, loaded with IV Keppra 1 g. In no acute respiratory distress and not requiring supplemental oxygen, hemodynamically stable after IV fluids given.    As per significant event note conversation held with patient's daughter and son after lactic acid level demonstrated rising values, exact etiology is unclear could be due to seizures or more insidious process that might be the cause of his worsening anemia, hemoglobin is less than 7 she.  In discussion with family aggressive treatment likely would require ICU level of care placement of central line for appropriate resuscitation with fluids blood and hemodynamic monitoring.  Further CT imaging to evaluate for source of rising lactic acid also conducting further seizure workup a day.  Family described to emergency  room physician that they would not want resuscitative measures applied and or artificial feeding, during my discussion describing potential ICU level of care ordered require testing for workup, Tete states that patient likely would not want to go through all those types of measures.  So described that we will continue anti seizure medicine antibiotics fluids intermittent lab checks in order to try to reverse any acute issue but if patient's condition continues to clinically worsen personal recommendation is to transition to fully comfort focused care where inpatient hospice might be most appropriate setting versus return to home hospice.    Tete was in agreement trial of medical workup at this time.

## 2023-01-21 NOTE — ASSESSMENT & PLAN NOTE
-seizure precautions  -continue Keppra via IV route  -EEG stat to r/o non-convulsive status or uncontrolled epileptiform activity requiring medication changes.

## 2023-01-21 NOTE — H&P
[facility] - Emergency Dept  [facility]  History & Physical    Patient Name: Atif Neves  MRN: [id number]  Patient Class: IP- Inpatient  Admission Date: 1/20/2023  Attending Physician: Trevin Garcia MD [facility]  Admitting Physician: Jose Kaye MD  Primary Care Provider: Ashlye Richards MD    Patient information was obtained from patient, past medical records and ER records.     Subjective:     Principal Problem:Seizure    Chief Complaint:   Chief Complaint   Patient presents with    Seizures     Arrives via EMS with c/o seizure like activity, 2 witnessed seizures, hx of epilepsy, BP in the 80s        HPI: 89-year-old man with a history of NPH status post  shunt, dementia, seizure disorder, functional quadriplegia with severe debility who was on home hospice presented to the emergency room today with family for concerns of breakthrough seizure.    Patients daughter(ZEYNEP)  Tete and son Virgil provide HPI.  Patient with Aphasia, can follow commands with repeated prompting cannot give detailed answers to questions.  The they report they report that patient has been having more frequent seizures in recent days, typically the features are staring spells followed by period of unresponsiveness or postictal.  Today he had 2 seizures followed by bouts of vomiting.  The emesis was described as yellow, nonbloody does not appear as coffee-grounds.    Patient last bowel movement is unknown.  Her his home medication list anti seizure medicine Keppra was not on it a previously on 1 g b.i.d. here in the [facility] record prior to hospice enrollment.      On presentation ER workup is notable for initial lactic acid of 8.1 and orion to greater than 12 on repeat check, he was hypothermic and hypotensive today started on sepsis protocol with 30 cc/kg fluid bolus empiric antibiotics as, loaded with IV Keppra 1 g. In no acute respiratory distress and not requiring supplemental oxygen, hemodynamically stable  after IV fluids given.    As per significant event note conversation held with patient's daughter and son after lactic acid level demonstrated rising values, exact etiology is unclear could be due to seizures or more insidious process that might be the cause of his worsening anemia, hemoglobin is less than 7 she.  In discussion with family aggressive treatment likely would require ICU level of care placement of central line for appropriate resuscitation with fluids blood and hemodynamic monitoring.  Further CT imaging to evaluate for source of rising lactic acid also conducting further seizure workup a day.  Family described to emergency room physician that they would not want resuscitative measures applied and or artificial feeding, during my discussion describing potential ICU level of care ordered require testing for workup, Tete states that patient likely would not want to go through all those types of measures.  So described that we will continue anti seizure medicine antibiotics fluids intermittent lab checks in order to try to reverse any acute issue but if patient's condition continues to clinically worsen personal recommendation is to transition to fully comfort focused care where inpatient hospice might be most appropriate setting versus return to home hospice.    Tete was in agreement trial of medical workup at this time.             Past Medical History:   Diagnosis Date    JONATAN (acute kidney injury) 06/2016    Bacteremia due to Gram-positive bacteria 3/24/2017    Antibiotics per ID recommendations; vancomycin x 14 days.     BPH (benign prostatic hypertrophy) with urinary obstruction 6/16/2016    Cystoid macular edema, left eye 8/4/2016    Essential hypertension 6/16/2016    Essential hypertension     Generalized anxiety disorder 6/16/2016    Glaucoma     Irregular heartbeat     Macular degeneration     Microcytic anemia 4/10/2017    Mixed hyperlipidemia 6/16/2016    Nonexudative  "age-related macular degeneration, bilateral, intermediate dry stage 8/4/2016    Normal pressure hydrocephalus     NPH (normal pressure hydrocephalus) 2/6/2017    Shunt placed 2/6/17    Urinary retention 06/2016    Ventriculo-peritoneal shunt status 2/6/2017       Past Surgical History:   Procedure Laterality Date    ESOPHAGOGASTRODUODENOSCOPY N/A 2/2/2022    Procedure: EGD (ESOPHAGOGASTRODUODENOSCOPY);  Surgeon: Atif Farrell MD;  Location: [facility];  Service: Endoscopy;  Laterality: N/A;    TRANSURETHRAL RESECTION OF PROSTATE  08/31/2016       Review of patient's allergies indicates:   Allergen Reactions    Aspirin Swelling     States, "makes me sick." pt does not elaborate.        No current facility-administered medications on file prior to encounter.     Current Outpatient Medications on File Prior to Encounter   Medication Sig    citalopram (CELEXA) 10 MG tablet TAKE 1 TABLET(10 MG) BY MOUTH EVERY DAY    levETIRAcetam (KEPPRA) 1000 MG tablet TAKE 1 TABLET(1000 MG) BY MOUTH TWICE DAILY    lisinopriL 10 MG tablet Take 10 mg by mouth once daily.    tamsulosin (FLOMAX) 0.4 mg Cap Take 0.4 mg by mouth once daily.    brimonidine 0.1% (ALPHAGAN P) 0.1 % Drop     loteprednol (LOTEMAX) 0.5 % ophthalmic suspension     moxifloxacin (VIGAMOX) 0.5 % ophthalmic solution     [DISCONTINUED] atorvastatin (LIPITOR) 20 MG tablet TAKE 1 TABLET(20 MG) BY MOUTH EVERY DAY    [DISCONTINUED] cholecalciferol, vitamin D3, (VITAMIN D3) 25 mcg (1,000 unit) capsule Take 1 capsule (1,000 Units total) by mouth once daily.    [DISCONTINUED] docusate sodium (COLACE) 100 MG capsule Take 1 capsule (100 mg total) by mouth once daily.    [DISCONTINUED] ferrous sulfate 324 mg (65 mg iron) TbEC Take 1 tablet (324 mg total) by mouth once daily.    [DISCONTINUED] pantoprazole (PROTONIX) 40 MG tablet Take 1 tablet (40 mg total) by mouth once daily.    [DISCONTINUED] thiamine (VITAMIN B-1) 100 MG tablet Take 1 tablet (100 mg total) " by mouth once daily.     Family History       Problem Relation (Age of Onset)    Cancer Daughter (55), Daughter (54)    Heart disease Brother    No Known Problems Son          Tobacco Use    Smoking status: Former     Packs/day: 1.00     Years: 18.00     Pack years: 18.00     Types: Cigarettes     Quit date: 1950     Years since quittin.1    Smokeless tobacco: Never   Substance and Sexual Activity    Alcohol use: No    Drug use: No    Sexual activity: Not Currently     Review of Systems   Unable to perform ROS: Dementia   Objective:     Vital Signs (Most Recent):  Temp: 98.2 °F (36.8 °C) (23 0156)  Pulse: 92 (23 0202)  Resp: 19 (23 020)  BP: (!) 114/57 (23 020)  SpO2: 100 % (23 020)   Vital Signs (24h Range):  Temp:  [94.5 °F (34.7 °C)-98.2 °F (36.8 °C)] 98.2 °F (36.8 °C)  Pulse:  [78-99] 92  Resp:  [14-23] 19  SpO2:  [93 %-100 %] 100 %  BP: ()/(45-64) 114/57     Weight: 86.2 kg (190 lb)  Body mass index is 28.89 kg/m².    Physical Exam  Constitutional:       General: He is not in acute distress.     Appearance: He is ill-appearing.   HENT:      Head: Normocephalic and atraumatic.   Eyes:      General: No scleral icterus.     Comments: Constricted pupils reactive to light   Cardiovascular:      Rate and Rhythm: Normal rate and regular rhythm.   Pulmonary:      Effort: Pulmonary effort is normal. No respiratory distress.      Breath sounds: No wheezing.   Abdominal:      General: Bowel sounds are normal.      Palpations: Abdomen is soft.      Tenderness: There is no guarding or rebound.      Comments: Firm abdomen   Musculoskeletal:      Cervical back: No rigidity.      Right lower leg: No edema.      Left lower leg: No edema.   Lymphadenopathy:      Cervical: No cervical adenopathy.   Skin:     General: Skin is warm.   Neurological:      Mental Status: He is alert. He is disoriented.   Psychiatric:         Mood and Affect: Affect is flat.           Significant Labs:  All pertinent labs within the past 24 hours have been reviewed.  ABGs:   Recent Labs   Lab 01/20/23  2309 01/21/23  0137   PH 7.399 7.394   PCO2 28.3* 28.9*   HCO3 17.5* 17.7*   POCSATURATED 93* 93*   BE -7 -7   PO2 64* 67*     Blood Culture:   Recent Labs   Lab 01/20/23  1742   LABBLOO No Growth to date  No Growth to date     CBC:   Recent Labs   Lab 01/20/23  1740   WBC 16.53*   HGB 6.7*   HCT 26.5*        CMP:   Recent Labs   Lab 01/20/23  1740      K 4.2   *   CO2 14*   *   BUN 16   CREATININE 1.2   CALCIUM 9.3   PROT 7.6   ALBUMIN 3.3*   BILITOT 0.5   ALKPHOS 138*   AST 26   ALT 6*   ANIONGAP 18*     Cardiac Markers: No results for input(s): CKMB, MYOGLOBIN, BNP, TROPISTAT in the last 48 hours.  Coagulation: No results for input(s): PT, INR, APTT in the last 48 hours.  Lactic Acid:   Recent Labs   Lab 01/20/23  1740 01/20/23  2041   LACTATE 8.1* >12.0*     Magnesium:   Recent Labs   Lab 01/20/23  1740   MG 2.0     Troponin: No results for input(s): TROPONINI, TROPONINIHS in the last 48 hours.  Urine Studies: No results for input(s): COLORU, APPEARANCEUA, PHUR, SPECGRAV, PROTEINUA, GLUCUA, KETONESU, BILIRUBINUA, OCCULTUA, NITRITE, UROBILINOGEN, LEUKOCYTESUR, RBCUA, WBCUA, BACTERIA, SQUAMEPITHEL, HYALINECASTS in the last 48 hours.    Invalid input(s): WRIGHTSUR    Significant Imaging: I have reviewed all pertinent imaging results/findings within the past 24 hours.  XR CHEST AP PORTABLE     CLINICAL HISTORY:  Sepsis;     TECHNIQUE:  Single frontal view of the chest was performed.     COMPARISON:  02/01/2022     FINDINGS:  Descending shunt catheter tubing appears continuous.  The cardiomediastinal silhouette is not enlarged noting calcification of the aorta..  There is no pleural effusion.  The trachea is midline.  The lungs are symmetrically expanded bilaterally with bilateral basilar subsegmental atelectasis or scarring, left greater than right.  No large focal consolidation seen.   There is no pneumothorax.  The osseous structures are remarkable for degenerative changes..     Impression:     1. Bilateral basilar subsegmental atelectasis or scarring, left greater than right.  No large focal consolidation.        Electronically signed by: Jed Jamil MD  Date:                                            01/20/2023  Time:                                           17:00    EXAMINATION:  XR SHUNT SERIES     CLINICAL HISTORY:  shunt evaluation;     TECHNIQUE:  PA and lateral views of the skull neck chest and abdomen were performed to evaluate  shunt tube     COMPARISON:  Of 03/23/2017     FINDINGS:  Programmable shunt catheter with valve set to approximately 120 mm of water.  No discontinuity is evident.  Prominent waste material with rectal fecal impaction.  Atelectasis and/or infiltrate in the left lung base.     Impression:     Stable  shunt tube apparently set to 120 mm of water.     Left lung base infiltrate versus atelectasis.     Mild constipation and rectal fecal impaction.        Electronically signed by: Kenrick Santoro  Date:                                            01/20/2023  Time:                                           18:32           Exam Ended: 01/20/23 18:21             CT HEAD WITHOUT CONTRAST     CLINICAL HISTORY:  Mental status change, unknown cause;     TECHNIQUE:  Low dose axial CT images obtained throughout the head without the use of intravenous contrast.  Axial, sagittal and coronal reconstructions were performed.     COMPARISON:  CT 01/31/2022, 04/10/2018     FINDINGS:  Right parietal ventricular shunt catheter in stable position.  Ventricular system appears enlarged similar to the prior exam.  Third ventricle measures 17 mm (previously 17 mm).     Periventricular white matter hypoattenuation likely representing chronic microvascular ischemic disease.  No evidence of acute territorial infarct or hemorrhage.  No mass effect or midline shift.  No edema.     No  extra-axial blood or fluid collections Scattered atherosclerotic calcification about the skull base.     No acute displaced calvarial fracture.  Layering fluid within the sphenoid sinuses.  Otherwise paranasal sinuses and mastoid air cells are clear.     Impression:     Stable right parietal ventriculostomy shunt catheter with prominence of the ventricular system, unchanged from prior.  Additional evaluation, as clinically warranted.     Generalized cerebral volume loss and chronic microvascular ischemic disease.     No acute territorial infarct or hemorrhage.     Electronically signed by resident: Dejon Willett  Date:                                            01/20/2023  Time:                                           19:16     Electronically signed by: Jose Thomas MD  Date:                                            01/20/2023  Time:                                           19:27      Assessment/Plan:     * Seizure  -seizure precautions  -continue Keppra via IV route  -EEG stat to r/o non-convulsive status or uncontrolled epileptiform activity requiring medication changes.       Severe sepsis  This patient does not have evidence of infective focus  My overall impression is severe sepsis. Vital signs were reviewed and noted in progress note.  Antibiotics given-   Antibiotics (From admission, onward)    None        Cultures were taken-   Microbiology Results (last 7 days)     Procedure Component Value Units Date/Time    Blood culture x two cultures. Draw prior to antibiotics. [[id number]] Collected: 01/20/23 1742    Order Status: Completed Specimen: Blood from Peripheral, Lower Arm, Left Updated: 01/21/23 0115     Blood Culture, Routine No Growth to date    Narrative:      Aerobic and anaerobic    Blood culture x two cultures. Draw prior to antibiotics. [[id number]] Collected: 01/20/23 1742    Order Status: Completed Specimen: Blood from Peripheral, Lower Arm, Left Updated: 01/21/23 0115     Blood Culture,  Routine No Growth to date    Narrative:      Aerobic and anaerobic        Latest lactate reviewed, they are-  Recent Labs   Lab 01/20/23  1740 01/20/23 2041   LACTATE 8.1* >12.0*       Organ dysfunction indicated by Encephalopathy   Source- Unknown source  Source control Achieved by- Empiric Antibiotics    -Urine sample attempted to be obtained via straight cath via regular catheter and coude by ED nursing was unsuccessful, bladder scan shows 58cc approx midnight.       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 see HPI for details     Code Status: DNR;  status confirmed/order placed in chart    ACP Documents: None    Goals of care: The family and healthcare power of   endorses that what is most important right now is to focus on symptom/pain control and improvement in condition but with limits to invasive therapies    Accordingly, we have decided that the best plan to meet the patient's goals includes continuing with treatment      Recommendations/  Follow-up tasks: Other (specify below) f/u on palliative care consultation - trend of labs and clinical condition - if patient with deterioration would advocate that transition to comfort focused care happen sooner given patient's age-comorbid conditions and existing hospice enrollment prior to admission      Length of ACP   conversation in minutes: 32 minutes             VTE Risk Mitigation (From admission, onward)         Ordered     IP VTE HIGH RISK PATIENT  Once         01/21/23 0044     Place sequential compression device  Until discontinued         01/21/23 0044     Reason for No Pharmacological VTE Prophylaxis  Once        Question:  Reasons:  Answer:  Risk of Bleeding    01/21/23 0044     Place sequential compression device  Until discontinued         01/21/23 0044                   Jose Kaye MD  Department of Hospital Medicine   [facility] - Emergency Dept

## 2023-01-21 NOTE — ASSESSMENT & PLAN NOTE
-received 30cc/kg bolus  -reciving 1unit PRBC overnight  -trend lactic, chemistry/blood counts   -VBG shows compensated metabolic AG acidosis   -no liver dysfunction on presentation - no obvious home medicines that might cause severe rise in lactic acid

## 2023-01-22 LAB
BASOPHILS # BLD AUTO: 0.04 K/UL (ref 0–0.2)
BASOPHILS NFR BLD: 0.3 % (ref 0–1.9)
DIFFERENTIAL METHOD: ABNORMAL
EOSINOPHIL # BLD AUTO: 0.2 K/UL (ref 0–0.5)
EOSINOPHIL NFR BLD: 1.6 % (ref 0–8)
ERYTHROCYTE [DISTWIDTH] IN BLOOD BY AUTOMATED COUNT: 25.2 % (ref 11.5–14.5)
HCT VFR BLD AUTO: 30.5 % (ref 40–54)
HGB BLD-MCNC: 8.8 G/DL (ref 14–18)
IMM GRANULOCYTES # BLD AUTO: 0.12 K/UL (ref 0–0.04)
IMM GRANULOCYTES NFR BLD AUTO: 0.8 % (ref 0–0.5)
LACTATE SERPL-SCNC: 1.9 MMOL/L (ref 0.5–2.2)
LYMPHOCYTES # BLD AUTO: 1.4 K/UL (ref 1–4.8)
LYMPHOCYTES NFR BLD: 10.1 % (ref 18–48)
MCH RBC QN AUTO: 19.7 PG (ref 27–31)
MCHC RBC AUTO-ENTMCNC: 28.9 G/DL (ref 32–36)
MCV RBC AUTO: 68 FL (ref 82–98)
MONOCYTES # BLD AUTO: 0.7 K/UL (ref 0.3–1)
MONOCYTES NFR BLD: 4.9 % (ref 4–15)
NEUTROPHILS # BLD AUTO: 11.7 K/UL (ref 1.8–7.7)
NEUTROPHILS NFR BLD: 82.3 % (ref 38–73)
NRBC BLD-RTO: 0 /100 WBC
PLATELET # BLD AUTO: 192 K/UL (ref 150–450)
PMV BLD AUTO: ABNORMAL FL (ref 9.2–12.9)
RBC # BLD AUTO: 4.46 M/UL (ref 4.6–6.2)
WBC # BLD AUTO: 14.2 K/UL (ref 3.9–12.7)

## 2023-01-22 PROCEDURE — 85025 COMPLETE CBC W/AUTO DIFF WBC: CPT | Mod: HCNC | Performed by: HOSPITALIST

## 2023-01-22 PROCEDURE — 25000003 PHARM REV CODE 250: Mod: HCNC | Performed by: STUDENT IN AN ORGANIZED HEALTH CARE EDUCATION/TRAINING PROGRAM

## 2023-01-22 PROCEDURE — 25000003 PHARM REV CODE 250: Mod: HCNC | Performed by: HOSPITALIST

## 2023-01-22 PROCEDURE — 83605 ASSAY OF LACTIC ACID: CPT | Mod: HCNC | Performed by: STUDENT IN AN ORGANIZED HEALTH CARE EDUCATION/TRAINING PROGRAM

## 2023-01-22 PROCEDURE — 95700 EEG CONT REC W/VID EEG TECH: CPT | Mod: HCNC

## 2023-01-22 PROCEDURE — 11000001 HC ACUTE MED/SURG PRIVATE ROOM: Mod: HCNC

## 2023-01-22 PROCEDURE — 63600175 PHARM REV CODE 636 W HCPCS: Mod: HCNC | Performed by: STUDENT IN AN ORGANIZED HEALTH CARE EDUCATION/TRAINING PROGRAM

## 2023-01-22 PROCEDURE — 95718 EEG PHYS/QHP 2-12 HR W/VEEG: CPT | Mod: HCNC,,, | Performed by: PSYCHIATRY & NEUROLOGY

## 2023-01-22 PROCEDURE — 99232 PR SUBSEQUENT HOSPITAL CARE,LEVL II: ICD-10-PCS | Mod: HCNC,,, | Performed by: STUDENT IN AN ORGANIZED HEALTH CARE EDUCATION/TRAINING PROGRAM

## 2023-01-22 PROCEDURE — 99232 SBSQ HOSP IP/OBS MODERATE 35: CPT | Mod: HCNC,,, | Performed by: STUDENT IN AN ORGANIZED HEALTH CARE EDUCATION/TRAINING PROGRAM

## 2023-01-22 PROCEDURE — 63600175 PHARM REV CODE 636 W HCPCS: Mod: HCNC | Performed by: HOSPITALIST

## 2023-01-22 PROCEDURE — 36415 COLL VENOUS BLD VENIPUNCTURE: CPT | Mod: HCNC | Performed by: STUDENT IN AN ORGANIZED HEALTH CARE EDUCATION/TRAINING PROGRAM

## 2023-01-22 PROCEDURE — 95711 VEEG 2-12 HR UNMONITORED: CPT | Mod: HCNC

## 2023-01-22 PROCEDURE — 95718 PR EEG, W/VIDEO, CONT RECORD, I&R, 2-12 HRS: ICD-10-PCS | Mod: HCNC,,, | Performed by: PSYCHIATRY & NEUROLOGY

## 2023-01-22 RX ADMIN — PIPERACILLIN SODIUM AND TAZOBACTAM SODIUM 4.5 G: 4; .5 INJECTION, POWDER, LYOPHILIZED, FOR SOLUTION INTRAVENOUS at 08:01

## 2023-01-22 RX ADMIN — LEVETIRACETAM 1000 MG: 100 INJECTION, SOLUTION INTRAVENOUS at 07:01

## 2023-01-22 RX ADMIN — POLYETHYLENE GLYCOL 3350 17 G: 17 POWDER, FOR SOLUTION ORAL at 08:01

## 2023-01-22 RX ADMIN — LEVETIRACETAM 1000 MG: 100 INJECTION, SOLUTION INTRAVENOUS at 08:01

## 2023-01-22 RX ADMIN — PIPERACILLIN SODIUM AND TAZOBACTAM SODIUM 4.5 G: 4; .5 INJECTION, POWDER, LYOPHILIZED, FOR SOLUTION INTRAVENOUS at 04:01

## 2023-01-22 RX ADMIN — SENNOSIDES AND DOCUSATE SODIUM 1 TABLET: 50; 8.6 TABLET ORAL at 07:01

## 2023-01-22 RX ADMIN — SENNOSIDES AND DOCUSATE SODIUM 1 TABLET: 50; 8.6 TABLET ORAL at 08:01

## 2023-01-22 NOTE — PROGRESS NOTES
Marlon Steinberg - Neurosurgery ([facility])  [facility] Medicine  Progress Note    Patient Name: Atif Neves  MRN: [id number]  Patient Class: IP- Inpatient   Admission Date: 1/20/2023  Length of Stay: 2 days  Attending Physician: Trevin Garcia MD  Primary Care Provider: Ashley Richards MD        Subjective:     Principal Problem:Seizure        HPI:  89-year-old man with a history of NPH status post  shunt, dementia, seizure disorder, functional quadriplegia with severe debility who was on home hospice presented to the emergency room today with family for concerns of breakthrough seizure.    Patients daughter(ZEYNEP)  Tete and son Virgil provide HPI.  Patient with Aphasia, can follow commands with repeated prompting cannot give detailed answers to questions.  The they report they report that patient has been having more frequent seizures in recent days, typically the features are staring spells followed by period of unresponsiveness or postictal.  Today he had 2 seizures followed by bouts of vomiting.  The emesis was described as yellow, nonbloody does not appear as coffee-grounds.    Patient last bowel movement is unknown.  Her his home medication list anti seizure medicine Keppra was not on it a previously on 1 g b.i.d. here in the [facility] record prior to hospice enrollment.      On presentation ER workup is notable for initial lactic acid of 8.1 and orion to greater than 12 on repeat check, he was hypothermic and hypotensive today started on sepsis protocol with 30 cc/kg fluid bolus empiric antibiotics as, loaded with IV Keppra 1 g. In no acute respiratory distress and not requiring supplemental oxygen, hemodynamically stable after IV fluids given.    As per significant event note conversation held with patient's daughter and son after lactic acid level demonstrated rising values, exact etiology is unclear could be due to seizures or more insidious process that might be the cause of his worsening anemia, hemoglobin  is less than 7 she.  In discussion with family aggressive treatment likely would require ICU level of care placement of central line for appropriate resuscitation with fluids blood and hemodynamic monitoring.  Further CT imaging to evaluate for source of rising lactic acid also conducting further seizure workup a day.  Family described to emergency room physician that they would not want resuscitative measures applied and or artificial feeding, during my discussion describing potential ICU level of care ordered require testing for workup, Tete states that patient likely would not want to go through all those types of measures.  So described that we will continue anti seizure medicine antibiotics fluids intermittent lab checks in order to try to reverse any acute issue but if patient's condition continues to clinically worsen personal recommendation is to transition to fully comfort focused care where inpatient hospice might be most appropriate setting versus return to home hospice.    Tete was in agreement trial of medical workup at this time.             Overview/Hospital Course:  No notes on file    Interval History:   Lactate up to 3.5 from 2.4 yesterday evening, and patient given small fluid bolus. This morning lactate 1.9. Patient eating breakfast with no complaints.      Review of Systems   Unable to perform ROS: Dementia   Objective:     Vital Signs (Most Recent):  Temp: 98 °F (36.7 °C) (01/22/23 1133)  Pulse: 85 (01/22/23 1143)  Resp: 16 (01/22/23 1133)  BP: (!) 119/59 (01/22/23 1133)  SpO2: 98 % (01/22/23 1133)   Vital Signs (24h Range):  Temp:  [97.3 °F (36.3 °C)-98.6 °F (37 °C)] 98 °F (36.7 °C)  Pulse:  [] 85  Resp:  [15-17] 16  SpO2:  [93 %-98 %] 98 %  BP: (119-156)/(58-71) 119/59     Weight: 86.2 kg (190 lb)  Body mass index is 28.89 kg/m².    Intake/Output Summary (Last 24 hours) at 1/22/2023 1510  Last data filed at 1/22/2023 0800  Gross per 24 hour   Intake 450 ml   Output 250 ml   Net 200 ml       Physical Exam  Constitutional:       Appearance: Normal appearance. He is normal weight.   HENT:      Head: Normocephalic and atraumatic.      Mouth/Throat:      Mouth: Mucous membranes are moist.   Eyes:      Extraocular Movements: Extraocular movements intact.      Conjunctiva/sclera: Conjunctivae normal.   Cardiovascular:      Rate and Rhythm: Normal rate and regular rhythm.      Heart sounds: No murmur heard.  Pulmonary:      Effort: Pulmonary effort is normal. No respiratory distress.      Breath sounds: Normal breath sounds. No wheezing or rales.   Abdominal:      General: Abdomen is flat. There is no distension.      Palpations: Abdomen is soft.      Tenderness: There is no abdominal tenderness. There is no guarding.   Musculoskeletal:         General: No swelling or tenderness.   Skin:     Findings: No rash.   Neurological:      Mental Status: He is alert. Mental status is at baseline.   Psychiatric:         Mood and Affect: Mood normal.         Behavior: Behavior normal.       Significant Labs: CBC:   Recent Labs   Lab 01/20/23  1740 01/21/23  0740 01/22/23  1009   WBC 16.53* 20.16* 14.20*   HGB 6.7* 8.4* 8.8*   HCT 26.5* 29.4* 30.5*    278 192     CMP:   Recent Labs   Lab 01/20/23  1740 01/21/23  0740    141   K 4.2 3.6   * 110   CO2 14* 20*   * 113*   BUN 16 19   CREATININE 1.2 1.4   CALCIUM 9.3 9.4   PROT 7.6 6.8   ALBUMIN 3.3* 3.2*   BILITOT 0.5 1.3*   ALKPHOS 138* 110   AST 26 15   ALT 6* 6*   ANIONGAP 18* 11       Significant Imaging: I have reviewed all pertinent imaging results/findings within the past 24 hours.      Assessment/Plan:      * Seizure  - Seizure precautions  - Continue Keppra via IV route  - Follow up EEG r/o non-convulsive status or uncontrolled epileptiform activity requiring medication changes.   - Follow up Keppra level      Severe sepsis  This patient does not have evidence of infective focus  My overall impression is severe sepsis. Vital signs were  reviewed and noted in progress note.  Antibiotics given-   Antibiotics (From admission, onward)    Start     Stop Route Frequency Ordered    01/21/23 0830  piperacillin-tazobactam (ZOSYN) 4.5 g in dextrose 5 % in water (D5W) 5 % 100 mL IVPB (MB+)         -- IV Every 8 hours (non-standard times) 01/21/23 0724        Cultures were taken-   Microbiology Results (last 7 days)     Procedure Component Value Units Date/Time    Blood culture x two cultures. Draw prior to antibiotics. [[id number]] Collected: 01/20/23 1742    Order Status: Completed Specimen: Blood from Peripheral, Lower Arm, Left Updated: 01/21/23 2013     Blood Culture, Routine No Growth to date      No Growth to date    Narrative:      Aerobic and anaerobic    Blood culture x two cultures. Draw prior to antibiotics. [[id number]] Collected: 01/20/23 1742    Order Status: Completed Specimen: Blood from Peripheral, Lower Arm, Left Updated: 01/21/23 2013     Blood Culture, Routine No Growth to date      No Growth to date    Narrative:      Aerobic and anaerobic    Urine culture [[id number]] Collected: 01/21/23 1823    Order Status: No result Specimen: Urine Updated: 01/21/23 1955        Latest lactate reviewed, they are-  Recent Labs   Lab 01/21/23  1232 01/21/23  1929 01/22/23  1009   LACTATE 2.4* 3.5* 1.9       Organ dysfunction indicated by Encephalopathy   Source- Unknown source  Source control Achieved by- Empiric Antibiotics    - Follow up urine culture  - Blood culture NGTD  - Continue Zosyn for aspiration pna coverage       Lactic acidosis  - Received 30cc/kg bolus int the ED  - Recived 1unit PRBC on admission for Hgb 6.8  - Lactate 8.4 > >21 > 4.1 > 2.4 > 3.5 > 1.9   - VBG shows compensated metabolic AG acidosis   - No liver dysfunction on presentation - no obvious home medicines that might cause severe rise in lactic acid   - Resovled      Acute on chronic anemia  Hx of GI bleed - 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  - Trend iron panel  - Trend CBC    Ventriculo-peritoneal shunt status  - Shunt series and CT head w/o any abnormality to V/P shunt      Goals of care, counseling/discussion  Advance Care Planning     Today a meeting took place: bedside    Patient Participation: Patient is unable to participate     Attendees (Name and  Relationship to patient): Health care power of : JANES LEE, jesus FREEMAN    Staff attendees (Name and  Role): Trevin Garcia MD - admitting physician\  ACP Conversation (General): Understanding of current condition patient with critical lactic acidosis, concerns for sepsis and acute anemia, concern that patient may not survive  such a critical illness. see HPI for details     Code Status: DNR; status confirmed/order placed in chart    ACP Documents: None    Goals of care: The family and healthcare power of   endorses that what is most important right now is to focus on symptom/pain control and improvement in condition but with limits to invasive therapies    Accordingly, we have decided that the best plan to meet the patient's goals includes continuing with treatment      Recommendations/  Follow-up tasks: Other (specify below) f/u on palliative care consultation - trend of labs and clinical condition - if patient with deterioration would advocate that transition to comfort focused care happen sooner given patient's age-comorbid conditions and existing hospice enrollment prior to admission      Length of ACP   conversation in minutes: 32 minutes           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      Functional quadriplegia  - Total assist at baseline  - Q2 turns  - Assist with feeding      Dementia associated with other underlying disease without behavioral disturbance  - Chronic stable      Essential hypertension  - Holding any anti-hypertensives  - Restart as needed      VTE Risk Mitigation (From admission, onward)          Ordered     IP VTE HIGH RISK PATIENT  Once         01/21/23 0044     Place sequential compression device  Until discontinued         01/21/23 0044     Reason for No Pharmacological VTE Prophylaxis  Once        Question:  Reasons:  Answer:  Risk of Bleeding    01/21/23 0044     Place sequential compression device  Until discontinued         01/21/23 0044                Discharge Planning   ROMULO:      Code Status: DNR   Is the patient medically ready for discharge?: No    Reason for patient still in hospital (select all that apply): Patient trending condition, Laboratory test, Treatment, Imaging and Pending disposition  Discharge Plan A: Hospice/home                  Trevin Garcia MD  Department of Hospital Medicine   [facility] - Neurosurgery ([facility])

## 2023-01-22 NOTE — PLAN OF CARE
Marlon Steinberg - Neurosurgery ([facility])  Initial Discharge Assessment       Primary Care Provider: Ashley Richards MD    Admission Diagnosis: Aphasia [R47.01]  Lactic acidosis [E87.20]  Seizure [R56.9]  Altered mental status [R41.82]  Functional quadriplegia [R53.2]  Ventriculo-peritoneal shunt status [Z98.2]  Essential hypertension [I10]  Chest pain [R07.9]  Goals of care, counseling/discussion [Z71.89]  Breakthrough seizure [G40.919]  Severe sepsis [A41.9, R65.20]  Dementia associated with other underlying disease without behavioral disturbance [F02.80]  Acute on chronic anemia [D64.9]  Sepsis, due to unspecified organism, unspecified whether acute organ dysfunction present [A41.9]    Admission Date: 1/20/2023  Expected Discharge Date:     Discharge Barriers Identified: None    Payor: HU[employer] MANAGED MEDICARE / Plan: HU[employer] MEDICARE HMO / Product Type: Capitation /     Extended Emergency Contact Information  Primary Emergency Contact: Tete Soria   [place]  Mobile Phone: [phone]  Relation: Daughter  Secondary Emergency Contact: Colleen Lopez   [place]  Mobile Phone: [phone]  Relation: Daughter    Discharge Plan A: Hospice/home  Discharge Plan B: Inpatient Hospice      [id number] [address] AT [facility] [address]  [address]  [facility] [address]  Phone: [phone] Fax: [phone]    [facility] - [facility] [address]  [address] [facility] [address]  Phone: [phone] Fax: [phone]    SW attempted to meet with patient at bedside without success.  SW spoke to patient's daughter Tete Soria [phone] to complete assessment.  Per Tete, patient is active with [facility] and will possibly discharge home with [facility].      Initial Assessment (most recent)       Adult Discharge Assessment - 01/22/23 1336          Discharge Assessment     Assessment Type Discharge Planning Assessment     Confirmed/corrected address, phone number and insurance Yes     Confirmed Demographics Correct on Facesheet     Source of Information family     Communicated ROMULO with patient/caregiver Date not available/Unable to determine     People in Home child(candi), adult     Facility Arrived From: home     Do you expect to return to your current living situation? Yes     Do you have help at home or someone to help you manage your care at home? Yes     Who are your caregiver(s) and their phone number(s)? daughter     Prior to hospitilization cognitive status: Unable to Assess     Current cognitive status: Unable to Assess     Patient currently being followed by outpatient case management? No     Do you currently have service(s) that help you manage your care at home? Yes     Name and Contact number of agency [facility]     Is the pt/caregiver preference to resume services with current agency Yes     Do you take prescription medications? Yes     Do you have prescription coverage? Yes     Do you have any problems affording any of your prescribed medications? No     Is the patient taking medications as prescribed? yes     Who is going to help you get home at discharge? daughter     How do you get to doctors appointments? other (see comments)   ambulance    Are you on dialysis? No     Do you take coumadin? No     Discharge Plan A Hospice/home     Discharge Plan B Inpatient Hospice     DME Needed Upon Discharge  none     Discharge Plan discussed with: Adult children     Discharge Barriers Identified None

## 2023-01-22 NOTE — PLAN OF CARE
Problem: Adult Inpatient Plan of Care  Goal: Plan of Care Review  Outcome: Ongoing, Progressing  Goal: Patient-Specific Goal (Individualized)  Outcome: Ongoing, Progressing  Goal: Absence of Hospital-Acquired Illness or Injury  Outcome: Ongoing, Progressing  Goal: Optimal Comfort and Wellbeing  Outcome: Ongoing, Progressing     Problem: Coping Ineffective  Goal: Effective Coping  Outcome: Ongoing, Progressing  Poc and meds reviewed with son.All concerns addressed.

## 2023-01-22 NOTE — ASSESSMENT & PLAN NOTE
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  - Trend iron panel  - Trend CBC

## 2023-01-22 NOTE — ASSESSMENT & PLAN NOTE
- Received 30cc/kg bolus int the ED  - Recived 1unit PRBC on admission for Hgb 6.8  - Lactate 8.4 > >21 > 4.1 > 2.4 > 3.5 > 1.9   - VBG shows compensated metabolic AG acidosis   - No liver dysfunction on presentation - no obvious home medicines that might cause severe rise in lactic acid   - Resovled

## 2023-01-22 NOTE — SUBJECTIVE & OBJECTIVE
Interval History:   Lactate up to 3.5 from 2.4 yesterday evening, and patient given small fluid bolus. This morning lactate 1.9. Patient eating breakfast with no complaints.      Review of Systems   Unable to perform ROS: Dementia   Objective:     Vital Signs (Most Recent):  Temp: 98 °F (36.7 °C) (01/22/23 1133)  Pulse: 85 (01/22/23 1143)  Resp: 16 (01/22/23 1133)  BP: (!) 119/59 (01/22/23 1133)  SpO2: 98 % (01/22/23 1133)   Vital Signs (24h Range):  Temp:  [97.3 °F (36.3 °C)-98.6 °F (37 °C)] 98 °F (36.7 °C)  Pulse:  [] 85  Resp:  [15-17] 16  SpO2:  [93 %-98 %] 98 %  BP: (119-156)/(58-71) 119/59     Weight: 86.2 kg (190 lb)  Body mass index is 28.89 kg/m².    Intake/Output Summary (Last 24 hours) at 1/22/2023 1510  Last data filed at 1/22/2023 0800  Gross per 24 hour   Intake 450 ml   Output 250 ml   Net 200 ml      Physical Exam  Constitutional:       Appearance: Normal appearance. He is normal weight.   HENT:      Head: Normocephalic and atraumatic.      Mouth/Throat:      Mouth: Mucous membranes are moist.   Eyes:      Extraocular Movements: Extraocular movements intact.      Conjunctiva/sclera: Conjunctivae normal.   Cardiovascular:      Rate and Rhythm: Normal rate and regular rhythm.      Heart sounds: No murmur heard.  Pulmonary:      Effort: Pulmonary effort is normal. No respiratory distress.      Breath sounds: Normal breath sounds. No wheezing or rales.   Abdominal:      General: Abdomen is flat. There is no distension.      Palpations: Abdomen is soft.      Tenderness: There is no abdominal tenderness. There is no guarding.   Musculoskeletal:         General: No swelling or tenderness.   Skin:     Findings: No rash.   Neurological:      Mental Status: He is alert. Mental status is at baseline.   Psychiatric:         Mood and Affect: Mood normal.         Behavior: Behavior normal.       Significant Labs: CBC:   Recent Labs   Lab 01/20/23  1740 01/21/23  0740 01/22/23  1009   WBC 16.53* 20.16* 14.20*    HGB 6.7* 8.4* 8.8*   HCT 26.5* 29.4* 30.5*    278 192     CMP:   Recent Labs   Lab 01/20/23  1740 01/21/23  0740    141   K 4.2 3.6   * 110   CO2 14* 20*   * 113*   BUN 16 19   CREATININE 1.2 1.4   CALCIUM 9.3 9.4   PROT 7.6 6.8   ALBUMIN 3.3* 3.2*   BILITOT 0.5 1.3*   ALKPHOS 138* 110   AST 26 15   ALT 6* 6*   ANIONGAP 18* 11       Significant Imaging: I have reviewed all pertinent imaging results/findings within the past 24 hours.

## 2023-01-22 NOTE — ASSESSMENT & PLAN NOTE
- Seizure precautions  - Continue Keppra via IV route  - Follow up EEG r/o non-convulsive status or uncontrolled epileptiform activity requiring medication changes.   - Follow up Keppra level

## 2023-01-22 NOTE — NURSING
Patient received in room via a stretcher, AAO only to person.No signs of distress or discomfort noted.Bed is in low position, bed rails up X3, call light within reach and bed alarm on.Son was at the bedside

## 2023-01-22 NOTE — PLAN OF CARE
Problem: Adult Inpatient Plan of Care  Goal: Plan of Care Review  Outcome: Ongoing, Progressing  Goal: Patient-Specific Goal (Individualized)  Outcome: Ongoing, Progressing  Goal: Absence of Hospital-Acquired Illness or Injury  Outcome: Ongoing, Progressing  Goal: Optimal Comfort and Wellbeing  Outcome: Ongoing, Progressing     Problem: Coping Ineffective  Goal: Effective Coping  Outcome: Ongoing, Progressing

## 2023-01-22 NOTE — ASSESSMENT & PLAN NOTE
This patient does not have evidence of infective focus  My overall impression is severe sepsis. Vital signs were reviewed and noted in progress note.  Antibiotics given-   Antibiotics (From admission, onward)    Start     Stop Route Frequency Ordered    01/21/23 0830  piperacillin-tazobactam (ZOSYN) 4.5 g in dextrose 5 % in water (D5W) 5 % 100 mL IVPB (MB+)         -- IV Every 8 hours (non-standard times) 01/21/23 0724        Cultures were taken-   Microbiology Results (last 7 days)     Procedure Component Value Units Date/Time    Blood culture x two cultures. Draw prior to antibiotics. [[id number]] Collected: 01/20/23 1742    Order Status: Completed Specimen: Blood from Peripheral, Lower Arm, Left Updated: 01/21/23 2013     Blood Culture, Routine No Growth to date      No Growth to date    Narrative:      Aerobic and anaerobic    Blood culture x two cultures. Draw prior to antibiotics. [[id number]] Collected: 01/20/23 1742    Order Status: Completed Specimen: Blood from Peripheral, Lower Arm, Left Updated: 01/21/23 2013     Blood Culture, Routine No Growth to date      No Growth to date    Narrative:      Aerobic and anaerobic    Urine culture [[id number]] Collected: 01/21/23 1823    Order Status: No result Specimen: Urine Updated: 01/21/23 1955        Latest lactate reviewed, they are-  Recent Labs   Lab 01/21/23  1232 01/21/23  1929 01/22/23  1009   LACTATE 2.4* 3.5* 1.9       Organ dysfunction indicated by Encephalopathy   Source- Unknown source  Source control Achieved by- Empiric Antibiotics    - Follow up urine culture  - Blood culture NGTD  - Continue Zosyn for aspiration pna coverage

## 2023-01-22 NOTE — ASSESSMENT & PLAN NOTE
Advance Care Planning     Today a meeting took place: bedside    Patient Participation: Patient is unable to participate     Attendees (Name and  Relationship to patient): Health care power of : jesus NICHOLSON    Staff attendees (Name and  Role): Trevin Garcia MD - admitting physician\  ACP Conversation (General): Understanding of current condition patient with critical lactic acidosis, concerns for sepsis and acute anemia, concern that patient may not survive  such a critical illness. see HPI for details     Code Status: DNR; status confirmed/order placed in chart    ACP Documents: None    Goals of care: The family and healthcare power of   endorses that what is most important right now is to focus on symptom/pain control and improvement in condition but with limits to invasive therapies    Accordingly, we have decided that the best plan to meet the patient's goals includes continuing with treatment      Recommendations/  Follow-up tasks: Other (specify below) f/u on palliative care consultation - trend of labs and clinical condition - if patient with deterioration would advocate that transition to comfort focused care happen sooner given patient's age-comorbid conditions and existing hospice enrollment prior to admission      Length of ACP   conversation in minutes: 32 minutes

## 2023-01-23 PROBLEM — Z51.5 PALLIATIVE CARE ENCOUNTER: Status: ACTIVE | Noted: 2023-01-23

## 2023-01-23 PROBLEM — G40.919 BREAKTHROUGH SEIZURE: Status: ACTIVE | Noted: 2020-02-04

## 2023-01-23 PROBLEM — Z71.89 ADVANCED CARE PLANNING/COUNSELING DISCUSSION: Status: ACTIVE | Noted: 2023-01-23

## 2023-01-23 LAB
ALBUMIN SERPL BCP-MCNC: 2.6 G/DL (ref 3.5–5.2)
ALP SERPL-CCNC: 106 U/L (ref 55–135)
ALT SERPL W/O P-5'-P-CCNC: 6 U/L (ref 10–44)
ANION GAP SERPL CALC-SCNC: 10 MMOL/L (ref 8–16)
AST SERPL-CCNC: 15 U/L (ref 10–40)
BACTERIA UR CULT: NO GROWTH
BASOPHILS # BLD AUTO: 0.04 K/UL (ref 0–0.2)
BASOPHILS NFR BLD: 0.3 % (ref 0–1.9)
BILIRUB SERPL-MCNC: 0.6 MG/DL (ref 0.1–1)
BUN SERPL-MCNC: 15 MG/DL (ref 10–30)
CALCIUM SERPL-MCNC: 8.6 MG/DL (ref 8.7–10.5)
CHLORIDE SERPL-SCNC: 112 MMOL/L (ref 95–110)
CO2 SERPL-SCNC: 22 MMOL/L (ref 23–29)
CREAT SERPL-MCNC: 0.9 MG/DL (ref 0.5–1.4)
DIFFERENTIAL METHOD: ABNORMAL
EOSINOPHIL # BLD AUTO: 0.5 K/UL (ref 0–0.5)
EOSINOPHIL NFR BLD: 3.2 % (ref 0–8)
ERYTHROCYTE [DISTWIDTH] IN BLOOD BY AUTOMATED COUNT: 25 % (ref 11.5–14.5)
EST. GFR  (NO RACE VARIABLE): >60 ML/MIN/1.73 M^2
FERRITIN SERPL-MCNC: 31 NG/ML (ref 20–300)
GLUCOSE SERPL-MCNC: 86 MG/DL (ref 70–110)
HCT VFR BLD AUTO: 26.5 % (ref 40–54)
HGB BLD-MCNC: 7.5 G/DL (ref 14–18)
IMM GRANULOCYTES # BLD AUTO: 0.08 K/UL (ref 0–0.04)
IMM GRANULOCYTES NFR BLD AUTO: 0.6 % (ref 0–0.5)
IRON SERPL-MCNC: 12 UG/DL (ref 45–160)
LEVETIRACETAM SERPL-MCNC: 27.8 UG/ML (ref 3–60)
LYMPHOCYTES # BLD AUTO: 1.5 K/UL (ref 1–4.8)
LYMPHOCYTES NFR BLD: 10.5 % (ref 18–48)
MAGNESIUM SERPL-MCNC: 1.9 MG/DL (ref 1.6–2.6)
MCH RBC QN AUTO: 19.2 PG (ref 27–31)
MCHC RBC AUTO-ENTMCNC: 28.3 G/DL (ref 32–36)
MCV RBC AUTO: 68 FL (ref 82–98)
MONOCYTES # BLD AUTO: 0.6 K/UL (ref 0.3–1)
MONOCYTES NFR BLD: 4.6 % (ref 4–15)
NEUTROPHILS # BLD AUTO: 11.2 K/UL (ref 1.8–7.7)
NEUTROPHILS NFR BLD: 80.8 % (ref 38–73)
NRBC BLD-RTO: 0 /100 WBC
PHOSPHATE SERPL-MCNC: 2.2 MG/DL (ref 2.7–4.5)
PLATELET # BLD AUTO: 239 K/UL (ref 150–450)
PMV BLD AUTO: ABNORMAL FL (ref 9.2–12.9)
POTASSIUM SERPL-SCNC: 3.3 MMOL/L (ref 3.5–5.1)
PROT SERPL-MCNC: 6.2 G/DL (ref 6–8.4)
RBC # BLD AUTO: 3.91 M/UL (ref 4.6–6.2)
SATURATED IRON: 4 % (ref 20–50)
SODIUM SERPL-SCNC: 144 MMOL/L (ref 136–145)
TOTAL IRON BINDING CAPACITY: 299 UG/DL (ref 250–450)
TRANSFERRIN SERPL-MCNC: 202 MG/DL (ref 200–375)
WBC # BLD AUTO: 13.85 K/UL (ref 3.9–12.7)

## 2023-01-23 PROCEDURE — 63600175 PHARM REV CODE 636 W HCPCS: Mod: HCNC | Performed by: STUDENT IN AN ORGANIZED HEALTH CARE EDUCATION/TRAINING PROGRAM

## 2023-01-23 PROCEDURE — 84100 ASSAY OF PHOSPHORUS: CPT | Mod: HCNC | Performed by: STUDENT IN AN ORGANIZED HEALTH CARE EDUCATION/TRAINING PROGRAM

## 2023-01-23 PROCEDURE — 83735 ASSAY OF MAGNESIUM: CPT | Mod: HCNC | Performed by: STUDENT IN AN ORGANIZED HEALTH CARE EDUCATION/TRAINING PROGRAM

## 2023-01-23 PROCEDURE — 85025 COMPLETE CBC W/AUTO DIFF WBC: CPT | Mod: HCNC | Performed by: STUDENT IN AN ORGANIZED HEALTH CARE EDUCATION/TRAINING PROGRAM

## 2023-01-23 PROCEDURE — 99233 SBSQ HOSP IP/OBS HIGH 50: CPT | Mod: HCNC,,, | Performed by: STUDENT IN AN ORGANIZED HEALTH CARE EDUCATION/TRAINING PROGRAM

## 2023-01-23 PROCEDURE — 36415 COLL VENOUS BLD VENIPUNCTURE: CPT | Mod: HCNC | Performed by: STUDENT IN AN ORGANIZED HEALTH CARE EDUCATION/TRAINING PROGRAM

## 2023-01-23 PROCEDURE — 25000003 PHARM REV CODE 250: Mod: HCNC | Performed by: STUDENT IN AN ORGANIZED HEALTH CARE EDUCATION/TRAINING PROGRAM

## 2023-01-23 PROCEDURE — 80053 COMPREHEN METABOLIC PANEL: CPT | Mod: HCNC | Performed by: STUDENT IN AN ORGANIZED HEALTH CARE EDUCATION/TRAINING PROGRAM

## 2023-01-23 PROCEDURE — 99223 1ST HOSP IP/OBS HIGH 75: CPT | Mod: HCNC,,, | Performed by: CLINICAL NURSE SPECIALIST

## 2023-01-23 PROCEDURE — 99497 ADVNCD CARE PLAN 30 MIN: CPT | Mod: HCNC,25,, | Performed by: CLINICAL NURSE SPECIALIST

## 2023-01-23 PROCEDURE — 99223 PR INITIAL HOSPITAL CARE,LEVL III: ICD-10-PCS | Mod: HCNC,,, | Performed by: CLINICAL NURSE SPECIALIST

## 2023-01-23 PROCEDURE — 11000001 HC ACUTE MED/SURG PRIVATE ROOM: Mod: HCNC

## 2023-01-23 PROCEDURE — 99497 PR ADVNCD CARE PLAN 30 MIN: ICD-10-PCS | Mod: HCNC,25,, | Performed by: CLINICAL NURSE SPECIALIST

## 2023-01-23 PROCEDURE — 84466 ASSAY OF TRANSFERRIN: CPT | Mod: HCNC | Performed by: STUDENT IN AN ORGANIZED HEALTH CARE EDUCATION/TRAINING PROGRAM

## 2023-01-23 PROCEDURE — 25000003 PHARM REV CODE 250: Mod: HCNC | Performed by: HOSPITALIST

## 2023-01-23 PROCEDURE — 99233 PR SUBSEQUENT HOSPITAL CARE,LEVL III: ICD-10-PCS | Mod: HCNC,,, | Performed by: STUDENT IN AN ORGANIZED HEALTH CARE EDUCATION/TRAINING PROGRAM

## 2023-01-23 PROCEDURE — 82728 ASSAY OF FERRITIN: CPT | Mod: HCNC | Performed by: STUDENT IN AN ORGANIZED HEALTH CARE EDUCATION/TRAINING PROGRAM

## 2023-01-23 RX ORDER — POTASSIUM CHLORIDE 20 MEQ/1
40 TABLET, EXTENDED RELEASE ORAL ONCE
Status: COMPLETED | OUTPATIENT
Start: 2023-01-23 | End: 2023-01-23

## 2023-01-23 RX ORDER — LEVETIRACETAM 500 MG/1
1000 TABLET ORAL 2 TIMES DAILY
Status: DISCONTINUED | OUTPATIENT
Start: 2023-01-23 | End: 2023-01-24

## 2023-01-23 RX ADMIN — LEVETIRACETAM 1000 MG: 100 INJECTION, SOLUTION INTRAVENOUS at 08:01

## 2023-01-23 RX ADMIN — PIPERACILLIN SODIUM AND TAZOBACTAM SODIUM 4.5 G: 4; .5 INJECTION, POWDER, LYOPHILIZED, FOR SOLUTION INTRAVENOUS at 04:01

## 2023-01-23 RX ADMIN — POTASSIUM CHLORIDE 40 MEQ: 1500 TABLET, EXTENDED RELEASE ORAL at 08:01

## 2023-01-23 RX ADMIN — SENNOSIDES AND DOCUSATE SODIUM 1 TABLET: 50; 8.6 TABLET ORAL at 08:01

## 2023-01-23 RX ADMIN — PIPERACILLIN SODIUM AND TAZOBACTAM SODIUM 4.5 G: 4; .5 INJECTION, POWDER, LYOPHILIZED, FOR SOLUTION INTRAVENOUS at 12:01

## 2023-01-23 RX ADMIN — LEVETIRACETAM 1000 MG: 500 TABLET, FILM COATED ORAL at 08:01

## 2023-01-23 RX ADMIN — PIPERACILLIN SODIUM AND TAZOBACTAM SODIUM 4.5 G: 4; .5 INJECTION, POWDER, LYOPHILIZED, FOR SOLUTION INTRAVENOUS at 07:01

## 2023-01-23 RX ADMIN — POLYETHYLENE GLYCOL 3350 17 G: 17 POWDER, FOR SOLUTION ORAL at 08:01

## 2023-01-23 NOTE — ASSESSMENT & PLAN NOTE
Palliative medicine consulted for end of life goals of care for Mr. Skinner a 89 yo gentleman with history of dementia, seizure disorder and debility.  Admitted to hospital medicine with concerns for increased seizure activity and sepsis.  At time of this encounter is awake, alert and oriented to person and place.  He recognizes daughter at bedside.  Appears comfortable     Advance Care Planning     - ACP documents received   - HPOA  Listed is Colleen Lopez - Ms Lopez  with dementia   Virgil skinner [phone]  And Tete Soria [phone]  Surrogate decision makers as indicated on HPOA documents   - DNR per primary team - consistent with [facility] on file     Goals of Care   - met with john Epstein at bedside.  Lengthy discussion regarding goals of care   - family with understanding current clinical condition remains appropriate for hospice care   - discussed inpatient hospice criteria and concern of pal med that he will not meet this critieria as evidenced by no active seizures and no symptom management burden.  - Tete states the family is amenable to resuming hospice care with [facility].   - Family continues to make decisions about which home the patient will return to.     Symptom Management   Pain   - no c/o pain and no behavior signs of pain   Dyspnea   - no shortness of breath noted  -room air oxygen saturation 92 - 97%    Seizures/Severe Sepsis/Lactic Acidosis/ demential without behavioral disturbance  - managed per primary team and specialty consultants   - pal med following for goals of care   - see above     Plan/Recommendation  - continue current plan of care - goals of care established   - family decision to return to home with hospice care   - / to consult with [facility] - resume services   - emotional support provided

## 2023-01-23 NOTE — SUBJECTIVE & OBJECTIVE
"Interval History:     Past Medical History:   Diagnosis Date    JONATAN (acute kidney injury) 06/2016    Bacteremia due to Gram-positive bacteria 3/24/2017    Antibiotics per ID recommendations; vancomycin x 14 days.     BPH (benign prostatic hypertrophy) with urinary obstruction 6/16/2016    Cystoid macular edema, left eye 8/4/2016    Essential hypertension 6/16/2016    Essential hypertension     Generalized anxiety disorder 6/16/2016    Glaucoma     Irregular heartbeat     Macular degeneration     Microcytic anemia 4/10/2017    Mixed hyperlipidemia 6/16/2016    Nonexudative age-related macular degeneration, bilateral, intermediate dry stage 8/4/2016    Normal pressure hydrocephalus     NPH (normal pressure hydrocephalus) 2/6/2017    Shunt placed 2/6/17    Urinary retention 06/2016    Ventriculo-peritoneal shunt status 2/6/2017       Past Surgical History:   Procedure Laterality Date    ESOPHAGOGASTRODUODENOSCOPY N/A 2/2/2022    Procedure: EGD (ESOPHAGOGASTRODUODENOSCOPY);  Surgeon: Atif Farrell MD;  Location: [facility];  Service: Endoscopy;  Laterality: N/A;    TRANSURETHRAL RESECTION OF PROSTATE  08/31/2016       Review of patient's allergies indicates:   Allergen Reactions    Aspirin Swelling     States, "makes me sick." pt does not elaborate.        Medications:  Continuous Infusions:  Scheduled Meds:   levETIRAcetam  1,000 mg Oral BID    piperacillin-tazobactam (ZOSYN) IVPB  4.5 g Intravenous Q8H    polyethylene glycol  17 g Oral Daily    senna-docusate 8.6-50 mg  1 tablet Oral BID     PRN Meds:sodium chloride, acetaminophen, bisacodyL, melatonin, naloxone, prochlorperazine, sodium chloride 0.9%, sodium chloride 0.9%    Family History       Problem Relation (Age of Onset)    Cancer Daughter (55), Daughter (54)    Heart disease Brother    No Known Problems Son          Tobacco Use    Smoking status: Former     Packs/day: 1.00     Years: 18.00     Pack years: 18.00     Types: Cigarettes     Quit date: 1950     " Years since quittin.1    Smokeless tobacco: Never   Substance and Sexual Activity    Alcohol use: No    Drug use: No    Sexual activity: Not Currently       Review of Systems   Unable to perform ROS: Dementia   Objective:     Vital Signs (Most Recent):  Temp: 98.6 °F (37 °C) (23 1130)  Pulse: 79 (23 1130)  Resp: 18 (23 1130)  BP: (!) 163/68 (23 1130)  SpO2: 96 % (23 1130)   Vital Signs (24h Range):  Temp:  [97.9 °F (36.6 °C)-98.8 °F (37.1 °C)] 98.6 °F (37 °C)  Pulse:  [18-92] 79  Resp:  [16-18] 18  SpO2:  [92 %-97 %] 96 %  BP: (135-171)/(64-74) 163/68     Weight: 86.2 kg (190 lb)  Body mass index is 28.89 kg/m².    Physical Exam  Vitals and nursing note reviewed.   Constitutional:       Appearance: He is ill-appearing.   HENT:      Head: Normocephalic and atraumatic.      Right Ear: External ear normal.      Left Ear: External ear normal.      Nose: Nose normal.      Mouth/Throat:      Mouth: Mucous membranes are moist.   Eyes:      Conjunctiva/sclera: Conjunctivae normal.      Pupils: Pupils are equal, round, and reactive to light.   Cardiovascular:      Rate and Rhythm: Normal rate and regular rhythm.      Pulses: Normal pulses.      Heart sounds: Normal heart sounds.   Pulmonary:      Effort: No respiratory distress.      Breath sounds: Normal breath sounds.   Abdominal:      General: Abdomen is flat. Bowel sounds are normal.   Genitourinary:     Comments: External urinary device - adequate urine output   Musculoskeletal:         General: Normal range of motion.      Cervical back: Normal range of motion and neck supple.      Right lower leg: No edema.      Left lower leg: No edema.   Skin:     General: Skin is warm and dry.   Neurological:      Mental Status: He is alert. Mental status is at baseline.      Motor: Weakness present.   Psychiatric:         Mood and Affect: Mood normal.         Behavior: Behavior normal.      Comments: Dementia - not able to assess judgement and  mood        Review of Symptoms      Symptom Assessment (ESAS 0-10 Scale)  Pain:  0  Dyspnea:  0  Anxiety:  0  Nausea:  0  Depression:  0  Anorexia:  0  Fatigue:  0  Insomnia:  0  Restlessness:  0  Agitation:  0     CAM / Delirium:  Positive  Constipation:  Negative  Diarrhea:  Negative      Bowel Management Plan (BMP):  Yes      Pain Assessment:  OME in 24 hours:  0  Location(s):      Modified Elver Scale:  0    Performance Status:  40    Living Arrangements:  Lives with family    Psychosocial/Cultural:   See Palliative Psychosocial Note: No  , three adult children,  Colleen Lopez  listed HPOA, on documents  however she has dementia and is unable to make medical decisions. Currently lives with daughter Colleen - Adult children Tete and Virgil also listed on HPOA are very involved in care.   Currently receiving home hospice services    **Primary  to Follow**  Palliative Care  Consult: Yes    Spiritual:  F - Veean and Belief:  Scientologist -   I - Importance:  Daughter reports veena plays role in decision making   A - Address in Care:  [place] to  visits       Advance Care Planning   Advance Directives:   Living Will: Yes        Copy on chart: Yes        Oral Declaration: No    [place]: Yes    Do Not Resuscitate Status: Yes    Medical Power of : Yes        Oral Declaration: No    Agent's Name:  Colleen Lopez   Agent's Contact Number:  [phone]    Decision Making:  Family answered questions and Patient unable to communicate due to disease severity/cognitive impairment  Goals of Care: The healthcare power of   endorses that what is most important right now is to focus on avoiding the hospital, symptom/pain control, quality of life, even if it means sacrificing a little time, improvement in condition but with limits to invasive therapies, and comfort and QOL     Accordingly, we have decided that the best plan to meet the patient's goals includes enrolling in hospice  care         Significant Labs: All pertinent labs within the past 24 hours have been reviewed.  CBC:   Recent Labs   Lab 01/23/23 0427   WBC 13.85*   HGB 7.5*   HCT 26.5*   MCV 68*        BMP:  Recent Labs   Lab 01/23/23 0427   GLU 86      K 3.3*   *   CO2 22*   BUN 15   CREATININE 0.9   CALCIUM 8.6*   MG 1.9     LFT:  Lab Results   Component Value Date    AST 15 01/23/2023    ALKPHOS 106 01/23/2023    BILITOT 0.6 01/23/2023     Albumin:   Albumin   Date Value Ref Range Status   01/23/2023 2.6 (L) 3.5 - 5.2 g/dL Final     Protein:   Total Protein   Date Value Ref Range Status   01/23/2023 6.2 6.0 - 8.4 g/dL Final     Lactic acid:   Lab Results   Component Value Date    LACTATE 1.9 01/22/2023    LACTATE 3.5 (HH) 01/21/2023       Significant Imaging: I have reviewed all pertinent imaging results/findings within the past 24 hours.

## 2023-01-23 NOTE — HPI
"HPI obtained from chart review:     Mr. Neves  89-year-old gentleman with PMH of: NPH status post  shunt, dementia, seizure disorder, functional quadriplegia with severe debility. Currently receiving home hospice service.  He presented to the ED with  concerns of breakthrough seizure.      Patient with Aphasia, can follow commands with repeated prompting cannot give detailed answers to questions.  Family provided history reporting Mr. Neves has had ore frequent seizures in recent days, described as staring spells followed by period of unresponsiveness or postictal.  Family reports this occurred twice today and was followed by vomiting.   The emesis was described as yellow, nonbloody does not appear as coffee-grounds.    Med reconciliation -  Keppra 1 gram BID   not on it a previously  in the [facility] record prior to hospice enrollment.      ED labs notable for initial lactic acid of 8.1 and orion to greater than 12 on repeat check,.  Symptoms also included hypothermia and hypotension.  Sepsis protocol  initiated:   30 cc/kg fluid bolus empiric antibiotics as, loaded with IV Keppra 1 g. In no acute respiratory distress and not requiring supplemental oxygen, hemodynamically stable after IV fluids given.    As per chart review: ACP conversation held with daughter and son - "lactic acid level demonstrated rising values, exact etiology is unclear could be due to seizures or more insidious process that might be the cause of his worsening anemia, hemoglobin is less than 7. In discussion with family aggressive treatment likely would require ICU level of care placement of central line for appropriate resuscitation with fluids blood and hemodynamic monitoring.  Further CT imaging to evaluate for source of rising lactic acid also conducting further seizure workup a day.  Family described to emergency room physician that they would not want resuscitative measures applied and or artificial feeding, during my discussion " "describing potential ICU level of care ordered require testing for workup, Tete states that patient likely would not want to go through all those types of measures.  So described that we will continue anti seizure medicine antibiotics fluids intermittent lab checks in order to try to reverse any acute issue but if patient's condition continues to clinically worsen personal recommendation is to transition to fully comfort focused care where inpatient hospice might be most appropriate setting versus return to home hospice"     Pal med consulted for end of life goals of care and hospice discussion       "

## 2023-01-23 NOTE — PROCEDURES
Date of service  1/22/2023    Introduction  Electroencephalographic (EEG) is recorded with electrodes placed according to the International 10-20 placement system.  Thirty two (32) channels  of digital signal (sampling rate of 512/sec), including T1 and T2, were simultaneously recorded from the scalp and may include EKG, EMG, and/or eye monitors.  Recording band pass was 0.1 to 512 Hz.  Digital video recording of the patient is simultaneously recorded with the EEG.  The patient is instructed to report clinical symptoms which may occur during the recording session.  EEG and video recording are stored and archived in digital format.  Activation procedures, which include photic stimulation, hyperventilation and instructing patients to perform simple tasks, are done in selected patients.    The EEG is displayed on a monitor screen and can be reviewed using different montages.  Computer-assisted analysis is employed to detect spike and electrographic seizure activity.   The entire record is submitted for computer analysis.  The entire recording is visually reviewed, and the times identified by computer analysis as being spikes or seizures are reviewed again.      Compressed spectral analysis (CSA) is also performed on the activity recorded from each individual channel.  This is displayed as a power display of frequencies from 0 to 30 Hz over time.   The CSA is reviewed looking for asymmetries in power between homologous areas of the scalp, then compared with the original EEG recording.      [employer] software was also utilized in the review of this study. This software suite analyzes the EEG recording in multiple domains. Coherence and rhythmicity are computed to identify EEG sections which may contain organized seizures. Each channel undergoes analysis to detect the presence of spike and sharp waves which have special and morphological characteristics of epileptic activity. The routine EEG recording is converted from  special into frequency domain. This is then displayed comparing homologous areas to identify areas of significant asymmetry. Algorithm to identify non-cortically generated artifact is used to separate artifact from the EEG.    Recording Times  Start on 1/22/23 at 1023  Stop on 1/22/23 at 1313  A total of 2.75 hours of EEG/video telemetry was recorded.    Findings  The patient's background consists of diffuse slowing, with predominantly theta range frequencies appreciated.  There is no focality to the slowing.  There are no focal, lateralized, or epileptiform transients.  No electrographic seizures are seen.    Normal sleep architecture is not noted.    Hyperventilation and photic stimulation were not performed.     EKG demonstrates sinus rhythm.    Interpretation  This is an abnormal LTM-EEG due to the presence of diffuse slowing as described above.  These findings are indicative of a mild to moderate encephalopathy, though they are not specific for a particular underlying etiology.

## 2023-01-23 NOTE — CONSULTS
[facility] - Neurosurgery ([facility])  Palliative Medicine  Consult Note    Patient Name: Atif Skinner  MRN: [id number]  Admission Date: 1/20/2023  Hospital Length of Stay: 3 days  Code Status: DNR   Attending Provider: Trevin Garcia MD  Consulting Provider: SOPHIA Sequeira  Primary Care Physician: Ashley Richards MD  Principal Problem:Seizure    Patient information was obtained from relative(s), past medical records and primary team.      Inpatient consult to Palliative Care  Consult performed by: SOPHIA Cohen  Consult ordered by: Jose Kaye MD  Reason for consult: acp, goc      Assessment/Plan:     Palliative care encounter  Palliative medicine consulted for end of life goals of care for Mr. Skinner a 89 yo gentleman with history of dementia, seizure disorder and debility.  Admitted to hospital medicine with concerns for increased seizure activity and sepsis.  At time of this encounter is awake, alert and oriented to person and place.  He recognizes daughter at bedside.  Appears comfortable     Advance Care Planning     - ACP documents received   - HPOA  Listed is Colleen Lopez - Ms Lopez  with dementia   Virgil skinner [phone]  And Tete Soria [phone]  Surrogate decision makers as indicated on HPOA documents   - DNR per primary team - consistent with [place] on file     Goals of Care   - met with john Epstein at bedside.  Lengthy discussion regarding goals of care   - family with understanding current clinical condition remains appropriate for hospice care   - discussed inpatient hospice criteria and concern of pal med that he will not meet this critieria as evidenced by no active seizures and no symptom management burden.  - Tete states the family is amenable to resuming hospice care with [facility].   - Family continues to make decisions about which home the patient will return to.     Symptom Management   Pain   - no c/o pain and no behavior signs of pain   Dyspnea    - no shortness of breath noted  -room air oxygen saturation 92 - 97%    Seizures/Severe Sepsis/Lactic Acidosis/ demential without behavioral disturbance  - managed per primary team and specialty consultants   - pal med following for goals of care   - see above     Plan/Recommendation  - continue current plan of care - goals of care established   - family decision to return to home with hospice care   - / to consult with [facility] - resume services   - emotional support provided         Thank you for your consult. I will sign off. Please contact us if you have any additional questions.    Subjective:     HPI:   HPI obtained from chart review:     Mr. Neves  89-year-old gentleman with PMH of: NPH status post  shunt, dementia, seizure disorder, functional quadriplegia with severe debility. Currently receiving home hospice service.  He presented to the ED with  concerns of breakthrough seizure.      Patient with Aphasia, can follow commands with repeated prompting cannot give detailed answers to questions.  Family provided history reporting Mr. Neves has had ore frequent seizures in recent days, described as staring spells followed by period of unresponsiveness or postictal.  Family reports this occurred twice today and was followed by vomiting.   The emesis was described as yellow, nonbloody does not appear as coffee-grounds.    Med reconciliation -  Keppra 1 gram BID   not on it a previously  in the [facility] record prior to hospice enrollment.      ED labs notable for initial lactic acid of 8.1 and orion to greater than 12 on repeat check,.  Symptoms also included hypothermia and hypotension.  Sepsis protocol  initiated:   30 cc/kg fluid bolus empiric antibiotics as, loaded with IV Keppra 1 g. In no acute respiratory distress and not requiring supplemental oxygen, hemodynamically stable after IV fluids given.    As per chart review: ACP conversation held with daughter and son -  ""lactic acid level demonstrated rising values, exact etiology is unclear could be due to seizures or more insidious process that might be the cause of his worsening anemia, hemoglobin is less than 7. In discussion with family aggressive treatment likely would require ICU level of care placement of central line for appropriate resuscitation with fluids blood and hemodynamic monitoring.  Further CT imaging to evaluate for source of rising lactic acid also conducting further seizure workup a day.  Family described to emergency room physician that they would not want resuscitative measures applied and or artificial feeding, during my discussion describing potential ICU level of care ordered require testing for workup, Tete states that patient likely would not want to go through all those types of measures.  So described that we will continue anti seizure medicine antibiotics fluids intermittent lab checks in order to try to reverse any acute issue but if patient's condition continues to clinically worsen personal recommendation is to transition to fully comfort focused care where inpatient hospice might be most appropriate setting versus return to home hospice"     Pal med consulted for end of life goals of care and hospice discussion           Hospital Course:  No notes on file    Interval History:     Past Medical History:   Diagnosis Date    JONATAN (acute kidney injury) 06/2016    Bacteremia due to Gram-positive bacteria 3/24/2017    Antibiotics per ID recommendations; vancomycin x 14 days.     BPH (benign prostatic hypertrophy) with urinary obstruction 6/16/2016    Cystoid macular edema, left eye 8/4/2016    Essential hypertension 6/16/2016    Essential hypertension     Generalized anxiety disorder 6/16/2016    Glaucoma     Irregular heartbeat     Macular degeneration     Microcytic anemia 4/10/2017    Mixed hyperlipidemia 6/16/2016    Nonexudative age-related macular degeneration, bilateral, intermediate dry stage " "2016    Normal pressure hydrocephalus     NPH (normal pressure hydrocephalus) 2017    Shunt placed 17    Urinary retention 2016    Ventriculo-peritoneal shunt status 2017       Past Surgical History:   Procedure Laterality Date    ESOPHAGOGASTRODUODENOSCOPY N/A 2022    Procedure: EGD (ESOPHAGOGASTRODUODENOSCOPY);  Surgeon: Atif Farrell MD;  Location: [facility];  Service: Endoscopy;  Laterality: N/A;    TRANSURETHRAL RESECTION OF PROSTATE  2016       Review of patient's allergies indicates:   Allergen Reactions    Aspirin Swelling     States, "makes me sick." pt does not elaborate.        Medications:  Continuous Infusions:  Scheduled Meds:   levETIRAcetam  1,000 mg Oral BID    piperacillin-tazobactam (ZOSYN) IVPB  4.5 g Intravenous Q8H    polyethylene glycol  17 g Oral Daily    senna-docusate 8.6-50 mg  1 tablet Oral BID     PRN Meds:sodium chloride, acetaminophen, bisacodyL, melatonin, naloxone, prochlorperazine, sodium chloride 0.9%, sodium chloride 0.9%    Family History       Problem Relation (Age of Onset)    Cancer Daughter (55), Daughter (54)    Heart disease Brother    No Known Problems Son          Tobacco Use    Smoking status: Former     Packs/day: 1.00     Years: 18.00     Pack years: 18.00     Types: Cigarettes     Quit date: 1950     Years since quittin.1    Smokeless tobacco: Never   Substance and Sexual Activity    Alcohol use: No    Drug use: No    Sexual activity: Not Currently       Review of Systems   Unable to perform ROS: Dementia   Objective:     Vital Signs (Most Recent):  Temp: 98.6 °F (37 °C) (23 1130)  Pulse: 79 (23 1130)  Resp: 18 (23 1130)  BP: (!) 163/68 (23 1130)  SpO2: 96 % (23 1130)   Vital Signs (24h Range):  Temp:  [97.9 °F (36.6 °C)-98.8 °F (37.1 °C)] 98.6 °F (37 °C)  Pulse:  [18-92] 79  Resp:  [16-18] 18  SpO2:  [92 %-97 %] 96 %  BP: (135-171)/(64-74) 163/68     Weight: 86.2 kg (190 lb)  Body mass index is 28.89 " kg/m².    Physical Exam  Vitals and nursing note reviewed.   Constitutional:       Appearance: He is ill-appearing.   HENT:      Head: Normocephalic and atraumatic.      Right Ear: External ear normal.      Left Ear: External ear normal.      Nose: Nose normal.      Mouth/Throat:      Mouth: Mucous membranes are moist.   Eyes:      Conjunctiva/sclera: Conjunctivae normal.      Pupils: Pupils are equal, round, and reactive to light.   Cardiovascular:      Rate and Rhythm: Normal rate and regular rhythm.      Pulses: Normal pulses.      Heart sounds: Normal heart sounds.   Pulmonary:      Effort: No respiratory distress.      Breath sounds: Normal breath sounds.   Abdominal:      General: Abdomen is flat. Bowel sounds are normal.   Genitourinary:     Comments: External urinary device - adequate urine output   Musculoskeletal:         General: Normal range of motion.      Cervical back: Normal range of motion and neck supple.      Right lower leg: No edema.      Left lower leg: No edema.   Skin:     General: Skin is warm and dry.   Neurological:      Mental Status: He is alert. Mental status is at baseline.      Motor: Weakness present.   Psychiatric:         Mood and Affect: Mood normal.         Behavior: Behavior normal.      Comments: Dementia - not able to assess judgement and mood        Review of Symptoms      Symptom Assessment (ESAS 0-10 Scale)  Pain:  0  Dyspnea:  0  Anxiety:  0  Nausea:  0  Depression:  0  Anorexia:  0  Fatigue:  0  Insomnia:  0  Restlessness:  0  Agitation:  0     CAM / Delirium:  Positive  Constipation:  Negative  Diarrhea:  Negative      Bowel Management Plan (BMP):  Yes      Pain Assessment:  OME in 24 hours:  0  Location(s):      Modified Elver Scale:  0    Performance Status:  40    Living Arrangements:  Lives with family    Psychosocial/Cultural:   See Palliative Psychosocial Note: No  , three adult children,  Colleen Lopez  listed HPOA, on documents  however she has dementia and is  unable to make medical decisions. Currently lives with john Macias - Adult children Tete and Virgil also listed on HPOA are very involved in care.   Currently receiving home hospice services    **Primary  to Follow**  Palliative Care  Consult: Yes    Spiritual:  F - Veena and Belief:  Rastafarian -   I - Importance:  Daughter reports veena plays role in decision making   A - Address in Care:  [place] to  visits       Advance Care Planning   Advance Directives:   Living Will: Yes        Copy on chart: Yes        Oral Declaration: No    [place]: Yes    Do Not Resuscitate Status: Yes    Medical Power of : Yes        Oral Declaration: No    Agent's Name:  Colleen Lopez   Agent's Contact Number:  [phone]    Decision Making:  Family answered questions and Patient unable to communicate due to disease severity/cognitive impairment  Goals of Care: The healthcare power of   endorses that what is most important right now is to focus on avoiding the hospital, symptom/pain control, quality of life, even if it means sacrificing a little time, improvement in condition but with limits to invasive therapies, and comfort and QOL     Accordingly, we have decided that the best plan to meet the patient's goals includes enrolling in hospice care         Significant Labs: All pertinent labs within the past 24 hours have been reviewed.  CBC:   Recent Labs   Lab 01/23/23 0427   WBC 13.85*   HGB 7.5*   HCT 26.5*   MCV 68*        BMP:  Recent Labs   Lab 01/23/23 0427   GLU 86      K 3.3*   *   CO2 22*   BUN 15   CREATININE 0.9   CALCIUM 8.6*   MG 1.9     LFT:  Lab Results   Component Value Date    AST 15 01/23/2023    ALKPHOS 106 01/23/2023    BILITOT 0.6 01/23/2023     Albumin:   Albumin   Date Value Ref Range Status   01/23/2023 2.6 (L) 3.5 - 5.2 g/dL Final     Protein:   Total Protein   Date Value Ref Range Status   01/23/2023 6.2 6.0 - 8.4 g/dL Final     Lactic  acid:   Lab Results   Component Value Date    LACTATE 1.9 01/22/2023    LACTATE 3.5 (HH) 01/21/2023       Significant Imaging: I have reviewed all pertinent imaging results/findings within the past 24 hours.    Additional 20 mins spent in advanced care planning     Meliza Freitas, CNS  Palliative Medicine  [facility] -

## 2023-01-24 LAB
ALBUMIN SERPL BCP-MCNC: 2.5 G/DL (ref 3.5–5.2)
ALP SERPL-CCNC: 95 U/L (ref 55–135)
ALT SERPL W/O P-5'-P-CCNC: <5 U/L (ref 10–44)
ANION GAP SERPL CALC-SCNC: 8 MMOL/L (ref 8–16)
AST SERPL-CCNC: 12 U/L (ref 10–40)
BASOPHILS # BLD AUTO: 0.03 K/UL (ref 0–0.2)
BASOPHILS NFR BLD: 0.2 % (ref 0–1.9)
BILIRUB SERPL-MCNC: 0.7 MG/DL (ref 0.1–1)
BUN SERPL-MCNC: 11 MG/DL (ref 10–30)
CALCIUM SERPL-MCNC: 8.7 MG/DL (ref 8.7–10.5)
CHLORIDE SERPL-SCNC: 111 MMOL/L (ref 95–110)
CO2 SERPL-SCNC: 21 MMOL/L (ref 23–29)
CREAT SERPL-MCNC: 0.9 MG/DL (ref 0.5–1.4)
DIFFERENTIAL METHOD: ABNORMAL
EOSINOPHIL # BLD AUTO: 0.4 K/UL (ref 0–0.5)
EOSINOPHIL NFR BLD: 3.4 % (ref 0–8)
ERYTHROCYTE [DISTWIDTH] IN BLOOD BY AUTOMATED COUNT: 25.3 % (ref 11.5–14.5)
EST. GFR  (NO RACE VARIABLE): >60 ML/MIN/1.73 M^2
GLUCOSE SERPL-MCNC: 89 MG/DL (ref 70–110)
HCT VFR BLD AUTO: 27.1 % (ref 40–54)
HGB BLD-MCNC: 7.6 G/DL (ref 14–18)
IMM GRANULOCYTES # BLD AUTO: 0.09 K/UL (ref 0–0.04)
IMM GRANULOCYTES NFR BLD AUTO: 0.7 % (ref 0–0.5)
LYMPHOCYTES # BLD AUTO: 1.6 K/UL (ref 1–4.8)
LYMPHOCYTES NFR BLD: 12.5 % (ref 18–48)
MAGNESIUM SERPL-MCNC: 1.9 MG/DL (ref 1.6–2.6)
MCH RBC QN AUTO: 19.4 PG (ref 27–31)
MCHC RBC AUTO-ENTMCNC: 28 G/DL (ref 32–36)
MCV RBC AUTO: 69 FL (ref 82–98)
MONOCYTES # BLD AUTO: 0.7 K/UL (ref 0.3–1)
MONOCYTES NFR BLD: 5.8 % (ref 4–15)
NEUTROPHILS # BLD AUTO: 9.9 K/UL (ref 1.8–7.7)
NEUTROPHILS NFR BLD: 77.4 % (ref 38–73)
NRBC BLD-RTO: 0 /100 WBC
PHOSPHATE SERPL-MCNC: 2 MG/DL (ref 2.7–4.5)
PLATELET # BLD AUTO: 237 K/UL (ref 150–450)
PMV BLD AUTO: ABNORMAL FL (ref 9.2–12.9)
POTASSIUM SERPL-SCNC: 3.7 MMOL/L (ref 3.5–5.1)
PROT SERPL-MCNC: 6 G/DL (ref 6–8.4)
RBC # BLD AUTO: 3.91 M/UL (ref 4.6–6.2)
SODIUM SERPL-SCNC: 140 MMOL/L (ref 136–145)
WBC # BLD AUTO: 12.81 K/UL (ref 3.9–12.7)

## 2023-01-24 PROCEDURE — 99233 SBSQ HOSP IP/OBS HIGH 50: CPT | Mod: HCNC,,, | Performed by: STUDENT IN AN ORGANIZED HEALTH CARE EDUCATION/TRAINING PROGRAM

## 2023-01-24 PROCEDURE — 25000003 PHARM REV CODE 250: Mod: HCNC | Performed by: STUDENT IN AN ORGANIZED HEALTH CARE EDUCATION/TRAINING PROGRAM

## 2023-01-24 PROCEDURE — 84100 ASSAY OF PHOSPHORUS: CPT | Mod: HCNC | Performed by: STUDENT IN AN ORGANIZED HEALTH CARE EDUCATION/TRAINING PROGRAM

## 2023-01-24 PROCEDURE — 94761 N-INVAS EAR/PLS OXIMETRY MLT: CPT | Mod: HCNC

## 2023-01-24 PROCEDURE — 99233 PR SUBSEQUENT HOSPITAL CARE,LEVL III: ICD-10-PCS | Mod: HCNC,,, | Performed by: STUDENT IN AN ORGANIZED HEALTH CARE EDUCATION/TRAINING PROGRAM

## 2023-01-24 PROCEDURE — 36415 COLL VENOUS BLD VENIPUNCTURE: CPT | Mod: HCNC | Performed by: STUDENT IN AN ORGANIZED HEALTH CARE EDUCATION/TRAINING PROGRAM

## 2023-01-24 PROCEDURE — 11000001 HC ACUTE MED/SURG PRIVATE ROOM: Mod: HCNC

## 2023-01-24 PROCEDURE — 63600175 PHARM REV CODE 636 W HCPCS: Mod: HCNC | Performed by: STUDENT IN AN ORGANIZED HEALTH CARE EDUCATION/TRAINING PROGRAM

## 2023-01-24 PROCEDURE — 83735 ASSAY OF MAGNESIUM: CPT | Mod: HCNC | Performed by: STUDENT IN AN ORGANIZED HEALTH CARE EDUCATION/TRAINING PROGRAM

## 2023-01-24 PROCEDURE — 25000003 PHARM REV CODE 250: Mod: HCNC | Performed by: HOSPITALIST

## 2023-01-24 PROCEDURE — 85025 COMPLETE CBC W/AUTO DIFF WBC: CPT | Mod: HCNC | Performed by: STUDENT IN AN ORGANIZED HEALTH CARE EDUCATION/TRAINING PROGRAM

## 2023-01-24 PROCEDURE — 80053 COMPREHEN METABOLIC PANEL: CPT | Mod: HCNC | Performed by: STUDENT IN AN ORGANIZED HEALTH CARE EDUCATION/TRAINING PROGRAM

## 2023-01-24 RX ORDER — SODIUM,POTASSIUM PHOSPHATES 280-250MG
1 POWDER IN PACKET (EA) ORAL ONCE
Status: COMPLETED | OUTPATIENT
Start: 2023-01-24 | End: 2023-01-24

## 2023-01-24 RX ORDER — LEVETIRACETAM 100 MG/ML
1000 SOLUTION ORAL 2 TIMES DAILY
Status: DISCONTINUED | OUTPATIENT
Start: 2023-01-24 | End: 2023-01-26 | Stop reason: HOSPADM

## 2023-01-24 RX ADMIN — SENNOSIDES AND DOCUSATE SODIUM 1 TABLET: 50; 8.6 TABLET ORAL at 08:01

## 2023-01-24 RX ADMIN — PIPERACILLIN SODIUM AND TAZOBACTAM SODIUM 4.5 G: 4; .5 INJECTION, POWDER, LYOPHILIZED, FOR SOLUTION INTRAVENOUS at 05:01

## 2023-01-24 RX ADMIN — PIPERACILLIN SODIUM AND TAZOBACTAM SODIUM 4.5 G: 4; .5 INJECTION, POWDER, LYOPHILIZED, FOR SOLUTION INTRAVENOUS at 12:01

## 2023-01-24 RX ADMIN — PIPERACILLIN SODIUM AND TAZOBACTAM SODIUM 4.5 G: 4; .5 INJECTION, POWDER, LYOPHILIZED, FOR SOLUTION INTRAVENOUS at 08:01

## 2023-01-24 RX ADMIN — LEVETIRACETAM 1000 MG: 100 SOLUTION ORAL at 09:01

## 2023-01-24 RX ADMIN — POTASSIUM & SODIUM PHOSPHATES POWDER PACK 280-160-250 MG 1 PACKET: 280-160-250 PACK at 08:01

## 2023-01-24 RX ADMIN — LEVETIRACETAM 1000 MG: 100 SOLUTION ORAL at 08:01

## 2023-01-24 NOTE — SUBJECTIVE & OBJECTIVE
Interval History:   No events overnight. Patient family at bedside and updated. Patient with no complaints this morning. Continuing treatment for aspiration pneumonia.      Review of Systems   Unable to perform ROS: Dementia   Objective:     Vital Signs (Most Recent):  Temp: 98.6 °F (37 °C) (01/23/23 1913)  Pulse: 85 (01/23/23 1913)  Resp: 17 (01/23/23 1913)  BP: (!) 143/75 (01/23/23 1913)  SpO2: (!) 94 % (01/23/23 1913)   Vital Signs (24h Range):  Temp:  [97.9 °F (36.6 °C)-98.8 °F (37.1 °C)] 98.6 °F (37 °C)  Pulse:  [18-86] 85  Resp:  [16-18] 17  SpO2:  [94 %-97 %] 94 %  BP: (143-171)/(67-75) 143/75     Weight: 86.2 kg (190 lb)  Body mass index is 28.89 kg/m².    Intake/Output Summary (Last 24 hours) at 1/23/2023 2008  Last data filed at 1/23/2023 0006  Gross per 24 hour   Intake 100 ml   Output --   Net 100 ml      Physical Exam  Constitutional:       Appearance: Normal appearance. He is normal weight.   HENT:      Head: Normocephalic and atraumatic.      Mouth/Throat:      Mouth: Mucous membranes are moist.   Eyes:      Extraocular Movements: Extraocular movements intact.      Conjunctiva/sclera: Conjunctivae normal.   Cardiovascular:      Rate and Rhythm: Normal rate and regular rhythm.      Heart sounds: No murmur heard.  Pulmonary:      Effort: Pulmonary effort is normal. No respiratory distress.      Breath sounds: Normal breath sounds. No wheezing or rales.   Abdominal:      General: Abdomen is flat. There is no distension.      Palpations: Abdomen is soft.      Tenderness: There is no abdominal tenderness. There is no guarding.   Musculoskeletal:         General: No swelling or tenderness.   Skin:     Findings: No rash.   Neurological:      Mental Status: He is alert. Mental status is at baseline.   Psychiatric:         Mood and Affect: Mood normal.         Behavior: Behavior normal.       Significant Labs: CBC:   Recent Labs   Lab 01/22/23  1009 01/23/23  0427   WBC 14.20* 13.85*   HGB 8.8* 7.5*   HCT  30.5* 26.5*    239     CMP:   Recent Labs   Lab 01/23/23  0427      K 3.3*   *   CO2 22*   GLU 86   BUN 15   CREATININE 0.9   CALCIUM 8.6*   PROT 6.2   ALBUMIN 2.6*   BILITOT 0.6   ALKPHOS 106   AST 15   ALT 6*   ANIONGAP 10       Significant Imaging: I have reviewed all pertinent imaging results/findings within the past 24 hours.

## 2023-01-24 NOTE — PROGRESS NOTES
[facility]arlon [facility] - Neurosurgery ([facility])  [facility] Medicine  Progress Note    Patient Name: Atif Neves  MRN: [id number]  Patient Class: IP- Inpatient   Admission Date: 1/20/2023  Length of Stay: 3 days  Attending Physician: Trevin Garcia MD  Primary Care Provider: Ashley Richards MD        Subjective:     Principal Problem:Breakthrough seizure        HPI:  89-year-old man with a history of NPH status post  shunt, dementia, seizure disorder, functional quadriplegia with severe debility who was on home hospice presented to the emergency room today with family for concerns of breakthrough seizure.    Patients daughter(ZEYNEP)  Tete and son Virgil provide HPI.  Patient with Aphasia, can follow commands with repeated prompting cannot give detailed answers to questions.  The they report they report that patient has been having more frequent seizures in recent days, typically the features are staring spells followed by period of unresponsiveness or postictal.  Today he had 2 seizures followed by bouts of vomiting.  The emesis was described as yellow, nonbloody does not appear as coffee-grounds.    Patient last bowel movement is unknown.  Her his home medication list anti seizure medicine Keppra was not on it a previously on 1 g b.i.d. here in the [facility] record prior to hospice enrollment.      On presentation ER workup is notable for initial lactic acid of 8.1 and orion to greater than 12 on repeat check, he was hypothermic and hypotensive today started on sepsis protocol with 30 cc/kg fluid bolus empiric antibiotics as, loaded with IV Keppra 1 g. In no acute respiratory distress and not requiring supplemental oxygen, hemodynamically stable after IV fluids given.    As per significant event note conversation held with patient's daughter and son after lactic acid level demonstrated rising values, exact etiology is unclear could be due to seizures or more insidious process that might be the cause of his worsening  anemia, hemoglobin is less than 7 she.  In discussion with family aggressive treatment likely would require ICU level of care placement of central line for appropriate resuscitation with fluids blood and hemodynamic monitoring.  Further CT imaging to evaluate for source of rising lactic acid also conducting further seizure workup a day.  Family described to emergency room physician that they would not want resuscitative measures applied and or artificial feeding, during my discussion describing potential ICU level of care ordered require testing for workup, Tete states that patient likely would not want to go through all those types of measures.  So described that we will continue anti seizure medicine antibiotics fluids intermittent lab checks in order to try to reverse any acute issue but if patient's condition continues to clinically worsen personal recommendation is to transition to fully comfort focused care where inpatient hospice might be most appropriate setting versus return to home hospice.    Tete was in agreement trial of medical workup at this time.             Overview/Hospital Course:  No notes on file    Interval History:   No events overnight. Patient family at bedside and updated. Patient with no complaints this morning. Continuing treatment for aspiration pneumonia.      Review of Systems   Unable to perform ROS: Dementia   Objective:     Vital Signs (Most Recent):  Temp: 98.6 °F (37 °C) (01/23/23 1913)  Pulse: 85 (01/23/23 1913)  Resp: 17 (01/23/23 1913)  BP: (!) 143/75 (01/23/23 1913)  SpO2: (!) 94 % (01/23/23 1913)   Vital Signs (24h Range):  Temp:  [97.9 °F (36.6 °C)-98.8 °F (37.1 °C)] 98.6 °F (37 °C)  Pulse:  [18-86] 85  Resp:  [16-18] 17  SpO2:  [94 %-97 %] 94 %  BP: (143-171)/(67-75) 143/75     Weight: 86.2 kg (190 lb)  Body mass index is 28.89 kg/m².    Intake/Output Summary (Last 24 hours) at 1/23/2023 2008  Last data filed at 1/23/2023 0006  Gross per 24 hour   Intake 100 ml   Output  --   Net 100 ml      Physical Exam  Constitutional:       Appearance: Normal appearance. He is normal weight.   HENT:      Head: Normocephalic and atraumatic.      Mouth/Throat:      Mouth: Mucous membranes are moist.   Eyes:      Extraocular Movements: Extraocular movements intact.      Conjunctiva/sclera: Conjunctivae normal.   Cardiovascular:      Rate and Rhythm: Normal rate and regular rhythm.      Heart sounds: No murmur heard.  Pulmonary:      Effort: Pulmonary effort is normal. No respiratory distress.      Breath sounds: Normal breath sounds. No wheezing or rales.   Abdominal:      General: Abdomen is flat. There is no distension.      Palpations: Abdomen is soft.      Tenderness: There is no abdominal tenderness. There is no guarding.   Musculoskeletal:         General: No swelling or tenderness.   Skin:     Findings: No rash.   Neurological:      Mental Status: He is alert. Mental status is at baseline.   Psychiatric:         Mood and Affect: Mood normal.         Behavior: Behavior normal.       Significant Labs: CBC:   Recent Labs   Lab 01/22/23  1009 01/23/23  0427   WBC 14.20* 13.85*   HGB 8.8* 7.5*   HCT 30.5* 26.5*    239     CMP:   Recent Labs   Lab 01/23/23  0427      K 3.3*   *   CO2 22*   GLU 86   BUN 15   CREATININE 0.9   CALCIUM 8.6*   PROT 6.2   ALBUMIN 2.6*   BILITOT 0.6   ALKPHOS 106   AST 15   ALT 6*   ANIONGAP 10       Significant Imaging: I have reviewed all pertinent imaging results/findings within the past 24 hours.      Assessment/Plan:      * Breakthrough seizure  - Seizure precautions  - Continue Keppra via PO route  - EEG without seizure activity   - Keppra level WNLs      Severe sepsis  This patient does not have evidence of infective focus  My overall impression is severe sepsis. Vital signs were reviewed and noted in progress note.  Antibiotics given-   Antibiotics (From admission, onward)    Start     Stop Route Frequency Ordered    01/21/23 9815   piperacillin-tazobactam (ZOSYN) 4.5 g in dextrose 5 % in water (D5W) 5 % 100 mL IVPB (MB+)         -- IV Every 8 hours (non-standard times) 01/21/23 0724        Cultures were taken-   Microbiology Results (last 7 days)     Procedure Component Value Units Date/Time    Urine culture [[id number]] Collected: 01/21/23 1823    Order Status: Completed Specimen: Urine Updated: 01/23/23 0334     Urine Culture, Routine No growth    Narrative:      Specimen Source->Urine    Blood culture x two cultures. Draw prior to antibiotics. [[id number]] Collected: 01/20/23 1742    Order Status: Completed Specimen: Blood from Peripheral, Lower Arm, Left Updated: 01/22/23 2012     Blood Culture, Routine No Growth to date      No Growth to date      No Growth to date    Narrative:      Aerobic and anaerobic    Blood culture x two cultures. Draw prior to antibiotics. [[id number]] Collected: 01/20/23 1742    Order Status: Completed Specimen: Blood from Peripheral, Lower Arm, Left Updated: 01/22/23 2012     Blood Culture, Routine No Growth to date      No Growth to date      No Growth to date    Narrative:      Aerobic and anaerobic        Latest lactate reviewed, they are-  Recent Labs   Lab 01/21/23  1232 01/21/23  1929 01/22/23  1009   LACTATE 2.4* 3.5* 1.9       Organ dysfunction indicated by Encephalopathy   Source- Unknown source  Source control Achieved by- Empiric Antibiotics    - Follow up urine culture  - Blood culture NGTD  - Continue Zosyn x5 days for aspiration pna coverage       Lactic acidosis  - Received 30cc/kg bolus int the ED  - Recived 1unit PRBC on admission for Hgb 6.8  - Lactate 8.4 > >21 > 4.1 > 2.4 > 3.5 > 1.9   - VBG shows compensated metabolic AG acidosis   - No liver dysfunction on presentation - no obvious home medicines that might cause severe rise in lactic acid   - Resovled      Acute on chronic anemia  Hx of GI bleed - 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  - Trend iron panel  - Trend CBC    Ventriculo-peritoneal shunt status  - Shunt series and CT head w/o any abnormality to V/P shunt      Goals of care, counseling/discussion  Advance Care Planning     Today a meeting took place: bedside    Patient Participation: Patient is unable to participate     Attendees (Name and  Relationship to patient): Health care power of : JANES LEE, jesus FREEMAN    Staff attendees (Name and  Role): Trevin Garcia MD - admitting physician\  ACP Conversation (General): Understanding of current condition patient with critical lactic acidosis, concerns for sepsis and acute anemia, concern that patient may not survive  such a critical illness. see HPI for details     Code Status: DNR; status confirmed/order placed in chart    ACP Documents: None    Goals of care: The family and healthcare power of   endorses that what is most important right now is to focus on symptom/pain control and improvement in condition but with limits to invasive therapies    Accordingly, we have decided that the best plan to meet the patient's goals includes continuing with treatment      Recommendations/  Follow-up tasks: Other (specify below) f/u on palliative care consultation - trend of labs and clinical condition - if patient with deterioration would advocate that transition to comfort focused care happen sooner given patient's age-comorbid conditions and existing hospice enrollment prior to admission      Length of ACP   conversation in minutes: 32 minutes           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      Functional quadriplegia  - Total assist at baseline  - Q2 turns  - Assist with feeding      Dementia associated with other underlying disease without behavioral disturbance  - Chronic stable      Essential hypertension  - Holding any anti-hypertensives  - Restart as needed      VTE Risk Mitigation (From admission, onward)         Ordered     IP VTE  HIGH RISK PATIENT  Once         01/21/23 0044     Place sequential compression device  Until discontinued         01/21/23 0044     Reason for No Pharmacological VTE Prophylaxis  Once        Question:  Reasons:  Answer:  Risk of Bleeding    01/21/23 0044     Place sequential compression device  Until discontinued         01/21/23 0044                Discharge Planning   ROMULO: 1/25/2023     Code Status: DNR   Is the patient medically ready for discharge?: No    Reason for patient still in hospital (select all that apply): Patient trending condition, Laboratory test, Treatment and Pending disposition  Discharge Plan A: Hospice/home                  Trevin Garcia MD  Department of Hospital Medicine   [facility] - Neurosurgery ([facility])

## 2023-01-24 NOTE — ASSESSMENT & PLAN NOTE
This patient does not have evidence of infective focus  My overall impression is severe sepsis. Vital signs were reviewed and noted in progress note.  Antibiotics given-   Antibiotics (From admission, onward)    Start     Stop Route Frequency Ordered    01/21/23 0830  piperacillin-tazobactam (ZOSYN) 4.5 g in dextrose 5 % in water (D5W) 5 % 100 mL IVPB (MB+)         -- IV Every 8 hours (non-standard times) 01/21/23 0724        Cultures were taken-   Microbiology Results (last 7 days)     Procedure Component Value Units Date/Time    Urine culture [[id number]] Collected: 01/21/23 1823    Order Status: Completed Specimen: Urine Updated: 01/23/23 0334     Urine Culture, Routine No growth    Narrative:      Specimen Source->Urine    Blood culture x two cultures. Draw prior to antibiotics. [[id number]] Collected: 01/20/23 1742    Order Status: Completed Specimen: Blood from Peripheral, Lower Arm, Left Updated: 01/22/23 2012     Blood Culture, Routine No Growth to date      No Growth to date      No Growth to date    Narrative:      Aerobic and anaerobic    Blood culture x two cultures. Draw prior to antibiotics. [[id number]] Collected: 01/20/23 1742    Order Status: Completed Specimen: Blood from Peripheral, Lower Arm, Left Updated: 01/22/23 2012     Blood Culture, Routine No Growth to date      No Growth to date      No Growth to date    Narrative:      Aerobic and anaerobic        Latest lactate reviewed, they are-  Recent Labs   Lab 01/21/23  1232 01/21/23  1929 01/22/23  1009   LACTATE 2.4* 3.5* 1.9       Organ dysfunction indicated by Encephalopathy   Source- Unknown source  Source control Achieved by- Empiric Antibiotics    - Follow up urine culture  - Blood culture NGTD  - Continue Zosyn x5 days for aspiration pna coverage

## 2023-01-24 NOTE — ASSESSMENT & PLAN NOTE
- Seizure precautions  - Continue Keppra via PO route  - EEG without seizure activity   - Keppra level WNLs

## 2023-01-24 NOTE — PLAN OF CARE
"[facility]  Department of Hospital Medicine  [address] [place] [address]  [phone] after hours  [phone] fax    HOSPICE  ORDERS    01/26/2023    Admit to Hospice:  Home Hospice Service    Diagnoses:   Active Hospital Problems    Diagnosis  POA    *Breakthrough seizure [G40.919]  Yes     Since childhood on long-term antiepileptic medications.        Hypokalemia [E87.6]  Yes    Hypophosphatemia [E83.39]  Yes    Palliative care encounter [Z51.5]  Not Applicable    Advanced care planning/counseling discussion [Z71.89]  Not Applicable    Goals of care, counseling/discussion [Z71.89]  Not Applicable    Acute on chronic anemia [D64.9]  Yes    Aphasia [R47.01]  Yes    Lactic acidosis [E87.20]  Yes    Severe sepsis [A41.9, R65.20]  Yes    Functional quadriplegia [R53.2]  Yes     Homebound, unable to ambulate, dependent on family for all ADLs      Dementia associated with other underlying disease without behavioral disturbance [F02.80]  Yes     Pt with NPH with shunt and microvascular chges on MRI.        Ventriculo-peritoneal shunt status [Z98.2]  Not Applicable    Essential hypertension [I10]  Yes    Sepsis [A41.9]  Yes      Resolved Hospital Problems   No resolved problems to display.       Hospice Qualifying Diagnoses:        Patient has a life expectancy < 6 months due to:  Primary Hospice Diagnosis:  Seizures     Comorbid Conditions Contributing to Decline:  NPH with  shunt, microcytic anemia, BPH    Vital Signs: Routine per Hospice Protocol.    Code Status: DNR    Allergies:   Review of patient's allergies indicates:   Allergen Reactions    Aspirin Swelling     States, "makes me sick." pt does not elaborate.        Diet: Dysphagia Mechanical Soft, Pleasure    Activities: As tolerated    Goals of Care Treatment Preferences:  Code Status: DNR    Health care agent: Colleen Lopez  Health care agent number: [phone]    Living Will: Yes  [place]: Yes  What is most " important right now is to focus on avoiding the hospital, symptom/pain control, quality of life, even if it means sacrificing a little time, improvement in condition but with limits to invasive therapies, comfort and QOL .  Accordingly, we have decided that the best plan to meet the patient's goals includes enrolling in hospice care.      Nursing: Per Hospice Routine.      Routine Skin for Bedridden Patients: Apply moisture barrier cream to all skin folds and   wet areas in perineal area daily and after baths and all bowel movements.      Oxygen: 3 L oxygen as needed     Other Miscellaneous Care: None      Medications:        Medication List        Start taking these medications      ferrous sulfate 325 (65 FE) MG EC tablet  Take 1 tablet (325 mg total) by mouth once daily.            Continue taking these medications      acetaminophen 650 MG Supp  Commonly known as: TYLENOL  Place 650 mg rectally every 6 (six) hours as needed (fever).     albuterol-ipratropium 2.5 mg-0.5 mg/3 mL nebulizer solution  Commonly known as: DUO-NEB  Take 3 mLs by nebulization every 6 (six) hours as needed for Wheezing or Shortness of Breath. Rescue     bisacodyL 10 mg Supp  Commonly known as: DULCOLAX  Place 10 mg rectally daily as needed (constipation).     citalopram 10 MG tablet  Commonly known as: CeleXA  TAKE 1 TABLET(10 MG) BY MOUTH EVERY DAY     docusate sodium 100 MG capsule  Commonly known as: COLACE  Take 100 mg by mouth daily as needed for Constipation.     hyoscyamine 0.125 mg Subl  Commonly known as: LEVSIN/SL  Place 0.125 mg under the tongue every 6 (six) hours as needed (excessive secretions).     levETIRAcetam 1000 MG tablet  Commonly known as: KEPPRA  TAKE 1 TABLET(1000 MG) BY MOUTH TWICE DAILY     LORazepam 2 mg/mL Conc  Commonly known as: ATIVAN  Take 0.25-0.5 ml by mouth every 4 hours as needed for anxiety/ agitation     morphine 100 mg/5 mL (20 mg/mL) concentrated solution  Take 0.25-0.5 ml by mouth every 2 hours as  needed for severe pain or shortness of breath     omeprazole 20 MG capsule  Commonly known as: PRILOSEC  Take 20 mg by mouth once daily.     ondansetron 4 MG Tbdl  Commonly known as: ZOFRAN-ODT  Take 4 mg by mouth every 6 (six) hours as needed (nausea/ vomiting).                DIABETES CARE: None    Future Orders:  Hospice Medical Director may dictate new orders for comfortable care measures & sign death certificate.        _________________________________  Yousif Mohan MD  01/26/2023

## 2023-01-24 NOTE — PLAN OF CARE
Problem: Adult Inpatient Plan of Care  Goal: Readiness for Transition of Care  Outcome: Ongoing, Progressing     Problem: Infection  Goal: Absence of Infection Signs and Symptoms  Outcome: Ongoing, Progressing  Intervention: Prevent or Manage Infection  Flowsheets (Taken 1/24/2023 0341)  Infection Management: aseptic technique maintained     Problem: Fall Injury Risk  Goal: Absence of Fall and Fall-Related Injury  Outcome: Ongoing, Progressing  Intervention: Promote Injury-Free Environment  Flowsheets (Taken 1/24/2023 0341)  Safety Promotion/Fall Prevention:   bed alarm set   Fall Risk reviewed with patient/family   Fall Risk signage in place   nonskid shoes/socks when out of bed   supervised activity   instructed to call staff for mobility   POC reviewed with pt at the bedside.  Needs reinforcement.  No neuro changes, adjustment to illness ongoing.  Continues on ABTx and tolerating well.  VS monitored.  See VS in the flowsheet.  No seizure activity noted this shift.  Seizure, aspiration and fall precautions maintained.  Quiet environment promoted.  Bed low, locked.  Call light within reach.  Bed alarm on.

## 2023-01-24 NOTE — PLAN OF CARE
Tete Soria, patients daughter, states patient going back to home hospice when medically ready for discharge.

## 2023-01-25 LAB
ALBUMIN SERPL BCP-MCNC: 2.5 G/DL (ref 3.5–5.2)
ALP SERPL-CCNC: 94 U/L (ref 55–135)
ALT SERPL W/O P-5'-P-CCNC: 5 U/L (ref 10–44)
ANION GAP SERPL CALC-SCNC: 8 MMOL/L (ref 8–16)
AST SERPL-CCNC: 9 U/L (ref 10–40)
BACTERIA BLD CULT: NORMAL
BACTERIA BLD CULT: NORMAL
BASOPHILS # BLD AUTO: 0.06 K/UL (ref 0–0.2)
BASOPHILS NFR BLD: 0.6 % (ref 0–1.9)
BILIRUB SERPL-MCNC: 0.5 MG/DL (ref 0.1–1)
BUN SERPL-MCNC: 10 MG/DL (ref 10–30)
CALCIUM SERPL-MCNC: 8.5 MG/DL (ref 8.7–10.5)
CHLORIDE SERPL-SCNC: 110 MMOL/L (ref 95–110)
CO2 SERPL-SCNC: 22 MMOL/L (ref 23–29)
CREAT SERPL-MCNC: 0.9 MG/DL (ref 0.5–1.4)
DIFFERENTIAL METHOD: ABNORMAL
EOSINOPHIL # BLD AUTO: 0.7 K/UL (ref 0–0.5)
EOSINOPHIL NFR BLD: 6.3 % (ref 0–8)
ERYTHROCYTE [DISTWIDTH] IN BLOOD BY AUTOMATED COUNT: 25.3 % (ref 11.5–14.5)
EST. GFR  (NO RACE VARIABLE): >60 ML/MIN/1.73 M^2
GLUCOSE SERPL-MCNC: 87 MG/DL (ref 70–110)
HCT VFR BLD AUTO: 25.8 % (ref 40–54)
HGB BLD-MCNC: 7.6 G/DL (ref 14–18)
IMM GRANULOCYTES # BLD AUTO: 0.04 K/UL (ref 0–0.04)
IMM GRANULOCYTES NFR BLD AUTO: 0.4 % (ref 0–0.5)
LYMPHOCYTES # BLD AUTO: 1.7 K/UL (ref 1–4.8)
LYMPHOCYTES NFR BLD: 15.6 % (ref 18–48)
MAGNESIUM SERPL-MCNC: 1.8 MG/DL (ref 1.6–2.6)
MCH RBC QN AUTO: 19.6 PG (ref 27–31)
MCHC RBC AUTO-ENTMCNC: 29.5 G/DL (ref 32–36)
MCV RBC AUTO: 67 FL (ref 82–98)
MONOCYTES # BLD AUTO: 0.8 K/UL (ref 0.3–1)
MONOCYTES NFR BLD: 7.5 % (ref 4–15)
NEUTROPHILS # BLD AUTO: 7.4 K/UL (ref 1.8–7.7)
NEUTROPHILS NFR BLD: 69.6 % (ref 38–73)
NRBC BLD-RTO: 0 /100 WBC
PHOSPHATE SERPL-MCNC: 2.6 MG/DL (ref 2.7–4.5)
PLATELET # BLD AUTO: 254 K/UL (ref 150–450)
PMV BLD AUTO: 10.2 FL (ref 9.2–12.9)
POTASSIUM SERPL-SCNC: 3.1 MMOL/L (ref 3.5–5.1)
PROT SERPL-MCNC: 6 G/DL (ref 6–8.4)
RBC # BLD AUTO: 3.87 M/UL (ref 4.6–6.2)
SODIUM SERPL-SCNC: 140 MMOL/L (ref 136–145)
WBC # BLD AUTO: 10.6 K/UL (ref 3.9–12.7)

## 2023-01-25 PROCEDURE — 94761 N-INVAS EAR/PLS OXIMETRY MLT: CPT | Mod: HCNC

## 2023-01-25 PROCEDURE — 11000001 HC ACUTE MED/SURG PRIVATE ROOM: Mod: HCNC

## 2023-01-25 PROCEDURE — 25000003 PHARM REV CODE 250: Mod: HCNC | Performed by: HOSPITALIST

## 2023-01-25 PROCEDURE — 63600175 PHARM REV CODE 636 W HCPCS: Mod: HCNC | Performed by: INTERNAL MEDICINE

## 2023-01-25 PROCEDURE — 25000003 PHARM REV CODE 250: Mod: HCNC | Performed by: INTERNAL MEDICINE

## 2023-01-25 PROCEDURE — 99232 PR SUBSEQUENT HOSPITAL CARE,LEVL II: ICD-10-PCS | Mod: HCNC,,, | Performed by: INTERNAL MEDICINE

## 2023-01-25 PROCEDURE — 63600175 PHARM REV CODE 636 W HCPCS: Mod: HCNC | Performed by: STUDENT IN AN ORGANIZED HEALTH CARE EDUCATION/TRAINING PROGRAM

## 2023-01-25 PROCEDURE — 80053 COMPREHEN METABOLIC PANEL: CPT | Mod: HCNC | Performed by: STUDENT IN AN ORGANIZED HEALTH CARE EDUCATION/TRAINING PROGRAM

## 2023-01-25 PROCEDURE — 25000003 PHARM REV CODE 250: Mod: HCNC | Performed by: STUDENT IN AN ORGANIZED HEALTH CARE EDUCATION/TRAINING PROGRAM

## 2023-01-25 PROCEDURE — 84100 ASSAY OF PHOSPHORUS: CPT | Mod: HCNC | Performed by: STUDENT IN AN ORGANIZED HEALTH CARE EDUCATION/TRAINING PROGRAM

## 2023-01-25 PROCEDURE — 99232 SBSQ HOSP IP/OBS MODERATE 35: CPT | Mod: HCNC,,, | Performed by: INTERNAL MEDICINE

## 2023-01-25 PROCEDURE — 83735 ASSAY OF MAGNESIUM: CPT | Mod: HCNC | Performed by: STUDENT IN AN ORGANIZED HEALTH CARE EDUCATION/TRAINING PROGRAM

## 2023-01-25 PROCEDURE — 36415 COLL VENOUS BLD VENIPUNCTURE: CPT | Mod: HCNC | Performed by: STUDENT IN AN ORGANIZED HEALTH CARE EDUCATION/TRAINING PROGRAM

## 2023-01-25 PROCEDURE — 85025 COMPLETE CBC W/AUTO DIFF WBC: CPT | Mod: HCNC | Performed by: STUDENT IN AN ORGANIZED HEALTH CARE EDUCATION/TRAINING PROGRAM

## 2023-01-25 RX ORDER — OLANZAPINE 2.5 MG/1
10 TABLET ORAL ONCE
Status: DISCONTINUED | OUTPATIENT
Start: 2023-01-26 | End: 2023-01-26 | Stop reason: HOSPADM

## 2023-01-25 RX ADMIN — LEVETIRACETAM 1000 MG: 100 SOLUTION ORAL at 08:01

## 2023-01-25 RX ADMIN — Medication 6 MG: at 11:01

## 2023-01-25 RX ADMIN — PIPERACILLIN SODIUM AND TAZOBACTAM SODIUM 4.5 G: 4; .5 INJECTION, POWDER, LYOPHILIZED, FOR SOLUTION INTRAVENOUS at 04:01

## 2023-01-25 RX ADMIN — PIPERACILLIN SODIUM AND TAZOBACTAM SODIUM 4.5 G: 4; .5 INJECTION, POWDER, LYOPHILIZED, FOR SOLUTION INTRAVENOUS at 12:01

## 2023-01-25 RX ADMIN — PIPERACILLIN SODIUM AND TAZOBACTAM SODIUM 4.5 G: 4; .5 INJECTION, POWDER, LYOPHILIZED, FOR SOLUTION INTRAVENOUS at 08:01

## 2023-01-25 NOTE — ASSESSMENT & PLAN NOTE
This patient does not have evidence of infective focus  My overall impression is severe sepsis. Vital signs were reviewed and noted in progress note.  Antibiotics given-   Antibiotics (From admission, onward)    Start     Stop Route Frequency Ordered    01/21/23 0830  piperacillin-tazobactam (ZOSYN) 4.5 g in dextrose 5 % in water (D5W) 5 % 100 mL IVPB (MB+)         -- IV Every 8 hours (non-standard times) 01/21/23 0724        Cultures were taken-   Microbiology Results (last 7 days)     Procedure Component Value Units Date/Time    Blood culture x two cultures. Draw prior to antibiotics. [[id number]] Collected: 01/20/23 1742    Order Status: Completed Specimen: Blood from Peripheral, Lower Arm, Left Updated: 01/23/23 2012     Blood Culture, Routine No Growth to date      No Growth to date      No Growth to date      No Growth to date    Narrative:      Aerobic and anaerobic    Blood culture x two cultures. Draw prior to antibiotics. [[id number]] Collected: 01/20/23 1742    Order Status: Completed Specimen: Blood from Peripheral, Lower Arm, Left Updated: 01/23/23 2012     Blood Culture, Routine No Growth to date      No Growth to date      No Growth to date      No Growth to date    Narrative:      Aerobic and anaerobic    Urine culture [[id number]] Collected: 01/21/23 1823    Order Status: Completed Specimen: Urine Updated: 01/23/23 0334     Urine Culture, Routine No growth    Narrative:      Specimen Source->Urine        Latest lactate reviewed, they are-  Recent Labs   Lab 01/21/23  1929 01/22/23  1009   LACTATE 3.5* 1.9       Organ dysfunction indicated by Encephalopathy   Source- Unknown source  Source control Achieved by- Empiric Antibiotics    - Urine culture NGTD  - Blood culture NGTD  - Continue Zosyn x5 days for aspiration pna coverage

## 2023-01-25 NOTE — SUBJECTIVE & OBJECTIVE
Interval History: No events overnight. Patient with no complaints this morning. Continue antibiotic treatment for pneumonia. Pending discharge to home with hospice.  Cultures NGTD, no further seizures noted. Family notat bedside unsure of MS baseline.    Review of Systems   Unable to perform ROS: Dementia   Constitutional:  Positive for fever.   Objective:     Vital Signs (Most Recent):  Temp: 98.2 °F (36.8 °C) (01/25/23 1200)  Pulse: 75 (01/25/23 1200)  Resp: 18 (01/25/23 1200)  BP: (!) 155/70 (01/25/23 1200)  SpO2: 98 % (01/25/23 1200)   Vital Signs (24h Range):  Temp:  [97.6 °F (36.4 °C)-99.3 °F (37.4 °C)] 98.2 °F (36.8 °C)  Pulse:  [64-83] 75  Resp:  [16-18] 18  SpO2:  [94 %-98 %] 98 %  BP: (127-161)/(60-74) 155/70     Weight: 86.2 kg (190 lb)  Body mass index is 28.89 kg/m².  No intake or output data in the 24 hours ending 01/25/23 1213   Physical Exam  Constitutional:       Appearance: Normal appearance. He is normal weight.   HENT:      Head: Normocephalic and atraumatic.      Mouth/Throat:      Mouth: Mucous membranes are moist.   Eyes:      Extraocular Movements: Extraocular movements intact.      Conjunctiva/sclera: Conjunctivae normal.   Cardiovascular:      Rate and Rhythm: Normal rate and regular rhythm.      Heart sounds: No murmur heard.  Pulmonary:      Effort: Pulmonary effort is normal. No respiratory distress.      Breath sounds: Normal breath sounds. No wheezing or rales.   Abdominal:      General: Abdomen is flat. There is no distension.      Palpations: Abdomen is soft.      Tenderness: There is no abdominal tenderness. There is no guarding.   Musculoskeletal:         General: No swelling or tenderness.   Skin:     Findings: No rash.   Neurological:      Mental Status: He is alert. Mental status is at baseline.   Psychiatric:         Mood and Affect: Mood normal.         Behavior: Behavior normal.       Significant Labs: All pertinent labs within the past 24 hours have been reviewed.  BMP:    Recent Labs   Lab 01/25/23  0348   GLU 87      K 3.1*      CO2 22*   BUN 10   CREATININE 0.9   CALCIUM 8.5*   MG 1.8       Significant Imaging: I have reviewed all pertinent imaging results/findings within the past 24 hours.

## 2023-01-25 NOTE — PROGRESS NOTES
[facility] - Neurosurgery ([facility])  [facility] Medicine  Progress Note    Patient Name: Atif Neves  MRN: [id number]  Patient Class: IP- Inpatient   Admission Date: 1/20/2023  Length of Stay: 4 days  Attending Physician: Trevin Garcia MD  Primary Care Provider: Ashley Richards MD        Subjective:     Principal Problem:Breakthrough seizure        HPI:  89-year-old man with a history of NPH status post  shunt, dementia, seizure disorder, functional quadriplegia with severe debility who was on home hospice presented to the emergency room today with family for concerns of breakthrough seizure.    Patients daughter(ZEYNEP)  Tete and son Virgil provide HPI.  Patient with Aphasia, can follow commands with repeated prompting cannot give detailed answers to questions.  The they report they report that patient has been having more frequent seizures in recent days, typically the features are staring spells followed by period of unresponsiveness or postictal.  Today he had 2 seizures followed by bouts of vomiting.  The emesis was described as yellow, nonbloody does not appear as coffee-grounds.    Patient last bowel movement is unknown.  Her his home medication list anti seizure medicine Keppra was not on it a previously on 1 g b.i.d. here in the [facility] record prior to hospice enrollment.      On presentation ER workup is notable for initial lactic acid of 8.1 and orion to greater than 12 on repeat check, he was hypothermic and hypotensive today started on sepsis protocol with 30 cc/kg fluid bolus empiric antibiotics as, loaded with IV Keppra 1 g. In no acute respiratory distress and not requiring supplemental oxygen, hemodynamically stable after IV fluids given.    As per significant event note conversation held with patient's daughter and son after lactic acid level demonstrated rising values, exact etiology is unclear could be due to seizures or more insidious process that might be the cause of his worsening  anemia, hemoglobin is less than 7 she.  In discussion with family aggressive treatment likely would require ICU level of care placement of central line for appropriate resuscitation with fluids blood and hemodynamic monitoring.  Further CT imaging to evaluate for source of rising lactic acid also conducting further seizure workup a day.  Family described to emergency room physician that they would not want resuscitative measures applied and or artificial feeding, during my discussion describing potential ICU level of care ordered require testing for workup, Tete states that patient likely would not want to go through all those types of measures.  So described that we will continue anti seizure medicine antibiotics fluids intermittent lab checks in order to try to reverse any acute issue but if patient's condition continues to clinically worsen personal recommendation is to transition to fully comfort focused care where inpatient hospice might be most appropriate setting versus return to home hospice.    Tete was in agreement trial of medical workup at this time.             Overview/Hospital Course:  No notes on file    Interval History:   No events overnight. Patient with no complaints this morning. Continue antibiotic treatment for pneumonia. Pending discharge to home with hospice.        Review of Systems   Unable to perform ROS: Dementia   Objective:     Vital Signs (Most Recent):  Temp: 99.3 °F (37.4 °C) (01/24/23 1536)  Pulse: 65 (01/24/23 1536)  Resp: 17 (01/24/23 1536)  BP: 127/60 (01/24/23 1536)  SpO2: 98 % (01/24/23 1536) Vital Signs (24h Range):  Temp:  [96.4 °F (35.8 °C)-99.3 °F (37.4 °C)] 99.3 °F (37.4 °C)  Pulse:  [64-89] 65  Resp:  [16-18] 17  SpO2:  [93 %-98 %] 98 %  BP: (127-156)/(60-79) 127/60     Weight: 86.2 kg (190 lb)  Body mass index is 28.89 kg/m².  No intake or output data in the 24 hours ending 01/24/23 1813   Physical Exam  Constitutional:       Appearance: Normal appearance. He is  normal weight.   HENT:      Head: Normocephalic and atraumatic.      Mouth/Throat:      Mouth: Mucous membranes are moist.   Eyes:      Extraocular Movements: Extraocular movements intact.      Conjunctiva/sclera: Conjunctivae normal.   Cardiovascular:      Rate and Rhythm: Normal rate and regular rhythm.      Heart sounds: No murmur heard.  Pulmonary:      Effort: Pulmonary effort is normal. No respiratory distress.      Breath sounds: Normal breath sounds. No wheezing or rales.   Abdominal:      General: Abdomen is flat. There is no distension.      Palpations: Abdomen is soft.      Tenderness: There is no abdominal tenderness. There is no guarding.   Musculoskeletal:         General: No swelling or tenderness.   Skin:     Findings: No rash.   Neurological:      Mental Status: He is alert. Mental status is at baseline.   Psychiatric:         Mood and Affect: Mood normal.         Behavior: Behavior normal.       Significant Labs: All pertinent labs within the past 24 hours have been reviewed.  CBC:   Recent Labs   Lab 01/23/23  0427 01/24/23  0442   WBC 13.85* 12.81*   HGB 7.5* 7.6*   HCT 26.5* 27.1*    237     CMP:   Recent Labs   Lab 01/23/23  0427 01/24/23  0442    140   K 3.3* 3.7   * 111*   CO2 22* 21*   GLU 86 89   BUN 15 11   CREATININE 0.9 0.9   CALCIUM 8.6* 8.7   PROT 6.2 6.0   ALBUMIN 2.6* 2.5*   BILITOT 0.6 0.7   ALKPHOS 106 95   AST 15 12   ALT 6* <5*   ANIONGAP 10 8       Significant Imaging: I have reviewed all pertinent imaging results/findings within the past 24 hours.      Assessment/Plan:      * Breakthrough seizure  - Seizure precautions  - Continue Keppra via PO route  - EEG without seizure activity   - Keppra level WNLs      Severe sepsis  This patient does not have evidence of infective focus  My overall impression is severe sepsis. Vital signs were reviewed and noted in progress note.  Antibiotics given-   Antibiotics (From admission, onward)    Start     Stop Route  Frequency Ordered    01/21/23 0830  piperacillin-tazobactam (ZOSYN) 4.5 g in dextrose 5 % in water (D5W) 5 % 100 mL IVPB (MB+)         -- IV Every 8 hours (non-standard times) 01/21/23 0724        Cultures were taken-   Microbiology Results (last 7 days)     Procedure Component Value Units Date/Time    Blood culture x two cultures. Draw prior to antibiotics. [[id number]] Collected: 01/20/23 1742    Order Status: Completed Specimen: Blood from Peripheral, Lower Arm, Left Updated: 01/23/23 2012     Blood Culture, Routine No Growth to date      No Growth to date      No Growth to date      No Growth to date    Narrative:      Aerobic and anaerobic    Blood culture x two cultures. Draw prior to antibiotics. [[id number]] Collected: 01/20/23 1742    Order Status: Completed Specimen: Blood from Peripheral, Lower Arm, Left Updated: 01/23/23 2012     Blood Culture, Routine No Growth to date      No Growth to date      No Growth to date      No Growth to date    Narrative:      Aerobic and anaerobic    Urine culture [[id number]] Collected: 01/21/23 1823    Order Status: Completed Specimen: Urine Updated: 01/23/23 0334     Urine Culture, Routine No growth    Narrative:      Specimen Source->Urine        Latest lactate reviewed, they are-  Recent Labs   Lab 01/21/23  1929 01/22/23  1009   LACTATE 3.5* 1.9       Organ dysfunction indicated by Encephalopathy   Source- Unknown source  Source control Achieved by- Empiric Antibiotics    - Urine culture NGTD  - Blood culture NGTD  - Continue Zosyn x5 days for aspiration pna coverage       Lactic acidosis  - Received 30cc/kg bolus int the ED  - Recived 1unit PRBC on admission for Hgb 6.8  - Lactate 8.4 > >21 > 4.1 > 2.4 > 3.5 > 1.9   - VBG shows compensated metabolic AG acidosis   - No liver dysfunction on presentation - no obvious home medicines that might cause severe rise in lactic acid   - Resolved      Acute on chronic anemia  Hx of GI bleed - 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  - Trend iron panel  - Trend CBC    Ventriculo-peritoneal shunt status  - Shunt series and CT head w/o any abnormality to V/P shunt      Goals of care, counseling/discussion  Advance Care Planning     Today a meeting took place: bedside    Patient Participation: Patient is unable to participate     Attendees (Name and  Relationship to patient): Health care power of : JANES LEE, son MADIE FREEMAN    Staff attendees (Name and  Role): Trevin Garcia MD - admitting physician\  ACP Conversation (General): Understanding of current condition patient with critical lactic acidosis, concerns for sepsis and acute anemia, concern that patient may not survive  such a critical illness. see HPI for details     Code Status: DNR; status confirmed/order placed in chart    ACP Documents: None    Goals of care: The family and healthcare power of   endorses that what is most important right now is to focus on symptom/pain control and improvement in condition but with limits to invasive therapies    Accordingly, we have decided that the best plan to meet the patient's goals includes continuing with treatment      Recommendations/  Follow-up tasks: Other (specify below) f/u on palliative care consultation - trend of labs and clinical condition - if patient with deterioration would advocate that transition to comfort focused care happen sooner given patient's age-comorbid conditions and existing hospice enrollment prior to admission      Length of ACP   conversation in minutes: 32 minutes           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      Functional quadriplegia  - Total assist at baseline  - Q2 turns  - Assist with feeding      Dementia associated with other underlying disease without behavioral disturbance  - Chronic stable      Essential hypertension  - Holding any anti-hypertensives  - Restart as needed      VTE Risk  Mitigation (From admission, onward)         Ordered     IP VTE HIGH RISK PATIENT  Once         01/21/23 0044     Place sequential compression device  Until discontinued         01/21/23 0044     Reason for No Pharmacological VTE Prophylaxis  Once        Question:  Reasons:  Answer:  Risk of Bleeding    01/21/23 0044     Place sequential compression device  Until discontinued         01/21/23 0044                Discharge Planning   ROMULO: 1/25/2023     Code Status: DNR   Is the patient medically ready for discharge?: No    Reason for patient still in hospital (select all that apply): Patient trending condition, Laboratory test, Treatment and Pending disposition  Discharge Plan A: Hospice/home                  Trevin Garcia MD  Department of Hospital Medicine   [facility] - Neurosurgery ([facility])

## 2023-01-25 NOTE — PLAN OF CARE
Problem: Adult Inpatient Plan of Care  Goal: Plan of Care Review  Outcome: Ongoing, Progressing  Flowsheets (Taken 1/25/2023 0307)  Plan of Care Reviewed With: patient     Problem: Infection  Goal: Absence of Infection Signs and Symptoms  Outcome: Ongoing, Progressing  Intervention: Prevent or Manage Infection  Flowsheets (Taken 1/25/2023 0307)  Fever Reduction/Comfort Measures:   lightweight clothing   lightweight bedding  Infection Management: aseptic technique maintained     Problem: Infection Progression (Sepsis/Septic Shock)  Goal: Absence of Infection Signs and Symptoms  Outcome: Ongoing, Progressing     Problem: Fall Injury Risk  Goal: Absence of Fall and Fall-Related Injury  Outcome: Ongoing, Progressing  Intervention: Promote Injury-Free Environment  Flowsheets (Taken 1/25/2023 0307)  Safety Promotion/Fall Prevention:   bed alarm set   Fall Risk reviewed with patient/family   Fall Risk signage in place   side rails raised x 3   instructed to call staff for mobility   nonskid shoes/socks when out of bed   POC reviewed with pt at the bedside.  Pt needs reinforcement.  Questions and concerns addressed.  ABTx ongoing.  No acute distress overnight.  Remains free from falls.  Pt progressing toward his goals.  VS monitored, see VS in the flowsheet.  Bed locked.  Bed alarm on.  Call light within reach.

## 2023-01-25 NOTE — SUBJECTIVE & OBJECTIVE
Interval History:   No events overnight. Patient with no complaints this morning. Continue antibiotic treatment for pneumonia. Pending discharge to home with hospice.        Review of Systems   Unable to perform ROS: Dementia   Objective:     Vital Signs (Most Recent):  Temp: 99.3 °F (37.4 °C) (01/24/23 1536)  Pulse: 65 (01/24/23 1536)  Resp: 17 (01/24/23 1536)  BP: 127/60 (01/24/23 1536)  SpO2: 98 % (01/24/23 1536) Vital Signs (24h Range):  Temp:  [96.4 °F (35.8 °C)-99.3 °F (37.4 °C)] 99.3 °F (37.4 °C)  Pulse:  [64-89] 65  Resp:  [16-18] 17  SpO2:  [93 %-98 %] 98 %  BP: (127-156)/(60-79) 127/60     Weight: 86.2 kg (190 lb)  Body mass index is 28.89 kg/m².  No intake or output data in the 24 hours ending 01/24/23 1813   Physical Exam  Constitutional:       Appearance: Normal appearance. He is normal weight.   HENT:      Head: Normocephalic and atraumatic.      Mouth/Throat:      Mouth: Mucous membranes are moist.   Eyes:      Extraocular Movements: Extraocular movements intact.      Conjunctiva/sclera: Conjunctivae normal.   Cardiovascular:      Rate and Rhythm: Normal rate and regular rhythm.      Heart sounds: No murmur heard.  Pulmonary:      Effort: Pulmonary effort is normal. No respiratory distress.      Breath sounds: Normal breath sounds. No wheezing or rales.   Abdominal:      General: Abdomen is flat. There is no distension.      Palpations: Abdomen is soft.      Tenderness: There is no abdominal tenderness. There is no guarding.   Musculoskeletal:         General: No swelling or tenderness.   Skin:     Findings: No rash.   Neurological:      Mental Status: He is alert. Mental status is at baseline.   Psychiatric:         Mood and Affect: Mood normal.         Behavior: Behavior normal.       Significant Labs: All pertinent labs within the past 24 hours have been reviewed.  CBC:   Recent Labs   Lab 01/23/23  0427 01/24/23  0442   WBC 13.85* 12.81*   HGB 7.5* 7.6*   HCT 26.5* 27.1*    237     CMP:    Recent Labs   Lab 01/23/23  0427 01/24/23  0442    140   K 3.3* 3.7   * 111*   CO2 22* 21*   GLU 86 89   BUN 15 11   CREATININE 0.9 0.9   CALCIUM 8.6* 8.7   PROT 6.2 6.0   ALBUMIN 2.6* 2.5*   BILITOT 0.6 0.7   ALKPHOS 106 95   AST 15 12   ALT 6* <5*   ANIONGAP 10 8       Significant Imaging: I have reviewed all pertinent imaging results/findings within the past 24 hours.

## 2023-01-25 NOTE — ASSESSMENT & PLAN NOTE
- Received 30cc/kg bolus int the ED  - Recived 1unit PRBC on admission for Hgb 6.8  - Lactate 8.4 > >21 > 4.1 > 2.4 > 3.5 > 1.9   - VBG shows compensated metabolic AG acidosis   - No liver dysfunction on presentation - no obvious home medicines that might cause severe rise in lactic acid   - Resolved

## 2023-01-26 VITALS
RESPIRATION RATE: 17 BRPM | TEMPERATURE: 98 F | BODY MASS INDEX: 28.89 KG/M2 | HEART RATE: 85 BPM | OXYGEN SATURATION: 96 % | SYSTOLIC BLOOD PRESSURE: 165 MMHG | WEIGHT: 190 LBS | DIASTOLIC BLOOD PRESSURE: 71 MMHG

## 2023-01-26 PROBLEM — E83.39 HYPOPHOSPHATEMIA: Status: ACTIVE | Noted: 2023-01-26

## 2023-01-26 PROBLEM — E87.6 HYPOKALEMIA: Status: ACTIVE | Noted: 2023-01-26

## 2023-01-26 LAB
ABO + RH BLD: NORMAL
ALBUMIN SERPL BCP-MCNC: 2.3 G/DL (ref 3.5–5.2)
ALP SERPL-CCNC: 78 U/L (ref 55–135)
ALT SERPL W/O P-5'-P-CCNC: 5 U/L (ref 10–44)
ANION GAP SERPL CALC-SCNC: 7 MMOL/L (ref 8–16)
AST SERPL-CCNC: 11 U/L (ref 10–40)
BASOPHILS # BLD AUTO: 0.04 K/UL (ref 0–0.2)
BASOPHILS NFR BLD: 0.3 % (ref 0–1.9)
BILIRUB SERPL-MCNC: 0.4 MG/DL (ref 0.1–1)
BLD GP AB SCN CELLS X3 SERPL QL: NORMAL
BLD PROD TYP BPU: NORMAL
BLOOD UNIT EXPIRATION DATE: NORMAL
BLOOD UNIT TYPE CODE: 5100
BLOOD UNIT TYPE: NORMAL
BUN SERPL-MCNC: 10 MG/DL (ref 10–30)
CALCIUM SERPL-MCNC: 8.3 MG/DL (ref 8.7–10.5)
CHLORIDE SERPL-SCNC: 110 MMOL/L (ref 95–110)
CO2 SERPL-SCNC: 22 MMOL/L (ref 23–29)
CODING SYSTEM: NORMAL
CREAT SERPL-MCNC: 0.8 MG/DL (ref 0.5–1.4)
DIFFERENTIAL METHOD: ABNORMAL
DISPENSE STATUS: NORMAL
EOSINOPHIL # BLD AUTO: 0.5 K/UL (ref 0–0.5)
EOSINOPHIL NFR BLD: 3.8 % (ref 0–8)
ERYTHROCYTE [DISTWIDTH] IN BLOOD BY AUTOMATED COUNT: 25.3 % (ref 11.5–14.5)
EST. GFR  (NO RACE VARIABLE): >60 ML/MIN/1.73 M^2
GLUCOSE SERPL-MCNC: 92 MG/DL (ref 70–110)
HCT VFR BLD AUTO: 23.9 % (ref 40–54)
HGB BLD-MCNC: 6.9 G/DL (ref 14–18)
IMM GRANULOCYTES # BLD AUTO: 0.06 K/UL (ref 0–0.04)
IMM GRANULOCYTES NFR BLD AUTO: 0.5 % (ref 0–0.5)
LYMPHOCYTES # BLD AUTO: 1.3 K/UL (ref 1–4.8)
LYMPHOCYTES NFR BLD: 10.5 % (ref 18–48)
MAGNESIUM SERPL-MCNC: 1.8 MG/DL (ref 1.6–2.6)
MCH RBC QN AUTO: 19.5 PG (ref 27–31)
MCHC RBC AUTO-ENTMCNC: 28.9 G/DL (ref 32–36)
MCV RBC AUTO: 68 FL (ref 82–98)
MONOCYTES # BLD AUTO: 1 K/UL (ref 0.3–1)
MONOCYTES NFR BLD: 7.8 % (ref 4–15)
NEUTROPHILS # BLD AUTO: 9.7 K/UL (ref 1.8–7.7)
NEUTROPHILS NFR BLD: 77.1 % (ref 38–73)
NRBC BLD-RTO: 0 /100 WBC
NUM UNITS TRANS PACKED RBC: NORMAL
PHOSPHATE SERPL-MCNC: 2.4 MG/DL (ref 2.7–4.5)
PLATELET # BLD AUTO: 262 K/UL (ref 150–450)
PMV BLD AUTO: 10.5 FL (ref 9.2–12.9)
POTASSIUM SERPL-SCNC: 3.3 MMOL/L (ref 3.5–5.1)
PROT SERPL-MCNC: 5.7 G/DL (ref 6–8.4)
RBC # BLD AUTO: 3.54 M/UL (ref 4.6–6.2)
SODIUM SERPL-SCNC: 139 MMOL/L (ref 136–145)
WBC # BLD AUTO: 12.51 K/UL (ref 3.9–12.7)

## 2023-01-26 PROCEDURE — 36415 COLL VENOUS BLD VENIPUNCTURE: CPT | Mod: HCNC | Performed by: HOSPITALIST

## 2023-01-26 PROCEDURE — 25000003 PHARM REV CODE 250: Mod: HCNC | Performed by: STUDENT IN AN ORGANIZED HEALTH CARE EDUCATION/TRAINING PROGRAM

## 2023-01-26 PROCEDURE — 99239 HOSP IP/OBS DSCHRG MGMT >30: CPT | Mod: HCNC,,, | Performed by: HOSPITALIST

## 2023-01-26 PROCEDURE — P9016 RBC LEUKOCYTES REDUCED: HCPCS | Mod: HCNC | Performed by: HOSPITALIST

## 2023-01-26 PROCEDURE — 80053 COMPREHEN METABOLIC PANEL: CPT | Mod: HCNC | Performed by: STUDENT IN AN ORGANIZED HEALTH CARE EDUCATION/TRAINING PROGRAM

## 2023-01-26 PROCEDURE — 84100 ASSAY OF PHOSPHORUS: CPT | Mod: HCNC | Performed by: STUDENT IN AN ORGANIZED HEALTH CARE EDUCATION/TRAINING PROGRAM

## 2023-01-26 PROCEDURE — 86900 BLOOD TYPING SEROLOGIC ABO: CPT | Mod: HCNC | Performed by: HOSPITALIST

## 2023-01-26 PROCEDURE — 99239 PR HOSPITAL DISCHARGE DAY,>30 MIN: ICD-10-PCS | Mod: HCNC,,, | Performed by: HOSPITALIST

## 2023-01-26 PROCEDURE — 25000003 PHARM REV CODE 250: Mod: HCNC | Performed by: HOSPITALIST

## 2023-01-26 PROCEDURE — 94761 N-INVAS EAR/PLS OXIMETRY MLT: CPT | Mod: HCNC

## 2023-01-26 PROCEDURE — 86920 COMPATIBILITY TEST SPIN: CPT | Mod: HCNC | Performed by: HOSPITALIST

## 2023-01-26 PROCEDURE — 83735 ASSAY OF MAGNESIUM: CPT | Mod: HCNC | Performed by: STUDENT IN AN ORGANIZED HEALTH CARE EDUCATION/TRAINING PROGRAM

## 2023-01-26 PROCEDURE — 36415 COLL VENOUS BLD VENIPUNCTURE: CPT | Mod: HCNC | Performed by: STUDENT IN AN ORGANIZED HEALTH CARE EDUCATION/TRAINING PROGRAM

## 2023-01-26 PROCEDURE — 85025 COMPLETE CBC W/AUTO DIFF WBC: CPT | Mod: HCNC | Performed by: STUDENT IN AN ORGANIZED HEALTH CARE EDUCATION/TRAINING PROGRAM

## 2023-01-26 RX ORDER — SODIUM,POTASSIUM PHOSPHATES 280-250MG
2 POWDER IN PACKET (EA) ORAL ONCE
Status: COMPLETED | OUTPATIENT
Start: 2023-01-26 | End: 2023-01-26

## 2023-01-26 RX ORDER — HYDROCODONE BITARTRATE AND ACETAMINOPHEN 500; 5 MG/1; MG/1
TABLET ORAL
Status: DISCONTINUED | OUTPATIENT
Start: 2023-01-26 | End: 2023-01-26 | Stop reason: HOSPADM

## 2023-01-26 RX ORDER — FERROUS SULFATE 325(65) MG
325 TABLET, DELAYED RELEASE (ENTERIC COATED) ORAL DAILY
Qty: 30 TABLET | Refills: 3 | Status: SHIPPED | OUTPATIENT
Start: 2023-01-26

## 2023-01-26 RX ADMIN — POTASSIUM BICARBONATE 40 MEQ: 391 TABLET, EFFERVESCENT ORAL at 09:01

## 2023-01-26 RX ADMIN — POLYETHYLENE GLYCOL 3350 17 G: 17 POWDER, FOR SOLUTION ORAL at 09:01

## 2023-01-26 RX ADMIN — POTASSIUM & SODIUM PHOSPHATES POWDER PACK 280-160-250 MG 2 PACKET: 280-160-250 PACK at 09:01

## 2023-01-26 RX ADMIN — LEVETIRACETAM 1000 MG: 100 SOLUTION ORAL at 09:01

## 2023-01-26 RX ADMIN — SENNOSIDES AND DOCUSATE SODIUM 1 TABLET: 50; 8.6 TABLET ORAL at 09:01

## 2023-01-26 NOTE — PLAN OF CARE
[facility] - Neurosurgery (Hospital)  Discharge Final Note    Primary Care Provider: Ashley Richards MD    Expected Discharge Date: 1/26/2023    Final Discharge Note (most recent)       Final Note - 01/26/23 1138          Final Note    Assessment Type Final Discharge Note     Anticipated Discharge Disposition Hospice/Home        Post-Acute Status    Post-Acute Authorization --     Post-Acute Placement Status --                     Important Message from Medicare    Patient going home with hospice. Tete Soria, patients daughter notified.

## 2023-01-26 NOTE — PROGRESS NOTES
[facility] - Neurosurgery ([facility])  [facility] Medicine  Progress Note    Patient Name: Atif Neves  MRN: [id number]  Patient Class: IP- Inpatient   Admission Date: 1/20/2023  Length of Stay: 6 days  Attending Physician: Yousif Mohan MD  Primary Care Provider: Ashley Richards MD        Subjective:     Principal Problem:Breakthrough seizure        HPI:  89-year-old man with a history of NPH status post  shunt, dementia, seizure disorder, functional quadriplegia with severe debility who was on home hospice presented to the emergency room today with family for concerns of breakthrough seizure.    Patients daughter(ZEYNEP)  Tete and son Virgil provide HPI.  Patient with Aphasia, can follow commands with repeated prompting cannot give detailed answers to questions.  The they report they report that patient has been having more frequent seizures in recent days, typically the features are staring spells followed by period of unresponsiveness or postictal.  Today he had 2 seizures followed by bouts of vomiting.  The emesis was described as yellow, nonbloody does not appear as coffee-grounds.    Patient last bowel movement is unknown.  Her his home medication list anti seizure medicine Keppra was not on it a previously on 1 g b.i.d. [facility] prior to hospice enrollment.      On presentation ER workup is notable for initial lactic acid of 8.1 and orion to greater than 12 on repeat check, he was hypothermic and hypotensive today started on sepsis protocol with 30 cc/kg fluid bolus empiric antibiotics as, loaded with IV Keppra 1 g. In no acute respiratory distress and not requiring supplemental oxygen, hemodynamically stable after IV fluids given.    As per significant event note conversation held with patient's daughter and son after lactic acid level demonstrated rising values, exact etiology is unclear could be due to seizures or more insidious process that might be the cause of his worsening  anemia, hemoglobin is less than 7 she.  In discussion with family aggressive treatment likely would require ICU level of care placement of central line for appropriate resuscitation with fluids blood and hemodynamic monitoring.  Further CT imaging to evaluate for source of rising lactic acid also conducting further seizure workup a day.  Family described to emergency room physician that they would not want resuscitative measures applied and or artificial feeding, during my discussion describing potential ICU level of care ordered require testing for workup, Tete states that patient likely would not want to go through all those types of measures.  So described that we will continue anti seizure medicine antibiotics fluids intermittent lab checks in order to try to reverse any acute issue but if patient's condition continues to clinically worsen personal recommendation is to transition to fully comfort focused care where inpatient hospice might be most appropriate setting versus return to home hospice.    Tete was in agreement trial of medical workup at this time.             Overview/Hospital Course:  1/25/2023 plan is to resume  [facility] hospice with [facility]. Can dc home tomorrow.  1/26 Hb 6.9 . Transfusion with 1 unit of PRBC. K and P replaced . discussed with daughter POA- Tete Millan .   Prior EGD 2/22 - Submucosal nodule found in the duodenum.                          Biopsied.                          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                          Treated with argon beam coagulation.   FOBT. GI consulted afte discussion with daughter . likely discharge home with hospice after transfusion           Review of Systems:   Pain scale: Unable to perform ROS: Dementia   Constitutional:  fever,  chills, headache, vision loss, hearing loss, weight loss, Generalized weakness, falls, loss of smell, loss of taste, poor appetite,  sore throat  Respiratory: cough, shortness of breath.    Cardiovascular: chest pain, dizziness, palpitations, orthopnea, swelling of feet, syncope  Gastrointestinal: nausea, vomiting, abdominal pain, diarrhea, black stool,  blood in stool, change in bowel habits  Genitourinary: hematuria, dysuria, urgency, frequency  Integument/Breast: rash,  pruritis  Hematologic/Lymphatic: easy bruising, lymphadenopathy  Musculoskeletal: arthralgias , myalgias, back pain, neck pain, knee pain  Neurological: confusion, seizures, tremors, slurred speech  Behavioral/Psych:  depression, anxiety, auditory or visual hallucinations     OBJECTIVE:     Physical Exam:  Body mass index is 28.89 kg/m².    Constitutional: Appears well-developed and well-nourished.   Head: Normocephalic and atraumatic.   Neck: Normal range of motion. Neck supple.   Cardiovascular: Normal heart rate.  Regular heart rhythm.  Pulmonary/Chest: Effort normal.   Abdominal: No distension.  No tenderness  Musculoskeletal: Normal range of motion. No edema.   Neurological: Alert and oriented to perso   Skin: Skin is warm and dry.   Psychiatric: Normal mood and affect. Behavior is normal.                  Vital Signs  Temp: 98.6 °F (37 °C) (01/25/23 2348)  Pulse: 83 (01/26/23 0322)  Resp: 18 (01/25/23 2348)  BP: (Abnormal) 156/68 (01/25/23 2348)  SpO2: 95 % (01/25/23 2348)     24 Hour VS Range    Temp:  [98.2 °F (36.8 °C)-98.7 °F (37.1 °C)]   Pulse:  [67-92]   Resp:  [15-18]   BP: (111-160)/(56-71)   SpO2:  [94 %-98 %]     Intake/Output Summary (Last 24 hours) at 1/26/2023 0719  Last data filed at 1/25/2023 1200  Gross per 24 hour   Intake 960 ml   Output no documentation   Net 960 ml         I/O This Shift:  No intake/output data recorded.    Wt Readings from Last 3 Encounters:   01/20/23 86.2 kg (190 lb)   10/11/22 75 kg (165 lb 5.5 oz)   01/31/22 77.1 kg (170 lb)       I have personally reviewed the vitals and recorded Intake/Output     Laboratory/Diagnostic Data:    CBC/Anemia Labs: Coags:    Recent Labs   Lab  01/23/23 0427 01/24/23 0442 01/25/23 0348 01/26/23  0529   WBC 13.85* 12.81* 10.60 12.51   HGB 7.5* 7.6* 7.6* 6.9*   HCT 26.5* 27.1* 25.8* 23.9*    237 254 262   MCV 68* 69* 67* 68*   RDW 25.0* 25.3* 25.3* 25.3*   IRON 12*  --   --   --    FERRITIN 31  --   --   --     Recent Labs   Lab 01/21/23  0525   INR 1.1   APTT <21.0        Chemistries: ABG:   Recent Labs   Lab 01/24/23 0442 01/25/23 0348 01/26/23  0529    140 139   K 3.7 3.1* 3.3*   * 110 110   CO2 21* 22* 22*   BUN 11 10 10   CREATININE 0.9 0.9 0.8   CALCIUM 8.7 8.5* 8.3*   PROT 6.0 6.0 5.7*   BILITOT 0.7 0.5 0.4   ALKPHOS 95 94 78   ALT <5* 5* 5*   AST 12 9* 11   MG 1.9 1.8 1.8   PHOS 2.0* 2.6* 2.4*    Recent Labs   Lab 01/20/23  2309 01/21/23  0137   PH 7.399 7.394   PCO2 28.3* 28.9*   PO2 64* 67*   HCO3 17.5* 17.7*   POCSATURATED 93* 93*   BE -7 -7        POCT Glucose: HbA1c:    No results for input(s): POCTGLUCOSE in the last 168 hours. Hemoglobin A1C   Date Value Ref Range Status   06/22/2021 5.2 4.0 - 5.6 % Final     Comment:     ADA Screening Guidelines:  5.7-6.4%  Consistent with prediabetes  >or=6.5%  Consistent with diabetes    High levels of fetal hemoglobin interfere with the HbA1C  assay. Heterozygous hemoglobin variants (HbS, HgC, etc)do  not significantly interfere with this assay.   However, presence of multiple variants may affect accuracy.          Cardiac Enzymes: Ejection Fractions:    No results for input(s): CPK, CPKMB, MB, TROPONINI in the last 72 hours. No results found for: EF       No results for input(s): COLORU, APPEARANCEUA, PHUR, SPECGRAV, PROTEINUA, GLUCUA, KETONESU, BILIRUBINUA, OCCULTUA, NITRITE, UROBILINOGEN, LEUKOCYTESUR, RBCUA, WBCUA, BACTERIA, SQUAMEPITHEL, HYALINECASTS in the last 48 hours.    Invalid input(s): WRIGHTSUR    Procalcitonin (ng/mL)   Date Value   03/24/2017 0.24     Lactate (Lactic Acid) (mmol/L)   Date Value   01/22/2023 1.9   01/21/2023 3.5 (HH)   01/21/2023 2.4 (H)   01/21/2023  4.1 (HH)   01/20/2023 >12.0 (HH)     No results found for: BNP  Sed Rate (mm/Hr)   Date Value   07/14/2017 15 (H)     No results found for: DDIMER  Ferritin (ng/mL)   Date Value   01/23/2023 31     No results found for: LDH  No results found for: TROPONINI, CPK  Results for orders placed or performed in visit on 06/22/21   Vitamin D   Result Value Ref Range    Vit D, 25-Hydroxy 18 (L) 30 - 96 ng/mL   Results for orders placed or performed in visit on 01/22/19   Vitamin D   Result Value Ref Range    Vit D, 25-Hydroxy 24 (L) 30 - 96 ng/mL   Results for orders placed or performed in visit on 08/21/18   Vitamin D   Result Value Ref Range    Vit D, 25-Hydroxy 26 (L) 30 - 96 ng/mL     SARS-CoV2 (COVID-19) Qualitative PCR (no units)   Date Value   01/20/2023 Not Detected     POC Rapid COVID (no units)   Date Value   01/31/2022 Negative       Microbiology labs for the last week  Microbiology Results (last 7 days)     Procedure Component Value Units Date/Time    Blood culture x two cultures. Draw prior to antibiotics. [[id number]] Collected: 01/20/23 1742    Order Status: Completed Specimen: Blood from Peripheral, Lower Arm, Left Updated: 01/25/23 2012     Blood Culture, Routine No growth after 5 days.    Narrative:      Aerobic and anaerobic    Blood culture x two cultures. Draw prior to antibiotics. [[id number]] Collected: 01/20/23 1742    Order Status: Completed Specimen: Blood from Peripheral, Lower Arm, Left Updated: 01/25/23 2012     Blood Culture, Routine No growth after 5 days.    Narrative:      Aerobic and anaerobic    Urine culture [[id number]] Collected: 01/21/23 1823    Order Status: Completed Specimen: Urine Updated: 01/23/23 0334     Urine Culture, Routine No growth    Narrative:      Specimen Source->Urine          Reviewed and noted in plan where applicable- Please see chart for full lab data.    Lines/Drains:       Peripheral IV - Single Lumen 01/20/23 2017 22 G Posterior;Right Hand (Active)   Site Assessment  Clean;Dry;Intact 01/26/23 0400   Extremity Assessment Distal to IV No abnormal discoloration 01/25/23 2000   Line Status Saline locked 01/26/23 0400   Dressing Status Clean;Dry;Intact 01/26/23 0400   Dressing Intervention Integrity maintained 01/26/23 0400   Dressing Change Due 01/25/23 01/25/23 2000   Site Change Due 01/25/23 01/25/23 2000   Reason Not Rotated Not due 01/24/23 2000   Number of days: 5            Peripheral IV - Single Lumen 01/20/23 2153 20 G Posterior;Right Forearm (Active)   Site Assessment Clean;Dry;Intact 01/26/23 0400   Extremity Assessment Distal to IV No abnormal discoloration 01/25/23 2000   Line Status Saline locked 01/26/23 0400   Dressing Status Clean;Dry;Intact 01/26/23 0400   Dressing Intervention Integrity maintained 01/26/23 0400   Dressing Change Due 01/25/23 01/25/23 2000   Site Change Due 01/25/23 01/25/23 2000   Reason Not Rotated Not due 01/24/23 2000   Number of days: 5            Peripheral IV - Single Lumen 01/24/23 1000 20 G;1 3/4 in Right Forearm (Active)   Site Assessment Clean;Dry;Intact 01/26/23 0400   Extremity Assessment Distal to IV No warmth;No swelling;No redness 01/25/23 2000   Line Status Saline locked 01/26/23 0400   Dressing Status Clean;Dry;Intact 01/26/23 0400   Dressing Intervention Integrity maintained 01/26/23 0400   Site Change Due 01/29/23 01/25/23 2000   Reason Not Rotated Not due 01/25/23 0800   Number of days: 1       Male External Urinary Catheter 01/21/23 1700 Medium (Active)   Collection Container Standard drainage bag 01/25/23 0800   Securement Method secured to top of thigh w/ adhesive device 01/25/23 0800   Skin no redness;no breakdown 01/25/23 0800   Tolerance no signs/symptoms of discomfort 01/25/23 0800   Output (mL) 200 mL 01/22/23 0800   Number of days: 4       Imaging  ECG Results          EKG 12-lead (Final result)  Result time 01/21/23 10:16:52    Final result by Interface, Lab In [facility] (01/21/23 10:16:52)             Narrative:    Test  Reason : R41.82,    Vent. Rate : 086 BPM     Atrial Rate : 086 BPM     P-R Int : 156 ms          QRS Dur : 084 ms      QT Int : 422 ms       P-R-T Axes : 088 052 070 degrees     QTc Int : 504 ms    Normal sinus rhythm  Prolonged QT  Abnormal ECG  When compared with ECG of 31-JAN-2022 18:34,  No significant change was found  Confirmed by CHANO JOHNSON MD ([id number]) on 1/21/2023 10:16:43 AM    Referred By: [email or web address]   SELF           Confirmed By:CHANO JOHNSON MD                            No results found for this or any previous visit.      CT Head Without Contrast  Narrative: EXAMINATION:  CT HEAD WITHOUT CONTRAST    CLINICAL HISTORY:  Mental status change, unknown cause;    TECHNIQUE:  Low dose axial CT images obtained throughout the head without the use of intravenous contrast.  Axial, sagittal and coronal reconstructions were performed.    COMPARISON:  CT 01/31/2022, 04/10/2018    FINDINGS:  Right parietal ventricular shunt catheter in stable position.  Ventricular system appears enlarged similar to the prior exam.  Third ventricle measures 17 mm (previously 17 mm).    Periventricular white matter hypoattenuation likely representing chronic microvascular ischemic disease.  No evidence of acute territorial infarct or hemorrhage.  No mass effect or midline shift.  No edema.    No extra-axial blood or fluid collections Scattered atherosclerotic calcification about the skull base.    No acute displaced calvarial fracture.  Layering fluid within the sphenoid sinuses.  Otherwise paranasal sinuses and mastoid air cells are clear.  Impression: Stable right parietal ventriculostomy shunt catheter with prominence of the ventricular system, unchanged from prior.  Additional evaluation, as clinically warranted.    Generalized cerebral volume loss and chronic microvascular ischemic disease.    No acute territorial infarct or hemorrhage.    Electronically signed by resident: Dejon  Stubenrauch  Date:    01/20/2023  Time:    19:16    Electronically signed by: Jose Thomas MD  Date:    01/20/2023  Time:    19:27  X-Ray Shunt Series  Narrative: EXAMINATION:  XR SHUNT SERIES    CLINICAL HISTORY:  shunt evaluation;    TECHNIQUE:  PA and lateral views of the skull neck chest and abdomen were performed to evaluate  shunt tube    COMPARISON:  Of 03/23/2017    FINDINGS:  Programmable shunt catheter with valve set to approximately 120 mm of water.  No discontinuity is evident.  Prominent waste material with rectal fecal impaction.  Atelectasis and/or infiltrate in the left lung base.  Impression: Stable  shunt tube apparently set to 120 mm of water.    Left lung base infiltrate versus atelectasis.    Mild constipation and rectal fecal impaction.    Electronically signed by: Kenrick Santoro  Date:    01/20/2023  Time:    18:32  X-Ray Chest AP Portable  Narrative: EXAMINATION:  XR CHEST AP PORTABLE    CLINICAL HISTORY:  Sepsis;    TECHNIQUE:  Single frontal view of the chest was performed.    COMPARISON:  02/01/2022    FINDINGS:  Descending shunt catheter tubing appears continuous.  The cardiomediastinal silhouette is not enlarged noting calcification of the aorta..  There is no pleural effusion.  The trachea is midline.  The lungs are symmetrically expanded bilaterally with bilateral basilar subsegmental atelectasis or scarring, left greater than right.  No large focal consolidation seen.  There is no pneumothorax.  The osseous structures are remarkable for degenerative changes..  Impression: 1. Bilateral basilar subsegmental atelectasis or scarring, left greater than right.  No large focal consolidation.    Electronically signed by: Jed Jamil MD  Date:    01/20/2023  Time:    17:00      Labs, Imaging, EKG and Diagnostic results from 1/26/2023 were reviewed.    Medications:  Medication list was reviewed and changes noted under Assessment/Plan.  No current facility-administered medications on  file prior to encounter.     Current Outpatient Medications on File Prior to Encounter   Medication Sig Dispense Refill    acetaminophen (TYLENOL) 650 MG Supp Place 650 mg rectally every 6 (six) hours as needed (fever).      albuterol-ipratropium (DUO-NEB) 2.5 mg-0.5 mg/3 mL nebulizer solution Take 3 mLs by nebulization every 6 (six) hours as needed for Wheezing or Shortness of Breath. Rescue      bisacodyL (DULCOLAX) 10 mg Supp Place 10 mg rectally daily as needed (constipation).      citalopram (CELEXA) 10 MG tablet TAKE 1 TABLET(10 MG) BY MOUTH EVERY DAY 90 tablet 3    docusate sodium (COLACE) 100 MG capsule Take 100 mg by mouth daily as needed for Constipation.      hyoscyamine (LEVSIN/SL) 0.125 mg Subl Place 0.125 mg under the tongue every 6 (six) hours as needed (excessive secretions).      levETIRAcetam (KEPPRA) 1000 MG tablet TAKE 1 TABLET(1000 MG) BY MOUTH TWICE DAILY 180 tablet 3    LORazepam (ATIVAN) 2 mg/mL Conc Take 0.25-0.5 ml by mouth every 4 hours as needed for anxiety/ agitation      morphine 100 mg/5 mL (20 mg/mL) concentrated solution Take 0.25-0.5 ml by mouth every 2 hours as needed for severe pain or shortness of breath      omeprazole (PRILOSEC) 20 MG capsule Take 20 mg by mouth once daily.      ondansetron (ZOFRAN-ODT) 4 MG TbDL Take 4 mg by mouth every 6 (six) hours as needed (nausea/ vomiting).       Scheduled Medications:  levetiracetam, 1,000 mg, Oral, BID  OLANZapine, 10 mg, Oral, Once  polyethylene glycol, 17 g, Oral, Daily  potassium bicarbonate, 40 mEq, Oral, Once  potassium, sodium phosphates, 2 packet, Oral, Once  senna-docusate 8.6-50 mg, 1 tablet, Oral, BID      PRN: sodium chloride, sodium chloride, acetaminophen, bisacodyL, melatonin, naloxone, prochlorperazine, sodium chloride 0.9%, sodium chloride 0.9%  Infusions:   Estimated Creatinine Clearance: 60.3 mL/min (based on SCr of 0.8 mg/dL).    Assessment/Plan:      * Breakthrough seizure  - Seizure precautions  -  Continue Keppra via PO route  - EEG without seizure activity   - Keppra level WNLs      Hypophosphatemia  replaced       Hypokalemia  replaced       Goals of care, counseling/discussion  Advance Care Planning     Today a meeting took place: bedside    Patient Participation: Patient is unable to participate     Attendees (Name and  Relationship to patient): Health care power of : JANES LEEjesus    Staff attendees (Name and  Role): Trevin Garcia MD - admitting physician\  ACP Conversation (General): Understanding of current condition patient with critical lactic acidosis, concerns for sepsis and acute anemia, concern that patient may not survive  such a critical illness. see HPI for details     Code Status: DNR; status confirmed/order placed in chart    ACP Documents: None    Goals of care: The family and healthcare power of   endorses that what is most important right now is to focus on symptom/pain control and improvement in condition but with limits to invasive therapies    Accordingly, we have decided that the best plan to meet the patient's goals includes continuing with treatment      Recommendations/  Follow-up tasks: Other (specify below) f/u on palliative care consultation - trend of labs and clinical condition - if patient with deterioration would advocate that transition to comfort focused care happen sooner given patient's age-comorbid conditions and existing hospice enrollment prior to admission      Length of ACP   conversation in minutes: 32 minutes           Severe sepsis  This patient does not have evidence of infective focus  My overall impression is severe sepsis. Vital signs were reviewed and noted in progress note.  Antibiotics given-   Antibiotics (From admission, onward)    Start     Stop Route Frequency Ordered    01/21/23 0830  piperacillin-tazobactam (ZOSYN) 4.5 g in dextrose 5 % in water (D5W) 5 % 100 mL IVPB (MB+)         -- IV Every 8 hours (non-standard  times) 01/21/23 0724        Cultures were taken-   Microbiology Results (last 7 days)     Procedure Component Value Units Date/Time    Blood culture x two cultures. Draw prior to antibiotics. [[id number]] Collected: 01/20/23 1742    Order Status: Completed Specimen: Blood from Peripheral, Lower Arm, Left Updated: 01/23/23 2012     Blood Culture, Routine No Growth to date      No Growth to date      No Growth to date      No Growth to date    Narrative:      Aerobic and anaerobic    Blood culture x two cultures. Draw prior to antibiotics. [[id number]] Collected: 01/20/23 1742    Order Status: Completed Specimen: Blood from Peripheral, Lower Arm, Left Updated: 01/23/23 2012     Blood Culture, Routine No Growth to date      No Growth to date      No Growth to date      No Growth to date    Narrative:      Aerobic and anaerobic    Urine culture [[id number]] Collected: 01/21/23 1823    Order Status: Completed Specimen: Urine Updated: 01/23/23 0334     Urine Culture, Routine No growth    Narrative:      Specimen Source->Urine        Latest lactate reviewed, they are-  Recent Labs   Lab 01/21/23  1929 01/22/23  1009   LACTATE 3.5* 1.9       Organ dysfunction indicated by Encephalopathy   Source- Unknown source  Source control Achieved by- Empiric Antibiotics    - Urine culture NGTD  - Blood culture NGTD  - Continue Zosyn x5 days for aspiration pna coverage       Lactic acidosis  - Received 30cc/kg bolus int the ED  - Recived 1unit PRBC on admission for Hgb 6.8  - Lactate 8.4 > >21 > 4.1 > 2.4 > 3.5 > 1.9   - VBG shows compensated metabolic AG acidosis   - No liver dysfunction on presentation - no obvious home medicines that might cause severe rise in lactic acid   - Resolved      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      Acute on chronic anemia  Hx of GI bleed - angioectasia in duodenum seen on prior endoscopy   - Continuetart IV PPI  - s/p 1unit PRBC - type& screen and consent obtained by ED physicians.  - Trend iron panel  - Trend  CBC  1/26 Hb 6.9 . Transfusion with 1 unit of PRBC. K and P replaced . discussed with daughter FANNIE- Tete Millan .   Prior EGD 2/22 - Submucosal nodule found in the duodenum.                          Biopsied.                          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                          Treated with argon beam coagulation.   FOBT. GI consulted afte discussion with daughter . likely discharge home with hospice after transfusion     Functional quadriplegia  - Total assist at baseline  - Q2 turns  - Assist with feeding      Dementia associated with other underlying disease without behavioral disturbance  - Chronic stable      Ventriculo-peritoneal shunt status  - Shunt series and CT head w/o any abnormality to V/P shunt      Essential hypertension  - Holding any anti-hypertensives  - Restart as needed      VTE Risk Mitigation (From admission, onward)         Ordered     IP VTE HIGH RISK PATIENT  Once         01/21/23 0044     Place sequential compression device  Until discontinued         01/21/23 0044     Reason for No Pharmacological VTE Prophylaxis  Once        Question:  Reasons:  Answer:  Risk of Bleeding    01/21/23 0044     Place sequential compression device  Until discontinued         01/21/23 0044                Discharge Planning   ROMULO: 1/26/2023     Code Status: DNR   Is the patient medically ready for discharge?: No    Reason for patient still in hospital (select all that apply): Treatment  Discharge Plan A: Hospice/home                  Yousif Mohan MD  Department of Hospital Medicine   [facility] - Neurosurgery ([facility])

## 2023-01-26 NOTE — TREATMENT PLAN
Brief GI Treatment Plan    GI was contacted about anemia without overt bleeding. 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 Has required pRBC during this admission. Hemodynamically stable. Patient is on home hospice and primary team is otherwise planning discharge within next 24h. I contacted the patient's daughter to discuss. She confirms that she wants comfort care and declines any invasive procedures for possible bleeding. Discussed that he could have slow occult bleed from known AVMs, but will hold off on any endoscopy as they are pursuing comfort care. Would recommend PO iron repletion at home and can continue once daily omeprazole at home. She is understands and is amenable to this plan.    Nabil Farias  Gastroenterology Fellow, PGY-IV

## 2023-01-26 NOTE — ASSESSMENT & PLAN NOTE
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  - Trend iron panel  - Trend CBC  1/26 Hb 6.9 . Transfusion with 1 unit of PRBC. K and P replaced . discussed with daughter POA- Tete Millan .   Prior EGD 2/22 - Submucosal nodule found in the duodenum.                          Biopsied.                          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                          Treated with argon beam coagulation.   FOBT. GI consulted afte discussion with daughter . likely discharge home with hospice after transfusion

## 2023-01-26 NOTE — DISCHARGE SUMMARY
Marlon Steinberg - Neurosurgery ([facility])  [facility] Medicine  Discharge Summary      Patient Name: Atif Neves  MRN: [id number]  BEATA: [id number]  Patient Class: IP- Inpatient  Admission Date: 1/20/2023  Hospital Length of Stay: 6 days  Discharge Date and Time:  01/26/2023 7:21 AM  Attending Physician: Yousif Mohan MD   Discharging Provider: Yousif Mohan MD  Primary Care Provider: Ashley Richards MD  [facility] Medicine Team: [facility]OSP MED N Yousif Mohan MD  Primary Care Team: [facility] HOSP MED N    HPI:   89-year-old man with a history of NPH status post  shunt, dementia, seizure disorder, functional quadriplegia with severe debility who was on home hospice presented to the emergency room today with family for concerns of breakthrough seizure.    Patients daughter(ZEYNEP)  Tete and son Virgil provide HPI.  Patient with Aphasia, can follow commands with repeated prompting cannot give detailed answers to questions.  The they report they report that patient has been having more frequent seizures in recent days, typically the features are staring spells followed by period of unresponsiveness or postictal.  Today he had 2 seizures followed by bouts of vomiting.  The emesis was described as yellow, nonbloody does not appear as coffee-grounds.    Patient last bowel movement is unknown.  Her his home medication list anti seizure medicine Keppra was not on it a previously on 1 g b.i.d. here in the [facility] record prior to hospice enrollment.      On presentation ER workup is notable for initial lactic acid of 8.1 and orion to greater than 12 on repeat check, he was hypothermic and hypotensive today started on sepsis protocol with 30 cc/kg fluid bolus empiric antibiotics as, loaded with IV Keppra 1 g. In no acute respiratory distress and not requiring supplemental oxygen, hemodynamically stable after IV fluids given.    As per significant event note conversation held with patient's daughter and son after lactic acid  level demonstrated rising values, exact etiology is unclear could be due to seizures or more insidious process that might be the cause of his worsening anemia, hemoglobin is less than 7 she.  In discussion with family aggressive treatment likely would require ICU level of care placement of central line for appropriate resuscitation with fluids blood and hemodynamic monitoring.  Further CT imaging to evaluate for source of rising lactic acid also conducting further seizure workup a day.  Family described to emergency room physician that they would not want resuscitative measures applied and or artificial feeding, during my discussion describing potential ICU level of care ordered require testing for workup, Tete states that patient likely would not want to go through all those types of measures.  So described that we will continue anti seizure medicine antibiotics fluids intermittent lab checks in order to try to reverse any acute issue but if patient's condition continues to clinically worsen personal recommendation is to transition to fully comfort focused care where inpatient hospice might be most appropriate setting versus return to home hospice.    Tete was in agreement trial of medical workup at this time.             * No surgery found *      Hospital Course:   1/25/2023 plan is to resume  [facility] hospice with [facility]. Can dc home tomorrow.  1/26 Hb 6.9 . Transfusion with 1 unit of PRBC. K and P replaced . discussed with daughter POA- Tete Millan .   Prior EGD 2/22 - Submucosal nodule found in the duodenum.                          Biopsied.                          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                          Treated with argon beam coagulation.   FOBT. GI consulted afte discussion with daughter . Daughter wants comfort care and does not want invasive procedures.  continue once daily omeprazole at home, discharge home with hospice after transfusion       Goals of  Care Treatment Preferences:  Code Status: DNR    Health care agent: Colleen Lopez  Health care agent number: [phone]    Living Will: Yes  [place]: Yes  What is most important right now is to focus on avoiding the hospital, symptom/pain control, quality of life, even if it means sacrificing a little time, improvement in condition but with limits to invasive therapies, comfort and QOL .  Accordingly, we have decided that the best plan to meet the patient's goals includes enrolling in hospice care.      Consults:   Consults (From admission, onward)          Status Ordering Provider     Inpatient consult to Midline team  Once        Provider:  (Not yet assigned)    Completed TREVIN GARCIA     Inpatient consult to Palliative Care  Once        Provider:  (Not yet assigned)    Completed ARNOLD PHILLIPS             Assessment/Plan:      * Breakthrough seizure  - Seizure precautions  - Continue Keppra via PO route  - EEG without seizure activity   - Keppra level WNLs        Hypophosphatemia  replaced         Hypokalemia  replaced         Goals of care, counseling/discussion  Advance Care Planning      Today a meeting took place: bedside     Patient Participation: Patient is unable to participate     Attendees (Name and  Relationship to patient): Health care power of : jesus NICHOLSON     Staff attendees (Name and  Role):         Trevin Garcia MD - admitting physician\  ACP Conversation (General): Understanding of current condition patient with critical lactic acidosis, concerns for sepsis and acute anemia, concern that patient may not survive  such a critical illness. see HPI for details     Code Status: DNR; status confirmed/order placed in chart     ACP Documents: None     Goals of care: The family and healthcare power of   endorses that what is most important right now is to focus on symptom/pain control and improvement in condition but with limits to invasive therapies      Accordingly, we have decided that the best plan to meet the patient's goals includes continuing with treatment             Recommendations/  Follow-up tasks: Other (specify below) f/u on palliative care consultation - trend of labs and clinical condition - if patient with deterioration would advocate that transition to comfort focused care happen sooner given patient's age-comorbid conditions and existing hospice enrollment prior to admission           Length of ACP   conversation in minutes: 32 minutes              Severe sepsis  This patient does not have evidence of infective focus  My overall impression is severe sepsis. Vital signs were reviewed and noted in progress note.  Antibiotics given-             Antibiotics (From admission, onward)     Start     Stop Route Frequency Ordered     01/21/23 0830   piperacillin-tazobactam (ZOSYN) 4.5 g in dextrose 5 % in water (D5W) 5 % 100 mL IVPB (MB+)         -- IV Every 8 hours (non-standard times) 01/21/23 0724          Cultures were taken-           Microbiology Results (last 7 days)      Procedure Component Value Units Date/Time     Blood culture x two cultures. Draw prior to antibiotics. [[id number]] Collected: 01/20/23 1742     Order Status: Completed Specimen: Blood from Peripheral, Lower Arm, Left Updated: 01/23/23 2012       Blood Culture, Routine No Growth to date         No Growth to date         No Growth to date         No Growth to date     Narrative:       Aerobic and anaerobic     Blood culture x two cultures. Draw prior to antibiotics. [[id number]] Collected: 01/20/23 1742     Order Status: Completed Specimen: Blood from Peripheral, Lower Arm, Left Updated: 01/23/23 2012       Blood Culture, Routine No Growth to date         No Growth to date         No Growth to date         No Growth to date     Narrative:       Aerobic and anaerobic     Urine culture [[id number]] Collected: 01/21/23 1823     Order Status: Completed Specimen: Urine Updated: 01/23/23  0334       Urine Culture, Routine No growth     Narrative:       Specimen Source->Urine          Latest lactate reviewed, they are-       Recent Labs   Lab 01/21/23  1929 01/22/23  1009   LACTATE 3.5* 1.9         Organ dysfunction indicated by Encephalopathy   Source- Unknown source  Source control Achieved by- Empiric Antibiotics     - Urine culture NGTD  - Blood culture NGTD  - Continue Zosyn x5 days for aspiration pna coverage         Lactic acidosis  - Received 30cc/kg bolus int the ED  - Recived 1unit PRBC on admission for Hgb 6.8  - Lactate 8.4 > >21 > 4.1 > 2.4 > 3.5 > 1.9   - VBG shows compensated metabolic AG acidosis   - No liver dysfunction on presentation - no obvious home medicines that might cause severe rise in lactic acid   - Resolved        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  - Trend iron panel  - Trend CBC  1/26 Hb 6.9 . Transfusion with 1 unit of PRBC. K and P replaced . discussed with daughter POA- Miss SoriaTete .   Prior EGD 2/22 - Submucosal nodule found in the duodenum.                          Biopsied.                          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                          Treated with argon beam coagulation.   FOBT. GI consulted afte discussion with daughter . likely discharge home with hospice after transfusion      Functional quadriplegia  - Total assist at baseline  - Q2 turns  - Assist with feeding        Dementia associated with other underlying disease without behavioral disturbance  - Chronic stable        Ventriculo-peritoneal shunt status  - Shunt series and CT head w/o any abnormality to V/P shunt        Essential hypertension  - Holding any anti-hypertensives  - Restart as needed      Final Active Diagnoses:    Diagnosis Date Noted POA    PRINCIPAL PROBLEM:  Breakthrough  seizure [G40.919] 02/04/2020 Yes    Hypokalemia [E87.6] 01/26/2023 Yes    Hypophosphatemia [E83.39] 01/26/2023 Yes    Palliative care encounter [Z51.5] 01/23/2023 Not Applicable    Advanced care planning/counseling discussion [Z71.89] 01/23/2023 Not Applicable    Goals of care, counseling/discussion [Z71.89] 01/21/2023 Not Applicable    Acute on chronic anemia [D64.9] 01/20/2023 Yes    Aphasia [R47.01] 01/20/2023 Yes    Lactic acidosis [E87.20] 01/20/2023 Yes    Severe sepsis [A41.9, R65.20] 01/20/2023 Yes    Functional quadriplegia [R53.2] 07/22/2020 Yes    Dementia 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            Continue taking these medications      acetaminophen 650 MG Supp  Commonly known as: TYLENOL  Place 650 mg rectally every 6 (six) hours as needed (fever).     albuterol-ipratropium 2.5 mg-0.5 mg/3 mL nebulizer solution  Commonly known as: DUO-NEB  Take 3 mLs by nebulization every 6 (six) hours as needed for Wheezing or Shortness of Breath. Rescue     bisacodyL 10 mg Supp  Commonly known as: DULCOLAX  Place 10 mg rectally daily as needed (constipation).     citalopram 10 MG tablet  Commonly known as: CeleXA  TAKE 1 TABLET(10 MG) BY MOUTH EVERY DAY     docusate sodium 100 MG capsule  Commonly known as: COLACE  Take 100 mg by mouth daily as needed for Constipation.     hyoscyamine 0.125 mg Subl  Commonly known as: LEVSIN/SL  Place 0.125 mg under the tongue every 6 (six) hours as needed (excessive secretions).     levETIRAcetam 1000 MG tablet  Commonly  known as: KEPPRA  TAKE 1 TABLET(1000 MG) BY MOUTH TWICE DAILY     LORazepam 2 mg/mL Conc  Commonly known as: ATIVAN  Take 0.25-0.5 ml by mouth every 4 hours as needed for anxiety/ agitation     morphine 100 mg/5 mL (20 mg/mL) concentrated solution  Take 0.25-0.5 ml by mouth every 2 hours as needed for severe pain or shortness of breath     omeprazole 20 MG capsule  Commonly known as: PRILOSEC  Take 20 mg by mouth once daily.     ondansetron 4 MG Tbdl  Commonly known as: ZOFRAN-ODT  Take 4 mg by mouth every 6 (six) hours as needed (nausea/ vomiting).                Discharged Condition: fair    Disposition: Hospice/Home               Follow Up:     Patient Instructions:   No discharge procedures on file.    Laboratory/Diagnostic Data:    CBC/Anemia Labs: Coags:    Recent Labs   Lab 01/23/23 0427 01/24/23 0442 01/25/23 0348 01/26/23  0529   WBC 13.85* 12.81* 10.60 12.51   HGB 7.5* 7.6* 7.6* 6.9*   HCT 26.5* 27.1* 25.8* 23.9*    237 254 262   MCV 68* 69* 67* 68*   RDW 25.0* 25.3* 25.3* 25.3*   IRON 12*  --   --   --    FERRITIN 31  --   --   --     Recent Labs   Lab 01/21/23  0525   INR 1.1   APTT <21.0        Chemistries: ABG:   Recent Labs   Lab 01/24/23 0442 01/25/23 0348 01/26/23  0529    140 139   K 3.7 3.1* 3.3*   * 110 110   CO2 21* 22* 22*   BUN 11 10 10   CREATININE 0.9 0.9 0.8   CALCIUM 8.7 8.5* 8.3*   PROT 6.0 6.0 5.7*   BILITOT 0.7 0.5 0.4   ALKPHOS 95 94 78   ALT <5* 5* 5*   AST 12 9* 11   MG 1.9 1.8 1.8   PHOS 2.0* 2.6* 2.4*    Recent Labs   Lab 01/20/23  2309 01/21/23  0137   PH 7.399 7.394   PCO2 28.3* 28.9*   PO2 64* 67*   HCO3 17.5* 17.7*   POCSATURATED 93* 93*   BE -7 -7        POCT Glucose: HbA1c:    No results for input(s): POCTGLUCOSE in the last 168 hours. Hemoglobin A1C   Date Value Ref Range Status   06/22/2021 5.2 4.0 - 5.6 % Final     Comment:     ADA Screening Guidelines:  5.7-6.4%  Consistent with prediabetes  >or=6.5%  Consistent with diabetes    High levels of  fetal hemoglobin interfere with the HbA1C  assay. Heterozygous hemoglobin variants (HbS, HgC, etc)do  not significantly interfere with this assay.   However, presence of multiple variants may affect accuracy.          Cardiac Enzymes: Ejection Fractions:    No results for input(s): CPK, CPKMB, MB, TROPONINI in the last 72 hours. No results found for: EF       No results for input(s): COLORU, APPEARANCEUA, PHUR, SPECGRAV, PROTEINUA, GLUCUA, KETONESU, BILIRUBINUA, OCCULTUA, NITRITE, UROBILINOGEN, LEUKOCYTESUR, RBCUA, WBCUA, BACTERIA, SQUAMEPITHEL, HYALINECASTS in the last 48 hours.    Invalid input(s): WRIGHTSUR    Procalcitonin (ng/mL)   Date Value   03/24/2017 0.24     Lactate (Lactic Acid) (mmol/L)   Date Value   01/22/2023 1.9   01/21/2023 3.5 (HH)   01/21/2023 2.4 (H)   01/21/2023 4.1 (HH)   01/20/2023 >12.0 (HH)     No results found for: BNP  Sed Rate (mm/Hr)   Date Value   07/14/2017 15 (H)     No results found for: DDIMER  Ferritin (ng/mL)   Date Value   01/23/2023 31     No results found for: LDH  No results found for: TROPONINI, CPK  Results for orders placed or performed in visit on 06/22/21   Vitamin D   Result Value Ref Range    Vit D, 25-Hydroxy 18 (L) 30 - 96 ng/mL   Results for orders placed or performed in visit on 01/22/19   Vitamin D   Result Value Ref Range    Vit D, 25-Hydroxy 24 (L) 30 - 96 ng/mL   Results for orders placed or performed in visit on 08/21/18   Vitamin D   Result Value Ref Range    Vit D, 25-Hydroxy 26 (L) 30 - 96 ng/mL     SARS-CoV2 (COVID-19) Qualitative PCR (no units)   Date Value   01/20/2023 Not Detected     POC Rapid COVID (no units)   Date Value   01/31/2022 Negative       Microbiology labs for the last week  Microbiology Results (last 7 days)       Procedure Component Value Units Date/Time    Blood culture x two cultures. Draw prior to antibiotics. [[id number]] Collected: 01/20/23 1742    Order Status: Completed Specimen: Blood from Peripheral, Lower Arm, Left Updated:  01/25/23 2012     Blood Culture, Routine No growth after 5 days.    Narrative:      Aerobic and anaerobic    Blood culture x two cultures. Draw prior to antibiotics. [[id number]] Collected: 01/20/23 1742    Order Status: Completed Specimen: Blood from Peripheral, Lower Arm, Left Updated: 01/25/23 2012     Blood Culture, Routine No growth after 5 days.    Narrative:      Aerobic and anaerobic    Urine culture [[id number]] Collected: 01/21/23 1823    Order Status: Completed Specimen: Urine Updated: 01/23/23 0334     Urine Culture, Routine No growth    Narrative:      Specimen Source->Urine              Reviewed and noted in plan where applicable- Please see chart for full lab data.    Lines/Drains:       Peripheral IV - Single Lumen 01/20/23 2017 22 G Posterior;Right Hand (Active)   Site Assessment Clean;Dry;Intact 01/26/23 0400   Extremity Assessment Distal to IV No abnormal discoloration 01/25/23 2000   Line Status Saline locked 01/26/23 0400   Dressing Status Clean;Dry;Intact 01/26/23 0400   Dressing Intervention Integrity maintained 01/26/23 0400   Dressing Change Due 01/25/23 01/25/23 2000   Site Change Due 01/25/23 01/25/23 2000   Reason Not Rotated Not due 01/24/23 2000   Number of days: 5            Peripheral IV - Single Lumen 01/20/23 2153 20 G Posterior;Right Forearm (Active)   Site Assessment Clean;Dry;Intact 01/26/23 0400   Extremity Assessment Distal to IV No abnormal discoloration 01/25/23 2000   Line Status Saline locked 01/26/23 0400   Dressing Status Clean;Dry;Intact 01/26/23 0400   Dressing Intervention Integrity maintained 01/26/23 0400   Dressing Change Due 01/25/23 01/25/23 2000   Site Change Due 01/25/23 01/25/23 2000   Reason Not Rotated Not due 01/24/23 2000   Number of days: 5            Peripheral IV - Single Lumen 01/24/23 1000 20 G;1 3/4 in Right Forearm (Active)   Site Assessment Clean;Dry;Intact 01/26/23 0400   Extremity Assessment Distal to IV No warmth;No swelling;No redness 01/25/23  2000   Line Status Saline locked 01/26/23 0400   Dressing Status Clean;Dry;Intact 01/26/23 0400   Dressing Intervention Integrity maintained 01/26/23 0400   Site Change Due 01/29/23 01/25/23 2000   Reason Not Rotated Not due 01/25/23 0800   Number of days: 1       Male External Urinary Catheter 01/21/23 1700 Medium (Active)   Collection Container Standard drainage bag 01/25/23 0800   Securement Method secured to top of thigh w/ adhesive device 01/25/23 0800   Skin no redness;no breakdown 01/25/23 0800   Tolerance no signs/symptoms of discomfort 01/25/23 0800   Output (mL) 200 mL 01/22/23 0800   Number of days: 4       Imaging  ECG Results              EKG 12-lead (Final result)  Result time 01/21/23 10:16:52      Final result by Interface, [facility] (01/21/23 10:16:52)               Narrative:    Test Reason : R41.82,    Vent. Rate : 086 BPM     Atrial Rate : 086 BPM     P-R Int : 156 ms          QRS Dur : 084 ms      QT Int : 422 ms       P-R-T Axes : 088 052 070 degrees     QTc Int : 504 ms    Normal sinus rhythm  Prolonged QT  Abnormal ECG  When compared with ECG of 31-JAN-2022 18:34,  No significant change was found  Confirmed by CHANO JOHNSON MD ([id number]) on 1/21/2023 10:16:43 AM    Referred By: [email or web address]   SELF           Confirmed By:CHANO JOHNSON MD                                  No results found for this or any previous visit.      CT Head Without Contrast  Narrative: EXAMINATION:  CT HEAD WITHOUT CONTRAST    CLINICAL HISTORY:  Mental status change, unknown cause;    TECHNIQUE:  Low dose axial CT images obtained throughout the head without the use of intravenous contrast.  Axial, sagittal and coronal reconstructions were performed.    COMPARISON:  CT 01/31/2022, 04/10/2018    FINDINGS:  Right parietal ventricular shunt catheter in stable position.  Ventricular system appears enlarged similar to the prior exam.  Third ventricle measures 17 mm (previously 17 mm).    Periventricular white matter  hypoattenuation likely representing chronic microvascular ischemic disease.  No evidence of acute territorial infarct or hemorrhage.  No mass effect or midline shift.  No edema.    No extra-axial blood or fluid collections Scattered atherosclerotic calcification about the skull base.    No acute displaced calvarial fracture.  Layering fluid within the sphenoid sinuses.  Otherwise paranasal sinuses and mastoid air cells are clear.  Impression: Stable right parietal ventriculostomy shunt catheter with prominence of the ventricular system, unchanged from prior.  Additional evaluation, as clinically warranted.    Generalized cerebral volume loss and chronic microvascular ischemic disease.    No acute territorial infarct or hemorrhage.    Electronically signed by resident: Dejon Willett  Date:    01/20/2023  Time:    19:16    Electronically signed by: Jose Thomas MD  Date:    01/20/2023  Time:    19:27  X-Ray Shunt Series  Narrative: EXAMINATION:  XR SHUNT SERIES    CLINICAL HISTORY:  shunt evaluation;    TECHNIQUE:  PA and lateral views of the skull neck chest and abdomen were performed to evaluate  shunt tube    COMPARISON:  Of 03/23/2017    FINDINGS:  Programmable shunt catheter with valve set to approximately 120 mm of water.  No discontinuity is evident.  Prominent waste material with rectal fecal impaction.  Atelectasis and/or infiltrate in the left lung base.  Impression: Stable  shunt tube apparently set to 120 mm of water.    Left lung base infiltrate versus atelectasis.    Mild constipation and rectal fecal impaction.    Electronically signed by: Kenrick Santoro  Date:    01/20/2023  Time:    18:32  X-Ray Chest AP Portable  Narrative: EXAMINATION:  XR CHEST AP PORTABLE    CLINICAL HISTORY:  Sepsis;    TECHNIQUE:  Single frontal view of the chest was performed.    COMPARISON:  02/01/2022    FINDINGS:  Descending shunt catheter tubing appears continuous.  The cardiomediastinal silhouette is not enlarged  noting calcification of the aorta..  There is no pleural effusion.  The trachea is midline.  The lungs are symmetrically expanded bilaterally with bilateral basilar subsegmental atelectasis or scarring, left greater than right.  No large focal consolidation seen.  There is no pneumothorax.  The osseous structures are remarkable for degenerative changes..  Impression: 1. Bilateral basilar subsegmental atelectasis or scarring, left greater than right.  No large focal consolidation.    Electronically signed by: Jed Jamil MD  Date:    01/20/2023  Time:    17:00      Imaging:  Imaging Results              CT Head Without Contrast (Final result)  Result time 01/20/23 19:27:20      Final result by Jose Thomas MD (01/20/23 19:27:20)               Impression:      Stable right parietal ventriculostomy shunt catheter with prominence of the ventricular system, unchanged from prior.  Additional evaluation, as clinically warranted.    Generalized cerebral volume loss and chronic microvascular ischemic disease.    No acute territorial infarct or hemorrhage.    Electronically signed by resident: Dejon Willett  Date:    01/20/2023  Time:    19:16    Electronically signed by: Jose Thomas MD  Date:    01/20/2023  Time:    19:27             Narrative:    EXAMINATION:  CT HEAD WITHOUT CONTRAST    CLINICAL HISTORY:  Mental status change, unknown cause;    TECHNIQUE:  Low dose axial CT images obtained throughout the head without the use of intravenous contrast.  Axial, sagittal and coronal reconstructions were performed.    COMPARISON:  CT 01/31/2022, 04/10/2018    FINDINGS:  Right parietal ventricular shunt catheter in stable position.  Ventricular system appears enlarged similar to the prior exam.  Third ventricle measures 17 mm (previously 17 mm).    Periventricular white matter hypoattenuation likely representing chronic microvascular ischemic disease.  No evidence of acute territorial infarct or hemorrhage.  No mass  effect or midline shift.  No edema.    No extra-axial blood or fluid collections Scattered atherosclerotic calcification about the skull base.    No acute displaced calvarial fracture.  Layering fluid within the sphenoid sinuses.  Otherwise paranasal sinuses and mastoid air cells are clear.                                     X-Ray Shunt Series (Final result)  Result time 01/20/23 18:32:39      Final result by Kenrick Santoro MD (01/20/23 18:32:39)               Impression:      Stable  shunt tube apparently set to 120 mm of water.    Left lung base infiltrate versus atelectasis.    Mild constipation and rectal fecal impaction.      Electronically signed by: Kenrick Santoro  Date:    01/20/2023  Time:    18:32             Narrative:    EXAMINATION:  XR SHUNT SERIES    CLINICAL HISTORY:  shunt evaluation;    TECHNIQUE:  PA and lateral views of the skull neck chest and abdomen were performed to evaluate  shunt tube    COMPARISON:  Of 03/23/2017    FINDINGS:  Programmable shunt catheter with valve set to approximately 120 mm of water.  No discontinuity is evident.  Prominent waste material with rectal fecal impaction.  Atelectasis and/or infiltrate in the left lung base.                                     X-Ray Chest AP Portable (Final result)  Result time 01/20/23 17:00:58      Final result by Jed Jamil MD (01/20/23 17:00:58)               Impression:      1. Bilateral basilar subsegmental atelectasis or scarring, left greater than right.  No large focal consolidation.      Electronically signed by: Jed Jamil MD  Date:    01/20/2023  Time:    17:00             Narrative:    EXAMINATION:  XR CHEST AP PORTABLE    CLINICAL HISTORY:  Sepsis;    TECHNIQUE:  Single frontal view of the chest was performed.    COMPARISON:  02/01/2022    FINDINGS:  Descending shunt catheter tubing appears continuous.  The cardiomediastinal silhouette is not enlarged noting calcification of the aorta..  There is no  pleural effusion.  The trachea is midline.  The lungs are symmetrically expanded bilaterally with bilateral basilar subsegmental atelectasis or scarring, left greater than right.  No large focal consolidation seen.  There is no pneumothorax.  The osseous structures are remarkable for degenerative changes..                                      Follow Up Instructions:     No future appointments.    Discharge Instructions:  No discharge procedures on file.      Total time spent on discharge 50   minutes     Yousif Mohan MD  Attending Staff Physician  [facility] Medicine

## 2023-03-23 ENCOUNTER — TELEPHONE (OUTPATIENT)
Dept: PRIMARY CARE CLINIC | Facility: CLINIC | Age: 88
End: 2023-03-23
Payer: MEDICARE

## 2023-03-23 NOTE — TELEPHONE ENCOUNTER
Call received from Kymberly with [facility].  Kymberly called to report that patient  on 3/19/2023

## 2023-07-17 NOTE — PROGRESS NOTES
"[facility] Hospital Medicine  Progress Note    Patient Name: Atif Neves  MRN: [id number]  Patient Class: IP- Inpatient   Admission Date: 3/23/2017  Length of Stay: 7 days  Attending Physician: Claudio Phipps MD  Primary Care Provider: Atif Christian MD    [facility] Medicine Team: [facility]  Claudio Phipps MD    Subjective:     Principal Problem:Delirium, acute    HPI:  "87 y/o gentleman presented to the ED today with acute onset of AMS.  At this time, he is confused, alone, and unable to provide any information related to events leading up to ED visit.  According to the records it appears that he has a hx of normal pressure hydrocephalus and recently underwent  shunt placement in February of this year.  He appears to have went to his 6 week post-op follow up with Dr. Sam on 3/22/17 and was at baseline and A/Ox3.   He reportedly was at baseline yesterday, but was found to be acutely alerted upon awaking this AM around 7:30. At this time he is screaming, calling for people that are not present in the room and conversing with the television. He is witnessed moving all 4 extremities.  He does follow simple commands but not consistently and provides yes / no responses to some questioning.  Additional PMH consists of HTN, BPH, macular degeneration, and HLD.  CT of the head was performed while in the ED and there appears to be no acute abnormality, but image was less than optimal related to motion artifact. He was found to have a WBC count of 16.1 and was evaluated by NSX and considered low probability for meningitis. U/A was not suggestive of UTI. Blood cultures were obtained and he was started empirically on vancomycin and cefepime."    Hospital Course:       Interval History:     Patient continues to be confused    Review of Systems   Unable to perform ROS: Other     Objective:     Vital Signs (Most Recent):  Temp: 98.6 °F (37 °C) (03/30/17 1222)  Pulse: 72 (03/30/17 1222)  Resp: 16 " (03/30/17 1222)  BP: (!) 111/58 (03/30/17 1222)  SpO2: 96 % (03/30/17 1222) Vital Signs (24h Range):  Temp:  [98.4 °F (36.9 °C)-101.3 °F (38.5 °C)] 98.6 °F (37 °C)  Pulse:  [66-81] 72  Resp:  [16-18] 16  SpO2:  [93 %-98 %] 96 %  BP: (111-165)/(58-76) 111/58     Weight: 68.9 kg (151 lb 14.4 oz)  Body mass index is 23.86 kg/(m^2).    Intake/Output Summary (Last 24 hours) at 03/30/17 1346  Last data filed at 03/30/17 1314   Gross per 24 hour   Intake              970 ml   Output                0 ml   Net              970 ml      Physical Exam   Constitutional: He appears well-nourished.   HENT:   Head: Atraumatic.   Cardiovascular: Normal rate.    Pulmonary/Chest: Effort normal.   Abdominal: Soft.   Neurological: He is alert.   Skin: Skin is warm.   Psychiatric: He has a normal mood and affect.   Vitals reviewed.      Significant Labs:   BMP:   Recent Labs  Lab 03/30/17  0404   GLU 90      K 4.0      CO2 28   BUN 12   CREATININE 1.0   CALCIUM 8.8   MG 1.9     CBC:   Recent Labs  Lab 03/29/17  0356 03/30/17  0404   WBC 6.79 4.84   HGB 11.2* 11.2*   HCT 33.8* 33.4*    222       Significant Imaging: None    Assessment/Plan:      * Delirium, acute  - remains altered, unclear etiology.  - continue all safety measures - high risk of injury / falls  - ammonia 23 on admission  - CT head unremarkable but less than optimal imaging related to motion artifact  - Neurology consulted; LP attempted. Appeared that infectious cause is possible given positive blood cultures but cultures are polymicrobial. ID consulted; repeat BCs are negative so far.  - Slightly improved but not at all near baseline. Was seen normal in Clinic, by Dr. Sam, 1 day prior to admission.  - possible seizure on 3/25/17, continuing Keppra.  - MRI negative  - Neuro signed off    NPH (normal pressure hydrocephalus)  - patient of Dr. Sam - who recently underwent  shunt placement 2/6/2017  - evaluated by Neurosurgery while in the ED  -  Neurology consulted  - Discussed with Awilda SHOOK,  shunt is MRI safe    Bacteremia due to Gram-positive bacteria  - WBC's 16.1 on arrival   - Procalcitonin 0.24  - U/A negative for nitrites or leukocytes   - Blood cultures positive for GPC in 2 bottles  - Vanco and cefepime started empirically in the ED  - continued Vanco and transitioned to ceftriaxone; plan to deescalate as indicated by culture results  - seen by Neurosurgery while in ED - thought to be low probability for meningitis - if workup is negative consider reconsulting and possible LP. 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  - Consulted ID. BCs are polymicrobial:  ENTEROCOCCUS FAECALIS   STAPHYLOCOCCUS EPIDERMIDIS     CORYNEBACTERIUM JEIKEIUM (JK)    Antibiotics per ID recommendations; vancomycin x 14 days.     VTE Risk Mitigation         Ordered     heparin (porcine) injection 5,000 Units  Every 12 hours     Route:  Subcutaneous        03/24/17 0018     Medium Risk of VTE  Once      03/24/17 0018     Place sequential compression device  Until discontinued      03/24/17 0018     Place LIAM hose  Until discontinued      03/24/17 0018          Claudio Phipps MD  Department of Hospital Medicine   [facility]   Suture Removal: 14 days

## 2023-11-02 NOTE — PROGRESS NOTES
DC instructions given and reviewed with pt and pt's daughter. Pt educated on SS of infection.  Pt verbalized understanding. Pt will receive information on future appts via mail. Pt's IV removed.  Pt received pain pill script.  Pt's daughter at bedside will transfer him home.  Wheelchair requested.    2 (mild pain)

## 2025-05-27 NOTE — OP NOTE
DATE OF PROCEDURE:  02/06/2017.    SURGEON:  Swapnil Sam M.D., Ph.D. and Viraj Mckay M.D.    ASSISTANT:  Lizandro Churchill D.O. (RES) (the assistant is a [facility] resident).    PROCEDURES PERFORMED:  1.  Endoscopic placement of a ventriculoperitoneal shunt.  2.  Stealth navigation.  3.  Laparoscopic placement of peritoneal catheter.    INDICATIONS IN DETAIL:  Mr. Neves is a very pleasant 88-year-old man who   presents with normal pressure hydrocephalus.  The patient has had signs and   symptoms of this and the diagnosis was confirmed by a cisternogram.  After the   risks, benefits and alternatives were described, they wished to proceed with   surgical placement of a ventriculoperitoneal shunt.    PROCEDURE IN DETAIL:  The patient was brought to the Operating Room where   general anesthetic was administered.  All proper lines were placed.  The patient   was placed in the supine position with a bump underneath his right shoulder.    The head was turned towards the right to allow access to the left parietal   region.  The Stealth navigation system was brought into the field and an   accurate registration was achieved using the tracer function in the AxiEM   setting.  The plan for placement had already been done prior to the patient   coming into the Operating Room.  The starting point in the right parietal or   posterior auricular region was marked on the patient's head.  Hair was clipped   from that area.  A curvilinear line was drawn there and the patient's head was   cleaned, prepped and draped in usual fashion.  An incision was made at the   aforementioned line and carried down through the galea.  A skin flap was turned   and held in retraction using a suture.  A pocket was made in the posterior   auricular region to hold the valve.  A Codman HaScrip-tm valve was obtained and set   at 100 mm of water and attached to the peritoneal catheter and secured using a   2-0 silk ligature.  A path  Last Office Visit  -  3/25/2025  Next Office Visit  -  07/02/25      Last Filled  -  04/24/25  Last UDS -  PDMP Monitoring:    Last PDMP Alin as Reviewed:  Review User Review Instant Review Result   DAVIDRUPA ELIZABETH CORTEZ 4/24/2025  6:04 PM Reviewed PDMP [1]     [unfilled]  Urine Drug Screenings (1 yr)       Urine Drug Screen  Collected: 9/20/2023  9:15 AM (Final result)              Drug screen multi urine  Collected: 5/14/2019  6:12 PM (Final result)   Narrative: Performed at:  [facility]  [address] [place] [address]   Phone [phone]             Drug screen multi urine  Collected: 3/10/2013 11:40 PM (Final result)   Narrative:    This method is a screening test to detect only these drug classes as  part of a medical workup. Confirmatory testing by another method should  be ordered if clinically indicated.             Drug screen multi urine  Collected: 2/4/2013  6:20 PM (Final result)   Narrative:    This method is a screening test to detect only these drug classes as  part of a medical workup. Confirmatory testing by another method should  be ordered if clinically indicated.             DRUG SCREEN MULTI URINE  Collected: 6/2/2010  7:35 PM (Final result)   Narrative:    This method is a screening test to detect only these drug classes as  part of a medical workup. Confirmatory testing by another method should  be ordered if clinically indicated.                 Medication Contract and Consent for Opioid Use Documents Filed        No documents found                  Contract -     between the posterior auricular region and the   abdomen was made using a tunneler.  A #2 silk ligature was tied to this and   pulled through the path.  This was then tied to the end of the peritoneal   catheter and pulled from the posterior auricular region down to the abdomen.    Dr. Mckay was then called into the room to place the peritoneal catheter.    Please see his note for the details of this procedure.  A single july hole was   then made in the calvarium and, using the Stealth navigation system, a catheter   was passed into the ventricular system in the region of the foramen of Monro.    Care was made to place it fairly high to keep it away from the choroid plexus   below.  Once this was in place, the catheter was cut to the appropriate size and   connected to the valve. The incisions were all closed in layers.     The patient was awakened and was extubated when he was able to follow commands.    EBL was 10 cc.    All counts were correct at the end of surgery.    There were no interoperative complication.        ROBINA/ANABEL  dd: 02/07/2017 10:26:28 (CST)  td: 02/07/2017 11:49:08 (CST)  Doc ID   #[id number]  Job ID #[id number]    CC:

## 2025-07-28 NOTE — ASSESSMENT & PLAN NOTE
Goal Outcome Evaluation:              Outcome Evaluation: VSS, blood glucose control per sliding scale, NPO at midnight for vascular surgery, consent signed, will continue plan of care.                              Hemoglobin 7.2, baseline-9.2; positive guaiac    IVF  Type and screen  Consult GI

## (undated) DEVICE — SUT 2-0 12-18IN SILK

## (undated) DEVICE — TAPE MEDIPORE 4IN X 2YDS

## (undated) DEVICE — SUT D SPECIAL VICRYL 2-0

## (undated) DEVICE — SUT SILK BLK BR. 2 2-60

## (undated) DEVICE — DURAPREP SURG SCRUB 26ML

## (undated) DEVICE — KIT AXIEM CRAN N INVAS SHUNT

## (undated) DEVICE — SUT MONOCRYL 4-0 PS-1 UND

## (undated) DEVICE — SEE MEDLINE ITEM 157150

## (undated) DEVICE — SEE MEDLINE ITEM 157131

## (undated) DEVICE — BLADE SURG CARBON STEEL SZ11

## (undated) DEVICE — CORD BIPOLAR 12 FOOT

## (undated) DEVICE — TRACER ZIEM POINTER

## (undated) DEVICE — DIFFUSER

## (undated) DEVICE — BURR ACORN 7.5MM

## (undated) DEVICE — DRESSING LEUKOPLAST FLEX 1X3IN

## (undated) DEVICE — TRAY CATH UM FOLEY SIL W 16FR

## (undated) DEVICE — STAPLER SKIN PROXIMATE WIDE

## (undated) DEVICE — CARTRIDGE OIL

## (undated) DEVICE — TUBE FRAZIER 5MM 2FT SOFT TIP

## (undated) DEVICE — DRAPE INCISE IOBAN 2 23X17IN

## (undated) DEVICE — SUT GUT PL. 4-0 27 FS-2

## (undated) DEVICE — CATH PASSER DISPOSABLE

## (undated) DEVICE — DRESSING TELFA N ADH 3X8

## (undated) DEVICE — PEN NEURO ENDOSCOPE RIGID

## (undated) DEVICE — BANDAGE ADHESIVE

## (undated) DEVICE — SPONGE DERMACEA GAUZE 4X4

## (undated) DEVICE — CLOSURE SKIN STERI STRIP 1/2X4

## (undated) DEVICE — SEE MEDLINE ITEM 156905

## (undated) DEVICE — ELECTRODE REM PLYHSV RETURN 9

## (undated) DEVICE — SUT 4/0 18IN NUROLON BLK B

## (undated) DEVICE — DRESSING SURGICAL 1/2X1/2

## (undated) DEVICE — POINTER AXIEM CRANIAL TRACER

## (undated) DEVICE — SHEET EENT SPLIT

## (undated) DEVICE — ELECTRODE BLADE INSULATED 1 IN

## (undated) DEVICE — MARKER SKIN STND TIP BLUE BARR

## (undated) DEVICE — DRUG BACITRACIN ZINC

## (undated) DEVICE — SPRAY MASTISOL

## (undated) DEVICE — ADHESIVE MASTISOL VIAL 48/BX

## (undated) DEVICE — BLADE CLIPPER NEURO / MDL 4411